# Patient Record
Sex: MALE | Race: WHITE | NOT HISPANIC OR LATINO | Employment: OTHER | URBAN - METROPOLITAN AREA
[De-identification: names, ages, dates, MRNs, and addresses within clinical notes are randomized per-mention and may not be internally consistent; named-entity substitution may affect disease eponyms.]

---

## 2024-08-01 ENCOUNTER — NURSE TRIAGE (OUTPATIENT)
Age: 79
End: 2024-08-01

## 2024-08-01 ENCOUNTER — OFFICE VISIT (OUTPATIENT)
Dept: UROLOGY | Facility: CLINIC | Age: 79
End: 2024-08-01
Payer: MEDICARE

## 2024-08-01 VITALS — SYSTOLIC BLOOD PRESSURE: 100 MMHG | DIASTOLIC BLOOD PRESSURE: 60 MMHG | OXYGEN SATURATION: 99 % | HEART RATE: 65 BPM

## 2024-08-01 DIAGNOSIS — N20.0 NEPHROLITHIASIS: Primary | ICD-10-CM

## 2024-08-01 PROCEDURE — 99204 OFFICE O/P NEW MOD 45 MIN: CPT

## 2024-08-01 RX ORDER — BACLOFEN 10 MG/1
5 TABLET ORAL
COMMUNITY

## 2024-08-01 RX ORDER — BISACODYL 5 MG/1
TABLET, DELAYED RELEASE ORAL
COMMUNITY

## 2024-08-01 RX ORDER — CEFDINIR 300 MG/1
CAPSULE ORAL
COMMUNITY
Start: 2024-06-01

## 2024-08-01 RX ORDER — AMLODIPINE BESYLATE 5 MG/1
TABLET ORAL
COMMUNITY
Start: 2024-07-07

## 2024-08-01 RX ORDER — PANTOPRAZOLE SODIUM 40 MG/1
TABLET, DELAYED RELEASE ORAL
COMMUNITY
Start: 2024-07-27

## 2024-08-01 RX ORDER — AMOXICILLIN 250 MG
CAPSULE ORAL
COMMUNITY

## 2024-08-01 RX ORDER — SENNOSIDES A AND B 8.6 MG/1
2 TABLET, FILM COATED ORAL
COMMUNITY

## 2024-08-01 RX ORDER — CEPHALEXIN 500 MG/1
CAPSULE ORAL
COMMUNITY
Start: 2024-04-30

## 2024-08-01 RX ORDER — LEVOTHYROXINE SODIUM 0.03 MG/1
TABLET ORAL
COMMUNITY
Start: 2024-07-26

## 2024-08-01 RX ORDER — ACETAMINOPHEN 325 MG/1
TABLET ORAL
COMMUNITY

## 2024-08-01 RX ORDER — SILVER SULFADIAZINE 1 %
CREAM (GRAM) TOPICAL
COMMUNITY
Start: 2024-07-29

## 2024-08-01 RX ORDER — FAMOTIDINE 20 MG/1
20 TABLET, FILM COATED ORAL DAILY
COMMUNITY

## 2024-08-01 NOTE — Clinical Note
Can we get urine culture results from PCP faxed over to us so I can review over the past year.  Also on recent BMP as well

## 2024-08-01 NOTE — TELEPHONE ENCOUNTER
"Answer Assessment - Initial Assessment Questions  1. REASON FOR CALL or QUESTION: \"What is your reason for calling today?\" or \"How can I best help you?\" or \"What question do you have that I can help answer?\"      [facility] returning a call to the office in regards to patient's appt for today. Call was disconnected    Protocols used: Information Only Call - No Triage-ADULT-[place]    "

## 2024-08-01 NOTE — PROGRESS NOTES
Office Visit- Urology  Chris Bojorquez 1945 MRN: [id number]      Assessment/Discussion/Plan    79 y.o. male managed by     Chronic catheter  -Per review of previous urologist records it appears patient has been having chronic catheter since 2022.  -Patient has a urethral catheter that is exchanged every 3 weeks due to obstruction.  Gave orders for flushing.  As well as next exchange to be a 18 Chinese all silicone catheter to prevent obstruction.  If catheter becomes less obstructed could consider exchange and on every 4-week basis  -Discussed suprapubic catheterization.  Patient refuses at this point in time  -Reassess in a few months.  Continue with routine catheter exchanges per nursing facility    2.  Urinary tract infections  -Family reports that patient has had multiple hospitalizations for sepsis  -Will obtain updated CT stone study to ensure no obstructing ureteral stones  -They will call office if any concern for urinary tract infection.  Will not treat with antibiotics for slowly cloudy urine or sediment.  Typical syndrome includes altered mental status with hallucinations.  Advised family to be on look out for symptoms such as increased bladder spasms, blood within the urine, suprapubic pain, fevers, chills  -Will obtain culture data over the past year.  Family would be interested in prophylactic antibiotics to keep patient out of hospital for urosepsis.  I think that this is reasonable given that he has prescribers that we would not bill will medicate with chronic catheter  -Would probably plan for Keflex 250 mg daily but will be dependent on urine culture results      Chief Complaint:   Chris is a 79 y.o. male presenting to the office for an initial evaluation regarding chronic catheter       Subjective    patient is a 79-year-old male with extensive urologic history including urinary retention Likely multifactorial given Parkinson's/BPH, nephrolithiasis, bladder stone, history of urosepsis.  He  presents today to the office with his 2 daughters.  He previously followed with [facility] but states that due to transportation issues patient was discharged from their practice.  Patient has a history of Rivera catheterization since 2022.  He has been maintained with a Rivera catheter has been exchanged on an every 3-week basis due to concern for obstruction/infection.  Nursing home staff performs exchanges every 3 weeks.  Patient does have a previous history of phimosis but with relatively easy access to urethral meatus.  Patient has previously refused consideration of suprapubic catheter.  He has had 2 ureteroscopy interventions for ureteral stones within the past year being in June 2023 used to in July 2023.  On patient has urinary tract infection family reports that he has hallucinations.  He stated that he has had to be hospitalized in the ICU due to sepsis    ROS:   Review of Systems   Constitutional: Negative.  Negative for chills, fatigue and fever.   HENT: Negative.     Respiratory:  Negative for shortness of breath.    Cardiovascular:  Negative for chest pain.   Gastrointestinal: Negative.  Negative for abdominal pain.   Endocrine: Negative.    Musculoskeletal: Negative.    Skin: Negative.    Neurological: Negative.  Negative for dizziness and light-headedness.   Hematological: Negative.    Psychiatric/Behavioral: Negative.           Past Medical History  History reviewed. No pertinent past medical history.    Past Surgical History  History reviewed. No pertinent surgical history.    Past Family History  History reviewed. No pertinent family history.    Past Social history  Social History     Socioeconomic History    Marital status:      Spouse name: Not on file    Number of children: Not on file    Years of education: Not on file    Highest education level: Not on file   Occupational History    Not on file   Tobacco Use    Smoking status: Not on file    Smokeless tobacco: Not on file    Substance and Sexual Activity    Alcohol use: Not on file    Drug use: Not on file    Sexual activity: Not on file   Other Topics Concern    Not on file   Social History Narrative    Not on file     Social Determinants of Health     Financial Resource Strain: Not on file   Food Insecurity: Not on file   Transportation Needs: Not on file   Physical Activity: Not on file   Stress: Not on file   Social Connections: Not on file   Intimate Partner Violence: Not on file   Housing Stability: Not on file       Current Medications  No current outpatient medications on file.     No current facility-administered medications for this visit.       Allergies  Not on File    OBJECTIVE    Vitals   Vitals:    08/01/24 1550   BP: 100/60   BP Location: Left arm   Patient Position: Sitting   Cuff Size: Standard   Pulse: 65   SpO2: 99%       PVR:    Physical Exam  Constitutional:       General: He is not in acute distress.     Appearance: Normal appearance. He is normal weight. He is not ill-appearing or toxic-appearing.   HENT:      Head: Normocephalic and atraumatic.   Eyes:      Conjunctiva/sclera: Conjunctivae normal.   Cardiovascular:      Rate and Rhythm: Normal rate.      Pulses: Normal pulses.   Pulmonary:      Effort: Pulmonary effort is normal. No respiratory distress.   Abdominal:      Tenderness: There is no right CVA tenderness or left CVA tenderness.   Genitourinary:     Comments: On exam today uncircumcised phallus.  Rivera catheter in place with mild exudate and urethra.  Not able to fully assess the ventral aspect of the urethra due to patient being in wheelchair not been able to ambulate but no significant erosion on my examination today  Musculoskeletal:         General: Normal range of motion.      Cervical back: Normal range of motion and neck supple.   Skin:     General: Skin is warm and dry.   Neurological:      General: No focal deficit present.      Mental Status: He is alert and oriented to person, place, and  "time.      Cranial Nerves: No cranial nerve deficit.   Psychiatric:         Mood and Affect: Mood normal.         Behavior: Behavior normal.         Thought Content: Thought content normal.          Labs:   LASTLAB(PROTEIN UA,TP,[id number],PROT,PROTEIN UA,PROTEINUA,PROTUR,LABPROTURI,PROTEIN,URPROTEIN)@   No results found for: \"PSA\"  No results found for: \"CREATININE\"   No results found for: \"HGBA1C\"  No results found for: \"GLUCOSE\", \"CALCIUM\", \"NA\", \"K\", \"CO2\", \"CL\", \"BUN\", \"CREATININE\"    I have personally reviewed all pertinent lab results and reviewed with patient    Imaging       David Sebastian PA-C  Date: 8/1/2024 Time: 3:56 PM  [facility]    This note was written using fluency dictation software. Please excuse any resulting minor grammatical errors.      "

## 2024-08-13 ENCOUNTER — HOSPITAL ENCOUNTER (OUTPATIENT)
Dept: RADIOLOGY | Facility: HOSPITAL | Age: 79
Discharge: HOME | End: 2024-08-13
Payer: MEDICARE

## 2024-08-13 DIAGNOSIS — N20.0 NEPHROLITHIASIS: ICD-10-CM

## 2024-08-13 PROCEDURE — 74176 CT ABD & PELVIS W/O CONTRAST: CPT

## 2024-08-14 ENCOUNTER — TELEPHONE (OUTPATIENT)
Age: 79
End: 2024-08-14

## 2024-08-14 NOTE — TELEPHONE ENCOUNTER
Radiology Test Results - Radiology Calling with report update    Pt under care of: David Sebastian- AP    Imaging Completed: 8/13/24    Significant Findings - Please review

## 2024-08-15 NOTE — TELEPHONE ENCOUNTER
Daughter called stating that brother sent message to her to call for the results of the patient's recent CT.     CB: [phone]

## 2024-08-15 NOTE — TELEPHONE ENCOUNTER
CT reviewed- large right renal pelvis stone burden (previous URS/laser lithotripsy x 2 in 2023 at [facility], with smaller burden then, so this appears new within the year)    i called patient with CT results. He is in facility and does not take his own calls. Nurse requested fax of the CT report to them as well.     I then called pt son listed as TOBY Reed he did not answer. left message on voicemail to please return our call to review dad's CT scan results.    stone surgery (high power URS/LL) will be recommended    [facility] DON fax #[phone]

## 2024-08-20 NOTE — TELEPHONE ENCOUNTER
Please call patient/power of  that due to the results of the CT scan he will likely need surgical intervention.  We should have this discussion in person so we can initiate this process.  Please schedule for urgent follow-up to initiate this.  I am okay with adding on during lunch.  Power of  should be present so that consent documentation can be signed

## 2024-08-20 NOTE — TELEPHONE ENCOUNTER
Spoke with Derek and relayed the message below: *Appt given for 8/26/24 at 12:40 pm with AP. Derek to call back and let us know who will be accompanying his dad to the appt.     My Note Signed 12:09 PM       Copy     LVM for pt's son - Derek - [phone]. *Instructed him to give the office a call back and ask for Terri JANSEN.      David Sebastian PA-C Physician Assistant Signed 10:22 AM      Copy      Please call patient/power of  that due to the results of the CT scan he will likely need surgical intervention.  We should have this discussion in person so we can initiate this process.  Please schedule for urgent follow-up to initiate this.  I am okay with adding on during lunch.  Power of  should be present so that consent documentation can be signed

## 2024-08-20 NOTE — TELEPHONE ENCOUNTER
[facility] for pt's son - Derek - [phone]. *Instructed him to give the office a call back and ask for Terri JANSEN.     David Sebastian PA-C Physician Assistant Signed 10:22 AM     Copy     Please call patient/power of  that due to the results of the CT scan he will likely need surgical intervention.  We should have this discussion in person so we can initiate this process.  Please schedule for urgent follow-up to initiate this.  I am okay with adding on during lunch.  Power of  should be present so that consent documentation can be signed

## 2024-08-21 NOTE — TELEPHONE ENCOUNTER
Pt son called and requested a VV for pt.  Stated that is is hard to get him out of the facility    Please chip son back at [phone]

## 2024-08-21 NOTE — TELEPHONE ENCOUNTER
Just spoke with patients son Derek, relayed Haines message that appointment needs to be done in person.  He would like to know what type of surgery his father might have, I aslo told him that would be decided when he meets with David.  Derek would still like a call back from Stephanie.

## 2024-08-26 ENCOUNTER — APPOINTMENT (EMERGENCY)
Dept: RADIOLOGY | Facility: HOSPITAL | Age: 79
DRG: 659 | End: 2024-08-26
Payer: MEDICARE

## 2024-08-26 ENCOUNTER — HOSPITAL ENCOUNTER (INPATIENT)
Facility: HOSPITAL | Age: 79
LOS: 15 days | Discharge: NURSING, REHAB OR LONG-TERM CARE | DRG: 659 | End: 2024-09-10
Attending: STUDENT IN AN ORGANIZED HEALTH CARE EDUCATION/TRAINING PROGRAM | Admitting: STUDENT IN AN ORGANIZED HEALTH CARE EDUCATION/TRAINING PROGRAM
Payer: MEDICARE

## 2024-08-26 DIAGNOSIS — L30.4 INTERTRIGO: ICD-10-CM

## 2024-08-26 DIAGNOSIS — R73.9 HYPERGLYCEMIA: ICD-10-CM

## 2024-08-26 DIAGNOSIS — N30.90 CYSTITIS: ICD-10-CM

## 2024-08-26 DIAGNOSIS — N20.0 RENAL STONES: ICD-10-CM

## 2024-08-26 DIAGNOSIS — R41.82 ALTERED MENTAL STATUS: Primary | ICD-10-CM

## 2024-08-26 DIAGNOSIS — A41.9 SEPSIS (HCC): ICD-10-CM

## 2024-08-26 DIAGNOSIS — I87.8 VENOUS STASIS: ICD-10-CM

## 2024-08-26 DIAGNOSIS — N17.9 AKI (ACUTE KIDNEY INJURY) (HCC): ICD-10-CM

## 2024-08-26 DIAGNOSIS — E87.70 VOLUME OVERLOAD: ICD-10-CM

## 2024-08-26 DIAGNOSIS — N12 EMPHYSEMATOUS PYELITIS: ICD-10-CM

## 2024-08-26 DIAGNOSIS — N50.89 SCROTAL SWELLING: ICD-10-CM

## 2024-08-26 DIAGNOSIS — N21.0 BLADDER STONES: ICD-10-CM

## 2024-08-26 DIAGNOSIS — E11.65 UNCONTROLLED TYPE 2 DIABETES MELLITUS WITH HYPERGLYCEMIA (HCC): ICD-10-CM

## 2024-08-26 DIAGNOSIS — N20.0 CALCULUS OF KIDNEY: ICD-10-CM

## 2024-08-26 PROBLEM — G93.41 ACUTE METABOLIC ENCEPHALOPATHY: Status: ACTIVE | Noted: 2024-08-26

## 2024-08-26 PROBLEM — E87.5 HYPERKALEMIA: Status: ACTIVE | Noted: 2024-08-26

## 2024-08-26 PROBLEM — I10 ESSENTIAL HYPERTENSION: Status: ACTIVE | Noted: 2024-08-26

## 2024-08-26 PROBLEM — G20.A1 PARKINSON DISEASE: Status: ACTIVE | Noted: 2024-08-26

## 2024-08-26 PROBLEM — E03.9 HYPOTHYROIDISM: Status: ACTIVE | Noted: 2024-08-26

## 2024-08-26 PROBLEM — E44.0 MODERATE PROTEIN-CALORIE MALNUTRITION (HCC): Status: ACTIVE | Noted: 2024-08-26

## 2024-08-26 PROBLEM — E87.1 HYPONATREMIA: Status: ACTIVE | Noted: 2024-08-26

## 2024-08-26 LAB
2HR DELTA HS TROPONIN: -1 NG/L
ALBUMIN SERPL BCG-MCNC: 2.9 G/DL (ref 3.5–5)
ALP SERPL-CCNC: 167 U/L (ref 34–104)
ALT SERPL W P-5'-P-CCNC: 8 U/L (ref 7–52)
AMMONIA PLAS-SCNC: 14 UMOL/L (ref 18–72)
ANION GAP SERPL CALCULATED.3IONS-SCNC: 8 MMOL/L (ref 4–13)
ANION GAP SERPL CALCULATED.3IONS-SCNC: 9 MMOL/L (ref 4–13)
APAP SERPL-MCNC: <2 UG/ML (ref 10–20)
AST SERPL W P-5'-P-CCNC: 25 U/L (ref 13–39)
B-OH-BUTYR SERPL-MCNC: 0.93 MMOL/L (ref 0.02–0.27)
BACTERIA UR QL AUTO: ABNORMAL /HPF
BASE EX.OXY STD BLDV CALC-SCNC: 61.8 % (ref 60–80)
BASE EXCESS BLDV CALC-SCNC: 0.4 MMOL/L
BASOPHILS # BLD AUTO: 0.02 THOUSANDS/ÂΜL (ref 0–0.1)
BASOPHILS NFR BLD AUTO: 0 % (ref 0–1)
BILIRUB SERPL-MCNC: 0.91 MG/DL (ref 0.2–1)
BILIRUB UR QL STRIP: NEGATIVE
BUN SERPL-MCNC: 39 MG/DL (ref 5–25)
BUN SERPL-MCNC: 41 MG/DL (ref 5–25)
CALCIUM ALBUM COR SERPL-MCNC: 8.9 MG/DL (ref 8.3–10.1)
CALCIUM SERPL-MCNC: 8 MG/DL (ref 8.4–10.2)
CALCIUM SERPL-MCNC: 8.1 MG/DL (ref 8.4–10.2)
CARDIAC TROPONIN I PNL SERPL HS: 10 NG/L
CARDIAC TROPONIN I PNL SERPL HS: 9 NG/L
CHLORIDE SERPL-SCNC: 104 MMOL/L (ref 96–108)
CHLORIDE SERPL-SCNC: 98 MMOL/L (ref 96–108)
CLARITY UR: ABNORMAL
CO2 SERPL-SCNC: 22 MMOL/L (ref 21–32)
CO2 SERPL-SCNC: 27 MMOL/L (ref 21–32)
COLOR UR: YELLOW
CREAT SERPL-MCNC: 1.94 MG/DL (ref 0.6–1.3)
CREAT SERPL-MCNC: 1.95 MG/DL (ref 0.6–1.3)
EOSINOPHIL # BLD AUTO: 0.01 THOUSAND/ÂΜL (ref 0–0.61)
EOSINOPHIL NFR BLD AUTO: 0 % (ref 0–6)
ERYTHROCYTE [DISTWIDTH] IN BLOOD BY AUTOMATED COUNT: 15.9 % (ref 11.6–15.1)
ETHANOL SERPL-MCNC: <10 MG/DL
FLUAV RNA RESP QL NAA+PROBE: NEGATIVE
FLUBV RNA RESP QL NAA+PROBE: NEGATIVE
GFR SERPL CREATININE-BSD FRML MDRD: 32 ML/MIN/1.73SQ M
GFR SERPL CREATININE-BSD FRML MDRD: 32 ML/MIN/1.73SQ M
GLUCOSE SERPL-MCNC: 171 MG/DL (ref 65–140)
GLUCOSE SERPL-MCNC: 348 MG/DL (ref 65–140)
GLUCOSE SERPL-MCNC: 437 MG/DL (ref 65–140)
GLUCOSE SERPL-MCNC: 492 MG/DL (ref 65–140)
GLUCOSE SERPL-MCNC: 554 MG/DL (ref 65–140)
GLUCOSE SERPL-MCNC: 638 MG/DL (ref 65–140)
GLUCOSE UR STRIP-MCNC: ABNORMAL MG/DL
HCO3 BLDV-SCNC: 25.6 MMOL/L (ref 24–30)
HCT VFR BLD AUTO: 33.7 % (ref 36.5–49.3)
HGB BLD-MCNC: 10.3 G/DL (ref 12–17)
HGB UR QL STRIP.AUTO: ABNORMAL
IMM GRANULOCYTES # BLD AUTO: 0.03 THOUSAND/UL (ref 0–0.2)
IMM GRANULOCYTES NFR BLD AUTO: 0 % (ref 0–2)
KETONES UR STRIP-MCNC: ABNORMAL MG/DL
LACTATE SERPL-SCNC: 1.1 MMOL/L (ref 0.5–2)
LEUKOCYTE ESTERASE UR QL STRIP: ABNORMAL
LYMPHOCYTES # BLD AUTO: 0.55 THOUSANDS/ÂΜL (ref 0.6–4.47)
LYMPHOCYTES NFR BLD AUTO: 6 % (ref 14–44)
MCH RBC QN AUTO: 25.5 PG (ref 26.8–34.3)
MCHC RBC AUTO-ENTMCNC: 30.6 G/DL (ref 31.4–37.4)
MCV RBC AUTO: 83 FL (ref 82–98)
MONOCYTES # BLD AUTO: 0.73 THOUSAND/ÂΜL (ref 0.17–1.22)
MONOCYTES NFR BLD AUTO: 8 % (ref 4–12)
NEUTROPHILS # BLD AUTO: 7.88 THOUSANDS/ÂΜL (ref 1.85–7.62)
NEUTS SEG NFR BLD AUTO: 86 % (ref 43–75)
NITRITE UR QL STRIP: NEGATIVE
NON-SQ EPI CELLS URNS QL MICRO: ABNORMAL /HPF
NRBC BLD AUTO-RTO: 0 /100 WBCS
O2 CT BLDV-SCNC: 9.4 ML/DL
PCO2 BLDV: 43.7 MM HG (ref 42–50)
PH BLDV: 7.39 [PH] (ref 7.3–7.4)
PH UR STRIP.AUTO: 7 [PH]
PLATELET # BLD AUTO: 149 THOUSANDS/UL (ref 149–390)
PLATELET # BLD AUTO: 163 THOUSANDS/UL (ref 149–390)
PMV BLD AUTO: 10.3 FL (ref 8.9–12.7)
PMV BLD AUTO: 9.7 FL (ref 8.9–12.7)
PO2 BLDV: 32.4 MM HG (ref 35–45)
POTASSIUM SERPL-SCNC: 5.4 MMOL/L (ref 3.5–5.3)
POTASSIUM SERPL-SCNC: 5.4 MMOL/L (ref 3.5–5.3)
PROT SERPL-MCNC: 6.8 G/DL (ref 6.4–8.4)
PROT UR STRIP-MCNC: ABNORMAL MG/DL
RBC # BLD AUTO: 4.04 MILLION/UL (ref 3.88–5.62)
RBC #/AREA URNS AUTO: ABNORMAL /HPF
RSV RNA RESP QL NAA+PROBE: NEGATIVE
SALICYLATES SERPL-MCNC: <5 MG/DL (ref 3–20)
SARS-COV-2 RNA RESP QL NAA+PROBE: NEGATIVE
SODIUM SERPL-SCNC: 133 MMOL/L (ref 135–147)
SODIUM SERPL-SCNC: 135 MMOL/L (ref 135–147)
SP GR UR STRIP.AUTO: 1.01 (ref 1–1.03)
TSH SERPL DL<=0.05 MIU/L-ACNC: 2.57 UIU/ML (ref 0.45–4.5)
UROBILINOGEN UR STRIP-ACNC: <2 MG/DL
WBC # BLD AUTO: 9.22 THOUSAND/UL (ref 4.31–10.16)
WBC #/AREA URNS AUTO: ABNORMAL /HPF
WBC CLUMPS # UR AUTO: PRESENT /UL

## 2024-08-26 PROCEDURE — 83605 ASSAY OF LACTIC ACID: CPT | Performed by: STUDENT IN AN ORGANIZED HEALTH CARE EDUCATION/TRAINING PROGRAM

## 2024-08-26 PROCEDURE — 96365 THER/PROPH/DIAG IV INF INIT: CPT

## 2024-08-26 PROCEDURE — 82140 ASSAY OF AMMONIA: CPT | Performed by: STUDENT IN AN ORGANIZED HEALTH CARE EDUCATION/TRAINING PROGRAM

## 2024-08-26 PROCEDURE — 83036 HEMOGLOBIN GLYCOSYLATED A1C: CPT

## 2024-08-26 PROCEDURE — 84443 ASSAY THYROID STIM HORMONE: CPT

## 2024-08-26 PROCEDURE — 82805 BLOOD GASES W/O2 SATURATION: CPT | Performed by: STUDENT IN AN ORGANIZED HEALTH CARE EDUCATION/TRAINING PROGRAM

## 2024-08-26 PROCEDURE — 99285 EMERGENCY DEPT VISIT HI MDM: CPT | Performed by: STUDENT IN AN ORGANIZED HEALTH CARE EDUCATION/TRAINING PROGRAM

## 2024-08-26 PROCEDURE — 87186 SC STD MICRODIL/AGAR DIL: CPT | Performed by: STUDENT IN AN ORGANIZED HEALTH CARE EDUCATION/TRAINING PROGRAM

## 2024-08-26 PROCEDURE — 82010 KETONE BODYS QUAN: CPT | Performed by: STUDENT IN AN ORGANIZED HEALTH CARE EDUCATION/TRAINING PROGRAM

## 2024-08-26 PROCEDURE — 82077 ASSAY SPEC XCP UR&BREATH IA: CPT | Performed by: STUDENT IN AN ORGANIZED HEALTH CARE EDUCATION/TRAINING PROGRAM

## 2024-08-26 PROCEDURE — 85025 COMPLETE CBC W/AUTO DIFF WBC: CPT | Performed by: STUDENT IN AN ORGANIZED HEALTH CARE EDUCATION/TRAINING PROGRAM

## 2024-08-26 PROCEDURE — 71045 X-RAY EXAM CHEST 1 VIEW: CPT

## 2024-08-26 PROCEDURE — 0241U HB NFCT DS VIR RESP RNA 4 TRGT: CPT | Performed by: STUDENT IN AN ORGANIZED HEALTH CARE EDUCATION/TRAINING PROGRAM

## 2024-08-26 PROCEDURE — 99222 1ST HOSP IP/OBS MODERATE 55: CPT

## 2024-08-26 PROCEDURE — 36415 COLL VENOUS BLD VENIPUNCTURE: CPT | Performed by: STUDENT IN AN ORGANIZED HEALTH CARE EDUCATION/TRAINING PROGRAM

## 2024-08-26 PROCEDURE — 87040 BLOOD CULTURE FOR BACTERIA: CPT | Performed by: STUDENT IN AN ORGANIZED HEALTH CARE EDUCATION/TRAINING PROGRAM

## 2024-08-26 PROCEDURE — 87154 CUL TYP ID BLD PTHGN 6+ TRGT: CPT | Performed by: STUDENT IN AN ORGANIZED HEALTH CARE EDUCATION/TRAINING PROGRAM

## 2024-08-26 PROCEDURE — 96375 TX/PRO/DX INJ NEW DRUG ADDON: CPT

## 2024-08-26 PROCEDURE — 81001 URINALYSIS AUTO W/SCOPE: CPT | Performed by: STUDENT IN AN ORGANIZED HEALTH CARE EDUCATION/TRAINING PROGRAM

## 2024-08-26 PROCEDURE — 99285 EMERGENCY DEPT VISIT HI MDM: CPT

## 2024-08-26 PROCEDURE — 80053 COMPREHEN METABOLIC PANEL: CPT | Performed by: STUDENT IN AN ORGANIZED HEALTH CARE EDUCATION/TRAINING PROGRAM

## 2024-08-26 PROCEDURE — 87077 CULTURE AEROBIC IDENTIFY: CPT | Performed by: STUDENT IN AN ORGANIZED HEALTH CARE EDUCATION/TRAINING PROGRAM

## 2024-08-26 PROCEDURE — 70450 CT HEAD/BRAIN W/O DYE: CPT

## 2024-08-26 PROCEDURE — 82948 REAGENT STRIP/BLOOD GLUCOSE: CPT

## 2024-08-26 PROCEDURE — 74177 CT ABD & PELVIS W/CONTRAST: CPT

## 2024-08-26 PROCEDURE — 80179 DRUG ASSAY SALICYLATE: CPT | Performed by: STUDENT IN AN ORGANIZED HEALTH CARE EDUCATION/TRAINING PROGRAM

## 2024-08-26 PROCEDURE — 87086 URINE CULTURE/COLONY COUNT: CPT | Performed by: STUDENT IN AN ORGANIZED HEALTH CARE EDUCATION/TRAINING PROGRAM

## 2024-08-26 PROCEDURE — 80048 BASIC METABOLIC PNL TOTAL CA: CPT

## 2024-08-26 PROCEDURE — 93005 ELECTROCARDIOGRAM TRACING: CPT

## 2024-08-26 PROCEDURE — 84484 ASSAY OF TROPONIN QUANT: CPT | Performed by: STUDENT IN AN ORGANIZED HEALTH CARE EDUCATION/TRAINING PROGRAM

## 2024-08-26 PROCEDURE — 80143 DRUG ASSAY ACETAMINOPHEN: CPT | Performed by: STUDENT IN AN ORGANIZED HEALTH CARE EDUCATION/TRAINING PROGRAM

## 2024-08-26 PROCEDURE — 85049 AUTOMATED PLATELET COUNT: CPT

## 2024-08-26 RX ORDER — CARBIDOPA AND LEVODOPA 25; 100 MG/1; MG/1
1 TABLET ORAL 2 TIMES DAILY
Status: DISCONTINUED | OUTPATIENT
Start: 2024-08-26 | End: 2024-09-10 | Stop reason: HOSPADM

## 2024-08-26 RX ORDER — ENOXAPARIN SODIUM 100 MG/ML
40 INJECTION SUBCUTANEOUS DAILY
Status: DISCONTINUED | OUTPATIENT
Start: 2024-08-27 | End: 2024-08-26

## 2024-08-26 RX ORDER — SODIUM CHLORIDE 9 MG/ML
75 INJECTION, SOLUTION INTRAVENOUS CONTINUOUS
Status: DISCONTINUED | OUTPATIENT
Start: 2024-08-26 | End: 2024-08-28

## 2024-08-26 RX ORDER — PANTOPRAZOLE SODIUM 40 MG/1
40 TABLET, DELAYED RELEASE ORAL
Status: DISCONTINUED | OUTPATIENT
Start: 2024-08-27 | End: 2024-09-10 | Stop reason: HOSPADM

## 2024-08-26 RX ORDER — CEFEPIME HYDROCHLORIDE 2 G/50ML
2000 INJECTION, SOLUTION INTRAVENOUS ONCE
Status: COMPLETED | OUTPATIENT
Start: 2024-08-26 | End: 2024-08-26

## 2024-08-26 RX ORDER — CEFEPIME HYDROCHLORIDE 1 G/50ML
1000 INJECTION, SOLUTION INTRAVENOUS EVERY 24 HOURS
Status: DISCONTINUED | OUTPATIENT
Start: 2024-08-27 | End: 2024-08-27

## 2024-08-26 RX ORDER — LEVOTHYROXINE SODIUM 25 UG/1
25 TABLET ORAL
Status: DISCONTINUED | OUTPATIENT
Start: 2024-08-27 | End: 2024-09-10 | Stop reason: HOSPADM

## 2024-08-26 RX ORDER — ENOXAPARIN SODIUM 100 MG/ML
30 INJECTION SUBCUTANEOUS DAILY
Status: DISCONTINUED | OUTPATIENT
Start: 2024-08-27 | End: 2024-08-29

## 2024-08-26 RX ORDER — CEFEPIME HYDROCHLORIDE 2 G/50ML
2000 INJECTION, SOLUTION INTRAVENOUS EVERY 12 HOURS
Status: DISCONTINUED | OUTPATIENT
Start: 2024-08-26 | End: 2024-08-26

## 2024-08-26 RX ORDER — SODIUM CHLORIDE, SODIUM GLUCONATE, SODIUM ACETATE, POTASSIUM CHLORIDE, MAGNESIUM CHLORIDE, SODIUM PHOSPHATE, DIBASIC, AND POTASSIUM PHOSPHATE .53; .5; .37; .037; .03; .012; .00082 G/100ML; G/100ML; G/100ML; G/100ML; G/100ML; G/100ML; G/100ML
75 INJECTION, SOLUTION INTRAVENOUS CONTINUOUS
Status: DISCONTINUED | OUTPATIENT
Start: 2024-08-26 | End: 2024-08-26

## 2024-08-26 RX ADMIN — SODIUM CHLORIDE 75 ML/HR: 0.9 INJECTION, SOLUTION INTRAVENOUS at 21:58

## 2024-08-26 RX ADMIN — CARBIDOPA AND LEVODOPA 1 TABLET: 25; 100 TABLET ORAL at 19:21

## 2024-08-26 RX ADMIN — SODIUM CHLORIDE 10 UNITS/HR: 9 INJECTION, SOLUTION INTRAVENOUS at 19:47

## 2024-08-26 RX ADMIN — CEFEPIME HYDROCHLORIDE 2000 MG: 2 INJECTION, SOLUTION INTRAVENOUS at 13:36

## 2024-08-26 RX ADMIN — SODIUM CHLORIDE 1000 ML: 0.9 INJECTION, SOLUTION INTRAVENOUS at 18:12

## 2024-08-26 RX ADMIN — SODIUM CHLORIDE 1000 ML: 0.9 INJECTION, SOLUTION INTRAVENOUS at 13:35

## 2024-08-26 RX ADMIN — VANCOMYCIN HYDROCHLORIDE 1500 MG: 10 INJECTION, POWDER, LYOPHILIZED, FOR SOLUTION INTRAVENOUS at 13:42

## 2024-08-26 RX ADMIN — IOHEXOL 100 ML: 350 INJECTION, SOLUTION INTRAVENOUS at 17:14

## 2024-08-26 NOTE — H&P
[facility]  H&P  Name: Chris Bojorquez 79 y.o. male I MRN: [id number]  Unit/Bed#: ED 09 I Date of Admission: 8/26/2024   Date of Service: 8/26/2024 I Hospital Day: 0      Assessment & Plan   Emphysematous pyelitis  Assessment & Plan  CT ABD: Worsening findings of urinary tract infection on the right, suspicious for emphysematous pyelitis. There is urothelial thickening throughout the right renal collecting system as well as gas within the renal collecting system and ureter. There is no   renal parenchymal gas to suggest emphysematous pyelonephritis.        Plan: case discussed with urology recommended admit and will evaluate per ED physician . Dr. Hughes aware     -continue cefipime   -de escalate Abx accordingly   -am labs   -continue IVF   -monitor I/Os   -trend vitals   -consult urology        * Acute metabolic encephalopathy  Assessment & Plan  On arrival from nursing home  Family reports poor compliance with medications including Diabetes medications   For the past several days havent been himself with lack of appetite       -complicated UTI   -mphysematous pyelitis.   -dehydration     CT head: no acute findings     CT ABD: Worsening findings of urinary tract infection on the right, suspicious for emphysematous pyelitis. There is urothelial thickening throughout the right renal collecting system as well as gas within the renal collecting system and ureter. There is no   renal parenchymal gas to suggest emphysematous pyelonephritis.    Plan:   Start cefepime   Continue IVF at adjusted level   Clears diet   Insulin infusion given severely increased and patient is drowsy and confused   Am labs   Interval follow up     Parkinson disease  Assessment & Plan  Continue carbidopa - levodopa       Hypothyroidism  Assessment & Plan  Continue medication  Recheck TSH     PABLO (acute kidney injury) (HCC)  Assessment & Plan  PABLO state on CKD 3   Per chart review basline Cr 0.7-0.9     Trend BMP   Am  "labs   Avoid nephrotoxins   PVR   Bladder scan   Retention protocol       Uncontrolled type 2 diabetes mellitus with hyperglycemia (HCC)  Assessment & Plan  No results found for: \"HGBA1C\"    Recent Labs     08/26/24  1219   POCGLU 554*       Blood Sugar Average: Last 72 hrs:  (P) 554  Poor complaince to medications per family   Takes metformin at home      Check HBA1C   Start insulin infusion   Interval follow up   Am labs    Accu checks Q 4     Moderate protein-calorie malnutrition (HCC)  Assessment & Plan  Malnutrition Findings:         Consult nutrition services                         BMI Findings:           There is no height or weight on file to calculate BMI.       Hyperkalemia  Assessment & Plan  Mild   No EKG changes    Recheck BMP in 4 hours     Hyponatremia  Assessment & Plan  Hypovolemic hyponatremia   Patient is dehydrated      S/p IV NS x 2 boluses total 2 L       Recheck BMP in 4 hrs     Essential hypertension  Assessment & Plan  Hold norvasc.   Restart when possible     -monitor vitals            VTE Pharmacologic Prophylaxis: VTE Score: 5 High Risk (Score >/= 5) - Pharmacological DVT Prophylaxis Ordered: enoxaparin (Lovenox). Sequential Compression Devices Ordered.  Code Status: Level 1 - Full Code   Discussion with family: Updated  (son) at bedside.    Anticipated Length of Stay: Patient will be admitted on an inpatient basis with an anticipated length of stay of greater than 2 midnights secondary to complicated UTI .    Total Time Spent on Date of Encounter in care of patient: 45 mins. This time was spent on one or more of the following: performing physical exam; counseling and coordination of care; obtaining or reviewing history; documenting in the medical record; reviewing/ordering tests, medications or procedures; communicating with other healthcare professionals and discussing with patient's family/caregivers.    Chief Complaint: altered mental state presents with his son and DIL "     History of Present Illness:  Chris Bojorquez is a 79 y.o. male with a PMH of htn , renal calculi, hypothyroidism , T2DM  who presents with his family from nursing home after was found confused and in altered mental state.family reports that this problem have been progressing for the past several days. Also with lack of appetite.   He have not been compliant with his medications per nursing at nursing home.     During my evaluation patient would open his eyes when called.   Answers breifly with one word.   Family reports that at baseline he talks and is sharp with limitations  He doesn't walk due to parkinsons.       Review of Systems:  Review of Systems   Constitutional:  Positive for activity change, appetite change and fatigue.   Gastrointestinal:  Positive for abdominal pain.       Past Medical and Surgical History:   History reviewed. No pertinent past medical history.    History reviewed. No pertinent surgical history.    Meds/Allergies:  Prior to Admission medications    Medication Sig Start Date End Date Taking? Authorizing Provider   acetaminophen (TYLENOL) 325 mg tablet every 4 (four) hours.    Historical Provider, MD   Acetaminophen 325 MG CAPS Take 2 tablets by mouth    Historical Provider, MD   amLODIPine (NORVASC) 5 mg tablet  7/7/24   Historical Provider, MD   baclofen 10 mg tablet Take 5 mg by mouth    Historical Provider, MD   bisacodyl (DULCOLAX) 5 mg EC tablet 1 (one) time each day at the same time.    Historical Provider, MD   carbidopa-levodopa (SINEMET)  mg per tablet  7/27/24   Historical Provider, MD   cefdinir (OMNICEF) 300 mg capsule  6/1/24   Historical Provider, MD   cephalexin (KEFLEX) 500 mg capsule  4/30/24   Historical Provider, MD   famotidine (PEPCID) 20 mg tablet Take 20 mg by mouth daily    Historical Provider, MD   levothyroxine 25 mcg tablet  7/26/24   Historical Provider, MD   magnesium hydroxide (MILK OF MAGNESIA) 400 mg/5 mL oral suspension Take by mouth     Historical Provider, MD   pantoprazole (PROTONIX) 40 mg tablet  7/27/24   Historical Provider, MD   senna (Senokot) 8.6 MG tablet Take 2 tablets by mouth    Historical Provider, MD   senna-docusate sodium (SENOKOT S) 8.6-50 mg per tablet 1 (one) time each day at the same time.    Historical Provider, MD   SSD 1 % cream  7/29/24   Historical Provider, MD GARCIA have reviewed home medications with patient personally.    Allergies:   Allergies   Allergen Reactions    Lactose - Food Allergy Other (See Comments)    Sulfa Antibiotics Other (See Comments)       Social History:  Marital Status:    Occupation: retired   Patient Pre-hospital Living Situation: Skilled Nursing Facility:    Patient Pre-hospital Level of Mobility: manual wheelchair  Patient Pre-hospital Diet Restrictions: regular   Substance Use History:   Social History     Substance and Sexual Activity   Alcohol Use None     Social History     Tobacco Use   Smoking Status Not on file   Smokeless Tobacco Not on file     Social History     Substance and Sexual Activity   Drug Use Not on file       Family History:  History reviewed. No pertinent family history.    Physical Exam:     Vitals:   Blood Pressure: 109/55 (08/26/24 1433)  Pulse: 70 (08/26/24 1433)  Temperature: 97.8 °F (36.6 °C) (08/26/24 1222)  Temp Source: Tympanic (08/26/24 1222)  Respirations: 16 (08/26/24 1433)  Weight - Scale: 62.4 kg (137 lb 9.1 oz) (08/26/24 1222)  SpO2: 97 % (08/26/24 1433)    Physical Exam  Constitutional:       Appearance: He is ill-appearing.   Cardiovascular:      Rate and Rhythm: Normal rate and regular rhythm.      Pulses: Normal pulses.      Heart sounds: Normal heart sounds. No murmur heard.  Pulmonary:      Effort: Pulmonary effort is normal.      Breath sounds: Normal breath sounds.   Abdominal:      General: Abdomen is flat. Bowel sounds are normal. There is no distension.      Palpations: Abdomen is soft.   Skin:     General: Skin is warm.      Capillary  "Refill: Capillary refill takes more than 3 seconds.   Neurological:      Mental Status: He is disoriented.   Psychiatric:         Behavior: Behavior normal.          Additional Data:     Lab Results:  Results from last 7 days   Lab Units 08/26/24  1256   WBC Thousand/uL 9.22   HEMOGLOBIN g/dL 10.3*   HEMATOCRIT % 33.7*   PLATELETS Thousands/uL 163   SEGS PCT % 86*   LYMPHO PCT % 6*   MONO PCT % 8   EOS PCT % 0     Results from last 7 days   Lab Units 08/26/24  1256   SODIUM mmol/L 133*   POTASSIUM mmol/L 5.4*   CHLORIDE mmol/L 98   CO2 mmol/L 27   BUN mg/dL 41*   CREATININE mg/dL 1.94*   ANION GAP mmol/L 8   CALCIUM mg/dL 8.0*   ALBUMIN g/dL 2.9*   TOTAL BILIRUBIN mg/dL 0.91   ALK PHOS U/L 167*   ALT U/L 8   AST U/L 25   GLUCOSE RANDOM mg/dL 638*         Results from last 7 days   Lab Units 08/26/24  1219   POC GLUCOSE mg/dl 554*     No results found for: \"HGBA1C\"  Results from last 7 days   Lab Units 08/26/24  1812   LACTIC ACID mmol/L 1.1       Lines/Drains:  Invasive Devices       Peripheral Intravenous Line  Duration             Peripheral IV 08/26/24 Distal;Left;Ventral (anterior) Forearm <1 day                        Imaging: Reviewed radiology reports from this admission including: abdominal/pelvic CT  CT head without contrast   Final Result by Isac Mcdonough MD (08/26 3007)      No acute intracranial abnormality.                  Workstation performed: [id number]         CT abdomen pelvis w contrast   Final Result by Isac Mcdonough MD (08/26 1777)      1. Worsening findings of urinary tract infection on the right, suspicious for emphysematous pyelitis. There is urothelial thickening throughout the right renal collecting system as well as gas within the renal collecting system and ureter. There is no    renal parenchymal gas to suggest emphysematous pyelonephritis.   2. Delayed nephrogram on the right, likely due to obstructive uropathy.   3. Decreased calculus volume in the right kidney, " interval passage of calculi suspected.   4. Bladder calculus with diffuse bladder wall thickening compatible with cystitis.      The study was marked in EPIC for immediate notification.      Workstation performed: [id number]         XR chest 1 view portable   ED Interpretation by Awais Haines DO (08/26 1824)   No acute cardiopulmonary disease          EKG and Other Studies Reviewed on Admission:   EKG: Personally Reviewed.    ** Please Note: This note has been constructed using a voice recognition system. **

## 2024-08-26 NOTE — ED NOTES
Update of pt's disposition given to [facility] over the phone     Cadence Islas RN  08/26/24 2305

## 2024-08-26 NOTE — ASSESSMENT & PLAN NOTE
Malnutrition Findings:         Consult nutrition services                         BMI Findings:           There is no height or weight on file to calculate BMI.

## 2024-08-26 NOTE — ASSESSMENT & PLAN NOTE
On arrival from nursing home  Family reports poor compliance with medications including Diabetes medications   For the past several days havent been himself with lack of appetite       -complicated UTI   -mphysematous pyelitis.   -dehydration     CT head: no acute findings     CT ABD: Worsening findings of urinary tract infection on the right, suspicious for emphysematous pyelitis. There is urothelial thickening throughout the right renal collecting system as well as gas within the renal collecting system and ureter. There is no   renal parenchymal gas to suggest emphysematous pyelonephritis.    Plan:   Start cefepime   Continue IVF at adjusted level   Clears diet   Insulin infusion given severely increased and patient is drowsy and confused   Am labs   Interval follow up

## 2024-08-26 NOTE — ASSESSMENT & PLAN NOTE
CT ABD: Worsening findings of urinary tract infection on the right, suspicious for emphysematous pyelitis. There is urothelial thickening throughout the right renal collecting system as well as gas within the renal collecting system and ureter. There is no   renal parenchymal gas to suggest emphysematous pyelonephritis.        Plan: case discussed with urology recommended admit and will evaluate per ED physician . Dr. Hughes aware     -continue cefipime   -de escalate Abx accordingly   -am labs   -continue IVF   -monitor I/Os   -trend vitals   -consult urology

## 2024-08-26 NOTE — ASSESSMENT & PLAN NOTE
Hypovolemic hyponatremia   Patient is dehydrated      S/p IV NS x 2 boluses total 2 L       Recheck BMP in 4 hrs

## 2024-08-26 NOTE — ED PROVIDER NOTES
History  Chief Complaint   Patient presents with    Altered Mental Status     Arrives via BLS from [facility]. EMS states he is altered beyond baseline, hx parkinsons. .      Patient is a 79-year-old male, past medical history including diabetes, Parkinson's, chronic Rivera catheter secondary to enlarged prostate, who presents the emergency department for altered mental status.  Per family, was at bedside, patient has recurrent episodes of altered mental status secondary to sepsis from UTIs.  This happens approximately every 6 months.  They state that patient was fine yesterday.  Today he has been not doing anything besides laying.  Family concerned there may be another UTI.    Further history and physical limited secondary to mental status change.      Altered Mental Status      Prior to Admission Medications   Prescriptions Last Dose Informant Patient Reported? Taking?   Acetaminophen 325 MG CAPS  Outside Facility (Specify) Yes No   Sig: Take 2 tablets by mouth   SSD 1 % cream  Outside Facility (Specify) Yes No   acetaminophen (TYLENOL) 325 mg tablet  Outside Facility (Specify) Yes No   Sig: every 4 (four) hours.   amLODIPine (NORVASC) 5 mg tablet  Outside Facility (Specify) Yes No   baclofen 10 mg tablet  Outside Facility (Specify) Yes No   Sig: Take 5 mg by mouth   bisacodyl (DULCOLAX) 5 mg EC tablet  Outside Facility (Specify) Yes No   Si (one) time each day at the same time.   carbidopa-levodopa (SINEMET)  mg per tablet  Outside Facility (Specify) Yes No   cefdinir (OMNICEF) 300 mg capsule  Outside Facility (Specify) Yes No   cephalexin (KEFLEX) 500 mg capsule  Outside Facility (Specify) Yes No   famotidine (PEPCID) 20 mg tablet  Outside Facility (Specify) Yes No   Sig: Take 20 mg by mouth daily   levothyroxine 25 mcg tablet  Outside Facility (Specify) Yes No   magnesium hydroxide (MILK OF MAGNESIA) 400 mg/5 mL oral suspension  Outside Facility (Specify) Yes No   Sig: Take by mouth    pantoprazole (PROTONIX) 40 mg tablet  Outside Facility (Specify) Yes No   senna (Senokot) 8.6 MG tablet  Outside Facility (Specify) Yes No   Sig: Take 2 tablets by mouth   senna-docusate sodium (SENOKOT S) 8.6-50 mg per tablet  Outside Facility (Specify) Yes No   Si (one) time each day at the same time.      Facility-Administered Medications: None       History reviewed. No pertinent past medical history.    History reviewed. No pertinent surgical history.    History reviewed. No pertinent family history.  I have reviewed and agree with the history as documented.    E-Cigarette/Vaping     E-Cigarette/Vaping Substances          Review of Systems   Unable to perform ROS: Mental status change       Physical Exam  Physical Exam  Vitals and nursing note reviewed.   Constitutional:       General: He is not in acute distress.     Appearance: He is well-developed. He is ill-appearing. He is not toxic-appearing or diaphoretic.   HENT:      Head: Normocephalic and atraumatic.      Right Ear: External ear normal.      Left Ear: External ear normal.      Nose: Nose normal.   Eyes:      General: Lids are normal. No scleral icterus.  Cardiovascular:      Rate and Rhythm: Normal rate and regular rhythm.      Heart sounds: Normal heart sounds. No murmur heard.     No friction rub. No gallop.   Pulmonary:      Effort: Pulmonary effort is normal. No respiratory distress.      Breath sounds: Normal breath sounds. No wheezing or rales.   Abdominal:      Palpations: Abdomen is soft.      Tenderness: There is no abdominal tenderness. There is no guarding or rebound.   Musculoskeletal:         General: No deformity. Normal range of motion.      Cervical back: Normal range of motion and neck supple.   Skin:     General: Skin is warm and dry.   Neurological:      General: No focal deficit present.         Vital Signs  ED Triage Vitals [24 1222]   Temperature Pulse Respirations Blood Pressure SpO2   97.8 °F (36.6 °C) 80 16 115/66  98 %      Temp Source Heart Rate Source Patient Position - Orthostatic VS BP Location FiO2 (%)   Tympanic Monitor Lying Right arm --      Pain Score       --           Vitals:    08/26/24 1222 08/26/24 1433   BP: 115/66 109/55   Pulse: 80 70   Patient Position - Orthostatic VS: Lying Lying         Visual Acuity      ED Medications  Medications   sodium chloride 0.9 % bolus 1,000 mL (1,000 mL Intravenous New Bag 8/26/24 1812)   sodium chloride 0.9 % bolus 1,000 mL (0 mL Intravenous Stopped 8/26/24 1535)   vancomycin (VANCOCIN) 1500 mg in sodium chloride 0.9% 250 mL IVPB (0 mg Intravenous Stopped 8/26/24 1512)   cefepime (MAXIPIME) IVPB (premix in dextrose) 2,000 mg 50 mL (0 mg Intravenous Stopped 8/26/24 1342)   iohexol (OMNIPAQUE) 350 MG/ML injection (MULTI-DOSE) 100 mL (100 mL Intravenous Given 8/26/24 1714)       Diagnostic Studies  Results Reviewed       Procedure Component Value Units Date/Time    Blood culture #2 [[id number]] Collected: 08/26/24 1812    Lab Status: In process Specimen: Blood from Arm, Right Updated: 08/26/24 1817    Blood culture #1 [[id number]] Collected: 08/26/24 1812    Lab Status: In process Specimen: Blood from Arm, Left Updated: 08/26/24 1817    Lactic acid, plasma (w/reflex if result > 2.0) [[id number]] Collected: 08/26/24 1812    Lab Status: In process Specimen: Blood from Arm, Left Updated: 08/26/24 1817    Hemoglobin A1C [[id number]] Collected: 08/26/24 1256    Lab Status: In process Specimen: Blood from Arm, Left Updated: 08/26/24 1812    HS Troponin I 2hr [[id number]]  (Normal) Collected: 08/26/24 1525    Lab Status: Final result Specimen: Blood from Arm, Left Updated: 08/26/24 1554     hs TnI 2hr 9 ng/L      Delta 2hr hsTnI -1 ng/L     Urine Microscopic [[id number]]  (Abnormal) Collected: 08/26/24 1333    Lab Status: Final result Specimen: Urine, Indwelling Rivera Catheter Updated: 08/26/24 1427     RBC, UA 0-5 /hpf      WBC, UA 30-50 /hpf      Epithelial Cells None Seen /hpf       Bacteria, UA Moderate /hpf      WBC Clumps present    Urine culture [[id number]] Collected: 08/26/24 1333    Lab Status: In process Specimen: Urine, Indwelling Rivera Catheter Updated: 08/26/24 1427    FLU/RSV/COVID - if FLU/RSV clinically relevant [[id number]]  (Normal) Collected: 08/26/24 1256    Lab Status: Final result Specimen: Nares from Nose Updated: 08/26/24 1406     SARS-CoV-2 Negative     INFLUENZA A PCR Negative     INFLUENZA B PCR Negative     RSV PCR Negative    Narrative:      This test has been performed using the CoV-2/Flu/RSV plus assay on the [employer] platform. This test has been validated by the  and verified by the performing laboratory.     This test is designed to amplify and detect the following: nucleocapsid (N), envelope (E), and RNA-dependent RNA polymerase (RdRP) genes of the SARS-CoV-2 genome; matrix (M), basic polymerase (PB2), and acidic protein (PA) segments of the influenza A genome; matrix (M) and non-structural protein (NS) segments of the influenza B genome, and the nucleocapsid genes of RSV A and RSV B.     Positive results are indicative of the presence of Flu A, Flu B, RSV, and/or SARS-CoV-2 RNA. Positive results for SARS-CoV-2 or suspected novel influenza should be reported to state, local, or federal health departments according to local reporting requirements.      All results should be assessed in conjunction with clinical presentation and other laboratory markers for clinical management.     FOR PEDIATRIC PATIENTS - copy/paste COVID Guidelines URL to browser: [email or web address]       UA w Reflex to Microscopic w Reflex to Culture [[id number]]  (Abnormal) Collected: 08/26/24 1333    Lab Status: Final result Specimen: Urine, Indwelling Rivera Catheter Updated: 08/26/24 1352     Color, UA Yellow     Clarity, UA Slightly Cloudy     Specific Gravity, UA 1.015     pH, UA 7.0     Leukocytes, UA Large      Nitrite, UA Negative     Protein, UA 30 (1+) mg/dl      Glucose, UA 1000 (1%) mg/dl      Ketones, UA 10 (1+) mg/dl      Urobilinogen, UA <2.0 mg/dl      Bilirubin, UA Negative     Occult Blood, UA Large    Comprehensive metabolic panel [[id number]]  (Abnormal) Collected: 08/26/24 1256    Lab Status: Final result Specimen: Blood from Arm, Left Updated: 08/26/24 1345     Sodium 133 mmol/L      Potassium 5.4 mmol/L      Chloride 98 mmol/L      CO2 27 mmol/L      ANION GAP 8 mmol/L      BUN 41 mg/dL      Creatinine 1.94 mg/dL      Glucose 638 mg/dL      Calcium 8.0 mg/dL      Corrected Calcium 8.9 mg/dL      AST 25 U/L      ALT 8 U/L      Alkaline Phosphatase 167 U/L      Total Protein 6.8 g/dL      Albumin 2.9 g/dL      Total Bilirubin 0.91 mg/dL      eGFR 32 ml/min/1.73sq m     Narrative:      National Kidney Disease Foundation guidelines for Chronic Kidney Disease (CKD):     Stage 1 with normal or high GFR (GFR > 90 mL/min/1.73 square meters)    Stage 2 Mild CKD (GFR = 60-89 mL/min/1.73 square meters)    Stage 3A Moderate CKD (GFR = 45-59 mL/min/1.73 square meters)    Stage 3B Moderate CKD (GFR = 30-44 mL/min/1.73 square meters)    Stage 4 Severe CKD (GFR = 15-29 mL/min/1.73 square meters)    Stage 5 End Stage CKD (GFR <15 mL/min/1.73 square meters)  Note: GFR calculation is accurate only with a steady state creatinine    Salicylate level [[id number]]  (Normal) Collected: 08/26/24 1256    Lab Status: Final result Specimen: Blood from Arm, Left Updated: 08/26/24 1343     Salicylate Lvl <5 mg/dL     HS Troponin 0hr (reflex protocol) [[id number]]  (Normal) Collected: 08/26/24 1256    Lab Status: Final result Specimen: Blood from Arm, Left Updated: 08/26/24 1342     hs TnI 0hr 10 ng/L     Acetaminophen level-If concentration is detectable, please discuss with medical  on call. [[id number]]  (Abnormal) Collected: 08/26/24 1256    Lab Status: Final result Specimen: Blood from Arm, Left Updated: 08/26/24 6807      Acetaminophen Level <2 ug/mL     Beta Hydroxybutyrate [[id number]]  (Abnormal) Collected: 08/26/24 1256    Lab Status: Final result Specimen: Blood from Arm, Left Updated: 08/26/24 1331     Beta- Hydroxybutyrate 0.93 mmol/L     Ammonia [[id number]]  (Abnormal) Collected: 08/26/24 1256    Lab Status: Final result Specimen: Blood from Arm, Left Updated: 08/26/24 1329     Ammonia 14 umol/L     Ethanol [[id number]]  (Normal) Collected: 08/26/24 1256    Lab Status: Final result Specimen: Blood from Arm, Left Updated: 08/26/24 1329     Ethanol Lvl <10 mg/dL     Blood gas, venous [[id number]]  (Abnormal) Collected: 08/26/24 1256    Lab Status: Final result Specimen: Blood from Arm, Left Updated: 08/26/24 1308     pH, Reese 7.386     pCO2, Reese 43.7 mm Hg      pO2, Reese 32.4 mm Hg      HCO3, Reese 25.6 mmol/L      Base Excess, Reese 0.4 mmol/L      O2 Content, Reese 9.4 ml/dL      O2 HGB, VENOUS 61.8 %     CBC and differential [[id number]]  (Abnormal) Collected: 08/26/24 1256    Lab Status: Final result Specimen: Blood from Arm, Left Updated: 08/26/24 1305     WBC 9.22 Thousand/uL      RBC 4.04 Million/uL      Hemoglobin 10.3 g/dL      Hematocrit 33.7 %      MCV 83 fL      MCH 25.5 pg      MCHC 30.6 g/dL      RDW 15.9 %      MPV 10.3 fL      Platelets 163 Thousands/uL      nRBC 0 /100 WBCs      Segmented % 86 %      Immature Grans % 0 %      Lymphocytes % 6 %      Monocytes % 8 %      Eosinophils Relative 0 %      Basophils Relative 0 %      Absolute Neutrophils 7.88 Thousands/µL      Absolute Immature Grans 0.03 Thousand/uL      Absolute Lymphocytes 0.55 Thousands/µL      Absolute Monocytes 0.73 Thousand/µL      Eosinophils Absolute 0.01 Thousand/µL      Basophils Absolute 0.02 Thousands/µL     Fingerstick Glucose (POCT) [[id number]]  (Abnormal) Collected: 08/26/24 1219    Lab Status: Final result Specimen: Blood Updated: 08/26/24 1222     POC Glucose 554 mg/dl                    CT head without contrast   Final Result by Isac Sandoval  MD Ceasar (08/26 1726)      No acute intracranial abnormality.                  Workstation performed: [id number]         CT abdomen pelvis w contrast   Final Result by Isac Mcdonough MD (08/26 1744)      1. Worsening findings of urinary tract infection on the right, suspicious for emphysematous pyelitis. There is urothelial thickening throughout the right renal collecting system as well as gas within the renal collecting system and ureter. There is no    renal parenchymal gas to suggest emphysematous pyelonephritis.   2. Delayed nephrogram on the right, likely due to obstructive uropathy.   3. Decreased calculus volume in the right kidney, interval passage of calculi suspected.   4. Bladder calculus with diffuse bladder wall thickening compatible with cystitis.      The study was marked in EPIC for immediate notification.      Workstation performed: [id number]         XR chest 1 view portable   ED Interpretation by Awais Haines DO (08/26 1824)   No acute cardiopulmonary disease                 Procedures  ECG 12 Lead Documentation Only    Date/Time: 8/26/2024 6:15 PM    Performed by: Awais Haines DO  Authorized by: Awais Haines DO    ECG reviewed by me, the ED Provider: yes    Patient location:  ED  Interpretation:     Interpretation: normal    Rate:     ECG rate:  79    ECG rate assessment: normal    Rhythm:     Rhythm: sinus rhythm    Ectopy:     Ectopy: none    QRS:     QRS axis:  Normal  Conduction:     Conduction: normal    ST segments:     ST segments:  Non-specific  T waves:     T waves: normal             ED Course  ED Course as of 08/26/24 1824   Mon Aug 26, 2024   1821 Discussed with urology. Aware. Discussed with SLIM. Accepts admission                                               Medical Decision Making  Patient is a 79 y.o. male who presents to the ED for altered mental status.  Patient is nontoxic, but ill-appearing.  Vitals are stable.  On exam patient is laying there with  "his mouth open.  Does respond intermittently to questions but otherwise not doing anything.    Presentation concerning for encephalopathy secondary to UTI. The differential includes toxic metabolic etiologies such as electrolyte disturbances (Na/Ca), hypoglycemia, uremia,, toxidromes of intoxication or withdrawal, hypoxemia or hypercarbia, liver disease or failure causing hepatic encephalopathy, endocrine emergencies (hyper/hypothyroidism, adrenal insufficiency), seizure, trauma, intracranial bleeds or ischemic stroke.     Plan: Labs, CT head, ct abdomen pelvis, abx, admit                 Portions of the record may have been created with voice recognition software. Occasional wrong word or \"sound a like\" substitutions may have occurred due to the inherent limitations of voice recognition software. Read the chart carefully and recognize, using context, where substitutions have occurred.      Amount and/or Complexity of Data Reviewed  Labs: ordered.  Radiology: ordered. Decision-making details documented in ED Course.    Risk  Prescription drug management.  Decision regarding hospitalization.                 Disposition  Final diagnoses:   Altered mental status   PABLO (acute kidney injury) (HCC)   Emphysematous pyelitis   Cystitis   Hyperglycemia     Time reflects when diagnosis was documented in both MDM as applicable and the Disposition within this note       Time User Action Codes Description Comment    8/26/2024  6:18 PM Awais Haines Add [R41.82] Altered mental status     8/26/2024  6:22 PM Awais Haines Add [N17.9] PABLO (acute kidney injury) (HCC)     8/26/2024  6:22 PM Awais Haines Add [N12] Emphysematous pyelitis     8/26/2024  6:23 PM Awais Haines [N30.90] Cystitis     8/26/2024  6:23 PM Awais Haines [R73.9] Hyperglycemia           ED Disposition       ED Disposition   Admit    Condition   Stable    Date/Time   Mon Aug 26, 2024  6:22 PM    Comment   Case was discussed with Dr Stacy the " patient's admission status was agreed to be Admission Status: observation status to the service of Dr. Cross .               Follow-up Information    None         Patient's Medications   Discharge Prescriptions    No medications on file       No discharge procedures on file.    PDMP Review       None            ED Provider  Electronically Signed by             Awais Haines DO  08/26/24 9670

## 2024-08-26 NOTE — CONSULTS
"H&P Exam - Urology       Patient: Chris Bojorquez   : 1945 Sex: male   MRN: [id number]     CSN: [id number]      History of Present Illness   HPI:  Chris Bojorquez is a 79 y.o. male who presents with family members from local nursing home found to be confused undergoing ER CAT scan confirming emphysematous pyelitis nonobstructing kidney stones bladder stones noted patient is vital signs stable white count 10        Review of Systems:   Constitutional:  Negative for activity change, fever, chills and diaphoresis.   HENT: Negative for hearing loss and trouble swallowing.   Eyes: Negative for itching and visual disturbance.   Respiratory: Negative for chest tightness and shortness of breath.   Cardiovascular: Negative for chest pain, edema.   Gastrointestinal: Negative for abdominal distention, na abdominal pain, constipation, diarrhea, Nausea and vomiting.   Genitourinary: Negative for decreased urine volume, difficulty urinating, dysuria, enuresis, frequency, hematuria and urgency.   Musculoskeletal: Negative for gait problem and myalgias.   Neurological: Negative for dizziness and headaches.   Hematological: Does not bruise/bleed easily.       Historical Information   History reviewed. No pertinent past medical history.  History reviewed. No pertinent surgical history.  Social History   Social History     Substance and Sexual Activity   Alcohol Use None     Social History     Substance and Sexual Activity   Drug Use Not on file     Social History     Tobacco Use   Smoking Status Not on file   Smokeless Tobacco Not on file     Family History: History reviewed. No pertinent family history.    Meds/Allergies   Not in a hospital admission.  Allergies   Allergen Reactions    Lactose - Food Allergy Other (See Comments)    Sulfa Antibiotics Other (See Comments)       Objective   Vitals: /55 (BP Location: Right arm)   Pulse 70   Temp 97.8 °F (36.6 °C) (Tympanic)   Resp 16   Ht 5' 9\" (1.753 m)   Wt 62.4 " "kg (137 lb 9.1 oz)   SpO2 97%   BMI 20.32 kg/m²     Physical Exam:  General lethargic  Normocephalic atraumatic PERRLA  Lungs clear bilaterally  Cardiac normal S1 normal S2  Abdomen soft, flank pain  Extremities no edema    I/O last 24 hours:  In: 1260 [IV Piggyback:1260]  Out: -     Invasive Devices       Peripheral Intravenous Line  Duration             Peripheral IV 08/26/24 Distal;Left;Ventral (anterior) Forearm <1 day                        Lab Results: CBC:   Lab Results   Component Value Date    WBC 9.22 08/26/2024    HGB 10.3 (L) 08/26/2024    HCT 33.7 (L) 08/26/2024    MCV 83 08/26/2024     08/26/2024    RBC 4.04 08/26/2024    MCH 25.5 (L) 08/26/2024    MCHC 30.6 (L) 08/26/2024    RDW 15.9 (H) 08/26/2024    MPV 10.3 08/26/2024    NRBC 0 08/26/2024     CMP:   Lab Results   Component Value Date    CL 98 08/26/2024    CO2 27 08/26/2024    BUN 41 (H) 08/26/2024    CREATININE 1.94 (H) 08/26/2024    CALCIUM 8.0 (L) 08/26/2024    AST 25 08/26/2024    ALT 8 08/26/2024    ALKPHOS 167 (H) 08/26/2024    EGFR 32 08/26/2024     Urinalysis:   Lab Results   Component Value Date    COLORU Yellow 08/26/2024    CLARITYU Slightly Cloudy 08/26/2024    SPECGRAV 1.015 08/26/2024    PHUR 7.0 08/26/2024    LEUKOCYTESUR Large (A) 08/26/2024    NITRITE Negative 08/26/2024    GLUCOSEU 1000 (1%) (A) 08/26/2024    KETONESU 10 (1+) (A) 08/26/2024    BILIRUBINUR Negative 08/26/2024    BLOODU Large (A) 08/26/2024     Urine Culture: No results found for: \"URINECX\"  PSA: No results found for: \"PSA\"        Assessment/ Plan:  Emphysematous pyelitis  Complicated UTI  Urothelial thickening in the right renal collecting system with gas  Delayed nephrogram questionable recently passed stone possible obstruction  Bladder stone?  Passed stone?  Would watch overnight  N.p.o. after midnight  If white count rises we will schedule cystoscopy stent tomorrow    Ciro Hughes MD    "

## 2024-08-26 NOTE — ASSESSMENT & PLAN NOTE
"No results found for: \"HGBA1C\"    Recent Labs     08/26/24  1219   POCGLU 554*       Blood Sugar Average: Last 72 hrs:  (P) 554  Poor complaince to medications per family   Takes metformin at home      Check HBA1C   Start insulin infusion   Interval follow up   Am labs    Accu checks Q 4   "

## 2024-08-26 NOTE — Clinical Note
Case was discussed with Dr Stacy the patient's admission status was agreed to be Admission Status: observation status to the service of   .

## 2024-08-26 NOTE — ASSESSMENT & PLAN NOTE
PABLO state on CKD 3   Per chart review basline Cr 0.7-0.9     Trend BMP   Am labs   Avoid nephrotoxins   Insert foleys

## 2024-08-27 PROBLEM — A41.9 SEVERE SEPSIS (HCC): Status: ACTIVE | Noted: 2024-08-27

## 2024-08-27 PROBLEM — R65.20 SEVERE SEPSIS (HCC): Status: ACTIVE | Noted: 2024-08-27

## 2024-08-27 PROBLEM — R78.81 BACTEREMIA: Status: ACTIVE | Noted: 2024-08-27

## 2024-08-27 LAB
ANION GAP SERPL CALCULATED.3IONS-SCNC: 6 MMOL/L (ref 4–13)
ATRIAL RATE: 79 BPM
BASOPHILS # BLD MANUAL: 0 THOUSAND/UL (ref 0–0.1)
BASOPHILS NFR MAR MANUAL: 0 % (ref 0–1)
BUN SERPL-MCNC: 41 MG/DL (ref 5–25)
CALCIUM SERPL-MCNC: 8 MG/DL (ref 8.4–10.2)
CHLORIDE SERPL-SCNC: 111 MMOL/L (ref 96–108)
CO2 SERPL-SCNC: 24 MMOL/L (ref 21–32)
CREAT SERPL-MCNC: 1.96 MG/DL (ref 0.6–1.3)
EOSINOPHIL # BLD MANUAL: 0 THOUSAND/UL (ref 0–0.4)
EOSINOPHIL NFR BLD MANUAL: 0 % (ref 0–6)
ERYTHROCYTE [DISTWIDTH] IN BLOOD BY AUTOMATED COUNT: 16 % (ref 11.6–15.1)
EST. AVERAGE GLUCOSE BLD GHB EST-MCNC: 372 MG/DL
GFR SERPL CREATININE-BSD FRML MDRD: 31 ML/MIN/1.73SQ M
GLUCOSE SERPL-MCNC: 115 MG/DL (ref 65–140)
GLUCOSE SERPL-MCNC: 128 MG/DL (ref 65–140)
GLUCOSE SERPL-MCNC: 136 MG/DL (ref 65–140)
GLUCOSE SERPL-MCNC: 150 MG/DL (ref 65–140)
GLUCOSE SERPL-MCNC: 151 MG/DL (ref 65–140)
GLUCOSE SERPL-MCNC: 153 MG/DL (ref 65–140)
GLUCOSE SERPL-MCNC: 158 MG/DL (ref 65–140)
GLUCOSE SERPL-MCNC: 158 MG/DL (ref 65–140)
GLUCOSE SERPL-MCNC: 177 MG/DL (ref 65–140)
GLUCOSE SERPL-MCNC: 181 MG/DL (ref 65–140)
GLUCOSE SERPL-MCNC: 203 MG/DL (ref 65–140)
GLUCOSE SERPL-MCNC: 311 MG/DL (ref 65–140)
GLUCOSE SERPL-MCNC: 355 MG/DL (ref 65–140)
HBA1C MFR BLD: 14.6 %
HCT VFR BLD AUTO: 28.7 % (ref 36.5–49.3)
HGB BLD-MCNC: 8.8 G/DL (ref 12–17)
LACTATE SERPL-SCNC: 0.8 MMOL/L (ref 0.5–2)
LYMPHOCYTES # BLD AUTO: 0.25 THOUSAND/UL (ref 0.6–4.47)
LYMPHOCYTES # BLD AUTO: 4 % (ref 14–44)
MAGNESIUM SERPL-MCNC: 2.2 MG/DL (ref 1.9–2.7)
MCH RBC QN AUTO: 25.7 PG (ref 26.8–34.3)
MCHC RBC AUTO-ENTMCNC: 30.7 G/DL (ref 31.4–37.4)
MCV RBC AUTO: 84 FL (ref 82–98)
MONOCYTES # BLD AUTO: 0.55 THOUSAND/UL (ref 0–1.22)
MONOCYTES NFR BLD: 9 % (ref 4–12)
NEUTROPHILS # BLD MANUAL: 5.33 THOUSAND/UL (ref 1.85–7.62)
NEUTS BAND NFR BLD MANUAL: 14 % (ref 0–8)
NEUTS SEG NFR BLD AUTO: 73 % (ref 43–75)
P AXIS: 60 DEGREES
PHOSPHATE SERPL-MCNC: 1.5 MG/DL (ref 2.3–4.1)
PLATELET # BLD AUTO: 132 THOUSANDS/UL (ref 149–390)
PLATELET BLD QL SMEAR: ABNORMAL
PMV BLD AUTO: 9.5 FL (ref 8.9–12.7)
POTASSIUM SERPL-SCNC: 4.3 MMOL/L (ref 3.5–5.3)
PR INTERVAL: 154 MS
PROCALCITONIN SERPL-MCNC: 6.84 NG/ML
QRS AXIS: 20 DEGREES
QRSD INTERVAL: 90 MS
QT INTERVAL: 368 MS
QTC INTERVAL: 421 MS
RBC # BLD AUTO: 3.43 MILLION/UL (ref 3.88–5.62)
RBC MORPH BLD: NORMAL
SODIUM SERPL-SCNC: 141 MMOL/L (ref 135–147)
T WAVE AXIS: 86 DEGREES
VENTRICULAR RATE: 79 BPM
WBC # BLD AUTO: 6.13 THOUSAND/UL (ref 4.31–10.16)

## 2024-08-27 PROCEDURE — 84145 PROCALCITONIN (PCT): CPT

## 2024-08-27 PROCEDURE — 87147 CULTURE TYPE IMMUNOLOGIC: CPT

## 2024-08-27 PROCEDURE — 93010 ELECTROCARDIOGRAM REPORT: CPT | Performed by: INTERNAL MEDICINE

## 2024-08-27 PROCEDURE — 99223 1ST HOSP IP/OBS HIGH 75: CPT | Performed by: INTERNAL MEDICINE

## 2024-08-27 PROCEDURE — 85007 BL SMEAR W/DIFF WBC COUNT: CPT

## 2024-08-27 PROCEDURE — 87081 CULTURE SCREEN ONLY: CPT

## 2024-08-27 PROCEDURE — 85027 COMPLETE CBC AUTOMATED: CPT

## 2024-08-27 PROCEDURE — 99232 SBSQ HOSP IP/OBS MODERATE 35: CPT

## 2024-08-27 PROCEDURE — 80048 BASIC METABOLIC PNL TOTAL CA: CPT

## 2024-08-27 PROCEDURE — 83605 ASSAY OF LACTIC ACID: CPT

## 2024-08-27 PROCEDURE — 82948 REAGENT STRIP/BLOOD GLUCOSE: CPT

## 2024-08-27 PROCEDURE — 83735 ASSAY OF MAGNESIUM: CPT

## 2024-08-27 PROCEDURE — 84100 ASSAY OF PHOSPHORUS: CPT

## 2024-08-27 RX ORDER — ACETAMINOPHEN 10 MG/ML
1000 INJECTION, SOLUTION INTRAVENOUS EVERY 6 HOURS PRN
Status: DISCONTINUED | OUTPATIENT
Start: 2024-08-27 | End: 2024-08-28

## 2024-08-27 RX ORDER — MULTIVIT-MIN/IRON FUM/FOLIC AC 7.5 MG-4
1 TABLET ORAL DAILY
COMMUNITY

## 2024-08-27 RX ORDER — CEFTRIAXONE 2 G/50ML
2000 INJECTION, SOLUTION INTRAVENOUS EVERY 12 HOURS
Status: DISCONTINUED | OUTPATIENT
Start: 2024-08-27 | End: 2024-08-27

## 2024-08-27 RX ORDER — DOCUSATE SODIUM 100 MG/1
100 CAPSULE, LIQUID FILLED ORAL DAILY
COMMUNITY

## 2024-08-27 RX ORDER — ACETAMINOPHEN 10 MG/ML
1000 INJECTION, SOLUTION INTRAVENOUS ONCE
Status: COMPLETED | OUTPATIENT
Start: 2024-08-27 | End: 2024-08-27

## 2024-08-27 RX ORDER — CEFTRIAXONE 2 G/50ML
2000 INJECTION, SOLUTION INTRAVENOUS EVERY 24 HOURS
Status: DISCONTINUED | OUTPATIENT
Start: 2024-08-27 | End: 2024-09-04

## 2024-08-27 RX ORDER — VANCOMYCIN HYDROCHLORIDE 1 G/200ML
15 INJECTION, SOLUTION INTRAVENOUS DAILY PRN
Status: DISCONTINUED | OUTPATIENT
Start: 2024-08-28 | End: 2024-08-28

## 2024-08-27 RX ADMIN — SODIUM CHLORIDE 1000 ML: 0.9 INJECTION, SOLUTION INTRAVENOUS at 08:39

## 2024-08-27 RX ADMIN — CEFTRIAXONE 2000 MG: 2 INJECTION, SOLUTION INTRAVENOUS at 17:30

## 2024-08-27 RX ADMIN — CARBIDOPA AND LEVODOPA 1 TABLET: 25; 100 TABLET ORAL at 17:23

## 2024-08-27 RX ADMIN — CEFEPIME HYDROCHLORIDE 1000 MG: 1 INJECTION, SOLUTION INTRAVENOUS at 13:01

## 2024-08-27 RX ADMIN — SODIUM CHLORIDE 75 ML/HR: 0.9 INJECTION, SOLUTION INTRAVENOUS at 13:06

## 2024-08-27 RX ADMIN — SODIUM PHOSPHATE, MONOBASIC, MONOHYDRATE AND SODIUM PHOSPHATE, DIBASIC, ANHYDROUS 12 MMOL: 142; 276 INJECTION, SOLUTION INTRAVENOUS at 17:27

## 2024-08-27 RX ADMIN — ACETAMINOPHEN 1000 MG: 10 INJECTION INTRAVENOUS at 04:15

## 2024-08-27 RX ADMIN — SODIUM CHLORIDE 1000 ML: 0.9 INJECTION, SOLUTION INTRAVENOUS at 10:09

## 2024-08-27 RX ADMIN — ENOXAPARIN SODIUM 30 MG: 30 INJECTION SUBCUTANEOUS at 10:11

## 2024-08-27 NOTE — ASSESSMENT & PLAN NOTE
Cr around 0.8-0.9 in December 2023  Cr 1.94 on admission  Likely in setting of sepsis, possible obstructive uropathy as seen on CT  Monitor Is/Os  Continue IVF  Daily bmp

## 2024-08-27 NOTE — ASSESSMENT & PLAN NOTE
History of polymicrobial bacteremia with e coli, bacteroides, klebsiella, enterococcus faecalis and proteus secondary to urinary source as well as MRSA bacteremia and PICC line infection  One of two admission blood cultures growing GNR  Infectious disease following  Continue cefepime and vancomycin  Follow up final cultures

## 2024-08-27 NOTE — PLAN OF CARE
Problem: Prexisting or High Potential for Compromised Skin Integrity  Goal: Skin integrity is maintained or improved  Description: INTERVENTIONS:  - Identify patients at risk for skin breakdown  - Assess and monitor skin integrity  - Assess and monitor nutrition and hydration status  - Monitor labs   - Assess for incontinence   - Turn and reposition patient  - Assist with mobility/ambulation  - Relieve pressure over bony prominences  - Avoid friction and shearing  - Provide appropriate hygiene as needed including keeping skin clean and dry  - Evaluate need for skin moisturizer/barrier cream  - Collaborate with interdisciplinary team   - Patient/family teaching  - Consider wound care consult   Outcome: Progressing     Problem: GENITOURINARY - ADULT  Goal: Maintains or returns to baseline urinary function  Description: INTERVENTIONS:  - Assess urinary function  - Encourage oral fluids to ensure adequate hydration if ordered  - Administer IV fluids as ordered to ensure adequate hydration  - Administer ordered medications as needed  - Offer frequent toileting  - Follow urinary retention protocol if ordered  Outcome: Progressing  Goal: Urinary catheter remains patent  Description: INTERVENTIONS:  - Assess patency of urinary catheter  - If patient has a chronic yang, consider changing catheter if non-functioning  - Follow guidelines for intermittent irrigation of non-functioning urinary catheter  Outcome: Progressing     Problem: METABOLIC, FLUID AND ELECTROLYTES - ADULT  Goal: Electrolytes maintained within normal limits  Description: INTERVENTIONS:  - Monitor labs and assess patient for signs and symptoms of electrolyte imbalances  - Administer electrolyte replacement as ordered  - Monitor response to electrolyte replacements, including repeat lab results as appropriate  - Instruct patient on fluid and nutrition as appropriate  Outcome: Progressing  Goal: Fluid balance maintained  Description: INTERVENTIONS:  -  Monitor labs   - Monitor I/O and WT  - Instruct patient on fluid and nutrition as appropriate  - Assess for signs & symptoms of volume excess or deficit  Outcome: Progressing  Goal: Glucose maintained within target range  Description: INTERVENTIONS:  - Monitor Blood Glucose as ordered  - Assess for signs and symptoms of hyperglycemia and hypoglycemia  - Administer ordered medications to maintain glucose within target range  - Assess nutritional intake and initiate nutrition service referral as needed  Outcome: Progressing     Problem: SKIN/TISSUE INTEGRITY - ADULT  Goal: Skin Integrity remains intact(Skin Breakdown Prevention)  Description: Assess:  -Perform Sushil assessment every shift  -Clean and moisturize skin every shift  -Inspect skin when repositioning, toileting, and assisting with ADLS  -Assess under medical devices such as wires every 2-4  -Assess extremities for adequate circulation and sensation     Bed Management:  -Have minimal linens on bed & keep smooth, unwrinkled  -Change linens as needed when moist or perspiring  -Avoid sitting or lying in one position for more than 2 hours while in bed  -Keep HOB at 45 degrees     Toileting:  -Offer bedside commode  -Assess for incontinence every shift  -Use incontinent care products after each incontinent episode such as foam cleanser    Activity:  -Mobilize patient  -Encourage activity and walks on unit  -Encourage or provide ROM exercises   -Turn and reposition patient every 2 Hours  -Use appropriate equipment to lift or move patient in bed  -Instruct/ Assist with weight shifting every 60 min when out of bed in chair  -Consider limitation of chair time 2 hour intervals    Skin Care:  -Avoid use of baby powder, tape, friction and shearing, hot water or constrictive clothing  -Relieve pressure over bony prominences using allyven  -Do not massage red bony areas    Next Steps:  -Teach patient strategies to minimize risks such as weight    -Consider consults to   interdisciplinary teams  Outcome: Progressing     Problem: HEMATOLOGIC - ADULT  Goal: Maintains hematologic stability  Description: INTERVENTIONS  - Assess for signs and symptoms of bleeding or hemorrhage  - Monitor labs  - Administer supportive blood products/factors as ordered and appropriate  Outcome: Progressing     Problem: MUSCULOSKELETAL - ADULT  Goal: Maintain or return mobility to safest level of function  Description: INTERVENTIONS:  - Assess patient's ability to carry out ADLs; assess patient's baseline for ADL function and identify physical deficits which impact ability to perform ADLs (bathing, care of mouth/teeth, toileting, grooming, dressing, etc.)  - Assess/evaluate cause of self-care deficits   - Assess range of motion  - Assess patient's mobility  - Assess patient's need for assistive devices and provide as appropriate  - Encourage maximum independence but intervene and supervise when necessary  - Involve family in performance of ADLs  - Assess for home care needs following discharge   - Consider OT consult to assist with ADL evaluation and planning for discharge  - Provide patient education as appropriate  Outcome: Progressing     Problem: PAIN - ADULT  Goal: Verbalizes/displays adequate comfort level or baseline comfort level  Description: Interventions:  - Encourage patient to monitor pain and request assistance  - Assess pain using appropriate pain scale  - Administer analgesics based on type and severity of pain and evaluate response  - Implement non-pharmacological measures as appropriate and evaluate response  - Consider cultural and social influences on pain and pain management  - Notify physician/advanced practitioner if interventions unsuccessful or patient reports new pain  Outcome: Progressing     Problem: INFECTION - ADULT  Goal: Absence or prevention of progression during hospitalization  Description: INTERVENTIONS:  - Assess and monitor for signs and symptoms of infection  - Monitor  lab/diagnostic results  - Monitor all insertion sites, i.e. indwelling lines, tubes, and drains  - Monitor endotracheal if appropriate and nasal secretions for changes in amount and color  - [place] appropriate cooling/warming therapies per order  - Administer medications as ordered  - Instruct and encourage patient and family to use good hand hygiene technique  - Identify and instruct in appropriate isolation precautions for identified infection/condition  Outcome: Progressing  Goal: Absence of fever/infection during neutropenic period  Description: INTERVENTIONS:  - Monitor WBC    Outcome: Progressing     Problem: SAFETY ADULT  Goal: Patient will remain free of falls  Description: INTERVENTIONS:  - Educate patient/family on patient safety including physical limitations  - Instruct patient to call for assistance with activity   - Consult OT/PT to assist with strengthening/mobility   - Keep Call bell within reach  - Keep bed low and locked with side rails adjusted as appropriate  - Keep care items and personal belongings within reach  - Initiate and maintain comfort rounds  - Make Fall Risk Sign visible to staff  - Offer Toileting every 2 Hours, in advance of need  - Initiate/Maintain bed alarm  - Obtain necessary fall risk management equipment: slipper socks  - Apply yellow socks and bracelet for high fall risk patients  - Consider moving patient to room near nurses station  Outcome: Progressing  Goal: Maintain or return to baseline ADL function  Description: INTERVENTIONS:  -  Assess patient's ability to carry out ADLs; assess patient's baseline for ADL function and identify physical deficits which impact ability to perform ADLs (bathing, care of mouth/teeth, toileting, grooming, dressing, etc.)  - Assess/evaluate cause of self-care deficits   - Assess range of motion  - Assess patient's mobility; develop plan if impaired  - Assess patient's need for assistive devices and provide as appropriate  - Encourage  maximum independence but intervene and supervise when necessary  - Involve family in performance of ADLs  - Assess for home care needs following discharge   - Consider OT consult to assist with ADL evaluation and planning for discharge  - Provide patient education as appropriate  Outcome: Progressing  Goal: Maintains/Returns to pre admission functional level  Description: INTERVENTIONS:  - Perform AM-PAC 6 Click Basic Mobility/ Daily Activity assessment daily.  - Set and communicate daily mobility goal to care team and patient/family/caregiver.   - Collaborate with rehabilitation services on mobility goals if consulted  - Perform Range of Motion 3 times a day.  - Reposition patient every 2 hours.  - Dangle patient 3 times a day  - Stand patient 3 times a day  - Ambulate patient 3 times a day  - Out of bed to chair 3 times a day   - Out of bed for meals 3 times a day  - Out of bed for toileting  - Record patient progress and toleration of activity level   Outcome: Progressing     Problem: DISCHARGE PLANNING  Goal: Discharge to home or other facility with appropriate resources  Description: INTERVENTIONS:  - Identify barriers to discharge w/patient and caregiver  - Arrange for needed discharge resources and transportation as appropriate  - Identify discharge learning needs (meds, wound care, etc.)  - Arrange for interpretive services to assist at discharge as needed  - Refer to Case Management Department for coordinating discharge planning if the patient needs post-hospital services based on physician/advanced practitioner order or complex needs related to functional status, cognitive ability, or social support system  Outcome: Progressing

## 2024-08-27 NOTE — CONSULTS
Consultation - Infectious Disease   Chris Bojorquez 79 y.o. male MRN: [id number]  Unit/Bed#: [address] [place] [address] Encounter: [id number]      IMPRESSION & RECOMMENDATIONS:   1. Severe sepsis, evidenced by fever spike, tachycardia, bandemia and hypotension responsive to IVF thus far. Likely in setting of #2/3 Blood and urine cultures finals pending.  -antibiotics as below  -follow-up cultures and adjust antibiotics as needed  -monitor temperature and hemodynamics  -serial exam  -recheck CBC and BMP in a.m. - monitoring for treatment response and any developing toxicities  -additional interventions pending clinical course     2. GNR bacteremia. Admission blood cultures 1 of 2 sets now growing GNR.   -continues on Cefepime 1 g IV q 24 hours at present, renal dose adjusted for #3  -follow up blood cultures  -management for #3 as below    3. Emphysematous pyelitis. 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 U/A with pyuria and bacteruria  -s/p Vancomycin 1.5 g load; pharmacy on consult for Vancomycin dosing  -continues on Cefepime 1 g IV q 24 hours at present, renal dose adjusted for #3  -monitor urine output and symptoms  -follow up urine culture  -serial exam  -close urological follow-up  -patient NPO for possible cystoscopy to follow     3. PABLO. Admission creatinine 1.94; CrCl 28% 12/2023 Cr 0.71  -renal dose adjust antibiotic as needed  -volume management   -recheck BMP     4. Type 2 Diabetes Mellitus with hyperglycemia;  on admission  8/26/24 HgbA1c 14.6%. Risk factor for infection  -tighten glycemic management per primary care team    5. Prior polymicrobic bacteremia with e coli, bacteroides, klebsiella, enterococcus faecalis, and proteus in 12/2023 due to urinary source. Patient completed 2 week total antibiotic course with Augmentin upon discharge from [facility]  system. Patient also had MRSA bacteremia/PICC infection 7/2023 managed by [facility]    Antibiotics:  Vancomycin/Cefepime D2    I have discussed the above management plan in detail with patient, RN, and Mai of the primary service. They concur with ID treatment plan and ongoing close followup.    Extensive review of the medical records in epic including review of the notes, radiographs, and laboratory results     HISTORY OF PRESENT ILLNESS:  Reason for Consult: 1. emphysematous pyelitis and 2. sepsis in background of prior polymicrobic bacteremia with e coli, bacteroides, klebsiella, enterococcus faecalis, and proteus in 12/2023 due to urinary source.    HPI: Chris Bojorquez is a 79 y.o. year old male with HTN, renal calculi, hypothyroidism, T2DM, and nonambulatory due to parkinsons  who presented to [facility] ER [facility]/26/24 with his family from nursing home after was found confused and in altered mental state progressing for the past several days with lack of appetite. CT A/P showed bladder calculi, urothelial thickening throughout the right renal collecting system as well as gas within the system and ureter, suspicious of emphysematous pyelitis. Creatinine 1.94 Blood and urine cultures were obtained. Patient was admitted on Vancomycin and Cefepime. Patient spiked fever to 102.9 this am with tachycardia and hypotensions responsive to IVF so far. Infectious Disease is now being consulted regarding evaluation and management of emphysematous pyelitis and sepsis in setting of prior polymicrobic bacteremia with e coli, bacteroides, klebsiella, enterococcus faecalis, and proteus in 12/2023 due to urinary source.  Patient also had MRSA bacteremia/PICC infection 7/2023 managed by [facility].  Patient is sluggish in bed. No reported chills, sweats, CP, cough, SOB, N/V/D, dysuria or abdominal/flank pain.    REVIEW OF SYSTEMS:  A complete review of systems is negative other than that noted in the HPI.    PAST  MEDICAL HISTORY:  History reviewed. No pertinent past medical history.  History reviewed. No pertinent surgical history.    FAMILY HISTORY:  Non-contributory    SOCIAL HISTORY:  Social History   Social History     Substance and Sexual Activity   Alcohol Use None     Social History     Substance and Sexual Activity   Drug Use Not on file     Social History     Tobacco Use   Smoking Status Unknown   Smokeless Tobacco Not on file       ALLERGIES:  Allergies   Allergen Reactions    Lactose - Food Allergy Other (See Comments)    Sulfa Antibiotics Other (See Comments)       MEDICATIONS:  All current active medications have been reviewed.    PHYSICAL EXAM:  Temp:  [97.1 °F (36.2 °C)-102.9 °F (39.4 °C)] 97.1 °F (36.2 °C)  HR:  [70-98] 72  Resp:  [16-20] 20  BP: ()/(44-67) 100/49  SpO2:  [92 %-99 %] 97 %  Temp (24hrs), Av.1 °F (37.3 °C), Min:97.1 °F (36.2 °C), Max:102.9 °F (39.4 °C)  Current: Temperature: (!) 97.1 °F (36.2 °C)    Intake/Output Summary (Last 24 hours) at 2024 1137  Last data filed at 2024 2228  Gross per 24 hour   Intake 1260 ml   Output 700 ml   Net 560 ml       General Appearance:  79 year old male, appearing lethargic, propped in bed, in no acute distress   Head:  Normocephalic, without obvious abnormality, atraumatic   Eyes:  Conjunctiva pink and sclera anicteric, both eyes. Keeps eyes closed   Nose: Nares normal, mucosa normal, no drainage   Throat: Oropharynx moist without lesions   Neck: Supple, symmetrical, no adenopathy, no tenderness/mass/nodules   Back:   Symmetric, no curvature, ROM normal, no CVA tenderness   Lungs:   Clear to auscultation bilaterally, respirations unlabored   Chest Wall:  No tenderness or deformity   Heart:  RRR; no murmur, rub or gallop   Abdomen:   Soft, non-tender, non-distended, positive bowel sounds    Extremities: No cyanosis, clubbing or edema   : Rivera with clear, pascale urine in bag, no SPT, no flank tenderness   Skin: No rashes or lesions. No  draining wounds noted. IV site nontender   Lymph nodes: Cervical, supraclavicular nodes normal   Neurologic: Arousable to name, answers few yes/no questions, lethargic, extremities propped in bed.       LABS, IMAGING, & OTHER STUDIES:  Lab Results:  I have personally reviewed pertinent labs.  Results from last 7 days   Lab Units 08/27/24 0443 08/26/24 1937 08/26/24  1256   WBC Thousand/uL 6.13  --  9.22   HEMOGLOBIN g/dL 8.8*  --  10.3*   PLATELETS Thousands/uL 132* 149 163     Results from last 7 days   Lab Units 08/27/24  0443 08/26/24  1937 08/26/24  1256   SODIUM mmol/L 141 135 133*   POTASSIUM mmol/L 4.3 5.4* 5.4*   CHLORIDE mmol/L 111* 104 98   CO2 mmol/L 24 22 27   BUN mg/dL 41* 39* 41*   CREATININE mg/dL 1.96* 1.95* 1.94*   EGFR ml/min/1.73sq m 31 32 32   CALCIUM mg/dL 8.0* 8.1* 8.0*   AST U/L  --   --  25   ALT U/L  --   --  8   ALK PHOS U/L  --   --  167*     Results from last 7 days   Lab Units 08/26/24  1812   BLOOD CULTURE  Received in Microbiology Lab. Culture in Progress.  Received in Microbiology Lab. Culture in Progress.     Results from last 7 days   Lab Units 08/27/24  0443   PROCALCITONIN ng/ml 6.84*                   Imaging Studies:   Imaging study reports and images in PACS in reviewed personally.  8/26/24 CT A/P showed bladder calculi, urothelial thickening throughout the right renal collecting system as well as gas within the system and ureter, suspicious of emphysematous pyelitis  8/26/24 CT head: no acute intracranial abnormality    Other Studies:   I have personally reviewed pertinent reports.

## 2024-08-27 NOTE — ASSESSMENT & PLAN NOTE
Family reports patient gets confused due to recurrent UTIs and sepsis  CT head negative  VBG, ammonia, TSH, coma panel all unremarkable  Likely metabolic encephalopathy in setting of sepsis due to bacteremia and UTI  Treatment as above  Continue to monitor

## 2024-08-27 NOTE — ASSESSMENT & PLAN NOTE
Evidenced by fever, tachycardia, bandemia, and hypotension responsive to IVF  In setting of bacteremia likely due to urinary source  Hypotensive this morning, received 30 mg/kg with improvement of BP  Lactic acid 0.8  1/2 blood cultures growing GNR  Urine culture pending  ID following  Continue cefepime and vancomycin  Trend cbc and fever curve

## 2024-08-27 NOTE — PLAN OF CARE
Problem: Prexisting or High Potential for Compromised Skin Integrity  Goal: Skin integrity is maintained or improved  Description: INTERVENTIONS:  - Identify patients at risk for skin breakdown  - Assess and monitor skin integrity  - Assess and monitor nutrition and hydration status  - Monitor labs   - Assess for incontinence   - Turn and reposition patient  - Assist with mobility/ambulation  - Relieve pressure over bony prominences  - Avoid friction and shearing  - Provide appropriate hygiene as needed including keeping skin clean and dry  - Evaluate need for skin moisturizer/barrier cream  - Collaborate with interdisciplinary team   - Patient/family teaching  - Consider wound care consult   Outcome: Progressing     Problem: GENITOURINARY - ADULT  Problem: METABOLIC, FLUID AND ELECTROLYTES - ADULT  Goal: Electrolytes maintained within normal limits  Description: INTERVENTIONS:  - Monitor labs and assess patient for signs and symptoms of electrolyte imbalances  - Administer electrolyte replacement as ordered  - Monitor response to electrolyte replacements, including repeat lab results as appropriate  - Instruct patient on fluid and nutrition as appropriate  Outcome: Progressing  Goal: Fluid balance maintained  Description: INTERVENTIONS:  - Monitor labs   - Monitor I/O and WT  - Instruct patient on fluid and nutrition as appropriate  - Assess for signs & symptoms of volume excess or deficit  Outcome: Progressing  Goal: Glucose maintained within target range  Description: INTERVENTIONS:  - Monitor Blood Glucose as ordered  - Assess for signs and symptoms of hyperglycemia and hypoglycemia  - Administer ordered medications to maintain glucose within target range  - Assess nutritional intake and initiate nutrition service referral as needed  Outcome: Progressing     Problem: HEMATOLOGIC - ADULT  Goal: Maintains hematologic stability  Description: INTERVENTIONS  - Assess for signs and symptoms of bleeding or  hemorrhage  - Monitor labs  - Administer supportive blood products/factors as ordered and appropriate  Outcome: Progressing     Problem: MUSCULOSKELETAL - ADULT  Goal: Maintain or return mobility to safest level of function  Description: INTERVENTIONS:  - Assess patient's ability to carry out ADLs; assess patient's baseline for ADL function and identify physical deficits which impact ability to perform ADLs (bathing, care of mouth/teeth, toileting, grooming, dressing, etc.)  - Assess/evaluate cause of self-care deficits   - Assess range of motion  - Assess patient's mobility  - Assess patient's need for assistive devices and provide as appropriate  - Encourage maximum independence but intervene and supervise when necessary  - Involve family in performance of ADLs  - Assess for home care needs following discharge   - Consider OT consult to assist with ADL evaluation and planning for discharge  - Provide patient education as appropriate  Outcome: Progressing     Problem: PAIN - ADULT  Goal: Verbalizes/displays adequate comfort level or baseline comfort level  Description: Interventions:  - Encourage patient to monitor pain and request assistance  - Assess pain using appropriate pain scale  - Administer analgesics based on type and severity of pain and evaluate response  - Implement non-pharmacological measures as appropriate and evaluate response  - Consider cultural and social influences on pain and pain management  - Notify physician/advanced practitioner if interventions unsuccessful or patient reports new pain  Outcome: Progressing     Problem: INFECTION - ADULT  Goal: Absence or prevention of progression during hospitalization  Description: INTERVENTIONS:  - Assess and monitor for signs and symptoms of infection  - Monitor lab/diagnostic results  - Monitor all insertion sites, i.e. indwelling lines, tubes, and drains  - Monitor endotracheal if appropriate and nasal secretions for changes in amount and color  -  [place] appropriate cooling/warming therapies per order  - Administer medications as ordered  - Instruct and encourage patient and family to use good hand hygiene technique  - Identify and instruct in appropriate isolation precautions for identified infection/condition  Outcome: Progressing  Goal: Absence of fever/infection during neutropenic period  Description: INTERVENTIONS:  - Monitor WBC    Outcome: Progressing     Problem: SAFETY ADULT  Goal: Patient will remain free of falls  Description: INTERVENTIONS:  - Educate patient/family on patient safety including physical limitations  - Instruct patient to call for assistance with activity   - Consult OT/PT to assist with strengthening/mobility   - Keep Call bell within reach  - Keep bed low and locked with side rails adjusted as appropriate  - Keep care items and personal belongings within reach  - Initiate and maintain comfort rounds  - Make Fall Risk Sign visible to staff  - Offer Toileting every 2 Hours, in advance of need  - Initiate/Maintain bed alarm  - Apply yellow socks and bracelet for high fall risk patients  - Consider moving patient to room near nurses station  Outcome: Progressing  Goal: Maintain or return to baseline ADL function  Description: INTERVENTIONS:  -  Assess patient's ability to carry out ADLs; assess patient's baseline for ADL function and identify physical deficits which impact ability to perform ADLs (bathing, care of mouth/teeth, toileting, grooming, dressing, etc.)  - Assess/evaluate cause of self-care deficits   - Assess range of motion  - Assess patient's mobility; develop plan if impaired  - Assess patient's need for assistive devices and provide as appropriate  - Encourage maximum independence but intervene and supervise when necessary  - Involve family in performance of ADLs  - Assess for home care needs following discharge   - Consider OT consult to assist with ADL evaluation and planning for discharge  - Provide patient  education as appropriate  Outcome: Progressing  Goal: Maintains/Returns to pre admission functional level  Description: INTERVENTIONS:  - Perform AM-PAC 6 Click Basic Mobility/ Daily Activity assessment daily.  - Set and communicate daily mobility goal to care team and patient/family/caregiver.   - Collaborate with rehabilitation services on mobility goals if consulted  - Perform Range of Motion 4 times a day.  - Reposition patient every 2 hours.  - Out of bed for toileting  - Record patient progress and toleration of activity level   Outcome: Progressing     Problem: DISCHARGE PLANNING  Goal: Discharge to home or other facility with appropriate resources  Description: INTERVENTIONS:  - Identify barriers to discharge w/patient and caregiver  - Arrange for needed discharge resources and transportation as appropriate  - Identify discharge learning needs (meds, wound care, etc.)  - Arrange for interpretive services to assist at discharge as needed  - Refer to Case Management Department for coordinating discharge planning if the patient needs post-hospital services based on physician/advanced practitioner order or complex needs related to functional status, cognitive ability, or social support system  Outcome: Progressing     Problem: Nutrition/Hydration-ADULT  Goal: Nutrient/Hydration intake appropriate for improving, restoring or maintaining nutritional needs  Description: Monitor and assess patient's nutrition/hydration status for malnutrition. Collaborate with interdisciplinary team and initiate plan and interventions as ordered.  Monitor patient's weight and dietary intake as ordered or per policy. Utilize nutrition screening tool and intervene as necessary. Determine patient's food preferences and provide high-protein, high-caloric foods as appropriate.     INTERVENTIONS:  - Monitor oral intake, urinary output, labs, and treatment plans  - Assess nutrition and hydration status and recommend course of action  -  Evaluate amount of meals eaten  - Assist patient with eating if necessary   - Allow adequate time for meals  - Recommend/ encourage appropriate diets, oral nutritional supplements, and vitamin/mineral supplements  - Order, calculate, and assess calorie counts as needed  - Assess need for intravenous fluids  - Provide specific nutrition/hydration education as appropriate  - Include patient/family/caregiver in decisions related to nutrition  Outcome: Progressing        Goal: Maintains or returns to baseline urinary function  Description: INTERVENTIONS:  - Assess urinary function  - Encourage oral fluids to ensure adequate hydration if ordered  - Administer IV fluids as ordered to ensure adequate hydration  - Administer ordered medications as needed  - Offer frequent toileting  - Follow urinary retention protocol if ordered  Outcome: Progressing  Goal: Urinary catheter remains patent  Description: INTERVENTIONS:  - Assess patency of urinary catheter  - If patient has a chronic yang, consider changing catheter if non-functioning  - Follow guidelines for intermittent irrigation of non-functioning urinary catheter  Outcome: Progressing     Problem: METABOLIC, FLUID AND ELECTROLYTES - ADULT  Goal: Electrolytes maintained within normal limits  Description: INTERVENTIONS:  - Monitor labs and assess patient for signs and symptoms of electrolyte imbalances  - Administer electrolyte replacement as ordered  - Monitor response to electrolyte replacements, including repeat lab results as appropriate  - Instruct patient on fluid and nutrition as appropriate  Outcome: Progressing  Goal: Fluid balance maintained  Description: INTERVENTIONS:  - Monitor labs   - Monitor I/O and WT  - Instruct patient on fluid and nutrition as appropriate  - Assess for signs & symptoms of volume excess or deficit  Outcome: Progressing  Goal: Glucose maintained within target range  Description: INTERVENTIONS:  - Monitor Blood Glucose as ordered  -  Assess for signs and symptoms of hyperglycemia and hypoglycemia  - Administer ordered medications to maintain glucose within target range  - Assess nutritional intake and initiate nutrition service referral as needed  Outcome: Progressing     Problem: SKIN/TISSUE INTEGRITY - ADULT  Goal: Skin Integrity remains intact(Skin Breakdown Prevention)  Description: Assess:  -Perform Sushil assessment daily  -Clean and moisturize skin daily  -Inspect skin when repositioning, toileting, and assisting with ADLS  -Assess under medical devices such as yang catheter every day  -Assess extremities for adequate circulation and sensation     Bed Management:  -Have minimal linens on bed & keep smooth, unwrinkled  -Change linens as needed when moist or perspiring  -Avoid sitting or lying in one position for more than 2 hours while in bed    Toileting:  -Offer bedside commode  -Assess for incontinence daily and PRN  -Use incontinent care products after each incontinent episode such as foaming cleanser    Activity:  -Encourage or provide ROM exercises   -Turn and reposition patient every 2 Hours  -Use appropriate equipment to lift or move patient in bed  -Instruct/ Assist with weight shifting every 2 when out of bed in chair    Skin Care:  -Avoid use of baby powder, tape, friction and shearing, hot water or constrictive clothing  -Relieve pressure over bony prominences using cusions  -Do not massage red bony areas    Next Steps:  -Consider consults to  interdisciplinary teams such as wound care  Outcome: Progressing

## 2024-08-27 NOTE — PHYSICAL THERAPY NOTE
PT cancellation     08/27/24 1319   PT Last Visit   PT Visit Date 08/27/24   Note Type   Note type Cancelled Session   Cancel Reasons Medical status  (Low BP)     Susan Nash PT  [id number]

## 2024-08-27 NOTE — PROGRESS NOTES
Formerly [facility], later [facility]  Progress Note  Name: Chris Bojorquez I  MRN: [id number]  Unit/Bed#: 4 [facility] 422-01 I Date of Admission: 8/26/2024   Date of Service: 8/27/2024 I Hospital Day: 1    Assessment & Plan   * Severe sepsis (HCC)  Assessment & Plan  Evidenced by fever, tachycardia, bandemia, and hypotension responsive to IVF  In setting of bacteremia likely due to urinary source  Hypotensive this morning, received 30 mg/kg with improvement of BP  Lactic acid 0.8  1/2 blood cultures growing GNR  Urine culture pending  ID following  Continue cefepime and vancomycin  Trend cbc and fever curve    Bacteremia  Assessment & Plan  History of polymicrobial bacteremia with e coli, bacteroides, klebsiella, enterococcus faecalis and proteus secondary to urinary source as well as MRSA bacteremia and PICC line infection  One of two admission blood cultures growing GNR  Infectious disease following  Continue cefepime and vancomycin  Follow up final cultures    Emphysematous pyelitis  Assessment & Plan  CT a/p: Worsening findings of urinary tract infection on the right, suspicious for emphysematous pyelitis. There is urothelial thickening throughout the right renal collecting system as well as gas within the renal collecting system and ureter. There is no   renal parenchymal gas to suggest emphysematous pyelonephritis.  Patient has chronic urethral catheter since 2022 that is exchanged every 3 weeks due to obstruction  Has had multiple hospitalizations for sepsis secondary to urinary source  UA positive for WBC, leukocytes  Urology consulted  NPO for possible cystoscopy  ID following  Continue cefepime and vancomycin  Urine culture pending    Acute metabolic encephalopathy  Assessment & Plan  Family reports patient gets confused due to recurrent UTIs and sepsis  CT head negative  VBG, ammonia, TSH, coma panel all unremarkable  Likely metabolic encephalopathy in setting of sepsis due to bacteremia and UTI  Treatment  as above  Continue to monitor    PABLO (acute kidney injury) (HCC)  Assessment & Plan  Cr around 0.8-0.9 in December 2023  Cr 1.94 on admission  Likely in setting of sepsis, possible obstructive uropathy as seen on CT  Monitor Is/Os  Continue IVF  Daily bmp    Parkinson disease  Assessment & Plan  Continue Sinemet  Non ambulatory at baseline    Hypothyroidism  Assessment & Plan  TSH WNL  Continue levothyroxine    Uncontrolled type 2 diabetes mellitus with hyperglycemia (HCC)  Assessment & Plan  Lab Results   Component Value Date    HGBA1C 14.6 (H) 08/26/2024     Recent Labs     08/27/24  0659 08/27/24  0847 08/27/24  1053 08/27/24  1327   POCGLU 128 153* 158* 181*     Blood Sugar Average: Last 72 hrs:  (P) 230.2812137759556446  Appears was on sliding scale from last discharge in December 2023  Family reports patient is on metformin at home but has poor compliance  Updated HbA1c 14.6  Started on insulin gtt on admission as sugars were up to 600s    Moderate protein-calorie malnutrition (HCC)  Assessment & Plan  Malnutrition Findings:   Adult Malnutrition type: Chronic illness  Adult Degree of Malnutrition: Malnutrition of moderate degree  Consult nutrition services Malnutrition Characteristics: Muscle loss, Fat loss, Weight loss     360 Statement: related to inadequate energy intake as evidenced by 10 % weight loss last 7-8 months, depression of temples, sunken orbital, wasted interosseous. Treatment: Oral diet and nutrition supplements    BMI Findings:      Body mass index is 20.84 kg/m².       Essential hypertension  Assessment & Plan  Hold home amlodipine         VTE Pharmacologic Prophylaxis: VTE Score: 5 High Risk (Score >/= 5) - Pharmacological DVT Prophylaxis Ordered: enoxaparin (Lovenox). Sequential Compression Devices Ordered.    Mobility:   Basic Mobility Inpatient Raw Score: 6  JH-HLM Goal: 2: Bed activities/Dependent transfer  JH-HLM Achieved: 2: Bed activities/Dependent transfer  JH-HLM Goal achieved.  "Continue to encourage appropriate mobility.    Patient Centered Rounds: I performed bedside rounds with nursing staff today.   Discussions with Specialists or Other Care Team Provider: critical care, ID, rn, cm    Education and Discussions with Family / Patient: Updated  (son) via phone.    Total Time Spent on Date of Encounter in care of patient: 55 mins. This time was spent on one or more of the following: performing physical exam; counseling and coordination of care; obtaining or reviewing history; documenting in the medical record; reviewing/ordering tests, medications or procedures; communicating with other healthcare professionals and discussing with patient's family/caregivers.    Current Length of Stay: 1 day(s)  Current Patient Status: Inpatient   Certification Statement: The patient will continue to require additional inpatient hospital stay due to severe sepsis, cultures pending, IV abx  Discharge Plan: Anticipate discharge in >72 hrs to discharge location to be determined pending rehab evaluations.    Code Status: Level 1 - Full Code    Subjective:   Patient seen and examined early this morning, appeared lethargic. He was moaning and wincing while blood work being drawn. Responsive to painful stimuli. He would not open his eyes or respond to questions.     Seen later in afternoon around 15:30 and patient is alert and oriented x 2 (self and location) though stated year was . He stated \"get the hell away from me with those needles\" and asked for some water. He denies current pain. He states he hates being in the hospital and when told he has an infection stated \"who cares, what are you going to do about it?\" He is asking for blueberries. Able to tell me his son's name.    Objective:     Vitals:   Temp (24hrs), Av °F (37.2 °C), Min:97.1 °F (36.2 °C), Max:102.9 °F (39.4 °C)    Temp:  [97.1 °F (36.2 °C)-102.9 °F (39.4 °C)] 97.5 °F (36.4 °C)  HR:  [65-98] 72  Resp:  [16-20] 18  BP: " ()/(44-67) 100/45  SpO2:  [92 %-100 %] 100 %  Body mass index is 20.84 kg/m².     Input and Output Summary (last 24 hours):     Intake/Output Summary (Last 24 hours) at 8/27/2024 1556  Last data filed at 8/27/2024 1401  Gross per 24 hour   Intake --   Output 950 ml   Net -950 ml       Physical Exam:   Physical Exam  Vitals and nursing note reviewed.   Constitutional:       General: He is not in acute distress.     Appearance: He is well-developed. He is ill-appearing.   Cardiovascular:      Rate and Rhythm: Normal rate and regular rhythm.   Pulmonary:      Effort: Pulmonary effort is normal. No respiratory distress.      Breath sounds: Normal breath sounds.   Abdominal:      Palpations: Abdomen is soft.      Tenderness: There is no abdominal tenderness.   Genitourinary:     Comments: Urethral catheter with dark yellow urine and sediment noted  Musculoskeletal:         General: No swelling.   Skin:     General: Skin is warm and dry.   Neurological:      Mental Status: He is alert. He is disoriented.      Comments: Oriented to self and location, thought it was 2003   Psychiatric:         Mood and Affect: Mood normal.          Additional Data:     Labs:  Results from last 7 days   Lab Units 08/27/24  0443 08/26/24  1937 08/26/24  1256   WBC Thousand/uL 6.13  --  9.22   HEMOGLOBIN g/dL 8.8*  --  10.3*   HEMATOCRIT % 28.7*  --  33.7*   PLATELETS Thousands/uL 132*   < > 163   BANDS PCT % 14*  --   --    SEGS PCT %  --   --  86*   LYMPHO PCT % 4*  --  6*   MONO PCT % 9  --  8   EOS PCT % 0  --  0    < > = values in this interval not displayed.     Results from last 7 days   Lab Units 08/27/24  0443 08/26/24  1937 08/26/24  1256   SODIUM mmol/L 141   < > 133*   POTASSIUM mmol/L 4.3   < > 5.4*   CHLORIDE mmol/L 111*   < > 98   CO2 mmol/L 24   < > 27   BUN mg/dL 41*   < > 41*   CREATININE mg/dL 1.96*   < > 1.94*   ANION GAP mmol/L 6   < > 8   CALCIUM mg/dL 8.0*   < > 8.0*   ALBUMIN g/dL  --   --  2.9*   TOTAL BILIRUBIN  mg/dL  --   --  0.91   ALK PHOS U/L  --   --  167*   ALT U/L  --   --  8   AST U/L  --   --  25   GLUCOSE RANDOM mg/dL 151*   < > 638*    < > = values in this interval not displayed.         Results from last 7 days   Lab Units 08/27/24  1529 08/27/24  1327 08/27/24  1053 08/27/24  0847 08/27/24  0659 08/27/24  0602 08/27/24  0357 08/27/24  0150 08/26/24  2349 08/26/24  2148 08/26/24  1943 08/26/24  1219   POC GLUCOSE mg/dl 158* 181* 158* 153* 128 136 150* 115 171* 348* 437* 554*     Results from last 7 days   Lab Units 08/26/24  1256   HEMOGLOBIN A1C % 14.6*     Results from last 7 days   Lab Units 08/27/24  0902 08/27/24  0443 08/26/24  1812   LACTIC ACID mmol/L 0.8  --  1.1   PROCALCITONIN ng/ml  --  6.84*  --        Lines/Drains:  Invasive Devices       Peripheral Intravenous Line  Duration             Peripheral IV 08/26/24 Distal;Left;Ventral (anterior) Forearm 1 day    Peripheral IV 08/26/24 Distal;Left;Upper;Ventral (anterior) Arm <1 day              Drain  Duration             Urethral Catheter 3 days                  Urinary Catheter:  Goal for removal: N/A - Chronic Rivera         Imaging: Reviewed radiology reports from this admission including: chest xray, abdominal/pelvic CT, and CT head    Recent Cultures (last 7 days):   Results from last 7 days   Lab Units 08/26/24  1812   BLOOD CULTURE  Received in Microbiology Lab. Culture in Progress.   GRAM STAIN RESULT  Gram negative rods*       Last 24 Hours Medication List:   Current Facility-Administered Medications   Medication Dose Route Frequency Provider Last Rate    acetaminophen  1,000 mg Intravenous Q6H PRN Mai Babcock PA-C      carbidopa-levodopa  1 tablet Oral BID Ward Cross MD      cefepime  1,000 mg Intravenous Q24H Ward Cross MD 1,000 mg (08/27/24 1301)    enoxaparin  30 mg Subcutaneous Daily Ward Cross MD      insulin regular (HumuLIN R,NovoLIN R) 1 Units/mL in sodium chloride 0.9 % 100 mL infusion  0.3-21 Units/hr Intravenous  Titrated Ward Cross MD 1 Units/hr (08/27/24 0852)    levothyroxine  25 mcg Oral Early Morning Ward Cross MD      pantoprazole  40 mg Oral Early Morning Ward Cross MD      sodium chloride  75 mL/hr Intravenous Continuous Ward Cross MD 75 mL/hr (08/27/24 1306)    [START ON 8/28/2024] vancomycin  15 mg/kg Intravenous Daily PRN Mai Babcock PA-C          Today, Patient Was Seen By: Mai Babcock PA-C    **Please Note: This note may have been constructed using a voice recognition system.**

## 2024-08-27 NOTE — ASSESSMENT & PLAN NOTE
Lab Results   Component Value Date    HGBA1C 14.6 (H) 08/26/2024     Recent Labs     08/27/24  0659 08/27/24  0847 08/27/24  1053 08/27/24  1327   POCGLU 128 153* 158* 181*     Blood Sugar Average: Last 72 hrs:  (P) 230.4672098969029278  Appears was on sliding scale from last discharge in December 2023  Family reports patient is on metformin at home but has poor compliance  Updated HbA1c 14.6  Started on insulin gtt on admission as sugars were up to 600s

## 2024-08-27 NOTE — MALNUTRITION/BMI
This medical record reflects one or more clinical indicators suggestive of malnutrition.    Malnutrition Findings:   Adult Malnutrition type: Chronic illness  Adult Degree of Malnutrition: Malnutrition of moderate degree  Malnutrition Characteristics: Muscle loss, Fat loss, Weight loss                  360 Statement: related to inadequate energy intake as evidenced by 10 % weight loss last 7-8 months, depression of temples, sunken orbital, wasted interosseous. Treatment: Oral diet and nutrition supplements    BMI Findings:           Body mass index is 20.84 kg/m².     See Nutrition note dated 8/27/2024 for additional details.  Completed nutrition assessment is viewable in the nutrition documentation.

## 2024-08-27 NOTE — ASSESSMENT & PLAN NOTE
CT a/p: Worsening findings of urinary tract infection on the right, suspicious for emphysematous pyelitis. There is urothelial thickening throughout the right renal collecting system as well as gas within the renal collecting system and ureter. There is no   renal parenchymal gas to suggest emphysematous pyelonephritis.  Patient has chronic urethral catheter since 2022 that is exchanged every 3 weeks due to obstruction  Has had multiple hospitalizations for sepsis secondary to urinary source  UA positive for WBC, leukocytes  Urology consulted  NPO for possible cystoscopy  ID following  Continue cefepime and vancomycin  Urine culture pending

## 2024-08-27 NOTE — PROGRESS NOTES
Chris Bojorquez is a 79 y.o. male who is currently ordered Vancomycin IV with management by the Pharmacy Consult service.  Relevant clinical data and objective / subjective history reviewed.  Vancomycin Assessment:  Indication and Goal AUC/Trough: Urinary tract infection (goal -600, trough >10)  Clinical Status:  new  Micro:   pending  Renal Function:  SCr: 1.96 mg/dL  CrCl: 28.1 mL/min  Renal replacement: Not on dialysis  Days of Therapy: 1  Current Dose: 1500mg IV once (LD)  Vancomycin Plan:  New Dosinmg  IV daily prn trough <15  Estimated AUC: N/A  Estimated Trough: <15 mcg/mL  Next Level: 24 @ 0600  Renal Function Monitoring: Daily BMP and UOP  Pharmacy will continue to follow closely for s/sx of nephrotoxicity, infusion reactions and appropriateness of therapy.  BMP and CBC will be ordered per protocol. We will continue to follow the patient’s culture results and clinical progress daily.    Gretchen Bowens, Pharmacist

## 2024-08-27 NOTE — PROGRESS NOTES
"Progress Note - Urology      Patient: Chris Bojorquez   : 1945 Sex: male   MRN: [id number]     CSN: [id number]  Unit/Bed#: [address] [place] [address]     SUBJECTIVE:   White count trending down  Vital signs stable  Long discussion with family members possible palliative care  Could schedule cystoscopy right stent in light of stones we will watch at this time not urgency      Objective   Vitals: /51   Pulse 80   Temp (!) 97.1 °F (36.2 °C)   Resp 20   Ht 5' 9\" (1.753 m)   Wt 64 kg (141 lb 1.6 oz)   SpO2 99%   BMI 20.84 kg/m²     I/O last 24 hours:  In: 1260 [IV Piggyback:1260]  Out: 950 [Urine:950]      Physical Exam:   General Alert awake   Normocephalic atraumatic PERRLA  Lungs clear bilaterally  Cardiac normal S1 normal S2  Abdomen soft, flank pain  Extremities no edema      Lab Results: CBC:   Lab Results   Component Value Date    WBC 6.13 2024    HGB 8.8 (L) 2024    HCT 28.7 (L) 2024    MCV 84 2024     (L) 2024    RBC 3.43 (L) 2024    MCH 25.7 (L) 2024    MCHC 30.7 (L) 2024    RDW 16.0 (H) 2024    MPV 9.5 2024    NRBC 0 2024     CMP:   Lab Results   Component Value Date     (H) 2024    CO2 24 2024    BUN 41 (H) 2024    CREATININE 1.96 (H) 2024    CALCIUM 8.0 (L) 2024    AST 25 2024    ALT 8 2024    ALKPHOS 167 (H) 2024    EGFR 31 2024     Urinalysis:   Lab Results   Component Value Date    COLORU Yellow 2024    CLARITYU Slightly Cloudy 2024    SPECGRAV 1.015 2024    PHUR 7.0 2024    LEUKOCYTESUR Large (A) 2024    NITRITE Negative 2024    GLUCOSEU 1000 (1%) (A) 2024    KETONESU 10 (1+) (A) 2024    BILIRUBINUR Negative 2024    BLOODU Large (A) 2024     Urine Culture: No results found for: \"URINECX\"  PSA: No results found for: \"PSA\"      Assessment/ Plan:  Right emphysematous pyelitis  Chronic right hydro " nephrosis  Emphysematous cystitis  White count trending down  Can watch at this time regular diet awaiting cultures          Ciro Hughes MD

## 2024-08-27 NOTE — ASSESSMENT & PLAN NOTE
Malnutrition Findings:   Adult Malnutrition type: Chronic illness  Adult Degree of Malnutrition: Malnutrition of moderate degree  Consult nutrition services Malnutrition Characteristics: Muscle loss, Fat loss, Weight loss     360 Statement: related to inadequate energy intake as evidenced by 10 % weight loss last 7-8 months, depression of temples, sunken orbital, wasted interosseous. Treatment: Oral diet and nutrition supplements    BMI Findings:      Body mass index is 20.84 kg/m².

## 2024-08-27 NOTE — SEPSIS NOTE
"  Sepsis Note   Chris Bojorquez 79 y.o. male MRN: [id number]  Unit/Bed#: [address] [place] [address] Encounter: [id number]       Initial Sepsis Screening       Row Name 08/27/24 0857                Is the patient's history suggestive of a new or worsening infection? Yes (Proceed)  -BR        Suspected source of infection urinary tract infection  -BR        Indicate SIRS criteria Tachycardia > 90 bpm;Hyperthemia > 38.3C (100.9F) OR Hypothermia <36C (96.8F)  -BR        Are two or more of the above signs & symptoms of infection both present and new to the patient? Yes (Proceed)  -BR        Assess for evidence of organ dysfunction: Are any of the below criteria present within 6 hours of suspected infection and SIRS criteria that are NOT considered to be chronic conditions? MAP < 65  -BR        Date of presentation of septic shock 08/27/24  -BR        Time of presentation of septic shock 0822  -BR        Fluid Resuscitation: 30 ml/kg IV fluid bolus will be given based on actual body weight  -BR        Is the patient is persistently hypotensive in the hour after fluid bolus administration? If yes, patient meets criteria for vasopressor use. --        Sepsis Note: Click \"NEXT\" below (NOT \"close\") to generate sepsis note based on above information. --                  User Key  (r) = Recorded By, (t) = Taken By, (c) = Cosigned By      Initials Name Provider Type    VANESSA Babcock PA-C Physician Assistant                        Body mass index is 20.84 kg/m².  Wt Readings from Last 1 Encounters:   08/26/24 64 kg (141 lb 1.6 oz)     IBW (Ideal Body Weight): 70.7 kg    Ideal body weight: 70.7 kg (155 lb 13.8 oz)    "

## 2024-08-27 NOTE — CASE MANAGEMENT
Case Management Assessment & Discharge Planning Note    Patient name Chris Bojorquez  Location [address] [place] [address]-* MRN [id number]  : 1945 Date 2024       Current Admission Date: 2024  Current Admission Diagnosis:Severe sepsis (HCC)   Patient Active Problem List    Diagnosis Date Noted Date Diagnosed    Severe sepsis (HCC) 2024     Bacteremia 2024     Essential hypertension 2024     Emphysematous pyelitis 2024     Hyponatremia 2024     Hyperkalemia 2024     Acute metabolic encephalopathy 2024     Moderate protein-calorie malnutrition (HCC) 2024     Uncontrolled type 2 diabetes mellitus with hyperglycemia (HCC) 2024     PABLO (acute kidney injury) (HCC) 2024     Hypothyroidism 2024     Parkinson disease 2024       LOS (days): 1  Geometric Mean LOS (GMLOS) (days): 3.9  Days to GMLOS:3     OBJECTIVE:    Risk of Unplanned Readmission Score: 17.76         Current admission status: Inpatient       Preferred Pharmacy:   PATIENT/FAMILY REPORTS NO PREFERRED PHARMACY  No address on file      Primary Care Provider: No primary care provider on file.    Primary Insurance: MEDICARE  Secondary Insurance: [employer] [facility]    ASSESSMENT:  Active Health Care Proxies       Derek Bojorquez [facility] Representative - Son   Primary Phone: [phone] (Mobile)                 Readmission Root Cause  30 Day Readmission: No    Patient Information  Admitted from:: Facility  Mental Status: Alert  During Assessment patient was accompanied by: Not accompanied during assessment  Assessment information provided by:: Son  Primary Caregiver: Other (Comment)  Caregiver's Name:: [facility]  Caregiver's Relationship to Patient:: Other (Specify)  Caregiver's Telephone Number:: [facility]  Support Systems: Family members, Son  County of Residence: [place]  What city do you live in?: [facility] entry access  options. Select all that apply.: No steps to enter home  Type of Current Residence: Facility  Upon entering residence, is there a bedroom on the main floor (no further steps)?: Yes  Upon entering residence, is there a bathroom on the main floor (no further steps)?: Yes  Living Arrangements: Other (Comment) (facility)  Is patient a ?: No    Activities of Daily Living Prior to Admission  Functional Status: Assistance  Completes ADLs independently?: No  Level of ADL dependence: Assistance  Ambulates independently?: No  Level of ambulatory dependence: Total Dependent  Does patient use assisted devices?: Yes  Assisted Devices (DME) used: Wheelchair  Does patient have a history of Outpatient Therapy (PT/OT)?: No  Does the patient have a history of Short-Term Rehab?: Yes  Does patient have a history of HHC?: No  Does patient currently have HHC?: No    Patient Information Continued  Income Source: Pension/half-way  Does patient have prescription coverage?: Yes  Does patient receive dialysis treatments?: No  Does patient have a history of substance abuse?: No  Does patient have a history of Mental Health Diagnosis?: No    Means of Transportation  Means of Transport to Appts:: Other (Comment) (SNF provides transportation)      Social Determinants of Health (SDOH)      Flowsheet Row Most Recent Value   Housing Stability    In the last 12 months, was there a time when you were not able to pay the mortgage or rent on time? N   In the past 12 months, how many times have you moved where you were living? 0   At any time in the past 12 months, were you homeless or living in a shelter (including now)? N   Transportation Needs    In the past 12 months, has lack of transportation kept you from medical appointments or from getting medications? no   In the past 12 months, has lack of transportation kept you from meetings, work, or from getting things needed for daily living? No   Food Insecurity    Within the past 12 months, you  worried that your food would run out before you got the money to buy more. Never true   Within the past 12 months, the food you bought just didn't last and you didn't have money to get more. Never true   Utilities    In the past 12 months has the electric, gas, oil, or water company threatened to shut off services in your home? No            DISCHARGE DETAILS:    Discharge planning discussed with:: patient's son over the phone  Freedom of Choice: Yes  Comments - Freedom of Choice: CM maintained freedom of choice as it pertains to discharge planning. Patient's son reports that patient has been a resident of [facility] since about 2018 and plan for patient to return there at discharge.  Patient's son reports that patient has a PCP through the SNF: Dr. Martinez.     CM contacted family/caregiver?: Yes  Were Treatment Team discharge recommendations reviewed with patient/caregiver?: Yes  Did patient/caregiver verbalize understanding of patient care needs?: N/A- going to facility  Were patient/caregiver advised of the risks associated with not following Treatment Team discharge recommendations?: Yes    Contacts  Patient Contacts: Derek (Juan Diego)  [phone]  Relationship to Patient:: Family  Contact Method: Phone  Phone Number: Derek Méndez)  [phone]  Reason/Outcome: Continuity of Care, Emergency Contact, Discharge Planning    Requested Home Health Care         Is the patient interested in HHC at discharge?: No    DME Referral Provided  Referral made for DME?: No    Other Referral/Resources/Interventions Provided:  Interventions: SNF  Referral Comments: Spoke to Abilio from admissions at [facility] over the phone. She confirms that patient is a resident of their facility and they are able to accept patient back at discharge. CM advised anticipated discharge was at least 48 hours. DALE referral sent to [facility] for resumption of care.    Would you like to participate in our  [facility] service program?  : No - Declined    Treatment Team Recommendation: Facility Return  Discharge Destination Plan:: Facility Return  Transport at Discharge : Warren rawls    Accepting Facility Name, City & State : [facility] [address], [facility]  Receiving Facility/Agency Phone Number: Phone: [phone]

## 2024-08-27 NOTE — QUICK NOTE
Patient seen and evaluated bedside. Meets septic shock criteria now with SBP < 90.    Administer 30 ml/kg  Follow up LA  Follow up culture data   Consider broadening antibiotics pending past sensitives if able to get previous records   Recommend additional goals of care conversations

## 2024-08-27 NOTE — OCCUPATIONAL THERAPY NOTE
OT EVALUATION       08/27/24 1516   Note Type   Note type Cancelled Session   Cancel Reasons Medical status   Additional Comments pt lethargic, will follow as medically appropriate.   Licensure   [place] License Number  Mari Bailey MS OTR/L [id number]

## 2024-08-28 ENCOUNTER — ANESTHESIA EVENT (INPATIENT)
Dept: PERIOP | Facility: HOSPITAL | Age: 79
End: 2024-08-28
Payer: MEDICARE

## 2024-08-28 PROBLEM — K21.9 GASTROESOPHAGEAL REFLUX DISEASE: Status: ACTIVE | Noted: 2024-08-28

## 2024-08-28 LAB
ANION GAP SERPL CALCULATED.3IONS-SCNC: 8 MMOL/L (ref 4–13)
BACTERIA UR CULT: ABNORMAL
BACTERIA UR CULT: ABNORMAL
BUN SERPL-MCNC: 42 MG/DL (ref 5–25)
CALCIUM SERPL-MCNC: 7.3 MG/DL (ref 8.4–10.2)
CHLORIDE SERPL-SCNC: 113 MMOL/L (ref 96–108)
CO2 SERPL-SCNC: 18 MMOL/L (ref 21–32)
CREAT SERPL-MCNC: 2.06 MG/DL (ref 0.6–1.3)
ERYTHROCYTE [DISTWIDTH] IN BLOOD BY AUTOMATED COUNT: 16.7 % (ref 11.6–15.1)
GFR SERPL CREATININE-BSD FRML MDRD: 29 ML/MIN/1.73SQ M
GLUCOSE SERPL-MCNC: 113 MG/DL (ref 65–140)
GLUCOSE SERPL-MCNC: 113 MG/DL (ref 65–140)
GLUCOSE SERPL-MCNC: 133 MG/DL (ref 65–140)
GLUCOSE SERPL-MCNC: 133 MG/DL (ref 65–140)
GLUCOSE SERPL-MCNC: 189 MG/DL (ref 65–140)
GLUCOSE SERPL-MCNC: 206 MG/DL (ref 65–140)
GLUCOSE SERPL-MCNC: 218 MG/DL (ref 65–140)
GLUCOSE SERPL-MCNC: 224 MG/DL (ref 65–140)
GLUCOSE SERPL-MCNC: 226 MG/DL (ref 65–140)
GLUCOSE SERPL-MCNC: 228 MG/DL (ref 65–140)
GLUCOSE SERPL-MCNC: 260 MG/DL (ref 65–140)
GLUCOSE SERPL-MCNC: 84 MG/DL (ref 65–140)
GLUCOSE SERPL-MCNC: 89 MG/DL (ref 65–140)
GLUCOSE SERPL-MCNC: 96 MG/DL (ref 65–140)
HCT VFR BLD AUTO: 28.4 % (ref 36.5–49.3)
HGB BLD-MCNC: 8.4 G/DL (ref 12–17)
MAGNESIUM SERPL-MCNC: 2.2 MG/DL (ref 1.9–2.7)
MCH RBC QN AUTO: 25.3 PG (ref 26.8–34.3)
MCHC RBC AUTO-ENTMCNC: 29.6 G/DL (ref 31.4–37.4)
MCV RBC AUTO: 86 FL (ref 82–98)
MRSA NOSE QL CULT: ABNORMAL
MRSA NOSE QL CULT: ABNORMAL
PHOSPHATE SERPL-MCNC: 2.2 MG/DL (ref 2.3–4.1)
PLATELET # BLD AUTO: 146 THOUSANDS/UL (ref 149–390)
PMV BLD AUTO: 9.4 FL (ref 8.9–12.7)
POTASSIUM SERPL-SCNC: 4.1 MMOL/L (ref 3.5–5.3)
RBC # BLD AUTO: 3.32 MILLION/UL (ref 3.88–5.62)
SODIUM SERPL-SCNC: 139 MMOL/L (ref 135–147)
VANCOMYCIN TROUGH SERPL-MCNC: 8.5 UG/ML (ref 10–20)
WBC # BLD AUTO: 7.54 THOUSAND/UL (ref 4.31–10.16)

## 2024-08-28 PROCEDURE — 85027 COMPLETE CBC AUTOMATED: CPT

## 2024-08-28 PROCEDURE — 84100 ASSAY OF PHOSPHORUS: CPT

## 2024-08-28 PROCEDURE — 82948 REAGENT STRIP/BLOOD GLUCOSE: CPT

## 2024-08-28 PROCEDURE — 97163 PT EVAL HIGH COMPLEX 45 MIN: CPT

## 2024-08-28 PROCEDURE — 83735 ASSAY OF MAGNESIUM: CPT

## 2024-08-28 PROCEDURE — 97110 THERAPEUTIC EXERCISES: CPT

## 2024-08-28 PROCEDURE — 92610 EVALUATE SWALLOWING FUNCTION: CPT

## 2024-08-28 PROCEDURE — 99232 SBSQ HOSP IP/OBS MODERATE 35: CPT | Performed by: NURSE PRACTITIONER

## 2024-08-28 PROCEDURE — 99233 SBSQ HOSP IP/OBS HIGH 50: CPT | Performed by: INTERNAL MEDICINE

## 2024-08-28 PROCEDURE — 80048 BASIC METABOLIC PNL TOTAL CA: CPT

## 2024-08-28 PROCEDURE — 80202 ASSAY OF VANCOMYCIN: CPT

## 2024-08-28 PROCEDURE — 97167 OT EVAL HIGH COMPLEX 60 MIN: CPT

## 2024-08-28 RX ORDER — VANCOMYCIN HYDROCHLORIDE 1 G/200ML
15 INJECTION, SOLUTION INTRAVENOUS ONCE
Status: COMPLETED | OUTPATIENT
Start: 2024-08-28 | End: 2024-08-29

## 2024-08-28 RX ORDER — INSULIN LISPRO 100 [IU]/ML
1-5 INJECTION, SOLUTION INTRAVENOUS; SUBCUTANEOUS
Status: DISCONTINUED | OUTPATIENT
Start: 2024-08-28 | End: 2024-08-29

## 2024-08-28 RX ORDER — SODIUM CHLORIDE, SODIUM GLUCONATE, SODIUM ACETATE, POTASSIUM CHLORIDE, MAGNESIUM CHLORIDE, SODIUM PHOSPHATE, DIBASIC, AND POTASSIUM PHOSPHATE .53; .5; .37; .037; .03; .012; .00082 G/100ML; G/100ML; G/100ML; G/100ML; G/100ML; G/100ML; G/100ML
500 INJECTION, SOLUTION INTRAVENOUS ONCE
Status: COMPLETED | OUTPATIENT
Start: 2024-08-28 | End: 2024-08-29

## 2024-08-28 RX ORDER — ACETAMINOPHEN 10 MG/ML
1000 INJECTION, SOLUTION INTRAVENOUS EVERY 6 HOURS SCHEDULED
Status: DISCONTINUED | OUTPATIENT
Start: 2024-08-28 | End: 2024-08-29

## 2024-08-28 RX ORDER — SODIUM CHLORIDE, SODIUM GLUCONATE, SODIUM ACETATE, POTASSIUM CHLORIDE, MAGNESIUM CHLORIDE, SODIUM PHOSPHATE, DIBASIC, AND POTASSIUM PHOSPHATE .53; .5; .37; .037; .03; .012; .00082 G/100ML; G/100ML; G/100ML; G/100ML; G/100ML; G/100ML; G/100ML
100 INJECTION, SOLUTION INTRAVENOUS CONTINUOUS
Status: DISCONTINUED | OUTPATIENT
Start: 2024-08-28 | End: 2024-08-29

## 2024-08-28 RX ORDER — SODIUM CHLORIDE, SODIUM GLUCONATE, SODIUM ACETATE, POTASSIUM CHLORIDE, MAGNESIUM CHLORIDE, SODIUM PHOSPHATE, DIBASIC, AND POTASSIUM PHOSPHATE .53; .5; .37; .037; .03; .012; .00082 G/100ML; G/100ML; G/100ML; G/100ML; G/100ML; G/100ML; G/100ML
250 INJECTION, SOLUTION INTRAVENOUS ONCE
Status: COMPLETED | OUTPATIENT
Start: 2024-08-28 | End: 2024-08-29

## 2024-08-28 RX ORDER — INSULIN LISPRO 100 [IU]/ML
1-5 INJECTION, SOLUTION INTRAVENOUS; SUBCUTANEOUS
Status: DISCONTINUED | OUTPATIENT
Start: 2024-08-29 | End: 2024-08-29

## 2024-08-28 RX ADMIN — LEVOTHYROXINE SODIUM 25 MCG: 25 TABLET ORAL at 06:09

## 2024-08-28 RX ADMIN — CARBIDOPA AND LEVODOPA 1 TABLET: 25; 100 TABLET ORAL at 08:26

## 2024-08-28 RX ADMIN — CARBIDOPA AND LEVODOPA 1 TABLET: 25; 100 TABLET ORAL at 17:27

## 2024-08-28 RX ADMIN — VANCOMYCIN HYDROCHLORIDE 1000 MG: 1 INJECTION, SOLUTION INTRAVENOUS at 09:25

## 2024-08-28 RX ADMIN — CEFTRIAXONE 2000 MG: 2 INJECTION, SOLUTION INTRAVENOUS at 16:55

## 2024-08-28 RX ADMIN — SODIUM CHLORIDE 0.5 UNITS/HR: 9 INJECTION, SOLUTION INTRAVENOUS at 04:39

## 2024-08-28 RX ADMIN — SODIUM CHLORIDE, SODIUM GLUCONATE, SODIUM ACETATE, POTASSIUM CHLORIDE, MAGNESIUM CHLORIDE, SODIUM PHOSPHATE, DIBASIC, AND POTASSIUM PHOSPHATE 500 ML: .53; .5; .37; .037; .03; .012; .00082 INJECTION, SOLUTION INTRAVENOUS at 16:34

## 2024-08-28 RX ADMIN — PANTOPRAZOLE SODIUM 40 MG: 40 TABLET, DELAYED RELEASE ORAL at 06:09

## 2024-08-28 RX ADMIN — ACETAMINOPHEN 1000 MG: 10 INJECTION INTRAVENOUS at 15:14

## 2024-08-28 RX ADMIN — INSULIN LISPRO 1 UNITS: 100 INJECTION, SOLUTION INTRAVENOUS; SUBCUTANEOUS at 22:17

## 2024-08-28 RX ADMIN — SODIUM CHLORIDE, SODIUM GLUCONATE, SODIUM ACETATE, POTASSIUM CHLORIDE, MAGNESIUM CHLORIDE, SODIUM PHOSPHATE, DIBASIC, AND POTASSIUM PHOSPHATE 250 ML: .53; .5; .37; .037; .03; .012; .00082 INJECTION, SOLUTION INTRAVENOUS at 14:23

## 2024-08-28 RX ADMIN — SODIUM CHLORIDE, SODIUM GLUCONATE, SODIUM ACETATE, POTASSIUM CHLORIDE, MAGNESIUM CHLORIDE, SODIUM PHOSPHATE, DIBASIC, AND POTASSIUM PHOSPHATE 75 ML/HR: .53; .5; .37; .037; .03; .012; .00082 INJECTION, SOLUTION INTRAVENOUS at 17:34

## 2024-08-28 RX ADMIN — ACETAMINOPHEN 1000 MG: 10 INJECTION INTRAVENOUS at 08:27

## 2024-08-28 RX ADMIN — SODIUM CHLORIDE, SODIUM GLUCONATE, SODIUM ACETATE, POTASSIUM CHLORIDE, MAGNESIUM CHLORIDE, SODIUM PHOSPHATE, DIBASIC, AND POTASSIUM PHOSPHATE 75 ML/HR: .53; .5; .37; .037; .03; .012; .00082 INJECTION, SOLUTION INTRAVENOUS at 13:22

## 2024-08-28 RX ADMIN — SODIUM CHLORIDE 75 ML/HR: 0.9 INJECTION, SOLUTION INTRAVENOUS at 04:40

## 2024-08-28 RX ADMIN — ACETAMINOPHEN 1000 MG: 10 INJECTION INTRAVENOUS at 21:55

## 2024-08-28 NOTE — ASSESSMENT & PLAN NOTE
Lab Results   Component Value Date    HGBA1C 14.6 (H) 08/26/2024     Recent Labs     08/28/24  0813 08/28/24  0820 08/28/24  1001 08/28/24  1154   POCGLU 228* 224* 260* 218*     Blood Sugar Average: Last 72 hrs:  (P) 213.48  Appears was on sliding scale from last discharge in December 2023  Family reports patient is on metformin at home but has poor compliance  Updated HbA1c 14.6  Started on insulin gtt on admission as sugars were up to 600s  Continues with elevated blood sugars, continue insulin drip at this time, following algorithm

## 2024-08-28 NOTE — PLAN OF CARE
Problem: Prexisting or High Potential for Compromised Skin Integrity  Goal: Skin integrity is maintained or improved  Description: INTERVENTIONS:  - Identify patients at risk for skin breakdown  - Assess and monitor skin integrity  - Assess and monitor nutrition and hydration status  - Monitor labs   - Assess for incontinence   - Turn and reposition patient  - Assist with mobility/ambulation  - Relieve pressure over bony prominences  - Avoid friction and shearing  - Provide appropriate hygiene as needed including keeping skin clean and dry  - Evaluate need for skin moisturizer/barrier cream  - Collaborate with interdisciplinary team   - Patient/family teaching  - Consider wound care consult   Outcome: Progressing     Problem: GENITOURINARY - ADULT  Goal: Maintains or returns to baseline urinary function  Description: INTERVENTIONS:  - Assess urinary function  - Encourage oral fluids to ensure adequate hydration if ordered  - Administer IV fluids as ordered to ensure adequate hydration  - Administer ordered medications as needed  - Offer frequent toileting  - Follow urinary retention protocol if ordered  Outcome: Progressing  Goal: Urinary catheter remains patent  Description: INTERVENTIONS:  - Assess patency of urinary catheter  - If patient has a chronic yang, consider changing catheter if non-functioning  - Follow guidelines for intermittent irrigation of non-functioning urinary catheter  Outcome: Progressing     Problem: METABOLIC, FLUID AND ELECTROLYTES - ADULT  Goal: Electrolytes maintained within normal limits  Description: INTERVENTIONS:  - Monitor labs and assess patient for signs and symptoms of electrolyte imbalances  - Administer electrolyte replacement as ordered  - Monitor response to electrolyte replacements, including repeat lab results as appropriate  - Instruct patient on fluid and nutrition as appropriate  Outcome: Progressing  Goal: Fluid balance maintained  Description: INTERVENTIONS:  -  Monitor labs   - Monitor I/O and WT  - Instruct patient on fluid and nutrition as appropriate  - Assess for signs & symptoms of volume excess or deficit  Outcome: Progressing  Goal: Glucose maintained within target range  Description: INTERVENTIONS:  - Monitor Blood Glucose as ordered  - Assess for signs and symptoms of hyperglycemia and hypoglycemia  - Administer ordered medications to maintain glucose within target range  - Assess nutritional intake and initiate nutrition service referral as needed  Outcome: Progressing     Problem: HEMATOLOGIC - ADULT  Goal: Maintains hematologic stability  Description: INTERVENTIONS  - Assess for signs and symptoms of bleeding or hemorrhage  - Monitor labs  - Administer supportive blood products/factors as ordered and appropriate  Outcome: Progressing     Problem: MUSCULOSKELETAL - ADULT  Goal: Maintain or return mobility to safest level of function  Description: INTERVENTIONS:  - Assess patient's ability to carry out ADLs; assess patient's baseline for ADL function and identify physical deficits which impact ability to perform ADLs (bathing, care of mouth/teeth, toileting, grooming, dressing, etc.)  - Assess/evaluate cause of self-care deficits   - Assess range of motion  - Assess patient's mobility  - Assess patient's need for assistive devices and provide as appropriate  - Encourage maximum independence but intervene and supervise when necessary  - Involve family in performance of ADLs  - Assess for home care needs following discharge   - Consider OT consult to assist with ADL evaluation and planning for discharge  - Provide patient education as appropriate  Outcome: Progressing     Problem: PAIN - ADULT  Goal: Verbalizes/displays adequate comfort level or baseline comfort level  Description: Interventions:  - Encourage patient to monitor pain and request assistance  - Assess pain using appropriate pain scale  - Administer analgesics based on type and severity of pain and  evaluate response  - Implement non-pharmacological measures as appropriate and evaluate response  - Consider cultural and social influences on pain and pain management  - Notify physician/advanced practitioner if interventions unsuccessful or patient reports new pain  Outcome: Progressing     Problem: SKIN/TISSUE INTEGRITY - ADULT  Goal: Skin Integrity remains intact(Skin Breakdown Prevention)  Description: Assess:  -Perform Sushil assessment every shift   -Clean and moisturize skin every shift   -Inspect skin when repositioning, toileting, and assisting with ADLS  -Assess under medical devices such as masimo every shift  -Assess extremities for adequate circulation and sensation     Bed Management:  -Have minimal linens on bed & keep smooth, unwrinkled  -Change linens as needed when moist or perspiring  -Avoid sitting or lying in one position for more than 2 hours while in bed  -Keep HOB at 30 degrees     Toileting:  -Offer bedside commode  -Assess for incontinence every shift   -Use incontinent care products after each incontinent episode such as remedy    Activity:  -Mobilize patient 3 times a day  -Encourage activity and walks on unit  -Encourage or provide ROM exercises   -Turn and reposition patient every 2 Hours  -Use appropriate equipment to lift or move patient in bed  -Instruct/ Assist with weight shifting every shift  when out of bed in chair  -Consider limitation of chair time 2 hour intervals    Skin Care:  -Avoid use of baby powder, tape, friction and shearing, hot water or constrictive clothing  -Relieve pressure over bony prominences using wedges and pillows   -Do not massage red bony areas    Next Steps:  -Teach patient strategies to minimize risks such as weight shifting    -Consider consults to  interdisciplinary teams such as woundcare  Outcome: Progressing

## 2024-08-28 NOTE — PROGRESS NOTES
[facility]  Progress Note  Name: Chris Bojorquez I  MRN: [id number]  Unit/Bed#: 4 [address] I Date of Admission: 8/26/2024   Date of Service: 8/28/2024 I Hospital Day: 2    Assessment & Plan   * Severe sepsis (HCC)  Assessment & Plan  Evidenced by fever, tachycardia, bandemia, and hypotension responsive to IVF  In setting of bacteremia likely due to urinary source due to chronic indwelling Rivera present on admit, evidenced by pyelitis, UA results requiring IV cefepime and transition to ceftriaxone  Bp still on the soft side, Isolyte at 75 cc per hour; will give bolus 250 cc over 1 hour  Lactic acid 0.8  1/2 blood cultures growing proteus   Urine culture growing proteus  ID following  Changed cefepime to ceftriaxone 2 grams IV  and continue vancomycin; MRSA from nares positive for MRSA   Trend cbc and fever curve    Bacteremia  Assessment & Plan  History of polymicrobial bacteremia with e coli, bacteroides, klebsiella, enterococcus faecalis and proteus secondary to urinary source as well as MRSA bacteremia and PICC line infection  One of two admission blood cultures growing Proteus  Infectious disease following  Patient had been on cefepime, changed to ceftriaxone 2 g IV every 24 hours and continue with IV vancomycin as MRSA swab is positive  IV hydration, will give bolus as BP soft    Emphysematous pyelitis  Assessment & Plan  CT a/p: Worsening findings of urinary tract infection on the right, suspicious for emphysematous pyelitis. There is urothelial thickening throughout the right renal collecting system as well as gas within the renal collecting system and ureter. There is no   renal parenchymal gas to suggest emphysematous pyelonephritis.  Patient has chronic urethral catheter since 2022 that is exchanged every 3 weeks due to obstruction  Has had multiple hospitalizations for sepsis secondary to urinary source  UA positive for WBC, leukocytes  Urology consulted  ID  following  Cefepime changed to ceftriaxone 2 g every 24 hours and vancomycin IV  Urine culture grew Proteus    Acute metabolic encephalopathy  Assessment & Plan  Family reports patient gets confused due to recurrent UTIs and sepsis  CT head negative  VBG, ammonia, TSH, coma panel all unremarkable  Likely metabolic encephalopathy in setting of sepsis due to bacteremia and UTI  Treatment as above  Continue to monitor    PABLO (acute kidney injury) (HCC)  Assessment & Plan  Cr around 0.8-0.9 in December 2023  Cr 1.94 on admission, currently 2.06  Likely in setting of sepsis, possible obstructive uropathy as seen on CT  Monitor Is/Os  Continue IVF  Daily bmp  If continues to worsen would consider consultation with nephrology    Parkinson disease  Assessment & Plan  Continue Sinemet  Non ambulatory at baseline    Hypothyroidism  Assessment & Plan  TSH WNL  Continue levothyroxine    Uncontrolled type 2 diabetes mellitus with hyperglycemia (HCC)  Assessment & Plan  Lab Results   Component Value Date    HGBA1C 14.6 (H) 08/26/2024     Recent Labs     08/28/24  0813 08/28/24  0820 08/28/24  1001 08/28/24  1154   POCGLU 228* 224* 260* 218*     Blood Sugar Average: Last 72 hrs:  (P) 213.48  Appears was on sliding scale from last discharge in December 2023  Family reports patient is on metformin at home but has poor compliance  Updated HbA1c 14.6  Started on insulin gtt on admission as sugars were up to 600s  Continues with elevated blood sugars, continue insulin drip at this time, following algorithm    Essential hypertension  Assessment & Plan  Hold home amlodipine    Moderate protein-calorie malnutrition (HCC)  Assessment & Plan  Malnutrition Findings:   Adult Malnutrition type: Chronic illness  Adult Degree of Malnutrition: Malnutrition of moderate degree  Consult nutrition services Malnutrition Characteristics: Muscle loss, Fat loss, Weight loss     360 Statement: related to inadequate energy intake as evidenced by 10 %  "weight loss last 7-8 months, depression of temples, sunken orbital, wasted interosseous. Treatment: Oral diet and nutrition supplements    BMI Findings:      Body mass index is 20.84 kg/m².                  VTE Pharmacologic Prophylaxis: VTE Score: 5 High Risk (Score >/= 5) - Pharmacological DVT Prophylaxis Ordered: enoxaparin (Lovenox). Sequential Compression Devices Ordered.    Mobility:   Basic Mobility Inpatient Raw Score: 8  JH-HLM Goal: 3: Sit at edge of bed  JH-HLM Achieved: 3: Sit at edge of bed  JH-HLM Goal achieved. Continue to encourage appropriate mobility.    Patient Centered Rounds: I performed bedside rounds with nursing staff today.   Discussions with Specialists or Other Care Team Provider: multidisciplinary team     Education and Discussions with Family / Patient: Attempted to update  (son) via phone. Left voicemail.     Total Time Spent on Date of Encounter in care of patient: greater than 45 mins. This time was spent on one or more of the following: performing physical exam; counseling and coordination of care; obtaining or reviewing history; documenting in the medical record; reviewing/ordering tests, medications or procedures; communicating with other healthcare professionals and discussing with patient's family/caregivers.    Current Length of Stay: 2 day(s)  Current Patient Status: Inpatient   Certification Statement: The patient will continue to require additional inpatient hospital stay due to IV abx, IV fluids, repeat labs   Discharge Plan: Anticipate discharge in >72 hrs to discharge location to be determined pending rehab evaluations.    Code Status: Level 1 - Full Code    Subjective:   Patient seen this am, very lethargic; nodding yes and no to simple questions. More awake this afternoon, however reports \"feeling tired\".     Objective:     Vitals:   Temp (24hrs), Av.4 °F (37.4 °C), Min:97.5 °F (36.4 °C), Max:102.1 °F (38.9 °C)    Temp:  [97.5 °F (36.4 °C)-102.1 °F " "(38.9 °C)] 100.7 °F (38.2 °C)  HR:  [63-83] 69  Resp:  [18] 18  BP: ()/(40-58) 82/40  SpO2:  [94 %-100 %] 98 %  Body mass index is 20.84 kg/m².     Input and Output Summary (last 24 hours):     Intake/Output Summary (Last 24 hours) at 8/28/2024 1418  Last data filed at 8/28/2024 0607  Gross per 24 hour   Intake 240 ml   Output 350 ml   Net -110 ml       Physical Exam:   Physical Exam  Vitals and nursing note reviewed.   Constitutional:       Appearance: He is ill-appearing.   HENT:      Head: Normocephalic.      Nose: Nose normal.      Mouth/Throat:      Mouth: Mucous membranes are dry.   Eyes:      Extraocular Movements: Extraocular movements intact.      Conjunctiva/sclera: Conjunctivae normal.   Cardiovascular:      Rate and Rhythm: Normal rate and regular rhythm.      Pulses: Normal pulses.   Pulmonary:      Effort: Pulmonary effort is normal.      Comments: Diminished bilaterally   Abdominal:      General: Bowel sounds are normal. There is no distension.      Palpations: Abdomen is soft.      Tenderness: There is no abdominal tenderness.   Genitourinary:     Comments: Chronic yang cath present   Musculoskeletal:      Cervical back: Normal range of motion.      Comments: Generalized deconditioning    Skin:     General: Skin is warm and dry.      Capillary Refill: Capillary refill takes less than 2 seconds.      Coloration: Skin is pale.   Neurological:      General: No focal deficit present.   Psychiatric:      Comments: Patient very lethargic, nods head yes and no, reports he is \"tired\".             Additional Data:     Labs:  Results from last 7 days   Lab Units 08/28/24  0435 08/27/24  0443 08/26/24  1937 08/26/24  1256   WBC Thousand/uL 7.54 6.13  --  9.22   HEMOGLOBIN g/dL 8.4* 8.8*  --  10.3*   HEMATOCRIT % 28.4* 28.7*  --  33.7*   PLATELETS Thousands/uL 146* 132*   < > 163   BANDS PCT %  --  14*  --   --    SEGS PCT %  --   --   --  86*   LYMPHO PCT %  --  4*  --  6*   MONO PCT %  --  9  --  8 "   EOS PCT %  --  0  --  0    < > = values in this interval not displayed.     Results from last 7 days   Lab Units 08/28/24  0435 08/26/24  1937 08/26/24  1256   SODIUM mmol/L 139   < > 133*   POTASSIUM mmol/L 4.1   < > 5.4*   CHLORIDE mmol/L 113*   < > 98   CO2 mmol/L 18*   < > 27   BUN mg/dL 42*   < > 41*   CREATININE mg/dL 2.06*   < > 1.94*   ANION GAP mmol/L 8   < > 8   CALCIUM mg/dL 7.3*   < > 8.0*   ALBUMIN g/dL  --   --  2.9*   TOTAL BILIRUBIN mg/dL  --   --  0.91   ALK PHOS U/L  --   --  167*   ALT U/L  --   --  8   AST U/L  --   --  25   GLUCOSE RANDOM mg/dL 113   < > 638*    < > = values in this interval not displayed.         Results from last 7 days   Lab Units 08/28/24  1154 08/28/24  1001 08/28/24  0820 08/28/24  0813 08/28/24  0658 08/28/24  0605 08/28/24  0406 08/28/24  0239 08/28/24  0138 08/27/24  2303 08/27/24  2113 08/27/24  1903   POC GLUCOSE mg/dl 218* 260* 224* 228* 226* 133 133 96 84 177* 311* 355*     Results from last 7 days   Lab Units 08/26/24  1256   HEMOGLOBIN A1C % 14.6*     Results from last 7 days   Lab Units 08/27/24  0902 08/27/24  0443 08/26/24  1812   LACTIC ACID mmol/L 0.8  --  1.1   PROCALCITONIN ng/ml  --  6.84*  --        Lines/Drains:  Invasive Devices       Peripheral Intravenous Line  Duration             Peripheral IV 08/27/24 Dorsal (posterior);Left Forearm <1 day    Peripheral IV 08/27/24 Right;Ventral (anterior) Forearm <1 day              Drain  Duration             Urethral Catheter 4 days                  Urinary Catheter:  Goal for removal: N/A - Chronic Rivera               Imaging: Reviewed radiology reports from this admission including: abdominal/pelvic CT and CT head    Recent Cultures (last 7 days):   Results from last 7 days   Lab Units 08/26/24  1812 08/26/24  1333   BLOOD CULTURE  Proteus mirabilis*  No Growth at 24 hrs.  --    GRAM STAIN RESULT  Gram negative rods*  --    URINE CULTURE   --  10,000-19,000 cfu/ml Proteus mirabilis*       Last 24 Hours  Medication List:   Current Facility-Administered Medications   Medication Dose Route Frequency Provider Last Rate    acetaminophen  1,000 mg Intravenous Q6H PRN Mai Babcock PA-C 1,000 mg (08/28/24 0827)    carbidopa-levodopa  1 tablet Oral BID Ward Cross MD      cefTRIAXone  2,000 mg Intravenous Q24H Destiney Aldea, DO 2,000 mg (08/27/24 1730)    enoxaparin  30 mg Subcutaneous Daily Ward Cross MD      insulin regular (HumuLIN R,NovoLIN R) 1 Units/mL in sodium chloride 0.9 % 100 mL infusion  0.3-21 Units/hr Intravenous Titrated Ward Cross MD 5 Units/hr (08/28/24 1156)    levothyroxine  25 mcg Oral Early Morning Ward Cross MD      multi-electrolyte  250 mL Intravenous Once FRANCES Schmidt      multi-electrolyte  75 mL/hr Intravenous Continuous FRANCES Schmidt      pantoprazole  40 mg Oral Early Morning Ward Cross MD      vancomycin  15 mg/kg Intravenous Daily PRN Mai Babcock PA-C          Today, Patient Was Seen By: FRANCES Schmidt    **Please Note: This note may have been constructed using a voice recognition system.**

## 2024-08-28 NOTE — ASSESSMENT & PLAN NOTE
Evidenced by fever, tachycardia, bandemia, and hypotension responsive to IVF  In setting of bacteremia likely due to urinary source due to chronic indwelling Rivera present on admit, evidenced by pyelitis, UA results requiring IV cefepime and transition to ceftriaxone  Bp still on the soft side, Isolyte at 75 cc per hour; will give bolus 250 cc over 1 hour  Lactic acid 0.8  1/2 blood cultures growing proteus   Urine culture growing proteus  ID following  Changed cefepime to ceftriaxone 2 grams IV  and continue vancomycin; MRSA from nares positive for MRSA   Trend cbc and fever curve

## 2024-08-28 NOTE — PROGRESS NOTES
Progress Note - Infectious Disease   Chris Bojorquez 79 y.o. male MRN: [id number]  Unit/Bed#: [address] [place] [address] Encounter: [id number]      Impression/Plan:  1. Severe sepsis, evidenced by fever spike, tachycardia, bandemia and hypotension responsive to IVF thus far. Likely in setting of #2/3 Blood and urine cultures finals pending.  -antibiotics as below  -follow-up cultures and adjust antibiotics as needed  -monitor temperature and hemodynamics  -serial exam  -recheck CBC and BMP in a.m. - monitoring for treatment response and any developing toxicities  -additional interventions pending clinical course     2. Proteus mirabilis bacteremia. Admission blood cultures 1 of 2 sets now growing Proteus mirabilis in setting of #3  -continue Ceftriaxone 2 g IV q 24 hours   -follow up final blood cultures  -management for #3 as below     3. Emphysematous pyelitis. 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 U/A with pyuria and bacteruria and urine cx with proteus mirabilis growth  -discontinue Vancomycin.  -continue Ceftriaxone 2 g IV q 24 hours   -monitor urine output and symptoms  -follow up urine culture  -serial exam  -close urological follow-up  -cystoscopy to follow     3. PABLO. Admission creatinine 1.94 < 2.06; CrCl <30% 12/2023 Cr 0.71  -renal dose adjust antibiotic as needed  -volume management   -recheck BMP     4. Type 2 Diabetes Mellitus with hyperglycemia;  on admission  8/26/24 HgbA1c 14.6%. Risk factor for infection  -tighten glycemic management per primary care team     5. Prior polymicrobic bacteremia with e coli, bacteroides, klebsiella, enterococcus faecalis, and proteus in 12/2023 due to urinary source. Patient completed 2 week total antibiotic course with Augmentin upon discharge from [facility]. Patient also had MRSA bacteremia/PICC infection  2023 managed by [facility] [employer] [facility]     6.Parkinson's Disease    Antibiotics:  Vancomycin/Ceftriaxone - abx D3     I have discussed the above management plan in detail with patient, RN, and Mare of the primary service. They concur with ID treatment plan and ongoing close followup.    Subjective:  Patient with fever spike to 102 F this am, chills, sweats; no reported nausea, vomiting, diarrhea; no cough, shortness of breath; no dysuria, flank/abd pain complaints. No new symptoms.    Objective:  Vitals:  Temp:  [97.5 °F (36.4 °C)-102.1 °F (38.9 °C)] 100.7 °F (38.2 °C)  HR:  [63-83] 79  Resp:  [18] 18  BP: ()/(45-58) 99/49  SpO2:  [94 %-100 %] 97 %  Temp (24hrs), Av.4 °F (37.4 °C), Min:97.5 °F (36.4 °C), Max:102.1 °F (38.9 °C)  Current: Temperature: (!) 100.7 °F (38.2 °C)    Physical Exam:   General Appearance:  79 year old male, chronically debilitated, lethargic, propped in bed, no acute resp distress.   HEENT: Atraumatic normocephalic   Throat: Oropharynx moist.   Pulmonary:   Normal respiratory excursion without accessory muscle use   Cardiac:  RRR   Abdomen:   Soft, non-tender, non-distended   Extremities: No edema   : Rivera with clear, yellow urine in bag, no SPT   Psychiatric: Weak arousable to name, cooperative   Skin: No new rashes. IV site nontender.        Labs, Imaging, & Other studies:   All pertinent labs and imaging studies were personally reviewed  Results from last 7 days   Lab Units 24  04324  1256   WBC Thousand/uL 7.54 6.13  --  9.22   HEMOGLOBIN g/dL 8.4* 8.8*  --  10.3*   PLATELETS Thousands/uL 146* 132* 149 163     Results from last 7 days   Lab Units 24  0435 243 24  1256   SODIUM mmol/L 139 141 135 133*   POTASSIUM mmol/L 4.1 4.3 5.4* 5.4*   CHLORIDE mmol/L 113* 111* 104 98   CO2 mmol/L 18* 24 22 27   BUN mg/dL 42* 41* 39* 41*   CREATININE mg/dL 2.06* 1.96* 1.95* 1.94*   EGFR ml/min/1.73sq m 29 31  32 32   CALCIUM mg/dL 7.3* 8.0* 8.1* 8.0*   AST U/L  --   --   --  25   ALT U/L  --   --   --  8   ALK PHOS U/L  --   --   --  167*     Results from last 7 days   Lab Units 08/27/24  0447 08/26/24  1812 08/26/24  1333   BLOOD CULTURE   --  Proteus species*  No Growth at 24 hrs.  --    GRAM STAIN RESULT   --  Gram negative rods*  --    URINE CULTURE   --   --  10,000-19,000 cfu/ml Proteus mirabilis*   MRSA CULTURE ONLY  Methicillin Resistant Staphylococcus aureus isolated*  This patient requires contact isolation precautions per [place] law. Contact precautions are not required in [place] for nasal surveillance cultures.  --   --      Results from last 7 days   Lab Units 08/27/24  0443   PROCALCITONIN ng/ml 6.84*

## 2024-08-28 NOTE — ASSESSMENT & PLAN NOTE
CT a/p: Worsening findings of urinary tract infection on the right, suspicious for emphysematous pyelitis. There is urothelial thickening throughout the right renal collecting system as well as gas within the renal collecting system and ureter. There is no   renal parenchymal gas to suggest emphysematous pyelonephritis.  Patient has chronic urethral catheter since 2022 that is exchanged every 3 weeks due to obstruction  Has had multiple hospitalizations for sepsis secondary to urinary source  UA positive for WBC, leukocytes  Urology consulted  ID following  Cefepime changed to ceftriaxone 2 g every 24 hours and vancomycin IV  Urine culture grew Proteus

## 2024-08-28 NOTE — PROGRESS NOTES
"Progress Note - Urology      Patient: Chris Bojorquez   : 1945 Sex: male   MRN: [id number]     CSN: [id number]  Unit/Bed#: [address] [place] [address]     SUBJECTIVE:   Patient seen on afternoon rounds  Spiking temperature to 102 today despite antibiotics  Will reach out to family for possible cystoscopy stent tomorrow in light of multiple admissions to hospital for stones and complicated UTIs/fever spikes with bladder stones      Objective   Vitals: BP (!) 89/43   Pulse 64   Temp 99.7 °F (37.6 °C)   Resp 18   Ht 5' 9\" (1.753 m)   Wt 64 kg (141 lb 1.6 oz)   SpO2 97%   BMI 20.84 kg/m²     I/O last 24 hours:  In: 510 [P.O.:510]  Out: 950 [Urine:950]      Physical Exam:   General Alert awake   Normocephalic atraumatic PERRLA  Lungs clear bilaterally  Cardiac normal S1 normal S2  Abdomen soft, flank pain  Extremities no edema      Lab Results: CBC:   Lab Results   Component Value Date    WBC 7.54 2024    HGB 8.4 (L) 2024    HCT 28.4 (L) 2024    MCV 86 2024     (L) 2024    RBC 3.32 (L) 2024    MCH 25.3 (L) 2024    MCHC 29.6 (L) 2024    RDW 16.7 (H) 2024    MPV 9.4 2024    NRBC 0 2024     CMP:   Lab Results   Component Value Date     (H) 2024    CO2 18 (L) 2024    BUN 42 (H) 2024    CREATININE 2.06 (H) 2024    CALCIUM 7.3 (L) 2024    AST 25 2024    ALT 8 2024    ALKPHOS 167 (H) 2024    EGFR 29 2024     Urinalysis:   Lab Results   Component Value Date    COLORU Yellow 2024    CLARITYU Slightly Cloudy 2024    SPECGRAV 1.015 2024    PHUR 7.0 2024    LEUKOCYTESUR Large (A) 2024    NITRITE Negative 2024    GLUCOSEU 1000 (1%) (A) 2024    KETONESU 10 (1+) (A) 2024    BILIRUBINUR Negative 2024    BLOODU Large (A) 2024     Urine Culture:   Lab Results   Component Value Date    URINECX 10,000-19,000 cfu/ml Proteus mirabilis (A) 2024 " "   URINECX <10,000 cfu/ml 08/26/2024     PSA: No results found for: \"PSA\"      Assessment/ Plan:  Right flank pain  Multiple stones  Will make n.p.o. after midnight  Discussed with family tomorrow cystoscopy possible stent removal bladder stones          Ciro Hughes MD  "

## 2024-08-28 NOTE — OCCUPATIONAL THERAPY NOTE
OT EVALUATION       08/28/24 0918   OT Last Visit   OT Visit Date 08/28/24   Note Type   Note type Evaluation   Pain Assessment   Pain Assessment Tool FLACC   Pain Rating: FLACC (Rest) - Face 0   Pain Rating: FLACC (Rest) - Legs 0   Pain Rating: FLACC (Rest) - Activity 0   Pain Rating: FLACC (Rest) - Cry 0   Pain Rating: FLACC (Rest) - Consolability 0   Score: FLACC (Rest) 0   Pain Rating: FLACC (Activity) - Face 0   Pain Rating: FLACC (Activity) - Legs 0   Pain Rating: FLACC (Activity) - Activity 0   Pain Rating: FLACC (Activity) - Cry 0   Pain Rating: FLACC (Activity) - Consolability 0   Score: FLACC (Activity) 0   Restrictions/Precautions   Other Precautions Chair Alarm;Bed Alarm;Fall Risk;Cognitive   Home Living   Type of Home SNF  ([facility])   Additional Comments pt reports mechanical lift OOB   Prior Function   Level of [place] Needs assistance with IADLS;Needs assistance with ADLs;Needs assistance with functional mobility   Lives With Facility staff   Receives Help From Personal care attendant   IADLs Family/Friend/Other provides transportation;Family/Friend/Other provides meals;Family/Friend/Other provides medication management   ADL   Eating Assistance 2  Maximal Assistance   Grooming Assistance 2  Maximal Assistance   UB Bathing Assistance 2  Maximal Assistance   LB Bathing Assistance 1  Total Assistance   UB Dressing Assistance 2  Maximal Assistance   LB Dressing Assistance 1  Total Assistance   Toileting Assistance  1  Total Assistance   Bed Mobility   Rolling R 2  Maximal assistance   Rolling L 2  Maximal assistance   Supine to Sit 2  Maximal assistance   Additional items Assist x 2   Sit to Supine 2  Maximal assistance   Additional items Assist x 2   Additional Comments poor- sitting balance on edge of bed, unable to assess standing   Transfers   Sit to Stand Unable to assess   Balance   Static Sitting Poor -   Activity Tolerance   Activity Tolerance Patient limited by  fatigue;Treatment limited secondary to medical complications (Comment)  (cognition, weakness)   Nurse Made Aware yes   RUE Assessment   RUE Assessment   (AAROM WFL, pt reports shoulder discomfort with RW, elbow AROM WFL, some flexion contracture 4th and 5th digits, pt did not allow OT to fully assess due to discomfort)   LUE Assessment   LUE Assessment   (AAROM WFL, pt reports shoulder discomfort with RW, elbow AROM WFL, flexion contracture 3,4,5 digits)   Cognition   Overall Cognitive Status Impaired   Arousal/Participation Arousable   Attention Difficulty attending to directions   Orientation Level Oriented to person;Oriented to place   Following Commands Follows one step commands with increased time or repetition   Comments pt with eyes closed throughout session, able to answer simple questions and follow most commands   Assessment   Limitation Decreased ADL status;Decreased UE strength;Decreased Safe judgement during ADL;Decreased endurance;Decreased self-care trans;Decreased high-level ADLs;Decreased cognition;Decreased UE ROM  (decreased balance)   Prognosis Good   Assessment Patient evaluated by Occupational Therapy.  Patient admitted with Severe sepsis (HCC).  The patients occupational profile, medical and therapy history includes a extensive additional review of physical, cognitive, or psychosocial history related to current functional performance.  Comorbidities affecting functional mobility and ADLS include: diabetes and Parkinsons.  Prior to admission, patient was requiring assist for functional mobility without assistive device, requiring assist for ADLS, and a resident of long term care.  The evaluation identifies the following performance deficits: weakness, decreased ROM, impaired balance, decreased endurance, increased fall risk, new onset of impairment of functional mobility, decreased ADLS, decreased IADLS, decreased activity tolerance, decreased safety awareness, impaired judgement, decreased  cognition, and decreased strength, that result in activity limitations and/or participation restrictions. This evaluation requires clinical decision making of high complexity, because the patient presents with comorbidites that affect occupational performance and required significant modification of tasks or assistance with consideration of multiple treatment options.  The Barthel Index was used as a functional outcome tool presenting with a score of Barthel Index Score: 5, indicating marked limitations of functional mobility and ADLS.  The patient's raw score on the -PAC Daily Activity Inpatient Short Form is 6. A raw score of less than 19 suggests the patient may benefit from discharge to post-acute rehabilitation services. Please refer to the recommendation of the Occupational Therapist for safe discharge planning.  Patient will benefit from skilled Occupational Therapy services to address above deficits and facilitate a safe return to prior level of function.   Goals   Patient Goals unable to state due to cognitive assessment   STG Time Frame   (1-7 days)   Short Term Goal  Goals established to promote Patient Goals: unable to state due to cognitive assessment:  Eating: mod assist; Grooming: mod assist seated; Bathing: mod assist; Upper Body Dressing max assist; Lower Body Dressing: max assist; Toileting: max assist; Patient will increase standing tolerance to 1 minutes during ADL task to decrease assistance level and decrease fall risk; Patient will increase bed mobility to mod assist of 2 in preparation for ADLS and transfers;  Patient will tolerate 5 minutes of UE ROM/strengthening to increase general activity tolerance and performance in ADLS/IADLS; Patient will improve functional activity tolerance to 10 minutes of sustained functional tasks to increase participation in basic self-care and decrease assistance level;  Patient will increase static sitting balance to poor to improve the ability to sit at  edge of bed or on a chair for ADLS;  Patient will increase static standing balance to poor to improve postural stability and decrease fall risk during standing ADLS and transfers.   LTG Time Frame   (8-14 days)   Long Term Goal Eating: min assist; Grooming: min assist seated; Bathing: min assist; Upper Body Dressing mod assist; Lower Body Dressing: mod assist; Toileting: mod assist; Patient will increase standing tolerance to 3 minutes during ADL task to decrease assistance level and decrease fall risk; Patient will increase bed mobility to mod assist in preparation for ADLS and transfers;  Patient will tolerate 10 minutes of UE ROM/strengthening to increase general activity tolerance and performance in ADLS/IADLS; Patient will improve functional activity tolerance to 20 minutes of sustained functional tasks to increase participation in basic self-care and decrease assistance level;  Patient will increase static sitting balance to poor+ to improve the ability to sit at edge of bed or on a chair for ADLS;  Patient will increase static standing balance to poor+ to improve postural stability and decrease fall risk during standing ADLS and transfers.   Pt will score >/= 11/24 on AM-[facility] Daily Activity Inpatient scale to promote safe independence with ADLs and functional mobility; Pt will score >/= 35/100 on Barthel Index in order to decrease caregiver assistance needed and increase ability to perform ADLs and functional mobility.   Plan   Treatment Interventions ADL retraining;Functional transfer training;UE strengthening/ROM;Endurance training;Cognitive reorientation;Patient/family training;Equipment evaluation/education;Activityengagement;Compensatory technique education   Goal Expiration Date 09/11/24   OT Frequency 1-2x/wk   Discharge Recommendation   Rehab Resource Intensity Level, OT III (Minimum Resource Intensity)   AM-PAC Daily Activity Inpatient   Lower Body Dressing 1   Bathing 1   Toileting 1   Upper Body  Dressing 1   Grooming 1   Eating 1   Daily Activity Raw Score 6   Turning Head Towards Sound 3   Follow Simple Instructions 3   Low Function Daily Activity Raw Score 12   Low Function Daily Activity Standardized Score  21.38   AM-PAC Applied Cognition Inpatient   Following a Speech/Presentation 1   Understanding Ordinary Conversation 3   Taking Medications 1   Remembering Where Things Are Placed or Put Away 1   Remembering List of 4-5 Errands 1   Taking Care of Complicated Tasks 1   Applied Cognition Raw Score 8   Applied Cognition Standardized Score 19.32   Barthel Index   Feeding 0   Bathing 0   Grooming Score 0   Dressing Score 0   Bladder Score 0   Bowels Score 0   Toilet Use Score 0   Transfers (Bed/Chair) Score 5   Mobility (Level Surface) Score 0   Stairs Score 0   Barthel Index Score 5   Licensure   [place] License Number  Mari Bailey MS OTR/L [id number]

## 2024-08-28 NOTE — PLAN OF CARE
Summary of Swallowing Evaluation:   Pt presented as lethargic with functional appearing pharyngeal stage swallowing skills but /s suggestive of mild-moderate oral dysphagia at this time.      Recommended Diet: mechanically altered/level 2 diet and thin liquids   Recommended Form of Meds: as best tolerated (crushed and in puree today)   Aspiration precautions and swallowing strategies: upright posture, only feed when fully alert, and slow rate of feeding  Other Recommendations: Continue frequent oral care   Plan: Halog topical solution: Apply to scalp and foot once a day or when irritated Detail Level: Zone

## 2024-08-28 NOTE — ANESTHESIA PREPROCEDURE EVALUATION
Procedure:  CYSTOSCOPY RETROGRADE PYELOGRAM WITH INSERTION STENT URETERAL removal bladder stone laser gypsy pexy (Right: Bladder)    Relevant Problems   CARDIO   (+) Essential hypertension      ENDO   (+) Hypothyroidism      GI/HEPATIC   (+) Gastroesophageal reflux disease      /RENAL   (+) PABLO (acute kidney injury) (HCC)   (+) Emphysematous pyelitis      Neurology/Sleep   (+) Parkinson disease        Physical Exam    Airway    Mallampati score: II  TM Distance: >3 FB  Neck ROM: full     Dental       Cardiovascular  Rhythm: irregular, Rate: abnormal    Pulmonary      Other Findings        Anesthesia Plan  ASA Score- 4 Emergent    Anesthesia Type- IV sedation with anesthesia with ASA Monitors.         Additional Monitors:     Airway Plan:     Comment: Patient brought to ICU around 5 am today.  Lethargic and confused admitted to ICU at that time.  Around 7 am he developed hypotension which progressed to bradycardia in the 40s.  Around 10 am he was started on epinephrine infusion which corrected the BP and HR.  Anesthesia plan was originally for GA with LMA, but given deterioration of the patient's condition it would be better to provide minimal sedation and avoid general anesthesia.  Will plan to send directly back to ICU after procedure.       Plan Factors-    Chart reviewed.        Patient is not a current smoker.              Induction- intravenous.    Postoperative Plan- Plan for postoperative opioid use.     Perioperative Resuscitation Plan - Level 1 - Full Code.       Informed Consent- Anesthetic plan and risks discussed with son.  I personally reviewed this patient with the CRNA. Discussed and agreed on the Anesthesia Plan with the CRNA..

## 2024-08-28 NOTE — SPEECH THERAPY NOTE
Speech-Language Pathology Bedside Swallow Evaluation      Patient Name: Chris Bojorquez    Today's Date: 8/28/2024     Problem List  Principal Problem:    Severe sepsis (HCC)  Active Problems:    Essential hypertension    Emphysematous pyelitis    Acute metabolic encephalopathy    Moderate protein-calorie malnutrition (HCC)    Uncontrolled type 2 diabetes mellitus with hyperglycemia (HCC)    PABLO (acute kidney injury) (HCC)    Hypothyroidism    Parkinson disease    Bacteremia      Past Medical History  History reviewed. No pertinent past medical history.    Past Surgical History  History reviewed. No pertinent surgical history.    Summary   Pt presented as lethargic with functional appearing pharyngeal stage swallowing skills but /s suggestive of mild-moderate oral dysphagia at this time.      Recommended Diet: mechanically altered/level 2 diet and thin liquids   Recommended Form of Meds:  as best tolerated (crushed and in puree today)    Aspiration precautions and swallowing strategies: upright posture, only feed when fully alert, and slow rate of feeding  Other Recommendations: Continue frequent oral care        Current Medical Status  Chris Bojorquez is a 79 y.o. M SNF resident with a PMH of htn , renal calculi, hypothyroidism , T2DM admitted 8/26 with AMS (progressed over a few days).   DX: Emphysematous pyelitis  CT ABD: Worsening findings of urinary tract infection on the right, suspicious for emphysematous pyelitis. There is urothelial thickening throughout the right renal collecting system as well as gas within the renal collecting system and ureter.    * Acute metabolic encephalopathy  Parkinson disease  Hypothyroidism  PABLO (acute kidney injury) (HCC)  Uncontrolled type 2 diabetes mellitus with hyperglycemia (HCC) Average BS: Last 72 hrs 554:  Moderate protein-calorie malnutrition (HCC), Hyperkalemia, Hyponatremia.     Pt was NPO 8/27 for procedure.  SLP Swallowing Evaluation ordered at this  "time    Allergies:  Lactose    Past medical history:  Please see H&P for details    Special Studies:  See above for CT of abd/pelvis.   CXR: NAD.  CT of head: No acute intracranial abnormality.     Social/Education/Vocational Hx:  Pt lives in SNF[facility]    Swallow Information   Current Risks for Dysphagia & Aspiration: AMS  Current Diet:CCD regular diet and thin liquids   Baseline Diet: CCD regular diet and thin liquids      Baseline Assessment   Behavior/Cognition: lethargic, O to self and \"hospital\"  When asked where he lives, \"right here at the hospital\"  could recognize name of SNF given choice of 2.   Speech/Language Status: able to follow commands inconsistently and limited verbal output  Patient Positioning: upright in bed  Pain Status/Interventions/Response to Interventions:  No report of or nonverbal indications of pain.       Swallow Mechanism Exam  Facial: masked facies  Labial: WFL for straw use  Lingual: decreased ROM and decreased coordination  Velum: unable to visualize  Mandible: adequate ROM  Dentition: adequate  Vocal quality:weak   Respiratory Status: on 2L O2     Consistencies Assessed and Performance   Consistencies Administered: thin liquids, puree, and soft solids  Materials administered included water/juice (pt v thirsty), cherie pudding, scrambled eggs, small bites of ripe melon and pineapple.    Oral Stage: mild-moderately impaired.  Adequate retrieval by straw/tsp/fork.   Mastication, bolus formation and transfer were significantly prolonged with fruit, mod prolonged with eggs.  Mild oral residue with each (puree required to facilitate clearance mild food residue). No oral residue noted with puree.  No overt s/s reduced oral control.    Pharyngeal Stage: WFL suspected  Swallow Mechanics:  Swallowing initiation appeared prompt.  Laryngeal rise was palpated and judged to be within functional limits.  No coughing, throat clearing, change in vocal quality or respiratory status noted " today.     Esophageal Concerns: none reported    Strategies and Efficacy: positioned upright and head supported (pt initially resistant to repositioning), fed slowly and puree used to clear mild food residue.    Summary and Recommendations (see above)    Results Reviewed with: RN, JULIA and JIM (Wendi who downgraded food texture as requested)    Treatment Recommended: yes    Frequency of treatment: 2-3x weekly    Patient Stated Goal: did not state today    Dysphagia LTG  -Patient will demonstrate safe and effective oral intake (without overt s/s significant oral/pharyngeal dysphagia including s/s penetration or aspiration) for the highest appropriate diet level.     Short Term Goals:  -Pt will tolerate Dysphagia 2/mechanical soft diet and thin liquid with no significant s/s oral or pharyngeal dysphagia across 1-3 diagnostic session/s.    -Patient will tolerate trials of upgraded food texture and pills with no significant s/s of oral or pharyngeal dysphagia including aspiration across 1-3 diagnostic sessions     Thank you for this referral.  Please do not hesitate to contact me with any questions or concerns.    Sofi Cat MS CCC-SLP  [place] License [id number]  [place] License [id number]

## 2024-08-28 NOTE — PLAN OF CARE
Problem: Prexisting or High Potential for Compromised Skin Integrity  Goal: Skin integrity is maintained or improved  Description: INTERVENTIONS:  - Identify patients at risk for skin breakdown  - Assess and monitor skin integrity  - Assess and monitor nutrition and hydration status  - Monitor labs   - Assess for incontinence   - Turn and reposition patient  - Assist with mobility/ambulation  - Relieve pressure over bony prominences  - Avoid friction and shearing  - Provide appropriate hygiene as needed including keeping skin clean and dry  - Evaluate need for skin moisturizer/barrier cream  - Collaborate with interdisciplinary team   - Patient/family teaching  - Consider wound care consult   Outcome: Progressing     Problem: GENITOURINARY - ADULT  Goal: Maintains or returns to baseline urinary function  Description: INTERVENTIONS:  - Assess urinary function  - Encourage oral fluids to ensure adequate hydration if ordered  - Administer IV fluids as ordered to ensure adequate hydration  - Administer ordered medications as needed  - Offer frequent toileting  - Follow urinary retention protocol if ordered  Outcome: Progressing  Goal: Urinary catheter remains patent  Description: INTERVENTIONS:  - Assess patency of urinary catheter  - If patient has a chronic yang, consider changing catheter if non-functioning  - Follow guidelines for intermittent irrigation of non-functioning urinary catheter  Outcome: Progressing     Problem: METABOLIC, FLUID AND ELECTROLYTES - ADULT  Goal: Electrolytes maintained within normal limits  Description: INTERVENTIONS:  - Monitor labs and assess patient for signs and symptoms of electrolyte imbalances  - Administer electrolyte replacement as ordered  - Monitor response to electrolyte replacements, including repeat lab results as appropriate  - Instruct patient on fluid and nutrition as appropriate  Outcome: Progressing  Goal: Fluid balance maintained  Description: INTERVENTIONS:  -  Last oral intake 0800 this morning. Pt reports \"a few sips of water with meds\". He denies any food today.    Monitor labs   - Monitor I/O and WT  - Instruct patient on fluid and nutrition as appropriate  - Assess for signs & symptoms of volume excess or deficit  Outcome: Progressing  Goal: Glucose maintained within target range  Description: INTERVENTIONS:  - Monitor Blood Glucose as ordered  - Assess for signs and symptoms of hyperglycemia and hypoglycemia  - Administer ordered medications to maintain glucose within target range  - Assess nutritional intake and initiate nutrition service referral as needed  Outcome: Progressing     Problem: SKIN/TISSUE INTEGRITY - ADULT  Goal: Skin Integrity remains intact(Skin Breakdown Prevention)  Description: Assess:  -Perform Sushil assessment every shift   -Clean and moisturize skin every shift   -Inspect skin when repositioning, toileting, and assisting with ADLS  -Assess under medical devices such as masimo every shift   -Assess extremities for adequate circulation and sensation     Bed Management:  -Have minimal linens on bed & keep smooth, unwrinkled  -Change linens as needed when moist or perspiring  -Avoid sitting or lying in one position for more than 2 hours while in bed  -Keep HOB at 30 degrees     Toileting:  -Offer bedside commode  -Assess for incontinence every shift   -Use incontinent care products after each incontinent episode such as remedy    Activity:  -Mobilize patient 3 times a day  -Encourage activity and walks on unit  -Encourage or provide ROM exercises   -Turn and reposition patient every 2 Hours  -Use appropriate equipment to lift or move patient in bed  -Instruct/ Assist with weight shifting every 30 minutes  when out of bed in chair  -Consider limitation of chair time 2 hour intervals    Skin Care:  -Avoid use of baby powder, tape, friction and shearing, hot water or constrictive clothing  -Relieve pressure over bony prominences using pillows and wedges   -Do not massage red bony areas    Next Steps:  -Teach patient strategies to minimize risks such as  weight shifting    -Consider consults to  interdisciplinary teams   Outcome: Progressing     Problem: HEMATOLOGIC - ADULT  Goal: Maintains hematologic stability  Description: INTERVENTIONS  - Assess for signs and symptoms of bleeding or hemorrhage  - Monitor labs  - Administer supportive blood products/factors as ordered and appropriate  Outcome: Progressing     Problem: MUSCULOSKELETAL - ADULT  Goal: Maintain or return mobility to safest level of function  Description: INTERVENTIONS:  - Assess patient's ability to carry out ADLs; assess patient's baseline for ADL function and identify physical deficits which impact ability to perform ADLs (bathing, care of mouth/teeth, toileting, grooming, dressing, etc.)  - Assess/evaluate cause of self-care deficits   - Assess range of motion  - Assess patient's mobility  - Assess patient's need for assistive devices and provide as appropriate  - Encourage maximum independence but intervene and supervise when necessary  - Involve family in performance of ADLs  - Assess for home care needs following discharge   - Consider OT consult to assist with ADL evaluation and planning for discharge  - Provide patient education as appropriate  Outcome: Progressing     Problem: PAIN - ADULT  Goal: Verbalizes/displays adequate comfort level or baseline comfort level  Description: Interventions:  - Encourage patient to monitor pain and request assistance  - Assess pain using appropriate pain scale  - Administer analgesics based on type and severity of pain and evaluate response  - Implement non-pharmacological measures as appropriate and evaluate response  - Consider cultural and social influences on pain and pain management  - Notify physician/advanced practitioner if interventions unsuccessful or patient reports new pain  Outcome: Progressing     Problem: INFECTION - ADULT  Goal: Absence or prevention of progression during hospitalization  Description: INTERVENTIONS:  - Assess and monitor for  signs and symptoms of infection  - Monitor lab/diagnostic results  - Monitor all insertion sites, i.e. indwelling lines, tubes, and drains  - Monitor endotracheal if appropriate and nasal secretions for changes in amount and color  - [place] appropriate cooling/warming therapies per order  - Administer medications as ordered  - Instruct and encourage patient and family to use good hand hygiene technique  - Identify and instruct in appropriate isolation precautions for identified infection/condition  Outcome: Progressing  Goal: Absence of fever/infection during neutropenic period  Description: INTERVENTIONS:  - Monitor WBC    Outcome: Progressing     Problem: SAFETY ADULT  Goal: Patient will remain free of falls  Description: INTERVENTIONS:  - Educate patient/family on patient safety including physical limitations  - Instruct patient to call for assistance with activity   - Consult OT/PT to assist with strengthening/mobility   - Keep Call bell within reach  - Keep bed low and locked with side rails adjusted as appropriate  - Keep care items and personal belongings within reach  - Initiate and maintain comfort rounds  - Make Fall Risk Sign visible to staff  - Offer Toileting every 2  Hours, in advance of need  - Initiate/Maintain bed alarm  - Obtain necessary fall risk management equipment: yes   - Apply yellow socks and bracelet for high fall risk patients  - Consider moving patient to room near nurses station  Outcome: Progressing  Goal: Maintain or return to baseline ADL function  Description: INTERVENTIONS:  -  Assess patient's ability to carry out ADLs; assess patient's baseline for ADL function and identify physical deficits which impact ability to perform ADLs (bathing, care of mouth/teeth, toileting, grooming, dressing, etc.)  - Assess/evaluate cause of self-care deficits   - Assess range of motion  - Assess patient's mobility; develop plan if impaired  - Assess patient's need for assistive devices and  provide as appropriate  - Encourage maximum independence but intervene and supervise when necessary  - Involve family in performance of ADLs  - Assess for home care needs following discharge   - Consider OT consult to assist with ADL evaluation and planning for discharge  - Provide patient education as appropriate  Outcome: Progressing  Goal: Maintains/Returns to pre admission functional level  Description: INTERVENTIONS:  - Perform AM-PAC 6 Click Basic Mobility/ Daily Activity assessment daily.  - Set and communicate daily mobility goal to care team and patient/family/caregiver.   - Collaborate with rehabilitation services on mobility goals if consulted  - Perform Range of Motion 3 times a day.  - Reposition patient every 2 hours.  - Dangle patient 3 times a day  - Stand patient 3 times a day  - Ambulate patient 3 times a day  - Out of bed to chair 3 times a day   - Out of bed for meals 3 times a day  - Out of bed for toileting  - Record patient progress and toleration of activity level   Outcome: Progressing     Problem: Nutrition/Hydration-ADULT  Goal: Nutrient/Hydration intake appropriate for improving, restoring or maintaining nutritional needs  Description: Monitor and assess patient's nutrition/hydration status for malnutrition. Collaborate with interdisciplinary team and initiate plan and interventions as ordered.  Monitor patient's weight and dietary intake as ordered or per policy. Utilize nutrition screening tool and intervene as necessary. Determine patient's food preferences and provide high-protein, high-caloric foods as appropriate.     INTERVENTIONS:  - Monitor oral intake, urinary output, labs, and treatment plans  - Assess nutrition and hydration status and recommend course of action  - Evaluate amount of meals eaten  - Assist patient with eating if necessary   - Allow adequate time for meals  - Recommend/ encourage appropriate diets, oral nutritional supplements, and vitamin/mineral supplements  -  Order, calculate, and assess calorie counts as needed  - Recommend, monitor, and adjust tube feedings and TPN/PPN based on assessed needs  - Assess need for intravenous fluids  - Provide specific nutrition/hydration education as appropriate  - Include patient/family/caregiver in decisions related to nutrition  Outcome: Progressing

## 2024-08-28 NOTE — ASSESSMENT & PLAN NOTE
History of polymicrobial bacteremia with e coli, bacteroides, klebsiella, enterococcus faecalis and proteus secondary to urinary source as well as MRSA bacteremia and PICC line infection  One of two admission blood cultures growing Proteus  Infectious disease following  Patient had been on cefepime, changed to ceftriaxone 2 g IV every 24 hours and continue with IV vancomycin as MRSA swab is positive  IV hydration, will give bolus as BP soft

## 2024-08-28 NOTE — QUICK NOTE
Patient noted to have softer BP in a.m. along with temperature 102.1.  Asked nursing to recheck BP 1400, noted to be 88/40, temp 100.7.  Changed patient's IV Tylenol to every 6 hours around-the-clock as patient has been having intermittent fevers.  Patient given isolyte bolus 250 cc, repeat blood pressure continued on low side 89/43, additional bolus of 500 cc provided.  Discussed with critical care, recommended increasing IV fluid to 100 cc/h and continue to monitor. Patient rounded on once again in evening; heart rate decreased, patient more alert and interactive; O2 sats upper 90s on 2 liters NC. Requested nursing staff to check vitals q 4 hours overnight and monitor.

## 2024-08-28 NOTE — PROGRESS NOTES
Chris Bojorquez is a 79 y.o. male who is currently ordered Vancomycin IV with management by the Pharmacy Consult service.  Relevant clinical data and objective / subjective history reviewed.  Vancomycin Assessment:  Indication and Goal AUC/Trough: Urinary tract infection (goal -600, trough >10)  Micro:   pending  Renal Function:  SCr: 2.06 mg/dL  CrCl: 26.8 mL/min  Renal replacement: Not on dialysis  Days of Therapy: 2  Current Dose: 1000mg  IV daily prn trough <15  Vancomycin Plan:  Continue the pulse dosing. 1000mg to be given once today.   Next Level: 8/29/24 @0600  Renal Function Monitoring: Daily BMP and UOP  Pharmacy will continue to follow closely for s/sx of nephrotoxicity, infusion reactions and appropriateness of therapy.  BMP and CBC will be ordered per protocol. We will continue to follow the patient’s culture results and clinical progress daily.    Do Shaniqua Cesar, Pharmacist

## 2024-08-28 NOTE — PLAN OF CARE
Problem: Prexisting or High Potential for Compromised Skin Integrity  Goal: Skin integrity is maintained or improved  Description: INTERVENTIONS:  - Identify patients at risk for skin breakdown  - Assess and monitor skin integrity  - Assess and monitor nutrition and hydration status  - Monitor labs   - Assess for incontinence   - Turn and reposition patient  - Assist with mobility/ambulation  - Relieve pressure over bony prominences  - Avoid friction and shearing  - Provide appropriate hygiene as needed including keeping skin clean and dry  - Evaluate need for skin moisturizer/barrier cream  - Collaborate with interdisciplinary team   - Patient/family teaching  - Consider wound care consult   Outcome: Progressing     Problem: GENITOURINARY - ADULT  Goal: Maintains or returns to baseline urinary function  Description: INTERVENTIONS:  - Assess urinary function  - Encourage oral fluids to ensure adequate hydration if ordered  - Administer IV fluids as ordered to ensure adequate hydration  - Administer ordered medications as needed  - Offer frequent toileting  - Follow urinary retention protocol if ordered  Outcome: Progressing  Goal: Urinary catheter remains patent  Description: INTERVENTIONS:  - Assess patency of urinary catheter  - If patient has a chronic yang, consider changing catheter if non-functioning  - Follow guidelines for intermittent irrigation of non-functioning urinary catheter  Outcome: Progressing     Problem: METABOLIC, FLUID AND ELECTROLYTES - ADULT  Goal: Electrolytes maintained within normal limits  Description: INTERVENTIONS:  - Monitor labs and assess patient for signs and symptoms of electrolyte imbalances  - Administer electrolyte replacement as ordered  - Monitor response to electrolyte replacements, including repeat lab results as appropriate  - Instruct patient on fluid and nutrition as appropriate  Outcome: Progressing  Goal: Fluid balance maintained  Description: INTERVENTIONS:  -  Monitor labs   - Monitor I/O and WT  - Instruct patient on fluid and nutrition as appropriate  - Assess for signs & symptoms of volume excess or deficit  Outcome: Progressing  Goal: Glucose maintained within target range  Description: INTERVENTIONS:  - Monitor Blood Glucose as ordered  - Assess for signs and symptoms of hyperglycemia and hypoglycemia  - Administer ordered medications to maintain glucose within target range  - Assess nutritional intake and initiate nutrition service referral as needed  Outcome: Progressing     Problem: SKIN/TISSUE INTEGRITY - ADULT  Goal: Skin Integrity remains intact(Skin Breakdown Prevention)  Description: Assess:  -Perform Sushil assessment every 4hr  -Clean and moisturize skin every shift  -Inspect skin when repositioning, toileting, and assisting with ADLS  -Assess under medical devices such as IV every shift  -Assess extremities for adequate circulation and sensation     Bed Management:  -Have minimal linens on bed & keep smooth, unwrinkled  -Change linens as needed when moist or perspiring  -Avoid sitting or lying in one position for more than 2 hours while in bed  -Keep HOB at 30degrees     Toileting:  -Offer bedside commode  -Assess for incontinence every 2-4 hr  -Use incontinent care products after each incontinent episode such as calazime    Activity:  -Mobilize patient 2 times a day  -Encourage activity and walks on unit  -Encourage or provide ROM exercises   -Turn and reposition patient every 2 Hours  -Use appropriate equipment to lift or move patient in bed  -Instruct/ Assist with weight shifting every 30 when out of bed in chair  -Consider limitation of chair time 2 hour intervals    Skin Care:  -Avoid use of baby powder, tape, friction and shearing, hot water or constrictive clothing  -Relieve pressure over bony prominences using allevyn, wedges  -Do not massage red bony areas    Next Steps:  -Teach patient strategies to minimize risks such as    -Consider consults to   interdisciplinary teams such as   Outcome: Progressing     Problem: HEMATOLOGIC - ADULT  Goal: Maintains hematologic stability  Description: INTERVENTIONS  - Assess for signs and symptoms of bleeding or hemorrhage  - Monitor labs  - Administer supportive blood products/factors as ordered and appropriate  Outcome: Progressing     Problem: MUSCULOSKELETAL - ADULT  Goal: Maintain or return mobility to safest level of function  Description: INTERVENTIONS:  - Assess patient's ability to carry out ADLs; assess patient's baseline for ADL function and identify physical deficits which impact ability to perform ADLs (bathing, care of mouth/teeth, toileting, grooming, dressing, etc.)  - Assess/evaluate cause of self-care deficits   - Assess range of motion  - Assess patient's mobility  - Assess patient's need for assistive devices and provide as appropriate  - Encourage maximum independence but intervene and supervise when necessary  - Involve family in performance of ADLs  - Assess for home care needs following discharge   - Consider OT consult to assist with ADL evaluation and planning for discharge  - Provide patient education as appropriate  Outcome: Progressing     Problem: PAIN - ADULT  Goal: Verbalizes/displays adequate comfort level or baseline comfort level  Description: Interventions:  - Encourage patient to monitor pain and request assistance  - Assess pain using appropriate pain scale  - Administer analgesics based on type and severity of pain and evaluate response  - Implement non-pharmacological measures as appropriate and evaluate response  - Consider cultural and social influences on pain and pain management  - Notify physician/advanced practitioner if interventions unsuccessful or patient reports new pain  Outcome: Progressing     Problem: INFECTION - ADULT  Goal: Absence or prevention of progression during hospitalization  Description: INTERVENTIONS:  - Assess and monitor for signs and symptoms of  infection  - Monitor lab/diagnostic results  - Monitor all insertion sites, i.e. indwelling lines, tubes, and drains  - Monitor endotracheal if appropriate and nasal secretions for changes in amount and color  - [place] appropriate cooling/warming therapies per order  - Administer medications as ordered  - Instruct and encourage patient and family to use good hand hygiene technique  - Identify and instruct in appropriate isolation precautions for identified infection/condition  Outcome: Progressing  Goal: Absence of fever/infection during neutropenic period  Description: INTERVENTIONS:  - Monitor WBC    Outcome: Progressing     Problem: SAFETY ADULT  Goal: Patient will remain free of falls  Description: INTERVENTIONS:  - Educate patient/family on patient safety including physical limitations  - Instruct patient to call for assistance with activity   - Consult OT/PT to assist with strengthening/mobility   - Keep Call bell within reach  - Keep bed low and locked with side rails adjusted as appropriate  - Keep care items and personal belongings within reach  - Initiate and maintain comfort rounds  - Make Fall Risk Sign visible to staff  - Offer Toileting every 2 Hours, in advance of need  - Initiate/Maintain bed alarm  - Obtain necessary fall risk management equipment:   - Apply yellow socks and bracelet for high fall risk patients  - Consider moving patient to room near nurses station  Outcome: Progressing  Goal: Maintain or return to baseline ADL function  Description: INTERVENTIONS:  -  Assess patient's ability to carry out ADLs; assess patient's baseline for ADL function and identify physical deficits which impact ability to perform ADLs (bathing, care of mouth/teeth, toileting, grooming, dressing, etc.)  - Assess/evaluate cause of self-care deficits   - Assess range of motion  - Assess patient's mobility; develop plan if impaired  - Assess patient's need for assistive devices and provide as appropriate  -  Encourage maximum independence but intervene and supervise when necessary  - Involve family in performance of ADLs  - Assess for home care needs following discharge   - Consider OT consult to assist with ADL evaluation and planning for discharge  - Provide patient education as appropriate  Outcome: Progressing  Goal: Maintains/Returns to pre admission functional level  Description: INTERVENTIONS:  - Perform AM-PAC 6 Click Basic Mobility/ Daily Activity assessment daily.  - Set and communicate daily mobility goal to care team and patient/family/caregiver.   - Collaborate with rehabilitation services on mobility goals if consulted  - Perform Range of Motion 2 times a day.  - Reposition patient every 2 hours.  - Dangle patient 2 times a day  - Stand patient 2 times a day  - Ambulate patient 1 times a day  - Out of bed to chair 2 times a day   - Out of bed for meals 2 times a day  - Out of bed for toileting  - Record patient progress and toleration of activity level   Outcome: Progressing     Problem: DISCHARGE PLANNING  Goal: Discharge to home or other facility with appropriate resources  Description: INTERVENTIONS:  - Identify barriers to discharge w/patient and caregiver  - Arrange for needed discharge resources and transportation as appropriate  - Identify discharge learning needs (meds, wound care, etc.)  - Arrange for interpretive services to assist at discharge as needed  - Refer to Case Management Department for coordinating discharge planning if the patient needs post-hospital services based on physician/advanced practitioner order or complex needs related to functional status, cognitive ability, or social support system  Outcome: Progressing     Problem: Nutrition/Hydration-ADULT  Goal: Nutrient/Hydration intake appropriate for improving, restoring or maintaining nutritional needs  Description: Monitor and assess patient's nutrition/hydration status for malnutrition. Collaborate with interdisciplinary team and  initiate plan and interventions as ordered.  Monitor patient's weight and dietary intake as ordered or per policy. Utilize nutrition screening tool and intervene as necessary. Determine patient's food preferences and provide high-protein, high-caloric foods as appropriate.     INTERVENTIONS:  - Monitor oral intake, urinary output, labs, and treatment plans  - Assess nutrition and hydration status and recommend course of action  - Evaluate amount of meals eaten  - Assist patient with eating if necessary   - Allow adequate time for meals  - Recommend/ encourage appropriate diets, oral nutritional supplements, and vitamin/mineral supplements  - Order, calculate, and assess calorie counts as needed  - Recommend, monitor, and adjust tube feedings and TPN/PPN based on assessed needs  - Assess need for intravenous fluids  - Provide specific nutrition/hydration education as appropriate  - Include patient/family/caregiver in decisions related to nutrition  Outcome: Progressing

## 2024-08-28 NOTE — PLAN OF CARE
Problem: PHYSICAL THERAPY ADULT  Goal: Performs mobility at highest level of function for planned discharge setting.  See evaluation for individualized goals.  Description: Treatment/Interventions: Therapeutic exercise, LE strengthening/ROM, Functional transfer training, ADL retraining, Bed mobility, Equipment eval/education, Patient/family training, Spoke to nursing          See flowsheet documentation for full assessment, interventions and recommendations.  Note: Prognosis: Good  Problem List: Decreased strength, Decreased endurance, Impaired balance, Decreased mobility  Assessment: Pt is 79 y.o. male seen for PT evaluation s/p admit to [facility] (formerly [facility]] on 8/26/2024 w/ Severe sepsis (HCC). PT consulted to assess pt's functional mobility and d/c needs. Order placed for PT eval and tx, w/ up as tolerated order.  Co-morbidities affecting patient's physical performance include: DM, Parkinsons disease, encephalopathy.  Personal factors affecting patient at time of initial evaluation include: decreased cognition, decreased initiation and engagement, inability to perform physical activity, and inability to perform ADLS. Prior to admission, patient has been residing at a LTC x 6 years.  Upon evaluation: Pt was able to sit at the edge of the bed and participate in some supine LE exercises.   Please find objective findings from Physical Therapy assessment regarding body systems outlined above with impairments and limitations including weakness, decreased ROM, impaired balance, decreased activity tolerance, decreased functional mobility tolerance, fall risk, and decreased cognition.The Barthel Index was used as a functional outcome tool presenting with a score of Barthel Index Score: 0 today indicating marked limitations of functional mobility and ADLS.  Patient's clinical presentation is currently unstable/unpredictable as seen in patient's presentation of varying levels of cognitive performance, increased fall risk, new  onset of impairment of functional mobility, and new onset of weakness. Pt would benefit from continued Physical Therapy treatment to address deficits as defined above and maximize level of functional mobility.     As demonstrated by objective findings, the assigned level of complexity for this evaluation is high.The patient's -[facility] Basic Mobility Inpatient Short Form Raw Score is 8. A Raw score of less than or equal to 16 suggests the patient may benefit from discharge to post-acute rehabilitation services. Please also refer to the recommendation of the Physical Therapist for safe discharge planning.        Rehab Resource Intensity Level, PT: III (Minimum Resource Intensity)    See flowsheet documentation for full assessment.

## 2024-08-28 NOTE — PHYSICAL THERAPY NOTE
"       PHYSICAL THERAPY EVALUATION/TREATMENT     08/28/24 0920   PT Last Visit   PT Visit Date 08/28/24   Note Type   Note type Evaluation   Pain Assessment   Pain Assessment Tool FLACC   Pain Rating: FLACC (Rest) - Face 0   Pain Rating: FLACC (Rest) - Legs 0   Pain Rating: FLACC (Rest) - Activity 0   Pain Rating: FLACC (Rest) - Cry 0   Pain Rating: FLACC (Rest) - Consolability 0   Score: FLACC (Rest) 0   Pain Rating: FLACC (Activity) - Face 0   Pain Rating: FLACC (Activity) - Legs 0   Pain Rating: FLACC (Activity) - Activity 0   Pain Rating: FLACC (Activity) - Cry 0   Pain Rating: FLACC (Activity) - Consolability 0   Score: FLACC (Activity) 0   Restrictions/Precautions   Other Precautions Fall Risk;Bed Alarm;Chair Alarm;Cognitive   Home Living   Type of Home SNF  ([facility])   Prior Function   Level of [place] Needs assistance with ADLs;Needs assistance with functional mobility  (Pt reports he gets OOB with a kathleen lift and sits in a chair \"sometimes\")   Lives With Facility staff   Receives Help From Personal care attendant   General   Additional Pertinent History Pt admitted wt severe sepsis.  Pt was lethargic yesterday but awake today.   Family/Caregiver Present No   Cognition   Arousal/Participation Arousable   Orientation Level Oriented to person;Oriented to place   Following Commands Follows one step commands with increased time or repetition   Subjective   Subjective \"My name is Chris, what do you think I like to be called?\"   RLE Assessment   RLE Assessment   (PROM limited DF, knee and hip flexion. MMT hip 1-2/5, knee 3-/5, ankle 2/5)   LLE Assessment   LLE Assessment   (PROM limited DF, knee and hip flexion. MMT hip 1-2/5, knee 3-/5, ankle 2/5)   Bed Mobility   Supine to Sit 2  Maximal assistance   Additional items Assist x 2   Sit to Supine 2  Maximal assistance   Additional items Assist x 2   Transfers   Sit to Stand 2  Maximal assistance   Additional items Assist x 2  (UE support on a walker) "   Stand to Sit 2  Maximal assistance   Additional items Assist x 2   Balance   Static Sitting Poor   Activity Tolerance   Activity Tolerance Patient limited by fatigue   Assessment   Prognosis Good   Problem List Decreased strength;Decreased endurance;Impaired balance;Decreased mobility   Assessment Pt is 79 y.o. male seen for PT evaluation s/p admit to [facility] on 8/26/2024 w/ Severe sepsis (HCC). PT consulted to assess pt's functional mobility and d/c needs. Order placed for PT eval and tx, w/ up as tolerated order.  Co-morbidities affecting patient's physical performance include: DM, Parkinsons disease, encephalopathy.  Personal factors affecting patient at time of initial evaluation include: decreased cognition, decreased initiation and engagement, inability to perform physical activity, and inability to perform ADLS. Prior to admission, patient has been residing at a LTC x 6 years.  Upon evaluation: Pt was able to sit at the edge of the bed and participate in some supine LE exercises.   Please find objective findings from Physical Therapy assessment regarding body systems outlined above with impairments and limitations including weakness, decreased ROM, impaired balance, decreased activity tolerance, decreased functional mobility tolerance, fall risk, and decreased cognition.The Barthel Index was used as a functional outcome tool presenting with a score of Barthel Index Score: 0 today indicating marked limitations of functional mobility and ADLS.  Patient's clinical presentation is currently unstable/unpredictable as seen in patient's presentation of varying levels of cognitive performance, increased fall risk, new onset of impairment of functional mobility, and new onset of weakness. Pt would benefit from continued Physical Therapy treatment to address deficits as defined above and maximize level of functional mobility.     As demonstrated by objective findings, the assigned level of complexity for  this evaluation is high.The patient's AM-PAC Basic Mobility Inpatient Short Form Raw Score is 8. A Raw score of less than or equal to 16 suggests the patient may benefit from discharge to post-acute rehabilitation services, however pt already lives at a LTC facility. Please also refer to the recommendation of the Physical Therapist for safe discharge planning.   Goals   Patient Goals none stated when asked   STG Expiration Date 09/04/24   Short Term Goal #1 1. improve bed mobility to max assist x 1, 2. Pt will participate in 10 minutes of LE strengthening exercises   [facility] Expiration Date 09/11/24   Long Term Goal #1 1. pt will sit up OOB in a chair x 2 hours, 2. Pt will stand pivot to a chair with max assist x 2, 3. improve B LE ROM to at least   Plan   Treatment/Interventions Therapeutic exercise;LE strengthening/ROM;Functional transfer training;ADL retraining;Bed mobility;Equipment eval/education;Patient/family training;Spoke to nursing   PT Frequency 3-5x/wk   Discharge Recommendation   Rehab Resource Intensity Level, PT III (Minimum Resource Intensity)   AM-PAC Basic Mobility Inpatient   Turning in Flat Bed Without Bedrails 2   Lying on Back to Sitting on Edge of Flat Bed Without Bedrails 2   Moving Bed to Chair 1   Standing Up From Chair Using Arms 1   Walk in Room 1   Climb 3-5 Stairs With Railing 1   Basic Mobility Inpatient Raw Score 8   Turning Head Towards Sound 3   Follow Simple Instructions 3   Low Function Basic Mobility Raw Score  14   Low Function Basic Mobility Standardized Score  22.01   [facility] Highest Level Of Mobility   -HL Goal 3: Sit at edge of bed   -[facility] Achieved 3: Sit at edge of bed   Barthel Index   Feeding 0   Bathing 0   Grooming Score 0   Dressing Score 0   Bladder Score 0   Bowels Score 0   Toilet Use Score 0   Transfers (Bed/Chair) Score 0   Mobility (Level Surface) Score 0   Stairs Score 0   Barthel Index Score 0   Additional Treatment Session   Start Time 0900   End Time  0910   Treatment Assessment S:  Pt agreed to participate in some LE exercises. O:  x 10 each AAROM ankle pumps, hip IR/ER, hip abd/add, heel slides, SAQ. A:  Pt able to participate actively in some exercises. P: Continue PT on a trial basis as pt is from [facility] and unsure what pt's function was PTA.   End of Consult   Patient Position at End of Consult All needs within reach;Bed/Chair alarm activated;Supine   Licensure   [place] License Number  Susan Nash PT  [id number]

## 2024-08-28 NOTE — ASSESSMENT & PLAN NOTE
Cr around 0.8-0.9 in December 2023  Cr 1.94 on admission, currently 2.06  Likely in setting of sepsis, possible obstructive uropathy as seen on CT  Monitor Is/Os  Continue IVF  Daily bmp  If continues to worsen would consider consultation with nephrology

## 2024-08-29 ENCOUNTER — ANESTHESIA (INPATIENT)
Dept: PERIOP | Facility: HOSPITAL | Age: 79
End: 2024-08-29
Payer: MEDICARE

## 2024-08-29 ENCOUNTER — APPOINTMENT (INPATIENT)
Dept: RADIOLOGY | Facility: HOSPITAL | Age: 79
DRG: 659 | End: 2024-08-29
Payer: MEDICARE

## 2024-08-29 LAB
ALBUMIN SERPL BCG-MCNC: 2.1 G/DL (ref 3.5–5)
ALP SERPL-CCNC: 141 U/L (ref 34–104)
ALT SERPL W P-5'-P-CCNC: 3 U/L (ref 7–52)
ANION GAP SERPL CALCULATED.3IONS-SCNC: 10 MMOL/L (ref 4–13)
ANION GAP SERPL CALCULATED.3IONS-SCNC: 9 MMOL/L (ref 4–13)
ARTERIAL PATENCY WRIST A: NO
AST SERPL W P-5'-P-CCNC: 27 U/L (ref 13–39)
BACTERIA BLD CULT: ABNORMAL
BASE EX.OXY STD BLDV CALC-SCNC: 59.2 % (ref 60–80)
BASE EXCESS BLDV CALC-SCNC: -6.9 MMOL/L
BILIRUB SERPL-MCNC: 0.33 MG/DL (ref 0.2–1)
BODY TEMPERATURE: 96.8 DEGREES FEHRENHEIT
BUN SERPL-MCNC: 35 MG/DL (ref 5–25)
BUN SERPL-MCNC: 39 MG/DL (ref 5–25)
CA-I BLD-SCNC: 0.99 MMOL/L (ref 1.12–1.32)
CALCIUM ALBUM COR SERPL-MCNC: 8.3 MG/DL (ref 8.3–10.1)
CALCIUM SERPL-MCNC: 6.8 MG/DL (ref 8.4–10.2)
CALCIUM SERPL-MCNC: 6.9 MG/DL (ref 8.4–10.2)
CHLORIDE SERPL-SCNC: 104 MMOL/L (ref 96–108)
CHLORIDE SERPL-SCNC: 107 MMOL/L (ref 96–108)
CO2 SERPL-SCNC: 18 MMOL/L (ref 21–32)
CO2 SERPL-SCNC: 20 MMOL/L (ref 21–32)
CREAT SERPL-MCNC: 1.95 MG/DL (ref 0.6–1.3)
CREAT SERPL-MCNC: 2.15 MG/DL (ref 0.6–1.3)
CREAT UR-MCNC: 89.1 MG/DL
ERYTHROCYTE [DISTWIDTH] IN BLOOD BY AUTOMATED COUNT: 17.2 % (ref 11.6–15.1)
FERRITIN SERPL-MCNC: 163 NG/ML (ref 24–336)
GFR SERPL CREATININE-BSD FRML MDRD: 28 ML/MIN/1.73SQ M
GFR SERPL CREATININE-BSD FRML MDRD: 32 ML/MIN/1.73SQ M
GLUCOSE SERPL-MCNC: 271 MG/DL (ref 65–140)
GLUCOSE SERPL-MCNC: 281 MG/DL (ref 65–140)
GLUCOSE SERPL-MCNC: 306 MG/DL (ref 65–140)
GLUCOSE SERPL-MCNC: 377 MG/DL (ref 65–140)
GLUCOSE SERPL-MCNC: 402 MG/DL (ref 65–140)
GLUCOSE SERPL-MCNC: 423 MG/DL (ref 65–140)
GRAM STN SPEC: ABNORMAL
HCO3 BLDV-SCNC: 19.3 MMOL/L (ref 24–30)
HCT VFR BLD AUTO: 22.9 % (ref 36.5–49.3)
HGB BLD-MCNC: 7 G/DL (ref 12–17)
HGB BLD-MCNC: 7.7 G/DL (ref 12–17)
IRON SERPL-MCNC: <10 UG/DL (ref 50–212)
LACTATE SERPL-SCNC: 0.5 MMOL/L (ref 0.5–2)
MAGNESIUM SERPL-MCNC: 2.2 MG/DL (ref 1.9–2.7)
MCH RBC QN AUTO: 25.9 PG (ref 26.8–34.3)
MCHC RBC AUTO-ENTMCNC: 30.6 G/DL (ref 31.4–37.4)
MCV RBC AUTO: 85 FL (ref 82–98)
NON VENT ROOM AIR: ABNORMAL %
O2 CT BLDV-SCNC: 8.3 ML/DL
PCO2 BLDV: 41.3 MM HG (ref 42–50)
PH BLDV: 7.29 [PH] (ref 7.3–7.4)
PLATELET # BLD AUTO: 118 THOUSANDS/UL (ref 149–390)
PMV BLD AUTO: 9.8 FL (ref 8.9–12.7)
PO2 BLDV: 31.7 MM HG (ref 35–45)
POTASSIUM SERPL-SCNC: 3.7 MMOL/L (ref 3.5–5.3)
POTASSIUM SERPL-SCNC: 4 MMOL/L (ref 3.5–5.3)
PROCALCITONIN SERPL-MCNC: 13.21 NG/ML
PROT SERPL-MCNC: 5.2 G/DL (ref 6.4–8.4)
PROTEUS SP DNA BLD POS QL NAA+NON-PROBE: DETECTED
RBC # BLD AUTO: 2.7 MILLION/UL (ref 3.88–5.62)
SODIUM SERPL-SCNC: 133 MMOL/L (ref 135–147)
SODIUM SERPL-SCNC: 135 MMOL/L (ref 135–147)
UIBC SERPL-MCNC: 130 UG/DL (ref 155–355)
UUN 24H UR-MCNC: 633 MG/DL
WBC # BLD AUTO: 9.61 THOUSAND/UL (ref 4.31–10.16)

## 2024-08-29 PROCEDURE — 84540 ASSAY OF URINE/UREA-N: CPT | Performed by: NURSE PRACTITIONER

## 2024-08-29 PROCEDURE — 85027 COMPLETE CBC AUTOMATED: CPT | Performed by: NURSE PRACTITIONER

## 2024-08-29 PROCEDURE — 82728 ASSAY OF FERRITIN: CPT | Performed by: NURSE PRACTITIONER

## 2024-08-29 PROCEDURE — 0TCB8ZZ EXTIRPATION OF MATTER FROM BLADDER, VIA NATURAL OR ARTIFICIAL OPENING ENDOSCOPIC: ICD-10-PCS | Performed by: SPECIALIST

## 2024-08-29 PROCEDURE — 80053 COMPREHEN METABOLIC PANEL: CPT | Performed by: NURSE PRACTITIONER

## 2024-08-29 PROCEDURE — 87040 BLOOD CULTURE FOR BACTERIA: CPT | Performed by: PHYSICIAN ASSISTANT

## 2024-08-29 PROCEDURE — 83550 IRON BINDING TEST: CPT | Performed by: NURSE PRACTITIONER

## 2024-08-29 PROCEDURE — 82948 REAGENT STRIP/BLOOD GLUCOSE: CPT

## 2024-08-29 PROCEDURE — 99291 CRITICAL CARE FIRST HOUR: CPT | Performed by: STUDENT IN AN ORGANIZED HEALTH CARE EDUCATION/TRAINING PROGRAM

## 2024-08-29 PROCEDURE — 0T768DZ DILATION OF RIGHT URETER WITH INTRALUMINAL DEVICE, VIA NATURAL OR ARTIFICIAL OPENING ENDOSCOPIC: ICD-10-PCS | Performed by: SPECIALIST

## 2024-08-29 PROCEDURE — 84145 PROCALCITONIN (PCT): CPT | Performed by: NURSE PRACTITIONER

## 2024-08-29 PROCEDURE — 83540 ASSAY OF IRON: CPT | Performed by: NURSE PRACTITIONER

## 2024-08-29 PROCEDURE — 99233 SBSQ HOSP IP/OBS HIGH 50: CPT | Performed by: STUDENT IN AN ORGANIZED HEALTH CARE EDUCATION/TRAINING PROGRAM

## 2024-08-29 PROCEDURE — 83735 ASSAY OF MAGNESIUM: CPT | Performed by: NURSE PRACTITIONER

## 2024-08-29 PROCEDURE — 83605 ASSAY OF LACTIC ACID: CPT | Performed by: PHYSICIAN ASSISTANT

## 2024-08-29 PROCEDURE — 82330 ASSAY OF CALCIUM: CPT | Performed by: NURSE PRACTITIONER

## 2024-08-29 PROCEDURE — NC001 PR NO CHARGE: Performed by: STUDENT IN AN ORGANIZED HEALTH CARE EDUCATION/TRAINING PROGRAM

## 2024-08-29 PROCEDURE — 85018 HEMOGLOBIN: CPT | Performed by: NURSE PRACTITIONER

## 2024-08-29 PROCEDURE — 82570 ASSAY OF URINE CREATININE: CPT | Performed by: NURSE PRACTITIONER

## 2024-08-29 PROCEDURE — 92526 ORAL FUNCTION THERAPY: CPT

## 2024-08-29 PROCEDURE — 74018 RADEX ABDOMEN 1 VIEW: CPT

## 2024-08-29 PROCEDURE — 80048 BASIC METABOLIC PNL TOTAL CA: CPT | Performed by: NURSE PRACTITIONER

## 2024-08-29 PROCEDURE — C2617 STENT, NON-COR, TEM W/O DEL: HCPCS | Performed by: SPECIALIST

## 2024-08-29 PROCEDURE — 82805 BLOOD GASES W/O2 SATURATION: CPT | Performed by: NURSE PRACTITIONER

## 2024-08-29 DEVICE — INLAY URETERAL STENT W/O GUIDEWIRE
Type: IMPLANTABLE DEVICE | Site: URETER | Status: FUNCTIONAL
Brand: BARD® INLAY® URETERAL STENT

## 2024-08-29 RX ORDER — PROPOFOL 10 MG/ML
INJECTION, EMULSION INTRAVENOUS CONTINUOUS PRN
Status: DISCONTINUED | OUTPATIENT
Start: 2024-08-29 | End: 2024-08-29

## 2024-08-29 RX ORDER — CALCIUM GLUCONATE 20 MG/ML
2 INJECTION, SOLUTION INTRAVENOUS ONCE
Status: COMPLETED | OUTPATIENT
Start: 2024-08-29 | End: 2024-08-29

## 2024-08-29 RX ORDER — INSULIN LISPRO 100 [IU]/ML
2-12 INJECTION, SOLUTION INTRAVENOUS; SUBCUTANEOUS
Status: DISCONTINUED | OUTPATIENT
Start: 2024-08-29 | End: 2024-09-10 | Stop reason: HOSPADM

## 2024-08-29 RX ORDER — INSULIN LISPRO 100 [IU]/ML
1-5 INJECTION, SOLUTION INTRAVENOUS; SUBCUTANEOUS EVERY 6 HOURS
Status: DISCONTINUED | OUTPATIENT
Start: 2024-08-29 | End: 2024-08-29

## 2024-08-29 RX ORDER — ALBUMIN, HUMAN INJ 5% 5 %
25 SOLUTION INTRAVENOUS ONCE
Status: COMPLETED | OUTPATIENT
Start: 2024-08-29 | End: 2024-08-29

## 2024-08-29 RX ORDER — FENTANYL CITRATE 50 UG/ML
INJECTION, SOLUTION INTRAMUSCULAR; INTRAVENOUS AS NEEDED
Status: DISCONTINUED | OUTPATIENT
Start: 2024-08-29 | End: 2024-08-29

## 2024-08-29 RX ORDER — INSULIN LISPRO 100 [IU]/ML
1-6 INJECTION, SOLUTION INTRAVENOUS; SUBCUTANEOUS EVERY 6 HOURS SCHEDULED
Status: DISCONTINUED | OUTPATIENT
Start: 2024-08-29 | End: 2024-08-29

## 2024-08-29 RX ORDER — INSULIN LISPRO 100 [IU]/ML
1-6 INJECTION, SOLUTION INTRAVENOUS; SUBCUTANEOUS
Status: DISCONTINUED | OUTPATIENT
Start: 2024-08-29 | End: 2024-08-29

## 2024-08-29 RX ORDER — ONDANSETRON 2 MG/ML
4 INJECTION INTRAMUSCULAR; INTRAVENOUS ONCE AS NEEDED
Status: DISCONTINUED | OUTPATIENT
Start: 2024-08-29 | End: 2024-08-29 | Stop reason: HOSPADM

## 2024-08-29 RX ORDER — ACETAMINOPHEN 10 MG/ML
1000 INJECTION, SOLUTION INTRAVENOUS EVERY 6 HOURS PRN
Status: DISCONTINUED | OUTPATIENT
Start: 2024-08-29 | End: 2024-09-10 | Stop reason: HOSPADM

## 2024-08-29 RX ORDER — CEFAZOLIN SODIUM 1 G/3ML
INJECTION, POWDER, FOR SOLUTION INTRAMUSCULAR; INTRAVENOUS AS NEEDED
Status: DISCONTINUED | OUTPATIENT
Start: 2024-08-29 | End: 2024-08-29

## 2024-08-29 RX ORDER — PROPOFOL 10 MG/ML
INJECTION, EMULSION INTRAVENOUS AS NEEDED
Status: DISCONTINUED | OUTPATIENT
Start: 2024-08-29 | End: 2024-08-29

## 2024-08-29 RX ORDER — ALBUMIN, HUMAN INJ 5% 5 %
12.5 SOLUTION INTRAVENOUS ONCE
Status: COMPLETED | OUTPATIENT
Start: 2024-08-29 | End: 2024-08-29

## 2024-08-29 RX ORDER — HEPARIN SODIUM 5000 [USP'U]/ML
5000 INJECTION, SOLUTION INTRAVENOUS; SUBCUTANEOUS EVERY 8 HOURS SCHEDULED
Status: DISCONTINUED | OUTPATIENT
Start: 2024-08-29 | End: 2024-09-07

## 2024-08-29 RX ORDER — INSULIN LISPRO 100 [IU]/ML
2-12 INJECTION, SOLUTION INTRAVENOUS; SUBCUTANEOUS
Status: DISCONTINUED | OUTPATIENT
Start: 2024-08-30 | End: 2024-09-10 | Stop reason: HOSPADM

## 2024-08-29 RX ORDER — POTASSIUM CHLORIDE 1500 MG/1
20 TABLET, EXTENDED RELEASE ORAL ONCE
Status: COMPLETED | OUTPATIENT
Start: 2024-08-29 | End: 2024-08-29

## 2024-08-29 RX ORDER — SODIUM CHLORIDE 9 MG/ML
INJECTION, SOLUTION INTRAVENOUS CONTINUOUS PRN
Status: DISCONTINUED | OUTPATIENT
Start: 2024-08-29 | End: 2024-08-29

## 2024-08-29 RX ORDER — CALCIUM GLUCONATE 20 MG/ML
1 INJECTION, SOLUTION INTRAVENOUS ONCE
Status: COMPLETED | OUTPATIENT
Start: 2024-08-29 | End: 2024-08-29

## 2024-08-29 RX ORDER — MAGNESIUM HYDROXIDE 1200 MG/15ML
LIQUID ORAL AS NEEDED
Status: DISCONTINUED | OUTPATIENT
Start: 2024-08-29 | End: 2024-08-29 | Stop reason: HOSPADM

## 2024-08-29 RX ORDER — FENTANYL CITRATE/PF 50 MCG/ML
25 SYRINGE (ML) INJECTION
Status: DISCONTINUED | OUTPATIENT
Start: 2024-08-29 | End: 2024-08-29 | Stop reason: HOSPADM

## 2024-08-29 RX ORDER — INSULIN GLARGINE 100 [IU]/ML
10 INJECTION, SOLUTION SUBCUTANEOUS
Status: DISCONTINUED | OUTPATIENT
Start: 2024-08-29 | End: 2024-09-04

## 2024-08-29 RX ORDER — SODIUM CHLORIDE, SODIUM GLUCONATE, SODIUM ACETATE, POTASSIUM CHLORIDE, MAGNESIUM CHLORIDE, SODIUM PHOSPHATE, DIBASIC, AND POTASSIUM PHOSPHATE .53; .5; .37; .037; .03; .012; .00082 G/100ML; G/100ML; G/100ML; G/100ML; G/100ML; G/100ML; G/100ML
75 INJECTION, SOLUTION INTRAVENOUS CONTINUOUS
Status: DISCONTINUED | OUTPATIENT
Start: 2024-08-29 | End: 2024-09-03

## 2024-08-29 RX ADMIN — SODIUM CHLORIDE, SODIUM GLUCONATE, SODIUM ACETATE, POTASSIUM CHLORIDE, MAGNESIUM CHLORIDE, SODIUM PHOSPHATE, DIBASIC, AND POTASSIUM PHOSPHATE 75 ML/HR: .53; .5; .37; .037; .03; .012; .00082 INJECTION, SOLUTION INTRAVENOUS at 21:06

## 2024-08-29 RX ADMIN — CEFTRIAXONE 2000 MG: 2 INJECTION, SOLUTION INTRAVENOUS at 17:27

## 2024-08-29 RX ADMIN — CEFAZOLIN 1000 MG: 1 INJECTION, POWDER, FOR SOLUTION INTRAMUSCULAR; INTRAVENOUS at 13:28

## 2024-08-29 RX ADMIN — ACETAMINOPHEN 1000 MG: 10 INJECTION INTRAVENOUS at 08:32

## 2024-08-29 RX ADMIN — EPINEPHRINE 1 MCG/MIN: 1 INJECTION INTRAMUSCULAR; INTRAVENOUS; SUBCUTANEOUS at 10:06

## 2024-08-29 RX ADMIN — FENTANYL CITRATE 25 MCG: 50 INJECTION, SOLUTION INTRAMUSCULAR; INTRAVENOUS at 13:36

## 2024-08-29 RX ADMIN — INSULIN LISPRO 3 UNITS: 100 INJECTION, SOLUTION INTRAVENOUS; SUBCUTANEOUS at 12:18

## 2024-08-29 RX ADMIN — FENTANYL CITRATE 25 MCG: 50 INJECTION, SOLUTION INTRAMUSCULAR; INTRAVENOUS at 13:28

## 2024-08-29 RX ADMIN — FENTANYL CITRATE 25 MCG: 50 INJECTION, SOLUTION INTRAMUSCULAR; INTRAVENOUS at 13:32

## 2024-08-29 RX ADMIN — INSULIN LISPRO 6 UNITS: 100 INJECTION, SOLUTION INTRAVENOUS; SUBCUTANEOUS at 18:03

## 2024-08-29 RX ADMIN — INSULIN GLARGINE 10 UNITS: 100 INJECTION, SOLUTION SUBCUTANEOUS at 21:51

## 2024-08-29 RX ADMIN — CARBIDOPA AND LEVODOPA 1 TABLET: 25; 100 TABLET ORAL at 17:27

## 2024-08-29 RX ADMIN — PROPOFOL 20 MG: 10 INJECTION, EMULSION INTRAVENOUS at 13:28

## 2024-08-29 RX ADMIN — INSULIN LISPRO 6 UNITS: 100 INJECTION, SOLUTION INTRAVENOUS; SUBCUTANEOUS at 21:17

## 2024-08-29 RX ADMIN — CALCIUM GLUCONATE 2 G: 20 INJECTION, SOLUTION INTRAVENOUS at 21:06

## 2024-08-29 RX ADMIN — NOREPINEPHRINE BITARTRATE 2 MCG/MIN: 1 SOLUTION INTRAVENOUS at 09:34

## 2024-08-29 RX ADMIN — PROPOFOL 60 MCG/KG/MIN: 10 INJECTION, EMULSION INTRAVENOUS at 13:28

## 2024-08-29 RX ADMIN — ALBUMIN (HUMAN) 12.5 G: 12.5 INJECTION, SOLUTION INTRAVENOUS at 04:15

## 2024-08-29 RX ADMIN — ALBUMIN (HUMAN) 25 G: 12.5 INJECTION, SOLUTION INTRAVENOUS at 07:03

## 2024-08-29 RX ADMIN — PROPOFOL 20 MG: 10 INJECTION, EMULSION INTRAVENOUS at 13:40

## 2024-08-29 RX ADMIN — SODIUM CHLORIDE: 0.9 INJECTION, SOLUTION INTRAVENOUS at 13:22

## 2024-08-29 RX ADMIN — ACETAMINOPHEN 1000 MG: 10 INJECTION INTRAVENOUS at 15:19

## 2024-08-29 RX ADMIN — CEFAZOLIN 1000 MG: 1 INJECTION, POWDER, FOR SOLUTION INTRAMUSCULAR; INTRAVENOUS at 13:35

## 2024-08-29 RX ADMIN — ACETAMINOPHEN 1000 MG: 10 INJECTION INTRAVENOUS at 03:40

## 2024-08-29 RX ADMIN — POTASSIUM CHLORIDE 20 MEQ: 1500 TABLET, EXTENDED RELEASE ORAL at 21:06

## 2024-08-29 RX ADMIN — SODIUM CHLORIDE, SODIUM GLUCONATE, SODIUM ACETATE, POTASSIUM CHLORIDE, MAGNESIUM CHLORIDE, SODIUM PHOSPHATE, DIBASIC, AND POTASSIUM PHOSPHATE 100 ML/HR: .53; .5; .37; .037; .03; .012; .00082 INJECTION, SOLUTION INTRAVENOUS at 04:08

## 2024-08-29 RX ADMIN — CALCIUM GLUCONATE 1 G: 20 INJECTION, SOLUTION INTRAVENOUS at 22:17

## 2024-08-29 RX ADMIN — INSULIN LISPRO 3 UNITS: 100 INJECTION, SOLUTION INTRAVENOUS; SUBCUTANEOUS at 08:29

## 2024-08-29 RX ADMIN — HEPARIN SODIUM 5000 UNITS: 5000 INJECTION, SOLUTION INTRAVENOUS; SUBCUTANEOUS at 21:06

## 2024-08-29 NOTE — CONSULTS
[facility]  Consult  Name: Chris Bojorquez 79 y.o. male I MRN: [id number]  Unit/Bed#: [address] [place] [address] Date of Admission: 8/26/2024   Date of Service: 8/29/2024 I Hospital Day: 3    Consults    Assessment & Plan   * Severe sepsis (HCC)  Assessment & Plan  Secondary to emphysematous pyelitis  To OR today for cystoscopy  Continue antibiotics  Patient remaining hypotensive despite fluid resuscitation - transfer to ICU for likely pressors  Blood cultures + proteus  ID following    Emphysematous pyelitis  Assessment & Plan  CT: Worsening findings of urinary tract infection on the right, suspicious for emphysematous pyelitis. There is urothelial thickening throughout the right renal collecting system as well as gas within the renal collecting system and ureter. There is no renal parenchymal gas to suggest emphysematous pyelonephritis.2. Delayed nephrogram on the right, likely due to obstructive uropathy.3. Decreased calculus volume in the right kidney, interval passage of calculi suspected.4. Bladder calculus with diffuse bladder wall thickening compatible with cystitis.  Hx of chronic yang and multiple infections  Urology following  Plan for cystoscopy today    Bacteremia  Assessment & Plan  1/2 BC positive for proteus mirabalis  Continue Ceftriaxone  ID following    Parkinson disease  Assessment & Plan  Continue carbidopa-levadopa as tolerated    Hypothyroidism  Assessment & Plan  Continue synthroid    PABLO (acute kidney injury) (HCC)  Assessment & Plan  In the setting of infection,  hypotension  Given adequate fluid resuscitation. If no improvement in BP, will require vasopressors    Uncontrolled type 2 diabetes mellitus with hyperglycemia (HCC)  Assessment & Plan  Lab Results   Component Value Date    HGBA1C 14.6 (H) 08/26/2024       Recent Labs     08/28/24  1420 08/28/24  1620 08/28/24  1834 08/28/24 2017   POCGLU 113 89 189* 206*       Blood Sugar Average: Last 72 hrs:  (P)  [id number]    Continue SSI - previously on insulin infusion  Hba1c 14    Moderate protein-calorie malnutrition (HCC)  Assessment & Plan  Malnutrition Findings:   Adult Malnutrition type: Chronic illness  Adult Degree of Malnutrition: Malnutrition of moderate degree  Malnutrition Characteristics: Muscle loss, Fat loss, Weight loss                  360 Statement: related to inadequate energy intake as evidenced by 10 % weight loss last 7-8 months, depression of temples, sunken orbital, wasted interosseous. Treatment: Oral diet and nutrition supplements    BMI Findings:           Body mass index is 20.84 kg/m².       Acute metabolic encephalopathy  Assessment & Plan  Hx of baseline parkinsons, worsening mental status in the setting of infection  Monitor    Essential hypertension  Assessment & Plan  Hold anti-hypertensive medications           History of Present Illness     HPI: Chris Bojorquez is a 79 y.o. with pmhx of HTN, renal calculi and infections, uncontrolled DM2, HTN, hypothyroidism, parksinsons who presents with altered mental status. CT with emphysematous pyelitis. Patient admitted to med/surg however has had issues with hypotension. Has received fluid resuscitation and has remained intermittently hypotensive. Patient will be transferred to ICU for further monitoring and possible vasopressors.    History obtained from chart review.  Review of Systems: Review of Systems   Unable to perform ROS: Mental status change     Disposition: Critical care   Historical Information   No past medical history on file. No past surgical history on file.   Current Outpatient Medications   Medication Instructions    acetaminophen (TYLENOL) 325 mg tablet every 4 (four) hours.    Acetaminophen 325 MG CAPS 2 tablets, Oral    amLODIPine (NORVASC) 5 mg tablet     baclofen 5 mg    bisacodyl (DULCOLAX) 5 mg EC tablet 1 (one) time each day at the same time.    carbidopa-levodopa (SINEMET)  mg per tablet 1 tablet, Oral,  3 times daily    cefdinir (OMNICEF) 300 mg capsule     cephalexin (KEFLEX) 500 mg capsule     docusate sodium (COLACE) 100 mg, Oral, Daily    famotidine (PEPCID) 20 mg, Daily    levothyroxine 25 mcg tablet     magnesium hydroxide (MILK OF MAGNESIA) 400 mg/5 mL oral suspension Oral    Multiple Vitamins-Minerals (multivitamin with minerals) tablet 1 tablet, Oral, Daily    pantoprazole (PROTONIX) 40 mg tablet     senna (Senokot) 8.6 MG tablet 2 tablets, Oral    senna-docusate sodium (SENOKOT S) 8.6-50 mg per tablet 1 (one) time each day at the same time.    SSD 1 % cream     Allergies   Allergen Reactions    Lactose - Food Allergy Other (See Comments)    Sulfa Antibiotics Other (See Comments)      Social History     Tobacco Use    Smoking status: Unknown    History reviewed. No pertinent family history.     Objective                            Vitals I/O      Most Recent Min/Max in 24hrs   Temp 98.2 °F (36.8 °C) Temp  Min: 98.2 °F (36.8 °C)  Max: 102.1 °F (38.9 °C)   Pulse 67 Pulse  Min: 64  Max: 92   Resp 18 Resp  Min: 18  Max: 20   BP (!) 91/44 BP  Min: 82/38  Max: 99/49   O2 Sat 98 % SpO2  Min: 94 %  Max: 98 %      Intake/Output Summary (Last 24 hours) at 8/29/2024 0506  Last data filed at 8/29/2024 0408  Gross per 24 hour   Intake 1744.17 ml   Output 500 ml   Net 1244.17 ml       Diet NPO    Invasive Monitoring           Physical Exam   Physical Exam  Vitals and nursing note reviewed.   Eyes:      Pupils: Pupils are equal, round, and reactive to light.   Skin:     General: Skin is warm.   HENT:      Head: Normocephalic and atraumatic.      Mouth/Throat:      Mouth: Mucous membranes are moist.   Cardiovascular:      Rate and Rhythm: Normal rate.   Abdominal:      Palpations: Abdomen is soft.   Constitutional:       General: He is not in acute distress.     Appearance: He is well-developed.   Pulmonary:      Effort: Pulmonary effort is normal.   Neurological:      Comments: Lethargic. Arousable and answers questions  but lethargic.   Genitourinary/Anorectal:  Rivera present.          Diagnostic Studies      EKG:   Imaging:  I have personally reviewed pertinent reports.       Medications:  Scheduled PRN   acetaminophen, 1,000 mg, Q6H RAHUL  carbidopa-levodopa, 1 tablet, BID  cefTRIAXone, 2,000 mg, Q24H  enoxaparin, 30 mg, Daily  insulin lispro, 1-5 Units, TID AC  insulin lispro, 1-5 Units, HS  levothyroxine, 25 mcg, Early Morning  pantoprazole, 40 mg, Early Morning          Continuous    multi-electrolyte, 100 mL/hr, Last Rate: 100 mL/hr (08/29/24 0408)         Labs:    CBC    Recent Labs     08/28/24  0435 08/29/24  0350   WBC 7.54 9.61   HGB 8.4* 7.0*   HCT 28.4* 22.9*   * 118*     BMP    Recent Labs     08/28/24  0435 08/29/24  0350   SODIUM 139 135   K 4.1 4.0   * 107   CO2 18* 18*   AGAP 8 10   BUN 42* 39*   CREATININE 2.06* 2.15*   CALCIUM 7.3* 6.8*       Coags    No recent results     Additional Electrolytes  Recent Labs     08/28/24  0435 08/29/24  0350   MG 2.2 2.2   PHOS 2.2*  --           Blood Gas    No recent results  No recent results LFTs  Recent Labs     08/29/24  0350   ALT 3*   AST 27   ALKPHOS 141*   ALB 2.1*   TBILI 0.33       Infectious  No recent results  Glucose  Recent Labs     08/28/24  0435 08/29/24  0350   GLUC 113 281*               Dennise Dobbs PA-C

## 2024-08-29 NOTE — PROGRESS NOTES
Progress Note - Infectious Disease   Chris Bojorquez 79 y.o. male MRN: [id number]  Unit/Bed#: ICU 05 Encounter: [id number]      Impression/Plan:  1. Severe sepsis, evidenced by fever spike, tachycardia, bandemia and hypotension requiring vasopressor therapy. Likely in setting of #2/3   -antibiotics as below  -follow-up cultures and adjust antibiotics as needed  -monitor temperature and hemodynamics  -serial exam  -recheck CBC and BMP in a.m. - monitoring for treatment response and any developing toxicities  -additional interventions pending clinical course  -vasopressor management per critical care team     2. Proteus mirabilis bacteremia. Admission blood cultures 1 of 2 sets now growing Proteus mirabilis sensitive to Cefuroxime, resistant to cefazolin, quinolones, tetracycline in setting of #3  8/29/24 blood cultures pending  -continue Ceftriaxone 2 g IV q 24 hours   -follow up final blood cultures  -management for #3 as below     3. Emphysematous pyelitis. 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 U/A with pyuria and bacteruria and urine cx with proteus mirabilis growth  8/29/24 cystoscopy with bladders stone broken/debris removed and stent placed  -continues Ceftriaxone 2 g IV q 24 hours   -monitor urine output and symptoms  -serial exam  -close urological follow-up  -cystoscopy to follow     3. PABLO. Admission creatinine 1.94 < 2.15; CrCl <30% 12/2023 Cr 0.71  -renal dose adjust antibiotic as needed  -volume management   -recheck BMP     4. Type 2 Diabetes Mellitus with hyperglycemia;  on admission  8/26/24 HgbA1c 14.6%. Risk factor for infection  -tighten glycemic management per primary care team     5. Prior polymicrobic bacteremia with e coli, bacteroides, klebsiella, enterococcus faecalis, and proteus in 12/2023 due to urinary source. Patient completed 2  week total antibiotic course with Augmentin upon discharge from [facility]. Patient also had MRSA bacteremia/PICC infection 2023 managed by [facility]     6.Parkinson's Disease    Antibiotics:  Ceftriaxone - abx D4     I have discussed the above management plan in detail with patient, RN, and the critical care service. They concur with ID treatment plan and ongoing close followup.    Subjective:  Patient with fever spike to 102 F overnight, chills, sweats; no reported nausea, vomiting, diarrhea; no cough, shortness of breath; no dysuria, flank/abd pain complaints. Patient transferred to ICU for vasopressor support for hypotension non responding to IVFs early this am    Objective:  Vitals:  Temp:  [96.8 °F (36 °C)-102 °F (38.9 °C)] 96.8 °F (36 °C)  HR:  [40-92] 48  Resp:  [10-20] 18  BP: ()/(36-66) 153/66  SpO2:  [94 %-100 %] 96 %  Temp (24hrs), Av.4 °F (37.4 °C), Min:96.8 °F (36 °C), Max:102 °F (38.9 °C)  Current: Temperature: (!) 96.8 °F (36 °C)    Physical Exam:   General Appearance:  79 year old male, chronically debilitated, lethargic, propped in bed, no acute resp distress, back from OR   HEENT: Atraumatic normocephalic   Throat: Oropharynx moist.   Pulmonary:   Normal respiratory excursion without accessory muscle use   Cardiac:  RRR   Abdomen:   Soft, non-tender, non-distended   Extremities: No edema   : Rivera with blood tinged urine in bag, no SPT   Psychiatric: Not easily arousable to name post op, cooperative   Skin: No new rashes. IV site nontender.        Labs, Imaging, & Other studies:   All pertinent labs and imaging studies were personally reviewed  Results from last 7 days   Lab Units 24  0350 24  0435 24  0443   WBC Thousand/uL 9.61 7.54 6.13   HEMOGLOBIN g/dL 7.0* 8.4* 8.8*   PLATELETS Thousands/uL 118* 146* 132*     Results from last 7 days   Lab Units 24  0350 24  0435 24  0443 24  1937 24  1256   SODIUM mmol/L  135 139 141   < > 133*   POTASSIUM mmol/L 4.0 4.1 4.3   < > 5.4*   CHLORIDE mmol/L 107 113* 111*   < > 98   CO2 mmol/L 18* 18* 24   < > 27   BUN mg/dL 39* 42* 41*   < > 41*   CREATININE mg/dL 2.15* 2.06* 1.96*   < > 1.94*   EGFR ml/min/1.73sq m 28 29 31   < > 32   CALCIUM mg/dL 6.8* 7.3* 8.0*   < > 8.0*   AST U/L 27  --   --   --  25   ALT U/L 3*  --   --   --  8   ALK PHOS U/L 141*  --   --   --  167*    < > = values in this interval not displayed.     Results from last 7 days   Lab Units 08/29/24  0622 08/27/24  0447 08/26/24  1812 08/26/24  1333   BLOOD CULTURE  Received in Microbiology Lab. Culture in Progress.  Received in Microbiology Lab. Culture in Progress.  --  Proteus mirabilis*  No Growth at 48 hrs.  --    GRAM STAIN RESULT   --   --  Gram negative rods*  --    URINE CULTURE   --   --   --  10,000-19,000 cfu/ml Proteus mirabilis*  <10,000 cfu/ml   MRSA CULTURE ONLY   --  Methicillin Resistant Staphylococcus aureus isolated*  This patient requires contact isolation precautions per [place] law. Contact precautions are not required in [place] for nasal surveillance cultures.  --   --      Results from last 7 days   Lab Units 08/27/24  0443   PROCALCITONIN ng/ml 6.84*

## 2024-08-29 NOTE — ASSESSMENT & PLAN NOTE
Secondary to emphysematous pyelitis  To OR today for cystoscopy  Continue antibiotics  Patient remaining hypotensive despite fluid resuscitation - transfer to ICU for likely pressors  Blood cultures + proteus  ID following

## 2024-08-29 NOTE — ASSESSMENT & PLAN NOTE
Malnutrition Findings:   Adult Malnutrition type: Chronic illness  Adult Degree of Malnutrition: Malnutrition of moderate degree  Malnutrition Characteristics: Muscle loss, Fat loss, Weight loss                  360 Statement: related to inadequate energy intake as evidenced by 10 % weight loss last 7-8 months, depression of temples, sunken orbital, wasted interosseous. Treatment: Oral diet and nutrition supplements    BMI Findings:           Body mass index is 20.84 kg/m².

## 2024-08-29 NOTE — ANESTHESIA POSTPROCEDURE EVALUATION
Post-Op Assessment Note    CV Status:  Stable  Pain Score: 0    Pain management: adequate       Mental Status:  Sleepy   Hydration Status:  Stable   PONV Controlled:  None   Airway Patency:  Patent     Post Op Vitals Reviewed: Yes    No anethesia notable event occurred.    Staff: Anesthesiologist, CRNA               /50 (08/29/24 1357)    Temp (!) 97 °F (36.1 °C) (08/29/24 1357)    Pulse 83 (08/29/24 1357)   Resp      SpO2   96

## 2024-08-29 NOTE — SPEECH THERAPY NOTE
Pt transferred to ICU since last seen. He is currently lethargic, NPO and planned for cystoscopy later day.   Plan: will f/u as medically and clinically indicated.   Sofi Cat MS CCC-SLP  [place] License [id number]

## 2024-08-29 NOTE — CASE MANAGEMENT
Case Management Discharge Planning Note    Patient name Chris Bojorquez  Location ICU 05/ICU 05 MRN [id number]  : 1945 Date 2024       Current Admission Date: 2024  Current Admission Diagnosis:Severe sepsis (HCC)   Patient Active Problem List    Diagnosis Date Noted Date Diagnosed    Gastroesophageal reflux disease 2024     Severe sepsis (HCC) 2024     Bacteremia 2024     Essential hypertension 2024     Emphysematous pyelitis 2024     Hyponatremia 2024     Hyperkalemia 2024     Acute metabolic encephalopathy 2024     Moderate protein-calorie malnutrition (HCC) 2024     Uncontrolled type 2 diabetes mellitus with hyperglycemia (HCC) 2024     PABLO (acute kidney injury) (HCC) 2024     Hypothyroidism 2024     Parkinson disease 2024       LOS (days): 3  Geometric Mean LOS (GMLOS) (days): 4.9  Days to GMLOS:2     OBJECTIVE:  Risk of Unplanned Readmission Score: 17.95     Current admission status: Inpatient   Preferred Pharmacy:   PATIENT/FAMILY REPORTS NO PREFERRED PHARMACY  No address on file      Primary Care Provider: No primary care provider on file.    Primary Insurance: MEDICARE  Secondary Insurance: [employer] [place] [facility]    DISCHARGE DETAILS:    Other Referral/Resources/Interventions Provided:  Interventions: Facility Return, SNF  Referral Comments: MIKHAIL following to assist with planning.  Per son plan is for pt to return to [facility].  Referral was made to facility and facility has accepted pt for readmission.  Facility will be reserved as requested by son.    Treatment Team Recommendation: Facility Return, SNF  Discharge Destination Plan:: Facility Return, SNF  Transport at Discharge : [facility]

## 2024-08-29 NOTE — ASSESSMENT & PLAN NOTE
In the setting of infection,  hypotension  Given adequate fluid resuscitation. If no improvement in BP, will require vasopressors

## 2024-08-29 NOTE — PROGRESS NOTES
Critical Care Attending Note; Brett Melendez   Note Date: 24  Note Time: 10:09 AM    Patient: Chris Bojorquez  Age, : 79 y.o., 1945 MRN: [id number] Code Status: Level 1 - Full Code Patient Location: ICU 05/ICU 05   Hospital LOS:3 days  ]   Patient seen and examined, medical record reviewed, discussed with house staff and nursing staff.     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    Main ICU Plans:       #Neuro  AMS/Parkinsons - TME  - Delirium precautions  - Sinemet    #Card  Sinus Bradycardia - Bradycardia likely related to sepsis, not overtly hypotensive during episodes   - Atropine bedside  - Pads on    Septic shock - POCUS normal biventricular function, IV 2.1 cm non-collapsible  - Epi MAP> 65mmHg    #Renal  PABLO on CKD - Baseline 0.7 - Concerns for ATN   - ATN   - FeUrea  - Strict I/O     #GI  PPI    #ID   Septic Shock - Emphysematous Pyelitis/Bacteremia - Proteus - Recurrent fevers despite appropriate abx, plan for possible stenting  - ID consulted  -CTX   - Urology consulted Plan for Cystoscopy -    #Endo  DM  - ISS    #Heme  Anemia -   - Iron Study  - Hgb >7     #DVT/GI ppx  Lovenox    #Lines/Tubes/Drains:   Invasive Devices       Peripheral Intravenous Line  Duration             Peripheral IV 24 Dorsal (posterior);Left Forearm 1 day    Peripheral IV 24 Right;Ventral (anterior) Forearm 1 day              Drain  Duration             Urethral Catheter 5 days                    #Nutrition:   Diet NPO        #Code Status:   Level 1 - Full Code    #Dispo:   ICU        Brett Melendez MD  Pulmonary, Critical Care    Critical care time, excluding procedures, teaching, family meetings, and excludes any prior time recorded by the AP/resident, 35 minutes. Upon my evaluation, this patient has a high probability of imminent or life-threatening  deterioration due to above problems which required my direct attention, intervention, and personal management.   Impression/Active Problems:    Septic Shock  AMS   Parkinson  Urinary retention   Nephrolithiasis   Emphysematous pyelitis   PABLO   DM   Anemia      Physical Exam:     Vital Signs:   Weight: 70.8 kg (156 lb 1.4 oz)  IBW: Ideal body weight: 70.7 kg (155 lb 13.8 oz)  Adjusted ideal body weight: 70.7 kg (155 lb 15.3 oz)  Temp:  [96.8 °F (36 °C)-102 °F (38.9 °C)] 96.8 °F (36 °C)  HR:  [40-92] 48  Resp:  [10-20] 18  BP: ()/(36-66) 153/66  General: NAD  Neuro: Speaking, not answering questions, localizing  Heart: RRR  Lungs: CTAB  Abdomen: Soft NT  Extremities: No Edema                Ventilator Settings:         Results from last 7 days   Lab Units 08/26/24  1256   PO2 BELINDA mm Hg 32.4*     Radiologic Images Reviewed:    CT Head  No acute intracranial abnormality.     CT Abd  1. Worsening findings of urinary tract infection on the right, suspicious for emphysematous pyelitis. There is urothelial thickening throughout the right renal collecting system as well as gas within the renal collecting system and ureter. There is no   renal parenchymal gas to suggest emphysematous pyelonephritis.   2. Delayed nephrogram on the right, likely due to obstructive uropathy.   3. Decreased calculus volume in the right kidney, interval passage of calculi suspected.   4. Bladder calculus with diffuse bladder wall thickening compatible with cystitis.     Input / Output:     Intake/Output Summary (Last 24 hours) at 8/29/2024 1009  Last data filed at 8/29/2024 0604  Gross per 24 hour   Intake 1767.5 ml   Output 900 ml   Net 867.5 ml            Infusions:  epinephrine, 1-10 mcg/min, Last Rate: 1 mcg/min (08/29/24 1006)  multi-electrolyte, 100 mL/hr, Last Rate: 100 mL/hr (08/29/24 0604)  norepinephrine, 1-30 mcg/min, Last Rate: 3 mcg/min (08/29/24 0940)      Scheduled Medications:  Current Facility-Administered Medications  "  Medication Dose Route Frequency Provider Last Rate    acetaminophen  1,000 mg Intravenous Q6H RAHUL Dennise Dobbs PA-C 1,000 mg (08/29/24 0832)    carbidopa-levodopa  1 tablet Oral BID Dennise Dobbs PA-C      cefTRIAXone  2,000 mg Intravenous Q24H Dennise Dobbs PA-C Stopped (08/29/24 0625)    enoxaparin  30 mg Subcutaneous Daily Dennise Dobbs PA-C      epinephrine  1-10 mcg/min Intravenous Titrated FRANCES Collado 1 mcg/min (08/29/24 1006)    insulin lispro  1-5 Units Subcutaneous Q6H FRANCES Collado      levothyroxine  25 mcg Oral Early Morning Dennise Dobbs PA-C      multi-electrolyte  100 mL/hr Intravenous Continuous Dennise Dobbs PA-C 100 mL/hr (08/29/24 0604)    norepinephrine  1-30 mcg/min Intravenous Titrated FRANCES Collado 3 mcg/min (08/29/24 0940)    pantoprazole  40 mg Oral Early Morning Dennise Dobbs PA-C         PRN Medications:      Labs Reviewed:  Results from last 7 days   Lab Units 08/29/24  0350 08/28/24  0435 08/27/24  0443   WBC Thousand/uL 9.61 7.54 6.13   HEMOGLOBIN g/dL 7.0* 8.4* 8.8*   HEMATOCRIT % 22.9* 28.4* 28.7*   PLATELETS Thousands/uL 118* 146* 132*      Results from last 7 days   Lab Units 08/29/24  0350 08/28/24  0435 08/27/24  0443   SODIUM mmol/L 135 139 141   CO2 mmol/L 18* 18* 24   BUN mg/dL 39* 42* 41*   CALCIUM mg/dL 6.8* 7.3* 8.0*   MAGNESIUM mg/dL 2.2 2.2 2.2   PHOSPHORUS mg/dL  --  2.2* 1.5*         Invalid input(s): \"ASTSGOT\", \"ALTSGPT\"LABRCNTIP@ ,alkphos:3,tbilirubin:3,dbilirubin:3)@            Invalid input(s): \"TROPT\", \"PBNP\"             I have personally seen and examined the patient on (08/29/24 between [id number]). I discussed the patient with the AP/resident including, but not limited to, verifying findings; reviewing labs and x-rays; discussing with consultants; developing the plan of care with the bedside nurse; and discussing treatment plan with patient or surrogate.  I have reviewed the note and assessment " performed by the AP/resident and agree with the AP/resident’s documented findings and plan of care with the above additions/exceptions. Please see my comments for details and adjustments.

## 2024-08-29 NOTE — PROGRESS NOTES
Patient:    MRN:  [id number]    Bg Request ID:  [id number]    Level of care reserved:  Skilled Nursing Facility    Partner Reserved:  [place] [phone]    Clinical needs requested:    Geography searched:  10 miles around [id number]    Start of Service:    Request sent:  1:55pm EDT on 8/27/2024 by Noy Soliman    Partner reserved:  4:57pm EDT on 8/29/2024 by Elissa Mcgregor    Choice list shared:  1:25pm EDT on 8/29/2024 by Elissa Mcgregor

## 2024-08-29 NOTE — OCCUPATIONAL THERAPY NOTE
OT TREATMENT     08/29/24 1140   Note Type   Note Type Cancelled Session   Cancel Reasons Medical status  (cancelled by nurse due to bradycardia.  Will follow as medically appropriate)   Licensure   [place] License Number  Mari Bailey MS OTR/L [id number]

## 2024-08-29 NOTE — OP NOTE
OPERATIVE REPORT  PATIENT NAME: Chris Bojorquez    :  1945  MRN: [id number]  Pt Location: [facility] OR ROOM 03    SURGERY DATE: 2024    Surgeons and Role:     * Ciro Hughes MD - Primary    Preop Diagnosis:  Emphysematous pyelitis [N12]  Bladder stones [N21.0]  Renal stones [N20.0]    Post-Op Diagnosis Codes:     * Emphysematous pyelitis [N12]     * Bladder stones [N21.0]     * Renal stones [N20.0]    Procedure(s):  Right - CYSTOSCOPY WITH INSERTION RIGHT STENT URETERAL removal large 3cm bladder stone    Specimen(s):  * No specimens in log *    Estimated Blood Loss:   0 mL    Drains:  Urethral Catheter Other (Comment) 20 Fr. (Active)   Number of days: 0       Ureteral Internal Stent Right ureter 6 Fr. (Active)   Number of days: 0       Anesthesia Type:   General/LMA    Operative Indications:  Emphysematous pyelitis [N12]  Bladder stones [N21.0]  Renal stones [N20.0]      Operative Findings:  3.5 cm by lobar obstructing prostate TUR defect 3 cm bladder stone fragmented broken and removed bladder debris removed  26 cm 6 Citizen of [place] stent passed      Complications:   None    Procedure and Technique:  Patient identified in the holding area telephone consent obtained earlier in the morning from the son right side marked placed in the OR suite after anesthesia induced placed in lithotomy position modified in light of contractures draped and prepped in sterile fashion timeout performed.  A 22 Citizen of [place] cystoscope with 30 lens was passed with difficulty through a 5 myotic edematous foreskin with the meatus found anterior FISH analysis posterior to confirm 3.5 cm by lobar obstructing prostatic urethra with questionable resection in the past scope inserted bladder lumen there was large amounts of debris a 0.038 wire passed up the right spatulated orifice into the right renal pelvis with difficulty and right of tortuosity a 26 cm 6 Citizen of [place] stent passed the wire removed alligator was then placed and the 3 cm bladder stone  debris was broken and removed.  A 0.038 wire was then left in and the scope removed a 20 [place] catheter was then placed over the wire 10 cc in the balloon irrigated with 50 cc normal saline and let the bag drainage.   mild hematuria postop   I was present for the entire procedure.    Patient Disposition:  PACU         SIGNATURE: Ciro Hughes MD  DATE: August 29, 2024  TIME: 1:55 PM

## 2024-08-29 NOTE — ASSESSMENT & PLAN NOTE
CT: Worsening findings of urinary tract infection on the right, suspicious for emphysematous pyelitis. There is urothelial thickening throughout the right renal collecting system as well as gas within the renal collecting system and ureter. There is no renal parenchymal gas to suggest emphysematous pyelonephritis.2. Delayed nephrogram on the right, likely due to obstructive uropathy.3. Decreased calculus volume in the right kidney, interval passage of calculi suspected.4. Bladder calculus with diffuse bladder wall thickening compatible with cystitis.  Hx of chronic yang and multiple infections  Urology following  Plan for cystoscopy today

## 2024-08-29 NOTE — ASSESSMENT & PLAN NOTE
Lab Results   Component Value Date    HGBA1C 14.6 (H) 08/26/2024       Recent Labs     08/28/24  1420 08/28/24  1620 08/28/24  1834 08/28/24 2017   POCGLU 113 89 189* 206*       Blood Sugar Average: Last 72 hrs:  (P) 204.6953145541028390    Continue SSI - previously on insulin infusion  Hba1c 14

## 2024-08-29 NOTE — NURSING NOTE
Np Licha made aware that patient blood pressure was in the 80s.  See Mar for orders.  ICU provider came to floor to evaluate patient and inform staff nurse that patient will be move to ICU for high level of care.  Pt transported to ICU at 550am on a monitor and O2.

## 2024-08-30 ENCOUNTER — APPOINTMENT (INPATIENT)
Dept: NON INVASIVE DIAGNOSTICS | Facility: HOSPITAL | Age: 79
DRG: 659 | End: 2024-08-30
Payer: MEDICARE

## 2024-08-30 PROBLEM — R65.21 SEPTIC SHOCK (HCC): Status: ACTIVE | Noted: 2024-08-27

## 2024-08-30 PROBLEM — D64.9 ANEMIA: Status: ACTIVE | Noted: 2024-08-30

## 2024-08-30 PROBLEM — E87.1 HYPONATREMIA: Status: RESOLVED | Noted: 2024-08-26 | Resolved: 2024-08-30

## 2024-08-30 PROBLEM — E87.5 HYPERKALEMIA: Status: RESOLVED | Noted: 2024-08-26 | Resolved: 2024-08-30

## 2024-08-30 LAB
ALBUMIN SERPL BCG-MCNC: 2.4 G/DL (ref 3.5–5)
ALP SERPL-CCNC: 130 U/L (ref 34–104)
ALT SERPL W P-5'-P-CCNC: 4 U/L (ref 7–52)
ANION GAP SERPL CALCULATED.3IONS-SCNC: 10 MMOL/L (ref 4–13)
AORTIC ROOT: 3.2 CM
APICAL FOUR CHAMBER EJECTION FRACTION: 61 %
ARTERIAL PATENCY WRIST A: NO
ASCENDING AORTA: 3.6 CM
AST SERPL W P-5'-P-CCNC: 14 U/L (ref 13–39)
BASE EX.OXY STD BLDV CALC-SCNC: 90.1 % (ref 60–80)
BASE EXCESS BLDV CALC-SCNC: -4 MMOL/L
BASOPHILS # BLD AUTO: 0.01 THOUSANDS/ÂΜL (ref 0–0.1)
BASOPHILS NFR BLD AUTO: 0 % (ref 0–1)
BILIRUB SERPL-MCNC: 0.35 MG/DL (ref 0.2–1)
BODY TEMPERATURE: 97.2 DEGREES FEHRENHEIT
BSA FOR ECHO PROCEDURE: 1.88 M2
BUN SERPL-MCNC: 32 MG/DL (ref 5–25)
CA-I BLD-SCNC: 1.09 MMOL/L (ref 1.12–1.32)
CALCIUM ALBUM COR SERPL-MCNC: 8.9 MG/DL (ref 8.3–10.1)
CALCIUM SERPL-MCNC: 7.6 MG/DL (ref 8.4–10.2)
CHLORIDE SERPL-SCNC: 106 MMOL/L (ref 96–108)
CO2 SERPL-SCNC: 18 MMOL/L (ref 21–32)
CREAT SERPL-MCNC: 1.73 MG/DL (ref 0.6–1.3)
E WAVE DECELERATION TIME: 181 MS
E/A RATIO: 1.07
EOSINOPHIL # BLD AUTO: 0.08 THOUSAND/ÂΜL (ref 0–0.61)
EOSINOPHIL NFR BLD AUTO: 1 % (ref 0–6)
ERYTHROCYTE [DISTWIDTH] IN BLOOD BY AUTOMATED COUNT: 17.6 % (ref 11.6–15.1)
FRACTIONAL SHORTENING: 31 (ref 28–44)
GFR SERPL CREATININE-BSD FRML MDRD: 36 ML/MIN/1.73SQ M
GLUCOSE SERPL-MCNC: 189 MG/DL (ref 65–140)
GLUCOSE SERPL-MCNC: 211 MG/DL (ref 65–140)
GLUCOSE SERPL-MCNC: 249 MG/DL (ref 65–140)
GLUCOSE SERPL-MCNC: 334 MG/DL (ref 65–140)
HCO3 BLDV-SCNC: 19.3 MMOL/L (ref 24–30)
HCT VFR BLD AUTO: 25.9 % (ref 36.5–49.3)
HGB BLD-MCNC: 7.8 G/DL (ref 12–17)
IMM GRANULOCYTES # BLD AUTO: 0.06 THOUSAND/UL (ref 0–0.2)
IMM GRANULOCYTES NFR BLD AUTO: 1 % (ref 0–2)
INTERVENTRICULAR SEPTUM IN DIASTOLE (PARASTERNAL SHORT AXIS VIEW): 1 CM
INTERVENTRICULAR SEPTUM: 1 CM (ref 0.6–1.1)
LAAS-AP2: 18.8 CM2
LAAS-AP4: 22.3 CM2
LEFT ATRIUM AREA SYSTOLE SINGLE PLANE A4C: 22.1 CM2
LEFT ATRIUM SIZE: 4.6 CM
LEFT ATRIUM VOLUME (MOD BIPLANE): 59 ML
LEFT ATRIUM VOLUME INDEX (MOD BIPLANE): 31.4 ML/M2
LEFT INTERNAL DIMENSION IN SYSTOLE: 3.3 CM (ref 2.1–4)
LEFT VENTRICULAR INTERNAL DIMENSION IN DIASTOLE: 4.8 CM (ref 3.5–6)
LEFT VENTRICULAR POSTERIOR WALL IN END DIASTOLE: 0.8 CM
LEFT VENTRICULAR STROKE VOLUME: 63 ML
LVSV (TEICH): 63 ML
LYMPHOCYTES # BLD AUTO: 0.35 THOUSANDS/ÂΜL (ref 0.6–4.47)
LYMPHOCYTES NFR BLD AUTO: 4 % (ref 14–44)
MAGNESIUM SERPL-MCNC: 2.2 MG/DL (ref 1.9–2.7)
MCH RBC QN AUTO: 25.2 PG (ref 26.8–34.3)
MCHC RBC AUTO-ENTMCNC: 30.1 G/DL (ref 31.4–37.4)
MCV RBC AUTO: 84 FL (ref 82–98)
MONOCYTES # BLD AUTO: 0.52 THOUSAND/ÂΜL (ref 0.17–1.22)
MONOCYTES NFR BLD AUTO: 5 % (ref 4–12)
MV E'TISSUE VEL-SEP: 10 CM/S
MV PEAK A VEL: 0.82 M/S
MV PEAK E VEL: 88 CM/S
MV STENOSIS PRESSURE HALF TIME: 53 MS
MV VALVE AREA P 1/2 METHOD: 4.15
NEUTROPHILS # BLD AUTO: 8.85 THOUSANDS/ÂΜL (ref 1.85–7.62)
NEUTS SEG NFR BLD AUTO: 89 % (ref 43–75)
NON VENT ROOM AIR: ABNORMAL %
NRBC BLD AUTO-RTO: 0 /100 WBCS
O2 CT BLDV-SCNC: 10.6 ML/DL
PCO2 BLDV: 28.3 MM HG (ref 42–50)
PH BLDV: 7.45 [PH] (ref 7.3–7.4)
PHOSPHATE SERPL-MCNC: 2.4 MG/DL (ref 2.3–4.1)
PLATELET # BLD AUTO: 159 THOUSANDS/UL (ref 149–390)
PMV BLD AUTO: 10.7 FL (ref 8.9–12.7)
PO2 BLDV: 54.3 MM HG (ref 35–45)
POTASSIUM SERPL-SCNC: 4 MMOL/L (ref 3.5–5.3)
PROCALCITONIN SERPL-MCNC: 11.85 NG/ML
PROT SERPL-MCNC: 5.6 G/DL (ref 6.4–8.4)
RA PRESSURE ESTIMATED: 8 MMHG
RBC # BLD AUTO: 3.09 MILLION/UL (ref 3.88–5.62)
RIGHT ATRIAL 2D VOLUME: 65 ML
RIGHT ATRIUM AREA SYSTOLE A4C: 18.5 CM2
RIGHT VENTRICLE ID DIMENSION: 4.2 CM
RV PSP: 51 MMHG
SL CV LEFT ATRIUM LENGTH A2C: 5.5 CM
SL CV LV EF: 55
SL CV PED ECHO LEFT VENTRICLE DIASTOLIC VOLUME (MOD BIPLANE) 2D: 106 ML
SL CV PED ECHO LEFT VENTRICLE SYSTOLIC VOLUME (MOD BIPLANE) 2D: 43 ML
SODIUM SERPL-SCNC: 134 MMOL/L (ref 135–147)
TR MAX PG: 43 MMHG
TR PEAK VELOCITY: 3.3 M/S
TRICUSPID ANNULAR PLANE SYSTOLIC EXCURSION: 2.8 CM
TRICUSPID VALVE PEAK REGURGITATION VELOCITY: 3.27 M/S
WBC # BLD AUTO: 9.87 THOUSAND/UL (ref 4.31–10.16)

## 2024-08-30 PROCEDURE — 83735 ASSAY OF MAGNESIUM: CPT | Performed by: NURSE PRACTITIONER

## 2024-08-30 PROCEDURE — 84100 ASSAY OF PHOSPHORUS: CPT | Performed by: NURSE PRACTITIONER

## 2024-08-30 PROCEDURE — 82805 BLOOD GASES W/O2 SATURATION: CPT | Performed by: NURSE PRACTITIONER

## 2024-08-30 PROCEDURE — NC001 PR NO CHARGE: Performed by: STUDENT IN AN ORGANIZED HEALTH CARE EDUCATION/TRAINING PROGRAM

## 2024-08-30 PROCEDURE — 82948 REAGENT STRIP/BLOOD GLUCOSE: CPT

## 2024-08-30 PROCEDURE — 99232 SBSQ HOSP IP/OBS MODERATE 35: CPT | Performed by: STUDENT IN AN ORGANIZED HEALTH CARE EDUCATION/TRAINING PROGRAM

## 2024-08-30 PROCEDURE — 93306 TTE W/DOPPLER COMPLETE: CPT | Performed by: INTERNAL MEDICINE

## 2024-08-30 PROCEDURE — 80053 COMPREHEN METABOLIC PANEL: CPT | Performed by: NURSE PRACTITIONER

## 2024-08-30 PROCEDURE — 82330 ASSAY OF CALCIUM: CPT | Performed by: NURSE PRACTITIONER

## 2024-08-30 PROCEDURE — 93306 TTE W/DOPPLER COMPLETE: CPT

## 2024-08-30 PROCEDURE — 84145 PROCALCITONIN (PCT): CPT | Performed by: NURSE PRACTITIONER

## 2024-08-30 PROCEDURE — 85025 COMPLETE CBC W/AUTO DIFF WBC: CPT | Performed by: NURSE PRACTITIONER

## 2024-08-30 PROCEDURE — 99233 SBSQ HOSP IP/OBS HIGH 50: CPT | Performed by: INTERNAL MEDICINE

## 2024-08-30 PROCEDURE — NC001 PR NO CHARGE: Performed by: NURSE PRACTITIONER

## 2024-08-30 RX ORDER — FERROUS SULFATE 325(65) MG
325 TABLET ORAL
Status: DISCONTINUED | OUTPATIENT
Start: 2024-08-30 | End: 2024-09-07

## 2024-08-30 RX ORDER — CALCIUM GLUCONATE 20 MG/ML
1 INJECTION, SOLUTION INTRAVENOUS ONCE
Status: COMPLETED | OUTPATIENT
Start: 2024-08-30 | End: 2024-08-30

## 2024-08-30 RX ADMIN — SODIUM CHLORIDE, SODIUM GLUCONATE, SODIUM ACETATE, POTASSIUM CHLORIDE, MAGNESIUM CHLORIDE, SODIUM PHOSPHATE, DIBASIC, AND POTASSIUM PHOSPHATE 75 ML/HR: .53; .5; .37; .037; .03; .012; .00082 INJECTION, SOLUTION INTRAVENOUS at 11:02

## 2024-08-30 RX ADMIN — PANTOPRAZOLE SODIUM 40 MG: 40 TABLET, DELAYED RELEASE ORAL at 05:59

## 2024-08-30 RX ADMIN — HEPARIN SODIUM 5000 UNITS: 5000 INJECTION, SOLUTION INTRAVENOUS; SUBCUTANEOUS at 05:59

## 2024-08-30 RX ADMIN — INSULIN LISPRO 8 UNITS: 100 INJECTION, SOLUTION INTRAVENOUS; SUBCUTANEOUS at 07:36

## 2024-08-30 RX ADMIN — INSULIN GLARGINE 10 UNITS: 100 INJECTION, SOLUTION SUBCUTANEOUS at 21:37

## 2024-08-30 RX ADMIN — INSULIN LISPRO 4 UNITS: 100 INJECTION, SOLUTION INTRAVENOUS; SUBCUTANEOUS at 21:36

## 2024-08-30 RX ADMIN — CALCIUM GLUCONATE 1 G: 20 INJECTION, SOLUTION INTRAVENOUS at 08:25

## 2024-08-30 RX ADMIN — LEVOTHYROXINE SODIUM 25 MCG: 25 TABLET ORAL at 06:00

## 2024-08-30 RX ADMIN — INSULIN LISPRO 2 UNITS: 100 INJECTION, SOLUTION INTRAVENOUS; SUBCUTANEOUS at 16:22

## 2024-08-30 RX ADMIN — CARBIDOPA AND LEVODOPA 1 TABLET: 25; 100 TABLET ORAL at 18:44

## 2024-08-30 RX ADMIN — HEPARIN SODIUM 5000 UNITS: 5000 INJECTION, SOLUTION INTRAVENOUS; SUBCUTANEOUS at 21:36

## 2024-08-30 RX ADMIN — INSULIN LISPRO 4 UNITS: 100 INJECTION, SOLUTION INTRAVENOUS; SUBCUTANEOUS at 11:03

## 2024-08-30 RX ADMIN — CARBIDOPA AND LEVODOPA 1 TABLET: 25; 100 TABLET ORAL at 08:25

## 2024-08-30 RX ADMIN — CEFTRIAXONE 2000 MG: 2 INJECTION, SOLUTION INTRAVENOUS at 16:24

## 2024-08-30 RX ADMIN — HEPARIN SODIUM 5000 UNITS: 5000 INJECTION, SOLUTION INTRAVENOUS; SUBCUTANEOUS at 13:43

## 2024-08-30 RX ADMIN — FERROUS SULFATE TAB 325 MG (65 MG ELEMENTAL FE) 325 MG: 325 (65 FE) TAB at 08:25

## 2024-08-30 NOTE — PROGRESS NOTES
Critical Care Interval Transfer Note:    Brief Hospital Summary:   Patient presented from home with diabetes, hypertension, and altered mental status.  CT scan showed emphysematous pyelitis.  Patient went to the OR yesterday afternoon for cystoscopy with a right ureteral stent placement and a 3 cm bladder stone removal.  He had positive blood cultures and continues on Rocephin.  He is back to his normal mental status and off of pressors.      Barriers to discharge:   PT/OT     Consults: IP CONSULT TO UROLOGY  IP CONSULT TO NUTRITION SERVICES  IP CONSULT TO CASE MANAGEMENT  IP CONSULT TO INFECTIOUS DISEASES    Recommended to review admission imaging for incidental findings and document in discharge navigator: Chart reviewed, no known incidental findings noted at this time.      Discharge Plan: Urology following -placement pending  Rivera Plan: Rivera placed by urology. Removal plans per their team            Patient seen and evaluated by Critical Care today and deemed to be appropriate for transfer to Med Surg. Spoke to Dr. Gayle from [facility] to accept transfer. Critical care can be contacted via [employer] with any questions or concerns.

## 2024-08-30 NOTE — ASSESSMENT & PLAN NOTE
Admission hgb 10.3, current hgb 7.7   Iron 10, start iron supplementation   Anemia secondary to iron deficiency, ABLA from procedure, and dilutional from IVF  Trend hgb daily, transfuse for hgb less than 7

## 2024-08-30 NOTE — PROGRESS NOTES
[facility]  Progress Note  Name: Chris Bojorquez I  MRN: [id number]  Unit/Bed#: ICU 05 I Date of Admission: 8/26/2024   Date of Service: 8/30/2024 I Hospital Day: 4    Assessment & Plan   * Septic shock (HCC)  Assessment & Plan  As evidenced by fever and bandemia   Secondary to emphysematous pyelitis  Lactic 0.5   Procal 6.8-->13.21, trend   1/2 blood cultures + proteus  Urine culture 10-19,000 CFU proteus  Continue ceftriaxone D5  Hypotension refractory to IVF, started on Levo and then developed bradycardia yesterday and was switched to Epi  Epi 2mcg, titrate for MAP > 65, HR > 50  Isolyte 75mL/hr   Echo ordered  Trend temps and WBC  PRN Tylenol for fever control   ID following, appreciate recs     Emphysematous pyelitis  Assessment & Plan  C 8/26T: Worsening findings of urinary tract infection on the right, suspicious for emphysematous pyelitis. There is urothelial thickening throughout the right renal collecting system as well as gas within the renal collecting system and ureter. There is no renal parenchymal gas to suggest emphysematous pyelonephritis.2. Delayed nephrogram on the right, likely due to obstructive uropathy.3. Decreased calculus volume in the right kidney, interval passage of calculi suspected.4. Bladder calculus with diffuse bladder wall thickening compatible with cystitis.  Hx of chronic yang and multiple infections  8/29 OR with urology: cystoscopy with Rt ureteral stent placed. Large 3 cm bladder stone removed  Appreciate urology recs       PABLO (acute kidney injury) (HCC)  Assessment & Plan  Pre-renal secondary to septic shock   Baseline creat around 0.7   Admission creat 1.94  Current creat 1.95  S/p IVF resuscitation, continue Isolyte 75mL/hr  Maintain MAP > 65  Trend renal indices  Strict I&O  Avoid nephrotoxic agents     Bacteremia  Assessment & Plan  1/2 BC positive for proteus mirabalis  Urinary source  Continue Ceftriaxone  ID following    Acute metabolic  encephalopathy  Assessment & Plan  Hx of baseline parkinsons, worsening mental status in the setting of infection  CT head 8/26 negative  Neuro checks q4h  Delirium precautions; regulate sleep/wake cycle, environmental controls, daily CAM ICU     Uncontrolled type 2 diabetes mellitus with hyperglycemia (HCC)  Assessment & Plan  Lab Results   Component Value Date    HGBA1C 14.6 (H) 08/26/2024       Recent Labs     08/29/24  0709 08/29/24  1120 08/29/24  1801 08/29/24  2109   POCGLU 306* 271* 423* 377*       Blood Sugar Average: Last 72 hrs:  (P) [id number]    Continue SSI - previously on insulin infusion  Increased to Alg #4 and 10 units HS Lantus added for persistent hyperglycemia  Hyperglycemia exacerbated by Epi infusion   Hba1c 14    Anemia  Assessment & Plan  Admission hgb 10.3, current hgb 7.7   Iron 10, start iron supplementation   Anemia secondary to iron deficiency, ABLA from procedure, and dilutional from IVF  Trend hgb daily, transfuse for hgb less than 7     Moderate protein-calorie malnutrition (HCC)  Assessment & Plan  Malnutrition Findings:   Adult Malnutrition type: Chronic illness  Adult Degree of Malnutrition: Malnutrition of moderate degree  Malnutrition Characteristics: Muscle loss, Fat loss, Weight loss                  360 Statement: related to inadequate energy intake as evidenced by 10 % weight loss last 7-8 months, depression of temples, sunken orbital, wasted interosseous. Treatment: Oral diet and nutrition supplements    BMI Findings:           Body mass index is 23.05 kg/m².     Nutrition consult       Hypothyroidism  Assessment & Plan  Continue synthroid  TSH 2.56     Parkinson disease  Assessment & Plan  Continue carbidopa-levadopa     Gastroesophageal reflux disease  Assessment & Plan  Continue protonix     Essential hypertension  Assessment & Plan  Hold home norvasc in setting of septic shock              Disposition: Critical care    ICU Core Measures     A: Assess, Prevent,  and Manage Pain Has pain been assessed? Yes  Need for changes to pain regimen? No   B: Both SAT/SAT  N/A   C: Choice of Sedation RASS Goal: N/A patient not on sedation  Need for changes to sedation or analgesia regimen? NA   D: Delirium CAM-ICU: Unable to perform secondary to Acute cognitive dysfunction   E: Early Mobility  Plan for early mobility? Yes   F: Family Engagement Plan for family engagement today? Yes       Antibiotic Review: Patient on appropriate coverage based on culture data.  and Infectious disease consulted    Review of Invasive Devices:    Yang Plan: chronic yang        Prophylaxis:  VTE VTE covered by:  heparin (porcine), Subcutaneous, 5,000 Units at 08/29/24 2106       Stress Ulcer  covered byfamotidine (PEPCID) 20 mg tablet [[id number]] (Long-Term Med), pantoprazole (PROTONIX) 40 mg tablet [[id number]] (Long-Term Med), pantoprazole (PROTONIX) EC tablet 40 mg [[id number]]         Significant 24hr Events     24hr events: POD #1 cystoscopy with Rt ureteral stent placed. Large 3 cm bladder stone removed. Bradycardic and hypotensive post procedure requiring Epi, remains on 2mcg. HR and BP drop when weaning attempted. Started on Isolyte 75mL/hr overnight. Hyperglycemia with glucose as high as 423, SSI increased to Alg #4 and 10 units Lantus added @ HS.      Subjective   Review of Systems: Review of Systems   All other systems reviewed and are negative.       Objective                            Vitals I/O      Most Recent Min/Max in 24hrs   Temp 98.1 °F (36.7 °C) Temp  Min: 94.4 °F (34.7 °C)  Max: 98.1 °F (36.7 °C)   Pulse 63 Pulse  Min: 40  Max: 90   Resp 12 Resp  Min: 10  Max: 20   BP (!) 74/41 BP  Min: 74/41  Max: 167/68   O2 Sat 94 % SpO2  Min: 94 %  Max: 100 %      Intake/Output Summary (Last 24 hours) at 8/30/2024 0301  Last data filed at 8/29/2024 2317  Gross per 24 hour   Intake 1797.5 ml   Output 825 ml   Net 972.5 ml       Diet Dysphagia/Modified Consistency; Dysphagia 2-Mechanical Soft; Thin  Liquid    Invasive Monitoring   N/A        Physical Exam   Physical Exam  Eyes:      General: Lids are normal.      Extraocular Movements: Extraocular movements intact.      Conjunctiva/sclera: Conjunctivae normal.      Pupils: Pupils are equal, round, and reactive to light.   Skin:     General: Skin is warm and dry.      Capillary Refill: Capillary refill takes less than 2 seconds.      Coloration: Skin is pale.   HENT:      Head: Normocephalic and atraumatic.   Neck:      Trachea: Trachea normal.   Cardiovascular:      Rate and Rhythm: Normal rate and regular rhythm.      Pulses: Normal pulses.           Radial pulses are 2+ on the right side and 2+ on the left side.        Dorsalis pedis pulses are 2+ on the right side and 2+ on the left side.      Heart sounds: Normal heart sounds, S1 normal and S2 normal.   Musculoskeletal:      Cervical back: Full passive range of motion without pain, normal range of motion and neck supple.      Comments: Generalized weakness, upper extremity rigidity    Abdominal: General: Bowel sounds are normal.      Palpations: Abdomen is soft.   Constitutional:       Appearance: He is ill-appearing.   Pulmonary:      Effort: Pulmonary effort is normal.      Breath sounds: Normal breath sounds.   Psychiatric:         Speech: Speech is delayed.         Behavior: Behavior is withdrawn. Behavior is cooperative.         Cognition and Memory: Cognition is impaired. Memory is impaired.         Judgment: Judgment is inappropriate.   Neurological:      General: No focal deficit present.      Mental Status: He is easily aroused. He is lethargic and disoriented.      GCS: GCS eye subscore is 4. GCS verbal subscore is 4. GCS motor subscore is 6.            Diagnostic Studies      No new imaging      Medications:  Scheduled PRN   carbidopa-levodopa, 1 tablet, BID  cefTRIAXone, 2,000 mg, Q24H  ferrous sulfate, 325 mg, Daily With Breakfast  heparin (porcine), 5,000 Units, Q8H RAHUL  insulin glargine, 10  Units, HS  insulin lispro, 2-12 Units, TID AC  insulin lispro, 2-12 Units, HS  levothyroxine, 25 mcg, Early Morning  pantoprazole, 40 mg, Early Morning      acetaminophen, 1,000 mg, Q6H PRN       Continuous    epinephrine, 1-10 mcg/min, Last Rate: 2 mcg/min (08/30/24 0034)  multi-electrolyte, 75 mL/hr, Last Rate: 75 mL/hr (08/29/24 2106)         Labs:    CBC    Recent Labs     08/28/24 0435 08/29/24 0350 08/29/24 2001   WBC 7.54 9.61  --    HGB 8.4* 7.0* 7.7*   HCT 28.4* 22.9*  --    * 118*  --      BMP    Recent Labs     08/29/24 0350 08/29/24 2001   SODIUM 135 133*   K 4.0 3.7    104   CO2 18* 20*   AGAP 10 9   BUN 39* 35*   CREATININE 2.15* 1.95*   CALCIUM 6.8* 6.9*       Coags    No recent results     Additional Electrolytes  Recent Labs     08/28/24 0435 08/29/24  0350 08/29/24 2001   MG 2.2 2.2  --    PHOS 2.2*  --   --    CAIONIZED  --   --  0.99*          Blood Gas    No recent results  Recent Labs     08/29/24 2001   PHVEN 7.287*   [id number] 41.3*   PO2VEN 31.7*   [id number] 19.3*   BEVEN -6.9   [id number] 59.2*    LFTs  Recent Labs     08/29/24 0350   ALT 3*   AST 27   ALKPHOS 141*   ALB 2.1*   TBILI 0.33       Infectious  Recent Labs     08/29/24 2001   PROCALCITONI 13.21*     Glucose  Recent Labs     08/28/24 0435 08/29/24  0350 08/29/24 2001   GLUC 113 281* 402*               FRANCES Cevallos

## 2024-08-30 NOTE — QUICK NOTE
Called left message for nurys Reed via telephone.     1205 - updated nurys Reed via telephone  all questions answered

## 2024-08-30 NOTE — ASSESSMENT & PLAN NOTE
Pre-renal secondary to septic shock   Baseline creat around 0.7   Admission creat 1.94  Current creat 1.95  S/p IVF resuscitation, continue Isolyte 75mL/hr  Maintain MAP > 65  Trend renal indices  Strict I&O  Avoid nephrotoxic agents

## 2024-08-30 NOTE — PROGRESS NOTES
"Progress Note - Urology      Patient: Chris Bojorquez   : 1945 Sex: male   MRN: [id number]     CSN: [id number]  Unit/Bed#: [address] [place] [address]     SUBJECTIVE:   Patient seen on evening rounds  Family present  Will continue full course of Rocephin  Can schedule outpatient right ureteroscopy stone extraction in a few weeks      Objective   Vitals: /59   Pulse 76   Temp 98.2 °F (36.8 °C) (Temporal)   Resp 20   Ht 5' 9\" (1.753 m)   Wt 73 kg (161 lb)   SpO2 97%   BMI 23.78 kg/m²     I/O last 24 hours:  In: 1234.6 [P.O.:160; I.V.:874.6; IV Piggyback:200]  Out: 1675 [Urine:1675]      Physical Exam:   General Confused  Normocephalic atraumatic PERRLA  Lungs clear bilaterally  Cardiac normal S1 normal S2  Abdomen soft, flank pain  Extremities no edema      Lab Results: CBC:   Lab Results   Component Value Date    WBC 9.87 2024    HGB 7.8 (L) 2024    HCT 25.9 (L) 2024    MCV 84 2024     2024    RBC 3.09 (L) 2024    MCH 25.2 (L) 2024    MCHC 30.1 (L) 2024    RDW 17.6 (H) 2024    MPV 10.7 2024    NRBC 0 2024     CMP:   Lab Results   Component Value Date     2024    CO2 18 (L) 2024    BUN 32 (H) 2024    CREATININE 1.73 (H) 2024    CALCIUM 7.6 (L) 2024    AST 14 2024    ALT 4 (L) 2024    ALKPHOS 130 (H) 2024    EGFR 36 2024     Urinalysis:   Lab Results   Component Value Date    COLORU Yellow 2024    CLARITYU Slightly Cloudy 2024    SPECGRAV 1.015 2024    PHUR 7.0 2024    LEUKOCYTESUR Large (A) 2024    NITRITE Negative 2024    GLUCOSEU 1000 (1%) (A) 2024    KETONESU 10 (1+) (A) 2024    BILIRUBINUR Negative 2024    BLOODU Large (A) 2024     Urine Culture:   Lab Results   Component Value Date    URINECX 10,000-19,000 cfu/ml Proteus mirabilis (A) 2024    URINECX <10,000 cfu/ml 2024     PSA: No results found for: " "\"PSA\"      Assessment/ Plan:  Status post cystoscopy right stent removal bladder stone day # 3  No issues  Family present discussed returning to the hospital for right ureteroscopy stone extraction in a few weeks            Ciro Hughes MD  "

## 2024-08-30 NOTE — ASSESSMENT & PLAN NOTE
C 8/26T: Worsening findings of urinary tract infection on the right, suspicious for emphysematous pyelitis. There is urothelial thickening throughout the right renal collecting system as well as gas within the renal collecting system and ureter. There is no renal parenchymal gas to suggest emphysematous pyelonephritis.2. Delayed nephrogram on the right, likely due to obstructive uropathy.3. Decreased calculus volume in the right kidney, interval passage of calculi suspected.4. Bladder calculus with diffuse bladder wall thickening compatible with cystitis.  Hx of chronic yang and multiple infections  8/29 OR with urology: cystoscopy with Rt ureteral stent placed. Large 3 cm bladder stone removed  Appreciate urology recs

## 2024-08-30 NOTE — ASSESSMENT & PLAN NOTE
Lab Results   Component Value Date    HGBA1C 14.6 (H) 08/26/2024       Recent Labs     08/29/24  0709 08/29/24  1120 08/29/24  1801 08/29/24  2109   POCGLU 306* 271* 423* 377*       Blood Sugar Average: Last 72 hrs:  (P) [id number]    Continue SSI - previously on insulin infusion  Increased to Alg #4 and 10 units HS Lantus added for persistent hyperglycemia  Hyperglycemia exacerbated by Epi infusion   Hba1c 14

## 2024-08-30 NOTE — ASSESSMENT & PLAN NOTE
Hx of baseline parkinsons, worsening mental status in the setting of infection  CT head 8/26 negative  Neuro checks q4h  Delirium precautions; regulate sleep/wake cycle, environmental controls, daily CAM ICU

## 2024-08-30 NOTE — ASSESSMENT & PLAN NOTE
Malnutrition Findings:   Adult Malnutrition type: Chronic illness  Adult Degree of Malnutrition: Malnutrition of moderate degree  Malnutrition Characteristics: Muscle loss, Fat loss, Weight loss                  360 Statement: related to inadequate energy intake as evidenced by 10 % weight loss last 7-8 months, depression of temples, sunken orbital, wasted interosseous. Treatment: Oral diet and nutrition supplements    BMI Findings:           Body mass index is 23.05 kg/m².     Nutrition consult

## 2024-08-30 NOTE — PROGRESS NOTES
Critical Care Attending Note; Brett Melendez   Note Date: 24  Note Time: 9:47 AM    Patient: Chirs Bojorquez  Age, : 79 y.o., 1945 MRN: [id number] Code Status: Level 1 - Full Code Patient Location: ICU 05/ICU 05   Hospital LOS:4 days  ]   Patient seen and examined, medical record reviewed, discussed with house staff and nursing staff.     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    Main ICU Plans:       #Neuro  AMS/Parkinsons - TME - Improving  - Delirium precautions  - Sinemet    #Card  Sinus Bradycardia - Bradycardia likely related to sepsis -  responding to Epi  - Monitor    Septic shock - POCUS normal biventricular function, IV 2.1 cm non-collapsible, Epi due to bradycardia  - Epi MAP> 65mmHg    #Renal  PABLO on CKD - Baseline 0.7 - ATN - Improved   - ATN   - Strict I/O     #GI  PPI    #ID   Septic Shock - Emphysematous Pyelitis/Bacteremia - Proteus - Recurrent fevers despite appropriate abx, plan for possible stenting  - ID consulted  -CTX   - Urology consulted Plan for Cystoscopy -  - Rivera     #Endo  DM  - ISS  - Lantus 10    #Heme  Anemia - PAZ  - Hgb >7   - Replete once infectious process resolved    #DVT/GI ppx  Lovenox    #Lines/Tubes/Drains:   Invasive Devices       Peripheral Intravenous Line  Duration             Peripheral IV 24 Dorsal (posterior);Left Forearm 2 days    Peripheral IV 24 Right;Ventral (anterior) Forearm 2 days              Drain  Duration             Ureteral Internal Stent Right ureter 6 Fr. <1 day    Urethral Catheter Other (Comment) 20 Fr. <1 day                    #Nutrition:   Diet Dysphagia/Modified Consistency; Dysphagia 2-Mechanical Soft; Thin Liquid        #Code Status:   Level 1 - Full Code    #Dispo:   ICU        Brett Melendez MD  Pulmonary, Critical Care    Critical care time, excluding procedures, teaching,  family meetings, and excludes any prior time recorded by the AP/resident, 35 minutes. Upon my evaluation, this patient has a high probability of imminent or life-threatening deterioration due to above problems which required my direct attention, intervention, and personal management.   Impression/Active Problems:    Septic Shock  AMS   Parkinson  Urinary retention   Nephrolithiasis   Emphysematous pyelitis   PABLO   DM   Anemia      Physical Exam:     Vital Signs:   Weight: 73 kg (161 lb)  IBW: Ideal body weight: 70.7 kg (155 lb 13.8 oz)  Adjusted ideal body weight: 71.6 kg (157 lb 14.7 oz)  Temp:  [94.4 °F (34.7 °C)-98.1 °F (36.7 °C)] 97.2 °F (36.2 °C)  HR:  [60-90] 83  Resp:  [10-20] 19  BP: ()/(39-68) 109/55  General: NAD  Neuro: Speaking, Confused/answering appropriately  Heart: RRR  Lungs: CTAB  Abdomen: Soft NT  Extremities: No Edema                Ventilator Settings:         Results from last 7 days   Lab Units 08/30/24  0558 08/29/24  2001 08/26/24  1256   PO2 BELINDA mm Hg 54.3* 31.7* 32.4*     Radiologic Images Reviewed:    CT Head  No acute intracranial abnormality.     CT Abd  1. Worsening findings of urinary tract infection on the right, suspicious for emphysematous pyelitis. There is urothelial thickening throughout the right renal collecting system as well as gas within the renal collecting system and ureter. There is no   renal parenchymal gas to suggest emphysematous pyelonephritis.   2. Delayed nephrogram on the right, likely due to obstructive uropathy.   3. Decreased calculus volume in the right kidney, interval passage of calculi suspected.   4. Bladder calculus with diffuse bladder wall thickening compatible with cystitis.     Input / Output:     Intake/Output Summary (Last 24 hours) at 8/30/2024 0950  Last data filed at 8/30/2024 0910  Gross per 24 hour   Intake 1222.55 ml   Output 1175 ml   Net 47.55 ml            Infusions:  epinephrine, 1-10 mcg/min, Last Rate: 1 mcg/min (08/30/24  "0535)  multi-electrolyte, 75 mL/hr, Last Rate: 75 mL/hr (08/29/24 2106)      Scheduled Medications:  Current Facility-Administered Medications   Medication Dose Route Frequency Provider Last Rate    acetaminophen  1,000 mg Intravenous Q6H PRN Luli Spironello V, CRNP      carbidopa-levodopa  1 tablet Oral BID Dennise Dobbs PA-C      cefTRIAXone  2,000 mg Intravenous Q24H Dennise Dobbs PA-C 2,000 mg (08/29/24 1727)    epinephrine  1-10 mcg/min Intravenous Titrated FRANCES Collado 1 mcg/min (08/30/24 0535)    ferrous sulfate  325 mg Oral Daily With Breakfast Luli Spironello V, CRNP      heparin (porcine)  5,000 Units Subcutaneous Q8H RAHUL Luli Spironello V, CRNP      insulin glargine  10 Units Subcutaneous HS Luli Spironello V, CRNP      insulin lispro  2-12 Units Subcutaneous TID AC Luli Spironello V, CRNP      insulin lispro  2-12 Units Subcutaneous HS Luli Spironello V, CRNP      levothyroxine  25 mcg Oral Early Morning Dennise Dobbs PA-C      multi-electrolyte  75 mL/hr Intravenous Continuous Luli Spiromaliko V, CRNP 75 mL/hr (08/29/24 2106)    pantoprazole  40 mg Oral Early Morning Dennise Dobbs PA-C         PRN Medications:    acetaminophen    Labs Reviewed:  Results from last 7 days   Lab Units 08/30/24  0558 08/29/24 2001 08/29/24 0350 08/28/24  0435   WBC Thousand/uL 9.87  --  9.61 7.54   HEMOGLOBIN g/dL 7.8* 7.7* 7.0* 8.4*   HEMATOCRIT % 25.9*  --  22.9* 28.4*   PLATELETS Thousands/uL 159  --  118* 146*      Results from last 7 days   Lab Units 08/30/24  0558 08/29/24 2001 08/29/24  0350 08/28/24  0435 08/27/24  0443   SODIUM mmol/L 134* 133* 135 139 141   CO2 mmol/L 18* 20* 18* 18* 24   BUN mg/dL 32* 35* 39* 42* 41*   CALCIUM mg/dL 7.6* 6.9* 6.8* 7.3* 8.0*   MAGNESIUM mg/dL 2.2  --  2.2 2.2 2.2   PHOSPHORUS mg/dL 2.4  --   --  2.2* 1.5*         Invalid input(s): \"ASTSGOT\", \"ALTSGPT\"LABRCNTIP@ ,alkphos:3,tbilirubin:3,dbilirubin:3)@            Invalid " "input(s): \"TROPT\", \"PBNP\"             I have personally seen and examined the patient on (08/30/24 between 6530-0719). I discussed the patient with the AP/resident including, but not limited to, verifying findings; reviewing labs and x-rays; discussing with consultants; developing the plan of care with the bedside nurse; and discussing treatment plan with patient or surrogate.  I have reviewed the note and assessment performed by the AP/resident and agree with the AP/resident’s documented findings and plan of care with the above additions/exceptions. Please see my comments for details and adjustments.                 "

## 2024-08-30 NOTE — ASSESSMENT & PLAN NOTE
As evidenced by fever and bandemia   Secondary to emphysematous pyelitis  Lactic 0.5   Procal 6.8-->13.21, trend   1/2 blood cultures + proteus  Urine culture 10-19,000 CFU proteus  Continue ceftriaxone D5  Hypotension refractory to IVF, started on Levo and then developed bradycardia yesterday and was switched to Epi  Epi 2mcg, titrate for MAP > 65, HR > 50  Isolyte 75mL/hr   Echo ordered  Trend temps and WBC  PRN Tylenol for fever control   ID following, appreciate recs

## 2024-08-30 NOTE — PROGRESS NOTES
Progress Note - Infectious Disease   Chris Bojorquez 79 y.o. male MRN: [id number]  Unit/Bed#: ICU 05 Encounter: [id number]      Impression/Plan:  1. Severe sepsis, evidenced by fever spike, tachycardia, bandemia and hypotension requiring vasopressor therapy. Likely in setting of #2/3   -antibiotics as below  -monitor temperature and hemodynamics  -serial exam  -monitoring serial CBC and BMP - monitoring for treatment response and any developing toxicities  -additional interventions pending clinical course  -vasopressor management discontinued per critical care team     2. Proteus mirabilis bacteremia. Admission blood cultures 1 of 2 sets now growing Proteus mirabilis sensitive to Cefuroxime, resistant to cefazolin, quinolones, tetracycline in setting of #3  8/29/24 blood cultures negative to date  -continues Ceftriaxone 2 g IV q 24 hours   -follow up final blood cultures  -management for #3 as below     3. Emphysematous pyelitis. 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 U/A with pyuria and bacteruria and urine cx with proteus mirabilis growth  8/29/24 cystoscopy with bladders stone broken/debris removed and stent placed  -continues Ceftriaxone 2 g IV q 24 hours at present  -monitor urine output and symptoms  -serial exam  -urological follow-up  -can transition to PO Cefpodoxime 400 mg PO q 12 hours upon discharge to complete post cystoscopy 10 day course through 9/8/24.       3. PABLO. Peak admission creatinine 2.15 > 1.76; CrCl 35% 12/2023 Cr 0.71  -renal dose adjust antibiotic as needed  -volume management   -recheck BMP     4. Type 2 Diabetes Mellitus with hyperglycemia;  on admission  8/26/24 HgbA1c 14.6%. Risk factor for infection  -tighten glycemic management per primary care team     5. Prior polymicrobic bacteremia with e coli, bacteroides, klebsiella,  enterococcus faecalis, and proteus in 2023 due to urinary source. Patient completed 2 week total antibiotic course with Augmentin upon discharge from [facility]. Patient also had MRSA bacteremia/PICC infection 2023 managed by [facility]     6.Parkinson's Disease    Antibiotics:  Ceftriaxone - abx D5     I have discussed the above management plan in detail with patient, RN, and the critical care service. They concur with ID treatment plan and ongoing close followup.  We will see patient again 9/3/24 if here. Please call ID on call physician in meantime if questions.    Subjective:  Patient with no fever since cystoscopy, no reported chills, sweats; no reported nausea, vomiting, diarrhea; no cough, shortness of breath; no dysuria, flank/abd pain complaints. Patient transferred to ICU for vasopressor support for hypotension, now discontinued.    Objective:  Vitals:  Temp:  [94.4 °F (34.7 °C)-98.2 °F (36.8 °C)] 98.2 °F (36.8 °C)  HR:  [60-85] 72  Resp:  [10-21] 20  BP: ()/(39-62) 96/55  SpO2:  [94 %-99 %] 97 %  Temp (24hrs), Av.1 °F (36.2 °C), Min:94.4 °F (34.7 °C), Max:98.2 °F (36.8 °C)  Current: Temperature: 98.2 °F (36.8 °C)    Physical Exam:   General Appearance:  79 year old male, chronically debilitated, sluggish more awake, propped in bed, no acute resp distress.   HEENT: Atraumatic normocephalic   Throat: Oropharynx moist.   Pulmonary:   Normal respiratory excursion without accessory muscle use   Cardiac:  RRR   Abdomen:   Soft, non-tender, non-distended   Extremities: No edema   : Rivera with debris in tubing, and some cloudy, yellow urine in bag, no SPT   Psychiatric: awake, cooperative   Skin: No new rashes. IV sites nontender.        Labs, Imaging, & Other studies:   All pertinent labs and imaging studies were personally reviewed  Results from last 7 days   Lab Units 24  0558 24  0350 24  0435   WBC Thousand/uL 9.87  --  9.61 7.54    HEMOGLOBIN g/dL 7.8* 7.7* 7.0* 8.4*   PLATELETS Thousands/uL 159  --  118* 146*     Results from last 7 days   Lab Units 08/30/24  0558 08/29/24 2001 08/29/24  0350 08/26/24  1937 08/26/24  1256   SODIUM mmol/L 134* 133* 135   < > 133*   POTASSIUM mmol/L 4.0 3.7 4.0   < > 5.4*   CHLORIDE mmol/L 106 104 107   < > 98   CO2 mmol/L 18* 20* 18*   < > 27   BUN mg/dL 32* 35* 39*   < > 41*   CREATININE mg/dL 1.73* 1.95* 2.15*   < > 1.94*   EGFR ml/min/1.73sq m 36 32 28   < > 32   CALCIUM mg/dL 7.6* 6.9* 6.8*   < > 8.0*   AST U/L 14  --  27  --  25   ALT U/L 4*  --  3*  --  8   ALK PHOS U/L 130*  --  141*  --  167*    < > = values in this interval not displayed.     Results from last 7 days   Lab Units 08/29/24  0622 08/27/24 0447 08/26/24  1812 08/26/24  1333   BLOOD CULTURE  No Growth at 24 hrs.  No Growth at 24 hrs.  --  No Growth at 72 hrs.  Proteus mirabilis*  --    GRAM STAIN RESULT   --   --  Gram negative rods*  --    URINE CULTURE   --   --   --  10,000-19,000 cfu/ml Proteus mirabilis*  <10,000 cfu/ml   MRSA CULTURE ONLY   --  Methicillin Resistant Staphylococcus aureus isolated*  This patient requires contact isolation precautions per [place] law. Contact precautions are not required in [place] for nasal surveillance cultures.  --   --      Results from last 7 days   Lab Units 08/30/24 0558 08/29/24 2001 08/27/24 0443   PROCALCITONIN ng/ml 11.85* 13.21* 6.84*         Results from last 7 days   Lab Units 08/29/24 2001   FERRITIN ng/mL 163

## 2024-08-31 PROBLEM — A41.9 SEPTIC SHOCK (HCC): Status: RESOLVED | Noted: 2024-08-27 | Resolved: 2024-08-31

## 2024-08-31 PROBLEM — R65.21 SEPTIC SHOCK (HCC): Status: RESOLVED | Noted: 2024-08-27 | Resolved: 2024-08-31

## 2024-08-31 LAB
ANION GAP SERPL CALCULATED.3IONS-SCNC: 5 MMOL/L (ref 4–13)
BASOPHILS # BLD AUTO: 0.01 THOUSANDS/ÂΜL (ref 0–0.1)
BASOPHILS NFR BLD AUTO: 0 % (ref 0–1)
BUN SERPL-MCNC: 27 MG/DL (ref 5–25)
CALCIUM SERPL-MCNC: 7.6 MG/DL (ref 8.4–10.2)
CHLORIDE SERPL-SCNC: 110 MMOL/L (ref 96–108)
CO2 SERPL-SCNC: 22 MMOL/L (ref 21–32)
CREAT SERPL-MCNC: 1.59 MG/DL (ref 0.6–1.3)
EOSINOPHIL # BLD AUTO: 0.14 THOUSAND/ÂΜL (ref 0–0.61)
EOSINOPHIL NFR BLD AUTO: 2 % (ref 0–6)
ERYTHROCYTE [DISTWIDTH] IN BLOOD BY AUTOMATED COUNT: 17.6 % (ref 11.6–15.1)
GFR SERPL CREATININE-BSD FRML MDRD: 40 ML/MIN/1.73SQ M
GLUCOSE SERPL-MCNC: 100 MG/DL (ref 65–140)
GLUCOSE SERPL-MCNC: 171 MG/DL (ref 65–140)
GLUCOSE SERPL-MCNC: 232 MG/DL (ref 65–140)
GLUCOSE SERPL-MCNC: 253 MG/DL (ref 65–140)
GLUCOSE SERPL-MCNC: 96 MG/DL (ref 65–140)
HCT VFR BLD AUTO: 25.6 % (ref 36.5–49.3)
HGB BLD-MCNC: 7.9 G/DL (ref 12–17)
IMM GRANULOCYTES # BLD AUTO: 0.09 THOUSAND/UL (ref 0–0.2)
IMM GRANULOCYTES NFR BLD AUTO: 1 % (ref 0–2)
LYMPHOCYTES # BLD AUTO: 0.62 THOUSANDS/ÂΜL (ref 0.6–4.47)
LYMPHOCYTES NFR BLD AUTO: 8 % (ref 14–44)
MAGNESIUM SERPL-MCNC: 2.1 MG/DL (ref 1.9–2.7)
MCH RBC QN AUTO: 25.1 PG (ref 26.8–34.3)
MCHC RBC AUTO-ENTMCNC: 30.9 G/DL (ref 31.4–37.4)
MCV RBC AUTO: 81 FL (ref 82–98)
MONOCYTES # BLD AUTO: 0.5 THOUSAND/ÂΜL (ref 0.17–1.22)
MONOCYTES NFR BLD AUTO: 7 % (ref 4–12)
NEUTROPHILS # BLD AUTO: 6.27 THOUSANDS/ÂΜL (ref 1.85–7.62)
NEUTS SEG NFR BLD AUTO: 82 % (ref 43–75)
NRBC BLD AUTO-RTO: 0 /100 WBCS
PHOSPHATE SERPL-MCNC: 1.8 MG/DL (ref 2.3–4.1)
PLATELET # BLD AUTO: 189 THOUSANDS/UL (ref 149–390)
PMV BLD AUTO: 10.4 FL (ref 8.9–12.7)
POTASSIUM SERPL-SCNC: 3.6 MMOL/L (ref 3.5–5.3)
RBC # BLD AUTO: 3.15 MILLION/UL (ref 3.88–5.62)
SODIUM SERPL-SCNC: 137 MMOL/L (ref 135–147)
WBC # BLD AUTO: 7.63 THOUSAND/UL (ref 4.31–10.16)

## 2024-08-31 PROCEDURE — 99232 SBSQ HOSP IP/OBS MODERATE 35: CPT | Performed by: STUDENT IN AN ORGANIZED HEALTH CARE EDUCATION/TRAINING PROGRAM

## 2024-08-31 PROCEDURE — 80048 BASIC METABOLIC PNL TOTAL CA: CPT | Performed by: NURSE PRACTITIONER

## 2024-08-31 PROCEDURE — 85025 COMPLETE CBC W/AUTO DIFF WBC: CPT | Performed by: NURSE PRACTITIONER

## 2024-08-31 PROCEDURE — 82948 REAGENT STRIP/BLOOD GLUCOSE: CPT

## 2024-08-31 PROCEDURE — 84100 ASSAY OF PHOSPHORUS: CPT | Performed by: NURSE PRACTITIONER

## 2024-08-31 PROCEDURE — 83735 ASSAY OF MAGNESIUM: CPT | Performed by: NURSE PRACTITIONER

## 2024-08-31 RX ADMIN — FERROUS SULFATE TAB 325 MG (65 MG ELEMENTAL FE) 325 MG: 325 (65 FE) TAB at 07:51

## 2024-08-31 RX ADMIN — CARBIDOPA AND LEVODOPA 1 TABLET: 25; 100 TABLET ORAL at 07:52

## 2024-08-31 RX ADMIN — CEFTRIAXONE 2000 MG: 2 INJECTION, SOLUTION INTRAVENOUS at 16:55

## 2024-08-31 RX ADMIN — SODIUM CHLORIDE, SODIUM GLUCONATE, SODIUM ACETATE, POTASSIUM CHLORIDE, MAGNESIUM CHLORIDE, SODIUM PHOSPHATE, DIBASIC, AND POTASSIUM PHOSPHATE 75 ML/HR: .53; .5; .37; .037; .03; .012; .00082 INJECTION, SOLUTION INTRAVENOUS at 17:01

## 2024-08-31 RX ADMIN — ACETAMINOPHEN 1000 MG: 10 INJECTION INTRAVENOUS at 21:31

## 2024-08-31 RX ADMIN — CARBIDOPA AND LEVODOPA 1 TABLET: 25; 100 TABLET ORAL at 17:02

## 2024-08-31 RX ADMIN — INSULIN LISPRO 2 UNITS: 100 INJECTION, SOLUTION INTRAVENOUS; SUBCUTANEOUS at 11:42

## 2024-08-31 RX ADMIN — LEVOTHYROXINE SODIUM 25 MCG: 25 TABLET ORAL at 05:25

## 2024-08-31 RX ADMIN — PANTOPRAZOLE SODIUM 40 MG: 40 TABLET, DELAYED RELEASE ORAL at 05:25

## 2024-08-31 RX ADMIN — HEPARIN SODIUM 5000 UNITS: 5000 INJECTION, SOLUTION INTRAVENOUS; SUBCUTANEOUS at 14:21

## 2024-08-31 RX ADMIN — HEPARIN SODIUM 5000 UNITS: 5000 INJECTION, SOLUTION INTRAVENOUS; SUBCUTANEOUS at 05:25

## 2024-08-31 RX ADMIN — INSULIN GLARGINE 10 UNITS: 100 INJECTION, SOLUTION SUBCUTANEOUS at 21:20

## 2024-08-31 RX ADMIN — INSULIN LISPRO 4 UNITS: 100 INJECTION, SOLUTION INTRAVENOUS; SUBCUTANEOUS at 16:59

## 2024-08-31 RX ADMIN — SODIUM CHLORIDE, SODIUM GLUCONATE, SODIUM ACETATE, POTASSIUM CHLORIDE, MAGNESIUM CHLORIDE, SODIUM PHOSPHATE, DIBASIC, AND POTASSIUM PHOSPHATE 75 ML/HR: .53; .5; .37; .037; .03; .012; .00082 INJECTION, SOLUTION INTRAVENOUS at 07:12

## 2024-08-31 RX ADMIN — HEPARIN SODIUM 5000 UNITS: 5000 INJECTION, SOLUTION INTRAVENOUS; SUBCUTANEOUS at 21:16

## 2024-08-31 RX ADMIN — INSULIN LISPRO 6 UNITS: 100 INJECTION, SOLUTION INTRAVENOUS; SUBCUTANEOUS at 21:21

## 2024-08-31 NOTE — ASSESSMENT & PLAN NOTE
Pt underwent cytoscopy with right stone extraction and right ureteral stent placement.  Resume IV Ceftriaxone (Day 6/10).  Urology and ID following

## 2024-08-31 NOTE — ASSESSMENT & PLAN NOTE
Possibly pre-renal due to septic shock/hypotension.  Continue IV fluid hydration.  Will follow BMP

## 2024-08-31 NOTE — ASSESSMENT & PLAN NOTE
Possibly related to dilution vs blood loss from procedures/blood draws.  Slight lower today however no urgent need for blood transfusion.  Will follow CBC

## 2024-08-31 NOTE — PROGRESS NOTES
[facility]  Progress Note  Name: Chris Bojorquez I  MRN: [id number]  Unit/Bed#: [address] [place] [address] I Date of Admission: 8/26/2024   Date of Service: 8/31/2024 I Hospital Day: 5    Assessment & Plan   Anemia  Assessment & Plan  Possibly related to dilution vs blood loss from procedures/blood draws.  Slight lower today however no urgent need for blood transfusion.  Will follow CBC     Gastroesophageal reflux disease  Assessment & Plan  Continue PPI    Bacteremia  Assessment & Plan  Proteus Bacteremia noted.   Resume IV Ceftriaxone.  ID following     Parkinson disease  Assessment & Plan  Continue Sinemet.  PT/OT evaluation for discharge needs     Hypothyroidism  Assessment & Plan  Continue Levothyroxine    PABLO (acute kidney injury) (HCC)  Assessment & Plan  Possibly pre-renal due to septic shock/hypotension.  Continue IV fluid hydration.  Will follow BMP     Uncontrolled type 2 diabetes mellitus with hyperglycemia (HCC)  Assessment & Plan  Lab Results   Component Value Date    HGBA1C 14.6 (H) 08/26/2024     Recent Labs     08/30/24  1055 08/30/24  1618 08/30/24  2104 08/31/24  0735   POCGLU 249* 189* 211* 96     Blood Sugar Average: Last 72 hrs:  (P) [id number]    Resume Lantus with SSI regimen     Moderate protein-calorie malnutrition (HCC)  Assessment & Plan  Malnutrition Findings:   Adult Malnutrition type: Chronic illness  Adult Degree of Malnutrition: Malnutrition of moderate degree  Consult nutrition services Malnutrition Characteristics: Muscle loss, Fat loss, Weight loss     360 Statement: related to inadequate energy intake as evidenced by 10 % weight loss last 7-8 months, depression of temples, sunken orbital, wasted interosseous. Treatment: Oral diet and nutrition supplements    BMI Findings:      Body mass index is 23.78 kg/m².       Acute metabolic encephalopathy  Assessment & Plan  Likely secondary to sepsis/UTI vs hospital delirium.  Mental status seems to have improved  today.  CT head is negative for acute process.  Continue neuro checks     Emphysematous pyelitis  Assessment & Plan  Pt underwent cytoscopy with right stone extraction and right ureteral stent placement.  Resume IV Ceftriaxone (Day 6/10).  Urology and ID following    Essential hypertension  Assessment & Plan  Norvasc was on hold due to septic shock.  Will restart once appropriate              VTE Pharmacologic Prophylaxis: VTE Score: 5 High Risk (Score >/= 5) - Pharmacological DVT Prophylaxis Ordered: heparin. Sequential Compression Devices Ordered.    Mobility:   Basic Mobility Inpatient Raw Score: 8  -HLM Goal: 3: Sit at edge of bed  JH-HLM Achieved: 1: Laying in bed  JH-HLM Goal NOT achieved. Continue with multidisciplinary rounding and encourage appropriate mobility to improve upon JH-HLM goals.    Patient Centered Rounds: I performed bedside rounds with nursing staff today.   Discussions with Specialists or Other Care Team Provider: Case Management     Education and Discussions with Family / Patient: Attempted to update  (son) via phone. Left voicemail.     Total Time Spent on Date of Encounter in care of patient: 35 mins. This time was spent on one or more of the following: performing physical exam; counseling and coordination of care; obtaining or reviewing history; documenting in the medical record; reviewing/ordering tests, medications or procedures; communicating with other healthcare professionals and discussing with patient's family/caregivers.    Current Length of Stay: 5 day(s)  Current Patient Status: Inpatient   Certification Statement: The patient will continue to require additional inpatient hospital stay due to Complicated UTI management; PABLO; placement   Discharge Plan: Anticipate discharge in 48-72 hrs to discharge location to be determined pending rehab evaluations.    Code Status: Level 1 - Full Code    Subjective:   Pt was seen and examined at bedside.  Denies any chest pain,  "SOB or abdominal pain.  Very agitated today because he feels he's being asked \"too many questions\".       Objective:     Vitals:   Temp (24hrs), Av.7 °F (37.1 °C), Min:98.1 °F (36.7 °C), Max:99.8 °F (37.7 °C)    Temp:  [98.1 °F (36.7 °C)-99.8 °F (37.7 °C)] 98.1 °F (36.7 °C)  HR:  [71-83] 79  Resp:  [17-21] 18  BP: ()/(51-59) 108/56  SpO2:  [96 %-98 %] 98 %  Body mass index is 23.78 kg/m².     Input and Output Summary (last 24 hours):     Intake/Output Summary (Last 24 hours) at 2024 0819  Last data filed at 2024 0500  Gross per 24 hour   Intake 62 ml   Output 800 ml   Net -738 ml       Physical Exam:   General: in no acute distress  Skin: no jaundice  CVS: RRR, no murmurs appreciated  Lungs: CTAL, no wheezing or rales appreciated  Abdomen: soft, nondistended, bowel sounds normal, nontender upon palpation, no guarding or rebound tenderness  Extremities: venodynes in place in both legs   Neuro: alert and oriented x3, no tremors noted   Psych: agitated       Additional Data:     Labs:  Results from last 7 days   Lab Units 24  0513 24  0435 24  0443   WBC Thousand/uL 7.63   < > 6.13   HEMOGLOBIN g/dL 7.9*   < > 8.8*   HEMATOCRIT % 25.6*   < > 28.7*   PLATELETS Thousands/uL 189   < > 132*   BANDS PCT %  --   --  14*   SEGS PCT % 82*   < >  --    LYMPHO PCT % 8*   < > 4*   MONO PCT % 7   < > 9   EOS PCT % 2   < > 0    < > = values in this interval not displayed.     Results from last 7 days   Lab Units 24  0513 24  0558   SODIUM mmol/L 137 134*   POTASSIUM mmol/L 3.6 4.0   CHLORIDE mmol/L 110* 106   CO2 mmol/L 22 18*   BUN mg/dL 27* 32*   CREATININE mg/dL 1.59* 1.73*   ANION GAP mmol/L 5 10   CALCIUM mg/dL 7.6* 7.6*   ALBUMIN g/dL  --  2.4*   TOTAL BILIRUBIN mg/dL  --  0.35   ALK PHOS U/L  --  130*   ALT U/L  --  4*   AST U/L  --  14   GLUCOSE RANDOM mg/dL 100 334*         Results from last 7 days   Lab Units 24  0735 24  2104 24  1618 24  1055 " 08/29/24  2109 08/29/24  1801 08/29/24  1120 08/29/24  0709 08/28/24  2017 08/28/24  1834 08/28/24  1620 08/28/24  1420   POC GLUCOSE mg/dl 96 211* 189* 249* 377* 423* 271* 306* 206* 189* 89 113     Results from last 7 days   Lab Units 08/26/24  1256   HEMOGLOBIN A1C % 14.6*     Results from last 7 days   Lab Units 08/30/24  0558 08/29/24 2001 08/29/24  0622 08/27/24  0902 08/27/24  0443 08/26/24  1812   LACTIC ACID mmol/L  --   --  0.5 0.8  --  1.1   PROCALCITONIN ng/ml 11.85* 13.21*  --   --  6.84*  --        Lines/Drains:  Invasive Devices       Peripheral Intravenous Line  Duration             Peripheral IV 08/27/24 Dorsal (posterior);Left Forearm 3 days    Peripheral IV 08/27/24 Right;Ventral (anterior) Forearm 3 days              Drain  Duration             Ureteral Internal Stent Right ureter 6 Fr. 1 day    Urethral Catheter Other (Comment) 20 Fr. 1 day                  Urinary Catheter:  Goal for removal: Voiding trial when ambulation improves               Imaging: Personally reviewed the following imaging: abdominal/pelvic CT and CT head    Recent Cultures (last 7 days):   Results from last 7 days   Lab Units 08/29/24  0622 08/26/24  1812 08/26/24  1333   BLOOD CULTURE  No Growth at 24 hrs.  No Growth at 24 hrs. No Growth After 4 Days.  Proteus mirabilis*  --    GRAM STAIN RESULT   --  Gram negative rods*  --    URINE CULTURE   --   --  10,000-19,000 cfu/ml Proteus mirabilis*  <10,000 cfu/ml       Last 24 Hours Medication List:   Current Facility-Administered Medications   Medication Dose Route Frequency Provider Last Rate    acetaminophen  1,000 mg Intravenous Q6H PRN FRANCES Hubbard      carbidopa-levodopa  1 tablet Oral BID FRANCES Hubbard      cefTRIAXone  2,000 mg Intravenous Q24H JanaFRANCES Peralta 2,000 mg (08/30/24 1624)    ferrous sulfate  325 mg Oral Daily With Breakfast FRANCES Hubbard      heparin (porcine)  5,000 Units Subcutaneous Q8H [facility] FRANCES Hubbard       insulin glargine  10 Units Subcutaneous HS Jana Kathy Jurado, CRNP      insulin lispro  2-12 Units Subcutaneous TID AC Jana Kathy Jurado, CRNP      insulin lispro  2-12 Units Subcutaneous HS Jana Kathy Jurado, CRNP      levothyroxine  25 mcg Oral Early Morning Jana Kathy Jurado, CRNP      multi-electrolyte  75 mL/hr Intravenous Continuous Jana Kathy Jurado, CRNP 75 mL/hr (08/31/24 0712)    pantoprazole  40 mg Oral Early Morning Jana Kathy Jurado, FRANCES          Today, Patient Was Seen By: Shante Palacio MD    **Please Note: This note may have been constructed using a voice recognition system.**

## 2024-08-31 NOTE — ASSESSMENT & PLAN NOTE
Likely secondary to sepsis/UTI vs hospital delirium.  Mental status seems to have improved today.  CT head is negative for acute process.  Continue neuro checks

## 2024-08-31 NOTE — ASSESSMENT & PLAN NOTE
Malnutrition Findings:   Adult Malnutrition type: Chronic illness  Adult Degree of Malnutrition: Malnutrition of moderate degree  Consult nutrition services Malnutrition Characteristics: Muscle loss, Fat loss, Weight loss     360 Statement: related to inadequate energy intake as evidenced by 10 % weight loss last 7-8 months, depression of temples, sunken orbital, wasted interosseous. Treatment: Oral diet and nutrition supplements    BMI Findings:      Body mass index is 23.78 kg/m².

## 2024-08-31 NOTE — PLAN OF CARE
Problem: Prexisting or High Potential for Compromised Skin Integrity  Goal: Skin integrity is maintained or improved  Description: INTERVENTIONS:  - Identify patients at risk for skin breakdown  - Assess and monitor skin integrity  - Assess and monitor nutrition and hydration status  - Monitor labs   - Assess for incontinence   - Turn and reposition patient  - Assist with mobility/ambulation  - Relieve pressure over bony prominences  - Avoid friction and shearing  - Provide appropriate hygiene as needed including keeping skin clean and dry  - Evaluate need for skin moisturizer/barrier cream  - Collaborate with interdisciplinary team   - Patient/family teaching  - Consider wound care consult   Outcome: Progressing     Problem: GENITOURINARY - ADULT  Goal: Maintains or returns to baseline urinary function  Description: INTERVENTIONS:  - Assess urinary function  - Encourage oral fluids to ensure adequate hydration if ordered  - Administer IV fluids as ordered to ensure adequate hydration  - Administer ordered medications as needed  - Offer frequent toileting  - Follow urinary retention protocol if ordered  Outcome: Progressing  Goal: Urinary catheter remains patent  Description: INTERVENTIONS:  - Assess patency of urinary catheter  - If patient has a chronic yang, consider changing catheter if non-functioning  - Follow guidelines for intermittent irrigation of non-functioning urinary catheter  Outcome: Progressing     Problem: METABOLIC, FLUID AND ELECTROLYTES - ADULT  Goal: Electrolytes maintained within normal limits  Description: INTERVENTIONS:  - Monitor labs and assess patient for signs and symptoms of electrolyte imbalances  - Administer electrolyte replacement as ordered  - Monitor response to electrolyte replacements, including repeat lab results as appropriate  - Instruct patient on fluid and nutrition as appropriate  Outcome: Progressing  Goal: Fluid balance maintained  Description: INTERVENTIONS:  -  Monitor labs   - Monitor I/O and WT  - Instruct patient on fluid and nutrition as appropriate  - Assess for signs & symptoms of volume excess or deficit  Outcome: Progressing  Goal: Glucose maintained within target range  Description: INTERVENTIONS:  - Monitor Blood Glucose as ordered  - Assess for signs and symptoms of hyperglycemia and hypoglycemia  - Administer ordered medications to maintain glucose within target range  - Assess nutritional intake and initiate nutrition service referral as needed  Outcome: Progressing     Problem: SKIN/TISSUE INTEGRITY - ADULT  Goal: Skin Integrity remains intact(Skin Breakdown Prevention)  Description: Assess:  -Perform Sushil assessment every 12  -Clean and moisturize skin every 12  -Inspect skin when repositioning, toileting, and assisting with ADLS    -Assess extremities for adequate circulation and sensation     Bed Management:  -Have minimal linens on bed & keep smooth, unwrinkled  -Change linens as needed when moist or perspiring  -Avoid sitting or lying in one position for more than 2 hours while in bed  -Keep HOB at 30degrees     Toileting:  -Offer bedside commode  -Assess for incontinence every 2  -Use incontinent care products after each incontinent episode such as foam    Activity:  -Mobilize patient 3 times a day  -Encourage activity   -Encourage or provide ROM exercises   -Turn and reposition patient every 2 Hours  -Use appropriate equipment to lift or move patient in bed  -Instruct/ Assist with weight shifting every 45 when out of bed in chair  -Consider limitation of chair time 3 hour intervals    Skin Care:  -Avoid use of baby powder, tape, friction and shearing, hot water or constrictive clothing  -Relieve pressure over bony prominences using foam  -Do not massage red bony areas    Next Steps:  -Teach patient strategies to minimize risks such as repositioning   -Consider consults to  interdisciplinary teams such as PT  Outcome: Progressing     Problem:  HEMATOLOGIC - ADULT  Goal: Maintains hematologic stability  Description: INTERVENTIONS  - Assess for signs and symptoms of bleeding or hemorrhage  - Monitor labs  - Administer supportive blood products/factors as ordered and appropriate  Outcome: Progressing     Problem: MUSCULOSKELETAL - ADULT  Goal: Maintain or return mobility to safest level of function  Description: INTERVENTIONS:  - Assess patient's ability to carry out ADLs; assess patient's baseline for ADL function and identify physical deficits which impact ability to perform ADLs (bathing, care of mouth/teeth, toileting, grooming, dressing, etc.)  - Assess/evaluate cause of self-care deficits   - Assess range of motion  - Assess patient's mobility  - Assess patient's need for assistive devices and provide as appropriate  - Encourage maximum independence but intervene and supervise when necessary  - Involve family in performance of ADLs  - Assess for home care needs following discharge   - Consider OT consult to assist with ADL evaluation and planning for discharge  - Provide patient education as appropriate  Outcome: Progressing     Problem: PAIN - ADULT  Goal: Verbalizes/displays adequate comfort level or baseline comfort level  Description: Interventions:  - Encourage patient to monitor pain and request assistance  - Assess pain using appropriate pain scale  - Administer analgesics based on type and severity of pain and evaluate response  - Implement non-pharmacological measures as appropriate and evaluate response  - Consider cultural and social influences on pain and pain management  - Notify physician/advanced practitioner if interventions unsuccessful or patient reports new pain  Outcome: Progressing     Problem: INFECTION - ADULT  Goal: Absence or prevention of progression during hospitalization  Description: INTERVENTIONS:  - Assess and monitor for signs and symptoms of infection  - Monitor lab/diagnostic results  - Monitor all insertion sites,  i.e. indwelling lines, tubes, and drains  - Monitor endotracheal if appropriate and nasal secretions for changes in amount and color  - [place] appropriate cooling/warming therapies per order  - Administer medications as ordered  - Instruct and encourage patient and family to use good hand hygiene technique  - Identify and instruct in appropriate isolation precautions for identified infection/condition  Outcome: Progressing  Goal: Absence of fever/infection during neutropenic period  Description: INTERVENTIONS:  - Monitor WBC    Outcome: Progressing     Problem: DISCHARGE PLANNING  Goal: Discharge to home or other facility with appropriate resources  Description: INTERVENTIONS:  - Identify barriers to discharge w/patient and caregiver  - Arrange for needed discharge resources and transportation as appropriate  - Identify discharge learning needs (meds, wound care, etc.)  - Arrange for interpretive services to assist at discharge as needed  - Refer to Case Management Department for coordinating discharge planning if the patient needs post-hospital services based on physician/advanced practitioner order or complex needs related to functional status, cognitive ability, or social support system  Outcome: Progressing     Problem: SAFETY ADULT  Goal: Patient will remain free of falls  Description: INTERVENTIONS:  - Educate patient/family on patient safety including physical limitations  - Instruct patient to call for assistance with activity   - Consult OT/PT to assist with strengthening/mobility   - Keep Call bell within reach  - Keep bed low and locked with side rails adjusted as appropriate  - Keep care items and personal belongings within reach  - Initiate and maintain comfort rounds  - Make Fall Risk Sign visible to staff  - Offer Toileting every 2 Hours, in advance of need  - Initiate/Maintain bed alarm  - Obtain necessary fall risk management equipment: alarm  - Apply yellow socks and bracelet for high fall risk  patients  - Consider moving patient to room near nurses station  Outcome: Progressing

## 2024-09-01 LAB
ANION GAP SERPL CALCULATED.3IONS-SCNC: 5 MMOL/L (ref 4–13)
BACTERIA BLD CULT: NORMAL
BASOPHILS # BLD AUTO: 0.01 THOUSANDS/ÂΜL (ref 0–0.1)
BASOPHILS NFR BLD AUTO: 0 % (ref 0–1)
BUN SERPL-MCNC: 25 MG/DL (ref 5–25)
CALCIUM SERPL-MCNC: 7.2 MG/DL (ref 8.4–10.2)
CHLORIDE SERPL-SCNC: 109 MMOL/L (ref 96–108)
CO2 SERPL-SCNC: 23 MMOL/L (ref 21–32)
CREAT SERPL-MCNC: 1.56 MG/DL (ref 0.6–1.3)
EOSINOPHIL # BLD AUTO: 0.08 THOUSAND/ÂΜL (ref 0–0.61)
EOSINOPHIL NFR BLD AUTO: 2 % (ref 0–6)
ERYTHROCYTE [DISTWIDTH] IN BLOOD BY AUTOMATED COUNT: 17.9 % (ref 11.6–15.1)
GFR SERPL CREATININE-BSD FRML MDRD: 41 ML/MIN/1.73SQ M
GLUCOSE SERPL-MCNC: 165 MG/DL (ref 65–140)
GLUCOSE SERPL-MCNC: 198 MG/DL (ref 65–140)
GLUCOSE SERPL-MCNC: 239 MG/DL (ref 65–140)
GLUCOSE SERPL-MCNC: 341 MG/DL (ref 65–140)
GLUCOSE SERPL-MCNC: 394 MG/DL (ref 65–140)
HCT VFR BLD AUTO: 25.2 % (ref 36.5–49.3)
HGB BLD-MCNC: 7.7 G/DL (ref 12–17)
IMM GRANULOCYTES # BLD AUTO: 0.06 THOUSAND/UL (ref 0–0.2)
IMM GRANULOCYTES NFR BLD AUTO: 1 % (ref 0–2)
LYMPHOCYTES # BLD AUTO: 0.55 THOUSANDS/ÂΜL (ref 0.6–4.47)
LYMPHOCYTES NFR BLD AUTO: 12 % (ref 14–44)
MAGNESIUM SERPL-MCNC: 2.1 MG/DL (ref 1.9–2.7)
MCH RBC QN AUTO: 25.1 PG (ref 26.8–34.3)
MCHC RBC AUTO-ENTMCNC: 30.6 G/DL (ref 31.4–37.4)
MCV RBC AUTO: 82 FL (ref 82–98)
MONOCYTES # BLD AUTO: 0.48 THOUSAND/ÂΜL (ref 0.17–1.22)
MONOCYTES NFR BLD AUTO: 10 % (ref 4–12)
NEUTROPHILS # BLD AUTO: 3.48 THOUSANDS/ÂΜL (ref 1.85–7.62)
NEUTS SEG NFR BLD AUTO: 75 % (ref 43–75)
NRBC BLD AUTO-RTO: 0 /100 WBCS
PLATELET # BLD AUTO: 214 THOUSANDS/UL (ref 149–390)
PMV BLD AUTO: 10.2 FL (ref 8.9–12.7)
POTASSIUM SERPL-SCNC: 3.8 MMOL/L (ref 3.5–5.3)
RBC # BLD AUTO: 3.07 MILLION/UL (ref 3.88–5.62)
SODIUM SERPL-SCNC: 137 MMOL/L (ref 135–147)
WBC # BLD AUTO: 4.66 THOUSAND/UL (ref 4.31–10.16)

## 2024-09-01 PROCEDURE — 82948 REAGENT STRIP/BLOOD GLUCOSE: CPT

## 2024-09-01 PROCEDURE — 99232 SBSQ HOSP IP/OBS MODERATE 35: CPT | Performed by: STUDENT IN AN ORGANIZED HEALTH CARE EDUCATION/TRAINING PROGRAM

## 2024-09-01 PROCEDURE — 85025 COMPLETE CBC W/AUTO DIFF WBC: CPT | Performed by: STUDENT IN AN ORGANIZED HEALTH CARE EDUCATION/TRAINING PROGRAM

## 2024-09-01 PROCEDURE — 80048 BASIC METABOLIC PNL TOTAL CA: CPT | Performed by: STUDENT IN AN ORGANIZED HEALTH CARE EDUCATION/TRAINING PROGRAM

## 2024-09-01 PROCEDURE — 83735 ASSAY OF MAGNESIUM: CPT | Performed by: STUDENT IN AN ORGANIZED HEALTH CARE EDUCATION/TRAINING PROGRAM

## 2024-09-01 RX ADMIN — INSULIN LISPRO 2 UNITS: 100 INJECTION, SOLUTION INTRAVENOUS; SUBCUTANEOUS at 08:04

## 2024-09-01 RX ADMIN — HEPARIN SODIUM 5000 UNITS: 5000 INJECTION, SOLUTION INTRAVENOUS; SUBCUTANEOUS at 22:25

## 2024-09-01 RX ADMIN — SODIUM CHLORIDE, SODIUM GLUCONATE, SODIUM ACETATE, POTASSIUM CHLORIDE, MAGNESIUM CHLORIDE, SODIUM PHOSPHATE, DIBASIC, AND POTASSIUM PHOSPHATE 75 ML/HR: .53; .5; .37; .037; .03; .012; .00082 INJECTION, SOLUTION INTRAVENOUS at 06:37

## 2024-09-01 RX ADMIN — LEVOTHYROXINE SODIUM 25 MCG: 25 TABLET ORAL at 05:47

## 2024-09-01 RX ADMIN — HEPARIN SODIUM 5000 UNITS: 5000 INJECTION, SOLUTION INTRAVENOUS; SUBCUTANEOUS at 05:48

## 2024-09-01 RX ADMIN — INSULIN LISPRO 8 UNITS: 100 INJECTION, SOLUTION INTRAVENOUS; SUBCUTANEOUS at 22:24

## 2024-09-01 RX ADMIN — PANTOPRAZOLE SODIUM 40 MG: 40 TABLET, DELAYED RELEASE ORAL at 05:47

## 2024-09-01 RX ADMIN — INSULIN LISPRO 4 UNITS: 100 INJECTION, SOLUTION INTRAVENOUS; SUBCUTANEOUS at 11:51

## 2024-09-01 RX ADMIN — INSULIN LISPRO 10 UNITS: 100 INJECTION, SOLUTION INTRAVENOUS; SUBCUTANEOUS at 17:27

## 2024-09-01 RX ADMIN — CARBIDOPA AND LEVODOPA 1 TABLET: 25; 100 TABLET ORAL at 08:05

## 2024-09-01 RX ADMIN — CARBIDOPA AND LEVODOPA 1 TABLET: 25; 100 TABLET ORAL at 17:27

## 2024-09-01 RX ADMIN — HEPARIN SODIUM 5000 UNITS: 5000 INJECTION, SOLUTION INTRAVENOUS; SUBCUTANEOUS at 15:07

## 2024-09-01 RX ADMIN — CEFTRIAXONE 2000 MG: 2 INJECTION, SOLUTION INTRAVENOUS at 17:30

## 2024-09-01 RX ADMIN — FERROUS SULFATE TAB 325 MG (65 MG ELEMENTAL FE) 325 MG: 325 (65 FE) TAB at 08:05

## 2024-09-01 RX ADMIN — INSULIN GLARGINE 10 UNITS: 100 INJECTION, SOLUTION SUBCUTANEOUS at 22:22

## 2024-09-01 NOTE — ASSESSMENT & PLAN NOTE
Likely secondary to sepsis/UTI vs hospital delirium.   CT head is negative for acute process.  Continue neuro checks

## 2024-09-01 NOTE — ASSESSMENT & PLAN NOTE
Pt underwent cytoscopy with right stone extraction and right ureteral stent placement.  Resume IV Ceftriaxone (Day 7/10).  Urology and ID following

## 2024-09-01 NOTE — PLAN OF CARE
Problem: GENITOURINARY - ADULT  Goal: Maintains or returns to baseline urinary function  Description: INTERVENTIONS:  - Assess urinary function  - Encourage oral fluids to ensure adequate hydration if ordered  - Administer IV fluids as ordered to ensure adequate hydration  - Administer ordered medications as needed  - Offer frequent toileting  - Follow urinary retention protocol if ordered  Outcome: Progressing     Problem: GENITOURINARY - ADULT  Goal: Urinary catheter remains patent  Description: INTERVENTIONS:  - Assess patency of urinary catheter  - If patient has a chronic yang, consider changing catheter if non-functioning  - Follow guidelines for intermittent irrigation of non-functioning urinary catheter  Outcome: Progressing     Problem: METABOLIC, FLUID AND ELECTROLYTES - ADULT  Goal: Electrolytes maintained within normal limits  Description: INTERVENTIONS:  - Monitor labs and assess patient for signs and symptoms of electrolyte imbalances  - Administer electrolyte replacement as ordered  - Monitor response to electrolyte replacements, including repeat lab results as appropriate  - Instruct patient on fluid and nutrition as appropriate  Outcome: Progressing  Goal: Fluid balance maintained  Description: INTERVENTIONS:  - Monitor labs   - Monitor I/O and WT  - Instruct patient on fluid and nutrition as appropriate  - Assess for signs & symptoms of volume excess or deficit  Outcome: Progressing  Goal: Glucose maintained within target range  Description: INTERVENTIONS:  - Monitor Blood Glucose as ordered  - Assess for signs and symptoms of hyperglycemia and hypoglycemia  - Administer ordered medications to maintain glucose within target range  - Assess nutritional intake and initiate nutrition service referral as needed  Outcome: Progressing     Problem: SKIN/TISSUE INTEGRITY - ADULT  Goal: Skin Integrity remains intact(Skin Breakdown Prevention)  Description: Assess:  -Perform Sushil assessment every  12  -Clean and moisturize skin every 12  -Inspect skin when repositioning, toileting, and assisting with ADLS    -Assess extremities for adequate circulation and sensation     Bed Management:  -Have minimal linens on bed & keep smooth, unwrinkled  -Change linens as needed when moist or perspiring  -Avoid sitting or lying in one position for more than 2 hours while in bed  -Keep HOB at 30degrees     Toileting:  -Offer bedside commode  -Assess for incontinence every 2  -Use incontinent care products after each incontinent episode such as foam    Activity:  -Mobilize patient 3 times a day  -Encourage activity   -Encourage or provide ROM exercises   -Turn and reposition patient every 2 Hours  -Use appropriate equipment to lift or move patient in bed  -Instruct/ Assist with weight shifting every 45 when out of bed in chair  -Consider limitation of chair time 3 hour intervals    Skin Care:  -Avoid use of baby powder, tape, friction and shearing, hot water or constrictive clothing  -Relieve pressure over bony prominences using foam  -Do not massage red bony areas    Next Steps:  -Teach patient strategies to minimize risks such as repositioning   -Consider consults to  interdisciplinary teams such as PT  Outcome: Progressing     Problem: HEMATOLOGIC - ADULT  Goal: Maintains hematologic stability  Description: INTERVENTIONS  - Assess for signs and symptoms of bleeding or hemorrhage  - Monitor labs  - Administer supportive blood products/factors as ordered and appropriate  Outcome: Progressing     Problem: MUSCULOSKELETAL - ADULT  Goal: Maintain or return mobility to safest level of function  Description: INTERVENTIONS:  - Assess patient's ability to carry out ADLs; assess patient's baseline for ADL function and identify physical deficits which impact ability to perform ADLs (bathing, care of mouth/teeth, toileting, grooming, dressing, etc.)  - Assess/evaluate cause of self-care deficits   - Assess range of motion  - Assess  patient's mobility  - Assess patient's need for assistive devices and provide as appropriate  - Encourage maximum independence but intervene and supervise when necessary  - Involve family in performance of ADLs  - Assess for home care needs following discharge   - Consider OT consult to assist with ADL evaluation and planning for discharge  - Provide patient education as appropriate  Outcome: Progressing

## 2024-09-01 NOTE — PROGRESS NOTES
[facility]  Progress Note  Name: Chris Bojorquez I  MRN: [id number]  Unit/Bed#: 2 [address] Date of Admission: 8/26/2024   Date of Service: 9/1/2024 I Hospital Day: 6    Assessment & Plan   Anemia  Assessment & Plan  Possibly related to dilution vs blood loss from procedures/blood draws.  Will follow CBC     Gastroesophageal reflux disease  Assessment & Plan  Continue PPI    Bacteremia  Assessment & Plan  Proteus Bacteremia noted.   Resume IV Ceftriaxone.  ID following     Parkinson disease  Assessment & Plan  Continue Sinemet.  PT/OT evaluation for discharge needs     Hypothyroidism  Assessment & Plan  Continue Levothyroxine    PABLO (acute kidney injury) (HCC)  Assessment & Plan  Possibly pre-renal due to septic shock/hypotension.  Continue IV fluid hydration.  Will follow BMP     Uncontrolled type 2 diabetes mellitus with hyperglycemia (HCC)  Assessment & Plan  Lab Results   Component Value Date    HGBA1C 14.6 (H) 08/26/2024     Recent Labs     08/31/24  1124 08/31/24  1643 08/31/24  2051 09/01/24  0746   POCGLU 171* 232* 253* 165*     Blood Sugar Average: Last 72 hrs:  (P) 245.25    Resume Lantus with SSI regimen     Moderate protein-calorie malnutrition (HCC)  Assessment & Plan  Malnutrition Findings:   Adult Malnutrition type: Chronic illness  Adult Degree of Malnutrition: Malnutrition of moderate degree  Consult nutrition services Malnutrition Characteristics: Muscle loss, Fat loss, Weight loss     360 Statement: related to inadequate energy intake as evidenced by 10 % weight loss last 7-8 months, depression of temples, sunken orbital, wasted interosseous. Treatment: Oral diet and nutrition supplements    BMI Findings:      Body mass index is 23.78 kg/m².       Acute metabolic encephalopathy  Assessment & Plan  Likely secondary to sepsis/UTI vs hospital delirium.   CT head is negative for acute process.  Continue neuro checks     Emphysematous pyelitis  Assessment & Plan  Pt  underwent cytoscopy with right stone extraction and right ureteral stent placement.  Resume IV Ceftriaxone (Day 7/10).  Urology and ID following    Essential hypertension  Assessment & Plan  Norvasc was on hold due to septic shock. BP still remains soft therefore will not resume at this time.  Will restart once appropriate              VTE Pharmacologic Prophylaxis: VTE Score: 5 High Risk (Score >/= 5) - Pharmacological DVT Prophylaxis Ordered: heparin. Sequential Compression Devices Ordered.    Mobility:   Basic Mobility Inpatient Raw Score: 8  -HLM Goal: 3: Sit at edge of bed  JH-HLM Achieved: 1: Laying in bed  JH-HLM Goal NOT achieved. Continue with multidisciplinary rounding and encourage appropriate mobility to improve upon -HLM goals.    Patient Centered Rounds: I performed bedside rounds with nursing staff today.   Discussions with Specialists or Other Care Team Provider: Case Management     Education and Discussions with Family / Patient: Attempted to update  (son) via phone. Left voicemail.     Total Time Spent on Date of Encounter in care of patient: 35 mins. This time was spent on one or more of the following: performing physical exam; counseling and coordination of care; obtaining or reviewing history; documenting in the medical record; reviewing/ordering tests, medications or procedures; communicating with other healthcare professionals and discussing with patient's family/caregivers.    Current Length of Stay: 6 day(s)  Current Patient Status: Inpatient   Certification Statement: The patient will continue to require additional inpatient hospital stay due to renal failure; UTI/Bacteremia treatment   Discharge Plan: Anticipate discharge in 48 hrs to discharge location to be determined pending rehab evaluations.    Code Status: Level 1 - Full Code    Subjective:   Pt was seen and examined at bedside.   Had a fever last night.  Denies any chest pain, SOB or abdominal pain.  Less agitated  today.      Objective:     Vitals:   Temp (24hrs), Av.4 °F (37.4 °C), Min:97.7 °F (36.5 °C), Max:101.2 °F (38.4 °C)    Temp:  [97.7 °F (36.5 °C)-101.2 °F (38.4 °C)] 97.7 °F (36.5 °C)  HR:  [66-82] 68  Resp:  [18-20] 18  BP: (107-124)/(55-68) 107/55  SpO2:  [91 %-97 %] 93 %  Body mass index is 23.78 kg/m².     Input and Output Summary (last 24 hours):     Intake/Output Summary (Last 24 hours) at 2024 0830  Last data filed at 2024 1801  Gross per 24 hour   Intake 1050 ml   Output 375 ml   Net 675 ml       Physical Exam:   General: in no acute distress  HEENT: atraumatic, normocephalic  Skin: no jaundice  CVS: RRR, no murmurs appreciated  Lungs: CTAL, no wheezing or rales appreciated  Abdomen: soft, nondistended, bowel sounds normal, nontender upon palpation, no guarding or rebound tenderness  Extremities: no edema, no calf swelling or tenderness  Neuro: alert and oriented x3, no tremor noted   Psych: calm      Additional Data:     Labs:  Results from last 7 days   Lab Units 24  0526 24  0435 24  0443   WBC Thousand/uL 4.66   < > 6.13   HEMOGLOBIN g/dL 7.7*   < > 8.8*   HEMATOCRIT % 25.2*   < > 28.7*   PLATELETS Thousands/uL 214   < > 132*   BANDS PCT %  --   --  14*   SEGS PCT % 75   < >  --    LYMPHO PCT % 12*   < > 4*   MONO PCT % 10   < > 9   EOS PCT % 2   < > 0    < > = values in this interval not displayed.     Results from last 7 days   Lab Units 24  0526 24  0513 24  0558   SODIUM mmol/L 137   < > 134*   POTASSIUM mmol/L 3.8   < > 4.0   CHLORIDE mmol/L 109*   < > 106   CO2 mmol/L 23   < > 18*   BUN mg/dL 25   < > 32*   CREATININE mg/dL 1.56*   < > 1.73*   ANION GAP mmol/L 5   < > 10   CALCIUM mg/dL 7.2*   < > 7.6*   ALBUMIN g/dL  --   --  2.4*   TOTAL BILIRUBIN mg/dL  --   --  0.35   ALK PHOS U/L  --   --  130*   ALT U/L  --   --  4*   AST U/L  --   --  14   GLUCOSE RANDOM mg/dL 198*   < > 334*    < > = values in this interval not displayed.         Results from  last 7 days   Lab Units 09/01/24  0746 08/31/24  2051 08/31/24  1643 08/31/24  1124 08/31/24  0735 08/30/24  2104 08/30/24  1618 08/30/24  1055 08/29/24  2109 08/29/24  1801 08/29/24  1120 08/29/24  0709   POC GLUCOSE mg/dl 165* 253* 232* 171* 96 211* 189* 249* 377* 423* 271* 306*     Results from last 7 days   Lab Units 08/26/24  1256   HEMOGLOBIN A1C % 14.6*     Results from last 7 days   Lab Units 08/30/24  0558 08/29/24  2001 08/29/24  0622 08/27/24  0902 08/27/24  0443 08/26/24  1812   LACTIC ACID mmol/L  --   --  0.5 0.8  --  1.1   PROCALCITONIN ng/ml 11.85* 13.21*  --   --  6.84*  --        Lines/Drains:  Invasive Devices       Peripheral Intravenous Line  Duration             Peripheral IV 08/27/24 Dorsal (posterior);Left Forearm 4 days    Peripheral IV 08/27/24 Right;Ventral (anterior) Forearm 4 days              Drain  Duration             Ureteral Internal Stent Right ureter 6 Fr. 2 days    Urethral Catheter Other (Comment) 20 Fr. 2 days                  Urinary Catheter:  Goal for removal: Voiding trial when ambulation improves               Imaging: No pertinent imaging reviewed.    Recent Cultures (last 7 days):   Results from last 7 days   Lab Units 08/29/24  0622 08/26/24  1812 08/26/24  1333   BLOOD CULTURE  No Growth at 48 hrs.  No Growth at 48 hrs. No Growth After 5 Days.  Proteus mirabilis*  --    GRAM STAIN RESULT   --  Gram negative rods*  --    URINE CULTURE   --   --  10,000-19,000 cfu/ml Proteus mirabilis*  <10,000 cfu/ml       Last 24 Hours Medication List:   Current Facility-Administered Medications   Medication Dose Route Frequency Provider Last Rate    acetaminophen  1,000 mg Intravenous Q6H PRN FRANCES Hubbard 1,000 mg (08/31/24 2131)    carbidopa-levodopa  1 tablet Oral BID FRANCES Hubbard      cefTRIAXone  2,000 mg Intravenous Q24H FRANCES Hubbard Stopped (08/31/24 6930)    ferrous sulfate  325 mg Oral Daily With Breakfast FRANCES Hubbard      heparin  (porcine)  5,000 Units Subcutaneous Q8H RAHUL Jana Kathy Jurado, CRNP      insulin glargine  10 Units Subcutaneous HS Jana Kathy Jurado, CRNP      insulin lispro  2-12 Units Subcutaneous TID AC Jana Kathy Jurado, CRNP      insulin lispro  2-12 Units Subcutaneous HS Jana Kathy Jurado, CRNP      levothyroxine  25 mcg Oral Early Morning Jana Kathy Jurado, CRNP      multi-electrolyte  75 mL/hr Intravenous Continuous Jana Akthy Jurado, CRNP 75 mL/hr (09/01/24 0637)    pantoprazole  40 mg Oral Early Morning Jana Kathy Jurado, CRNP          Today, Patient Was Seen By: Shante Palacio MD    **Please Note: This note may have been constructed using a voice recognition system.**

## 2024-09-01 NOTE — ASSESSMENT & PLAN NOTE
Lab Results   Component Value Date    HGBA1C 14.6 (H) 08/26/2024     Recent Labs     08/31/24  1124 08/31/24  1643 08/31/24 2051 09/01/24  0746   POCGLU 171* 232* 253* 165*     Blood Sugar Average: Last 72 hrs:  (P) 245.25    Resume Lantus with SSI regimen

## 2024-09-01 NOTE — PLAN OF CARE
Problem: METABOLIC, FLUID AND ELECTROLYTES - ADULT  Goal: Electrolytes maintained within normal limits  Description: INTERVENTIONS:  - Monitor labs and assess patient for signs and symptoms of electrolyte imbalances  - Administer electrolyte replacement as ordered  - Monitor response to electrolyte replacements, including repeat lab results as appropriate  - Instruct patient on fluid and nutrition as appropriate  Outcome: Progressing     Problem: INFECTION - ADULT  Goal: Absence or prevention of progression during hospitalization  Description: INTERVENTIONS:  - Assess and monitor for signs and symptoms of infection  - Monitor lab/diagnostic results  - Monitor all insertion sites, i.e. indwelling lines, tubes, and drains  - Monitor endotracheal if appropriate and nasal secretions for changes in amount and color  - [place] appropriate cooling/warming therapies per order  - Administer medications as ordered  - Instruct and encourage patient and family to use good hand hygiene technique  - Identify and instruct in appropriate isolation precautions for identified infection/condition  Outcome: Progressing

## 2024-09-01 NOTE — ASSESSMENT & PLAN NOTE
Norvasc was on hold due to septic shock. BP still remains soft therefore will not resume at this time.  Will restart once appropriate

## 2024-09-02 LAB
ANION GAP SERPL CALCULATED.3IONS-SCNC: 4 MMOL/L (ref 4–13)
BASOPHILS # BLD AUTO: 0.02 THOUSANDS/ÂΜL (ref 0–0.1)
BASOPHILS NFR BLD AUTO: 0 % (ref 0–1)
BUN SERPL-MCNC: 22 MG/DL (ref 5–25)
CALCIUM SERPL-MCNC: 7.3 MG/DL (ref 8.4–10.2)
CHLORIDE SERPL-SCNC: 110 MMOL/L (ref 96–108)
CO2 SERPL-SCNC: 25 MMOL/L (ref 21–32)
CREAT SERPL-MCNC: 1.53 MG/DL (ref 0.6–1.3)
EOSINOPHIL # BLD AUTO: 0.07 THOUSAND/ÂΜL (ref 0–0.61)
EOSINOPHIL NFR BLD AUTO: 1 % (ref 0–6)
ERYTHROCYTE [DISTWIDTH] IN BLOOD BY AUTOMATED COUNT: 17.8 % (ref 11.6–15.1)
GFR SERPL CREATININE-BSD FRML MDRD: 42 ML/MIN/1.73SQ M
GLUCOSE SERPL-MCNC: 143 MG/DL (ref 65–140)
GLUCOSE SERPL-MCNC: 153 MG/DL (ref 65–140)
GLUCOSE SERPL-MCNC: 198 MG/DL (ref 65–140)
GLUCOSE SERPL-MCNC: 248 MG/DL (ref 65–140)
GLUCOSE SERPL-MCNC: 357 MG/DL (ref 65–140)
HCT VFR BLD AUTO: 25.1 % (ref 36.5–49.3)
HGB BLD-MCNC: 7.5 G/DL (ref 12–17)
IMM GRANULOCYTES # BLD AUTO: 0.09 THOUSAND/UL (ref 0–0.2)
IMM GRANULOCYTES NFR BLD AUTO: 2 % (ref 0–2)
LYMPHOCYTES # BLD AUTO: 0.63 THOUSANDS/ÂΜL (ref 0.6–4.47)
LYMPHOCYTES NFR BLD AUTO: 11 % (ref 14–44)
MAGNESIUM SERPL-MCNC: 2.1 MG/DL (ref 1.9–2.7)
MCH RBC QN AUTO: 24.8 PG (ref 26.8–34.3)
MCHC RBC AUTO-ENTMCNC: 29.9 G/DL (ref 31.4–37.4)
MCV RBC AUTO: 83 FL (ref 82–98)
MONOCYTES # BLD AUTO: 0.59 THOUSAND/ÂΜL (ref 0.17–1.22)
MONOCYTES NFR BLD AUTO: 11 % (ref 4–12)
NEUTROPHILS # BLD AUTO: 4.14 THOUSANDS/ÂΜL (ref 1.85–7.62)
NEUTS SEG NFR BLD AUTO: 75 % (ref 43–75)
NRBC BLD AUTO-RTO: 0 /100 WBCS
PLATELET # BLD AUTO: 256 THOUSANDS/UL (ref 149–390)
PMV BLD AUTO: 9.6 FL (ref 8.9–12.7)
POTASSIUM SERPL-SCNC: 3.9 MMOL/L (ref 3.5–5.3)
RBC # BLD AUTO: 3.03 MILLION/UL (ref 3.88–5.62)
SODIUM SERPL-SCNC: 139 MMOL/L (ref 135–147)
WBC # BLD AUTO: 5.54 THOUSAND/UL (ref 4.31–10.16)

## 2024-09-02 PROCEDURE — 99232 SBSQ HOSP IP/OBS MODERATE 35: CPT | Performed by: STUDENT IN AN ORGANIZED HEALTH CARE EDUCATION/TRAINING PROGRAM

## 2024-09-02 PROCEDURE — 83735 ASSAY OF MAGNESIUM: CPT | Performed by: STUDENT IN AN ORGANIZED HEALTH CARE EDUCATION/TRAINING PROGRAM

## 2024-09-02 PROCEDURE — 80048 BASIC METABOLIC PNL TOTAL CA: CPT | Performed by: STUDENT IN AN ORGANIZED HEALTH CARE EDUCATION/TRAINING PROGRAM

## 2024-09-02 PROCEDURE — 85025 COMPLETE CBC W/AUTO DIFF WBC: CPT | Performed by: STUDENT IN AN ORGANIZED HEALTH CARE EDUCATION/TRAINING PROGRAM

## 2024-09-02 PROCEDURE — 82948 REAGENT STRIP/BLOOD GLUCOSE: CPT

## 2024-09-02 RX ADMIN — INSULIN LISPRO 10 UNITS: 100 INJECTION, SOLUTION INTRAVENOUS; SUBCUTANEOUS at 21:16

## 2024-09-02 RX ADMIN — CARBIDOPA AND LEVODOPA 1 TABLET: 25; 100 TABLET ORAL at 17:46

## 2024-09-02 RX ADMIN — INSULIN LISPRO 2 UNITS: 100 INJECTION, SOLUTION INTRAVENOUS; SUBCUTANEOUS at 12:07

## 2024-09-02 RX ADMIN — FERROUS SULFATE TAB 325 MG (65 MG ELEMENTAL FE) 325 MG: 325 (65 FE) TAB at 10:00

## 2024-09-02 RX ADMIN — CEFTRIAXONE 2000 MG: 2 INJECTION, SOLUTION INTRAVENOUS at 17:47

## 2024-09-02 RX ADMIN — HEPARIN SODIUM 5000 UNITS: 5000 INJECTION, SOLUTION INTRAVENOUS; SUBCUTANEOUS at 13:58

## 2024-09-02 RX ADMIN — SODIUM CHLORIDE, SODIUM GLUCONATE, SODIUM ACETATE, POTASSIUM CHLORIDE, MAGNESIUM CHLORIDE, SODIUM PHOSPHATE, DIBASIC, AND POTASSIUM PHOSPHATE 75 ML/HR: .53; .5; .37; .037; .03; .012; .00082 INJECTION, SOLUTION INTRAVENOUS at 03:54

## 2024-09-02 RX ADMIN — CARBIDOPA AND LEVODOPA 1 TABLET: 25; 100 TABLET ORAL at 10:00

## 2024-09-02 RX ADMIN — LEVOTHYROXINE SODIUM 25 MCG: 25 TABLET ORAL at 06:20

## 2024-09-02 RX ADMIN — INSULIN LISPRO 4 UNITS: 100 INJECTION, SOLUTION INTRAVENOUS; SUBCUTANEOUS at 17:11

## 2024-09-02 RX ADMIN — HEPARIN SODIUM 5000 UNITS: 5000 INJECTION, SOLUTION INTRAVENOUS; SUBCUTANEOUS at 06:21

## 2024-09-02 RX ADMIN — SODIUM CHLORIDE, SODIUM GLUCONATE, SODIUM ACETATE, POTASSIUM CHLORIDE, MAGNESIUM CHLORIDE, SODIUM PHOSPHATE, DIBASIC, AND POTASSIUM PHOSPHATE 75 ML/HR: .53; .5; .37; .037; .03; .012; .00082 INJECTION, SOLUTION INTRAVENOUS at 17:52

## 2024-09-02 RX ADMIN — HEPARIN SODIUM 5000 UNITS: 5000 INJECTION, SOLUTION INTRAVENOUS; SUBCUTANEOUS at 21:15

## 2024-09-02 RX ADMIN — PANTOPRAZOLE SODIUM 40 MG: 40 TABLET, DELAYED RELEASE ORAL at 06:21

## 2024-09-02 RX ADMIN — INSULIN GLARGINE 10 UNITS: 100 INJECTION, SOLUTION SUBCUTANEOUS at 21:15

## 2024-09-02 NOTE — ASSESSMENT & PLAN NOTE
Possibly multifactorial septic shock/hypotension and obstructive uropathy.  Pt is s/p ureteral stent placement with stone extraction.  Continue IV fluid hydration.  Will follow BMP.  Urology following

## 2024-09-02 NOTE — NURSING NOTE
Attempted to insert new IV access but pt not cooperative at all. He was refusing to be stuck with a needle and pinched nurse's hand everytime his arm or hand is touched.

## 2024-09-02 NOTE — ASSESSMENT & PLAN NOTE
Pt underwent cytoscopy with right stone extraction and right ureteral stent placement.  Resume IV Ceftriaxone (Day 8/10).  Urology and ID following

## 2024-09-02 NOTE — PROGRESS NOTES
[facility]  Progress Note  Name: Chris Bojorquez I  MRN: [id number]  Unit/Bed#: 2 [address] Date of Admission: 8/26/2024   Date of Service: 9/2/2024 I Hospital Day: 7    Assessment & Plan   Anemia  Assessment & Plan  Possibly related to dilution vs blood loss from procedures/blood draws.  Will follow CBC     Gastroesophageal reflux disease  Assessment & Plan  Continue PPI    Bacteremia  Assessment & Plan  Proteus Bacteremia noted.   Resume IV Ceftriaxone.  ID following     Parkinson disease  Assessment & Plan  Continue Sinemet.  PT/OT evaluation for discharge needs     Hypothyroidism  Assessment & Plan  Continue Levothyroxine    PABLO (acute kidney injury) (HCC)  Assessment & Plan  Possibly multifactorial septic shock/hypotension and obstructive uropathy.  Pt is s/p ureteral stent placement with stone extraction.  Continue IV fluid hydration.  Will follow BMP.  Urology following     Uncontrolled type 2 diabetes mellitus with hyperglycemia (HCC)  Assessment & Plan  Lab Results   Component Value Date    HGBA1C 14.6 (H) 08/26/2024     Recent Labs     09/01/24  1144 09/01/24  1633 09/01/24  2043 09/02/24  0743   POCGLU 239* 394* 341* 143*     Blood Sugar Average: Last 72 hrs:  (P) [id number]    Resume Lantus with SSI regimen     Moderate protein-calorie malnutrition (HCC)  Assessment & Plan  Malnutrition Findings:   Adult Malnutrition type: Chronic illness  Adult Degree of Malnutrition: Malnutrition of moderate degree  Consult nutrition services Malnutrition Characteristics: Muscle loss, Fat loss, Weight loss     360 Statement: related to inadequate energy intake as evidenced by 10 % weight loss last 7-8 months, depression of temples, sunken orbital, wasted interosseous. Treatment: Oral diet and nutrition supplements    BMI Findings:      Body mass index is 23.78 kg/m².       Acute metabolic encephalopathy  Assessment & Plan  Likely secondary to sepsis/UTI vs hospital delirium.   CT  head is negative for acute process.  Mental status continues to wax and wane per family.  Continue neuro checks     Emphysematous pyelitis  Assessment & Plan  Pt underwent cytoscopy with right stone extraction and right ureteral stent placement.  Resume IV Ceftriaxone (Day 8/10).  Urology and ID following    Essential hypertension  Assessment & Plan  Norvasc initially held due to labile BP/sepsis.  Will restart once appropriate              VTE Pharmacologic Prophylaxis: VTE Score: 5 High Risk (Score >/= 5) - Pharmacological DVT Prophylaxis Ordered: heparin. Sequential Compression Devices Ordered.    Mobility:   Basic Mobility Inpatient Raw Score: 7  JH-HLM Goal: 2: Bed activities/Dependent transfer  JH-HLM Achieved: 1: Laying in bed  JH-HLM Goal NOT achieved. Continue with multidisciplinary rounding and encourage appropriate mobility to improve upon -HLM goals.    Patient Centered Rounds: I performed bedside rounds with nursing staff today.   Discussions with Specialists or Other Care Team Provider: Case Management     Education and Discussions with Family / Patient: Attempted to update  (son) via phone. Left voicemail.     Total Time Spent on Date of Encounter in care of patient: 35 mins. This time was spent on one or more of the following: performing physical exam; counseling and coordination of care; obtaining or reviewing history; documenting in the medical record; reviewing/ordering tests, medications or procedures; communicating with other healthcare professionals and discussing with patient's family/caregivers.    Current Length of Stay: 7 day(s)  Current Patient Status: Inpatient   Certification Statement: The patient will continue to require additional inpatient hospital stay due to renal failure, UTI/bacteremia treatment; placement   Discharge Plan: Anticipate discharge in 24-48 hrs to discharge location to be determined pending rehab evaluations.    Code Status: Level 1 - Full  Code    Subjective:   Pt was seen and examined at bedside.  Appears slightly confused today.  Does not participate in my questioning.  Afebrile overnight.        Objective:     Vitals:   Temp (24hrs), Av.4 °F (37.4 °C), Min:99 °F (37.2 °C), Max:99.7 °F (37.6 °C)    Temp:  [99 °F (37.2 °C)-99.7 °F (37.6 °C)] 99.5 °F (37.5 °C)  HR:  [71-74] 73  Resp:  [17-19] 17  BP: (112-122)/(57-65) 112/57  SpO2:  [91 %-94 %] 91 %  Body mass index is 23.78 kg/m².     Input and Output Summary (last 24 hours):     Intake/Output Summary (Last 24 hours) at 2024 0821  Last data filed at 2024 0639  Gross per 24 hour   Intake 1795 ml   Output 650 ml   Net 1145 ml       Physical Exam:   General: in no acute distress  Skin: no jaundice  CVS: RRR, no murmurs appreciated  Lungs: no wheezing or rales appreciated  Abdomen: soft, slightly distended, bowel sounds normal, nontender upon palpation, no guarding or rebound tenderness  Extremities: no edema, no calf swelling or tenderness  Neuro: alert, resting tremor noted in left hand   Psych: calm, cooperative      Additional Data:     Labs:  Results from last 7 days   Lab Units 24  04324  0435 24  0443   WBC Thousand/uL 5.54   < > 6.13   HEMOGLOBIN g/dL 7.5*   < > 8.8*   HEMATOCRIT % 25.1*   < > 28.7*   PLATELETS Thousands/uL 256   < > 132*   BANDS PCT %  --   --  14*   SEGS PCT % 75   < >  --    LYMPHO PCT % 11*   < > 4*   MONO PCT % 11   < > 9   EOS PCT % 1   < > 0    < > = values in this interval not displayed.     Results from last 7 days   Lab Units 24  04324  0513 24  0558   SODIUM mmol/L 139   < > 134*   POTASSIUM mmol/L 3.9   < > 4.0   CHLORIDE mmol/L 110*   < > 106   CO2 mmol/L 25   < > 18*   BUN mg/dL 22   < > 32*   CREATININE mg/dL 1.53*   < > 1.73*   ANION GAP mmol/L 4   < > 10   CALCIUM mg/dL 7.3*   < > 7.6*   ALBUMIN g/dL  --   --  2.4*   TOTAL BILIRUBIN mg/dL  --   --  0.35   ALK PHOS U/L  --   --  130*   ALT U/L  --   --  4*   AST  U/L  --   --  14   GLUCOSE RANDOM mg/dL 153*   < > 334*    < > = values in this interval not displayed.         Results from last 7 days   Lab Units 09/02/24  0743 09/01/24  2043 09/01/24  1633 09/01/24  1144 09/01/24  0746 08/31/24  2051 08/31/24  1643 08/31/24  1124 08/31/24  0735 08/30/24  2104 08/30/24  1618 08/30/24  1055   POC GLUCOSE mg/dl 143* 341* 394* 239* 165* 253* 232* 171* 96 211* 189* 249*     Results from last 7 days   Lab Units 08/26/24  1256   HEMOGLOBIN A1C % 14.6*     Results from last 7 days   Lab Units 08/30/24  0558 08/29/24 2001 08/29/24  0622 08/27/24  0902 08/27/24  0443 08/26/24  1812   LACTIC ACID mmol/L  --   --  0.5 0.8  --  1.1   PROCALCITONIN ng/ml 11.85* 13.21*  --   --  6.84*  --        Lines/Drains:  Invasive Devices       Peripheral Intravenous Line  Duration             Peripheral IV Dorsal (posterior);Right Hand -- days              Drain  Duration             Ureteral Internal Stent Right ureter 6 Fr. 3 days    Urethral Catheter Other (Comment) 20 Fr. 3 days                  Urinary Catheter:  Goal for removal:  voiding trial once deemed appropriate by Urology                Imaging: No pertinent imaging reviewed.    Recent Cultures (last 7 days):   Results from last 7 days   Lab Units 08/29/24  0622 08/26/24  1812 08/26/24  1333   BLOOD CULTURE  No Growth at 72 hrs.  No Growth at 72 hrs. No Growth After 5 Days.  Proteus mirabilis*  --    GRAM STAIN RESULT   --  Gram negative rods*  --    URINE CULTURE   --   --  10,000-19,000 cfu/ml Proteus mirabilis*  <10,000 cfu/ml       Last 24 Hours Medication List:   Current Facility-Administered Medications   Medication Dose Route Frequency Provider Last Rate    acetaminophen  1,000 mg Intravenous Q6H PRN FRANCES Hubbard 1,000 mg (08/31/24 2131)    carbidopa-levodopa  1 tablet Oral BID Jana Kathy Jurado, CRNP      cefTRIAXone  2,000 mg Intravenous Q24H FRANCES Hubbard 2,000 mg (09/01/24 3290)    ferrous sulfate  325 mg Oral  Daily With Breakfast Jana Kathy Jurado, CRNP      heparin (porcine)  5,000 Units Subcutaneous Q8H RAHUL Jana Kathy Jurado, CRNP      insulin glargine  10 Units Subcutaneous HS Jana Kathy Jurado, CRNP      insulin lispro  2-12 Units Subcutaneous TID AC Jana Kathy Jurado, CRNP      insulin lispro  2-12 Units Subcutaneous HS Jana Kathy Jurado, CRNP      levothyroxine  25 mcg Oral Early Morning Jana Kathy Jurado, CRNP      multi-electrolyte  75 mL/hr Intravenous Continuous Jana Kathy Jurado, CRNP 75 mL/hr (09/02/24 0639)    pantoprazole  40 mg Oral Early Morning Jana Kathy Jurado, CRNP          Today, Patient Was Seen By: Shante Palacio MD    **Please Note: This note may have been constructed using a voice recognition system.**

## 2024-09-02 NOTE — PROGRESS NOTES
"Progress Note - Urology      Patient: Chris Bojorquez   : 1945 Sex: male   MRN: [id number]     CSN: [id number]  Unit/Bed#: [address] [place] [address]     SUBJECTIVE:   Patient seen on afternoon rounds  Family not present today  On Rocephin    Objective   Vitals: /56   Pulse 72   Temp 99.3 °F (37.4 °C)   Resp 18   Ht 5' 9\" (1.753 m)   Wt 73 kg (161 lb)   SpO2 96%   BMI 23.78 kg/m²     I/O last 24 hours:  In: 1795 [P.O.:200; I.V.:1595]  Out: 650 [Urine:650]      Physical Exam:   General Alert awake   Normocephalic atraumatic PERRLA  Lungs clear bilaterally  Cardiac normal S1 normal S2  Abdomen soft, flank pain  Extremities no edema      Lab Results: CBC:   Lab Results   Component Value Date    WBC 5.54 2024    HGB 7.5 (L) 2024    HCT 25.1 (L) 2024    MCV 83 2024     2024    RBC 3.03 (L) 2024    MCH 24.8 (L) 2024    MCHC 29.9 (L) 2024    RDW 17.8 (H) 2024    MPV 9.6 2024    NRBC 0 2024     CMP:   Lab Results   Component Value Date     (H) 2024    CO2 25 2024    BUN 22 2024    CREATININE 1.53 (H) 2024    CALCIUM 7.3 (L) 2024    AST 14 2024    ALT 4 (L) 2024    ALKPHOS 130 (H) 2024    EGFR 42 2024     Urinalysis:   Lab Results   Component Value Date    COLORU Yellow 2024    CLARITYU Slightly Cloudy 2024    SPECGRAV 1.015 2024    PHUR 7.0 2024    LEUKOCYTESUR Large (A) 2024    NITRITE Negative 2024    GLUCOSEU 1000 (1%) (A) 2024    KETONESU 10 (1+) (A) 2024    BILIRUBINUR Negative 2024    BLOODU Large (A) 2024     Urine Culture:   Lab Results   Component Value Date    URINECX 10,000-19,000 cfu/ml Proteus mirabilis (A) 2024    URINECX <10,000 cfu/ml 2024     PSA: No results found for: \"PSA\"      Assessment/ Plan:  Emphysematous cystitis/right emphysematous pyelitis  Multiple right renal stones  Status post " cystoscopy right stent removal bladder stone day #4  Continue antibiotics as per medical team  Ureteroscopy in 2 to 3 weeks          Ciro Hughes MD

## 2024-09-02 NOTE — PLAN OF CARE
Problem: Prexisting or High Potential for Compromised Skin Integrity  Goal: Skin integrity is maintained or improved  Description: INTERVENTIONS:  - Identify patients at risk for skin breakdown  - Assess and monitor skin integrity  - Assess and monitor nutrition and hydration status  - Monitor labs   - Assess for incontinence   - Turn and reposition patient  - Assist with mobility/ambulation  - Relieve pressure over bony prominences  - Avoid friction and shearing  - Provide appropriate hygiene as needed including keeping skin clean and dry  - Evaluate need for skin moisturizer/barrier cream  - Collaborate with interdisciplinary team   - Patient/family teaching  - Consider wound care consult   Outcome: Progressing     Problem: GENITOURINARY - ADULT  Goal: Maintains or returns to baseline urinary function  Description: INTERVENTIONS:  - Assess urinary function  - Encourage oral fluids to ensure adequate hydration if ordered  - Administer IV fluids as ordered to ensure adequate hydration  - Administer ordered medications as needed  - Offer frequent toileting  - Follow urinary retention protocol if ordered  Outcome: Progressing  Goal: Urinary catheter remains patent  Description: INTERVENTIONS:  - Assess patency of urinary catheter  - If patient has a chronic yang, consider changing catheter if non-functioning  - Follow guidelines for intermittent irrigation of non-functioning urinary catheter  Outcome: Progressing     Problem: METABOLIC, FLUID AND ELECTROLYTES - ADULT  Goal: Electrolytes maintained within normal limits  Description: INTERVENTIONS:  - Monitor labs and assess patient for signs and symptoms of electrolyte imbalances  - Administer electrolyte replacement as ordered  - Monitor response to electrolyte replacements, including repeat lab results as appropriate  - Instruct patient on fluid and nutrition as appropriate  Outcome: Progressing  Goal: Fluid balance maintained  Description: INTERVENTIONS:  -  Monitor labs   - Monitor I/O and WT  - Instruct patient on fluid and nutrition as appropriate  - Assess for signs & symptoms of volume excess or deficit  Outcome: Progressing  Goal: Glucose maintained within target range  Description: INTERVENTIONS:  - Monitor Blood Glucose as ordered  - Assess for signs and symptoms of hyperglycemia and hypoglycemia  - Administer ordered medications to maintain glucose within target range  - Assess nutritional intake and initiate nutrition service referral as needed  Outcome: Progressing     Problem: SKIN/TISSUE INTEGRITY - ADULT  Goal: Skin Integrity remains intact(Skin Breakdown Prevention)  Description: Assess:  -Perform Sushil assessment every 12  -Clean and moisturize skin every 12  -Inspect skin when repositioning, toileting, and assisting with ADLS    -Assess extremities for adequate circulation and sensation     Bed Management:  -Have minimal linens on bed & keep smooth, unwrinkled  -Change linens as needed when moist or perspiring  -Avoid sitting or lying in one position for more than 2 hours while in bed  -Keep HOB at 30degrees     Toileting:  -Offer bedside commode  -Assess for incontinence every 2  -Use incontinent care products after each incontinent episode such as foam    Activity:  -Mobilize patient 3 times a day  -Encourage activity   -Encourage or provide ROM exercises   -Turn and reposition patient every 2 Hours  -Use appropriate equipment to lift or move patient in bed  -Instruct/ Assist with weight shifting every 45 when out of bed in chair  -Consider limitation of chair time 3 hour intervals    Skin Care:  -Avoid use of baby powder, tape, friction and shearing, hot water or constrictive clothing  -Relieve pressure over bony prominences using foam  -Do not massage red bony areas    Next Steps:  -Teach patient strategies to minimize risks such as repositioning   -Consider consults to  interdisciplinary teams such as PT  Outcome: Progressing     Problem:  HEMATOLOGIC - ADULT  Goal: Maintains hematologic stability  Description: INTERVENTIONS  - Assess for signs and symptoms of bleeding or hemorrhage  - Monitor labs  - Administer supportive blood products/factors as ordered and appropriate  Outcome: Progressing     Problem: MUSCULOSKELETAL - ADULT  Goal: Maintain or return mobility to safest level of function  Description: INTERVENTIONS:  - Assess patient's ability to carry out ADLs; assess patient's baseline for ADL function and identify physical deficits which impact ability to perform ADLs (bathing, care of mouth/teeth, toileting, grooming, dressing, etc.)  - Assess/evaluate cause of self-care deficits   - Assess range of motion  - Assess patient's mobility  - Assess patient's need for assistive devices and provide as appropriate  - Encourage maximum independence but intervene and supervise when necessary  - Involve family in performance of ADLs  - Assess for home care needs following discharge   - Consider OT consult to assist with ADL evaluation and planning for discharge  - Provide patient education as appropriate  Outcome: Progressing     Problem: PAIN - ADULT  Goal: Verbalizes/displays adequate comfort level or baseline comfort level  Description: Interventions:  - Encourage patient to monitor pain and request assistance  - Assess pain using appropriate pain scale  - Administer analgesics based on type and severity of pain and evaluate response  - Implement non-pharmacological measures as appropriate and evaluate response  - Consider cultural and social influences on pain and pain management  - Notify physician/advanced practitioner if interventions unsuccessful or patient reports new pain  Outcome: Progressing     Problem: INFECTION - ADULT  Goal: Absence or prevention of progression during hospitalization  Description: INTERVENTIONS:  - Assess and monitor for signs and symptoms of infection  - Monitor lab/diagnostic results  - Monitor all insertion sites,  i.e. indwelling lines, tubes, and drains  - Monitor endotracheal if appropriate and nasal secretions for changes in amount and color  - Mullens appropriate cooling/warming therapies per order  - Administer medications as ordered  - Instruct and encourage patient and family to use good hand hygiene technique  - Identify and instruct in appropriate isolation precautions for identified infection/condition  Outcome: Progressing  Goal: Absence of fever/infection during neutropenic period  Description: INTERVENTIONS:  - Monitor WBC    Outcome: Progressing     Problem: SAFETY ADULT  Goal: Patient will remain free of falls  Description: INTERVENTIONS:  - Educate patient/family on patient safety including physical limitations  - Instruct patient to call for assistance with activity   - Consult OT/PT to assist with strengthening/mobility   - Keep Call bell within reach  - Keep bed low and locked with side rails adjusted as appropriate  - Keep care items and personal belongings within reach  - Initiate and maintain comfort rounds  - Make Fall Risk Sign visible to staff  - Offer Toileting every 2 Hours, in advance of need  - Initiate/Maintain bed alarm  - Obtain necessary fall risk management equipment: alarm  - Apply yellow socks and bracelet for high fall risk patients  - Consider moving patient to room near nurses station  Outcome: Progressing     Problem: DISCHARGE PLANNING  Goal: Discharge to home or other facility with appropriate resources  Description: INTERVENTIONS:  - Identify barriers to discharge w/patient and caregiver  - Arrange for needed discharge resources and transportation as appropriate  - Identify discharge learning needs (meds, wound care, etc.)  - Arrange for interpretive services to assist at discharge as needed  - Refer to Case Management Department for coordinating discharge planning if the patient needs post-hospital services based on physician/advanced practitioner order or complex needs related to  functional status, cognitive ability, or social support system  Outcome: Progressing     Problem: Nutrition/Hydration-ADULT  Goal: Nutrient/Hydration intake appropriate for improving, restoring or maintaining nutritional needs  Description: Monitor and assess patient's nutrition/hydration status for malnutrition. Collaborate with interdisciplinary team and initiate plan and interventions as ordered.  Monitor patient's weight and dietary intake as ordered or per policy. Utilize nutrition screening tool and intervene as necessary. Determine patient's food preferences and provide high-protein, high-caloric foods as appropriate.     INTERVENTIONS:  - Monitor oral intake, urinary output, labs, and treatment plans  - Assess nutrition and hydration status and recommend course of action  - Evaluate amount of meals eaten  - Assist patient with eating if necessary   - Allow adequate time for meals  - Recommend/ encourage appropriate diets, oral nutritional supplements, and vitamin/mineral supplements  - Order, calculate, and assess calorie counts as needed  - Recommend, monitor, and adjust tube feedings and TPN/PPN based on assessed needs  - Assess need for intravenous fluids  - Provide specific nutrition/hydration education as appropriate  - Include patient/family/caregiver in decisions related to nutrition  Outcome: Progressing

## 2024-09-02 NOTE — ASSESSMENT & PLAN NOTE
Likely secondary to sepsis/UTI vs hospital delirium.   CT head is negative for acute process.  Mental status continues to wax and wane per family.  Continue neuro checks

## 2024-09-02 NOTE — ASSESSMENT & PLAN NOTE
Lab Results   Component Value Date    HGBA1C 14.6 (H) 08/26/2024     Recent Labs     09/01/24  1144 09/01/24  1633 09/01/24 2043 09/02/24  0743   POCGLU 239* 394* 341* 143*     Blood Sugar Average: Last 72 hrs:  (P) [id number]    Resume Lantus with SSI regimen

## 2024-09-03 LAB
ANION GAP SERPL CALCULATED.3IONS-SCNC: 5 MMOL/L (ref 4–13)
BACTERIA BLD CULT: NORMAL
BACTERIA BLD CULT: NORMAL
BASOPHILS # BLD AUTO: 0.01 THOUSANDS/ÂΜL (ref 0–0.1)
BASOPHILS NFR BLD AUTO: 0 % (ref 0–1)
BUN SERPL-MCNC: 22 MG/DL (ref 5–25)
CALCIUM SERPL-MCNC: 7.1 MG/DL (ref 8.4–10.2)
CHLORIDE SERPL-SCNC: 109 MMOL/L (ref 96–108)
CO2 SERPL-SCNC: 24 MMOL/L (ref 21–32)
CREAT SERPL-MCNC: 1.53 MG/DL (ref 0.6–1.3)
EOSINOPHIL # BLD AUTO: 0.06 THOUSAND/ÂΜL (ref 0–0.61)
EOSINOPHIL NFR BLD AUTO: 1 % (ref 0–6)
ERYTHROCYTE [DISTWIDTH] IN BLOOD BY AUTOMATED COUNT: 17.6 % (ref 11.6–15.1)
GFR SERPL CREATININE-BSD FRML MDRD: 42 ML/MIN/1.73SQ M
GLUCOSE SERPL-MCNC: 195 MG/DL (ref 65–140)
GLUCOSE SERPL-MCNC: 219 MG/DL (ref 65–140)
GLUCOSE SERPL-MCNC: 232 MG/DL (ref 65–140)
GLUCOSE SERPL-MCNC: 234 MG/DL (ref 65–140)
GLUCOSE SERPL-MCNC: 294 MG/DL (ref 65–140)
HCT VFR BLD AUTO: 23 % (ref 36.5–49.3)
HGB BLD-MCNC: 7 G/DL (ref 12–17)
IMM GRANULOCYTES # BLD AUTO: 0.08 THOUSAND/UL (ref 0–0.2)
IMM GRANULOCYTES NFR BLD AUTO: 1 % (ref 0–2)
LYMPHOCYTES # BLD AUTO: 0.72 THOUSANDS/ÂΜL (ref 0.6–4.47)
LYMPHOCYTES NFR BLD AUTO: 11 % (ref 14–44)
MAGNESIUM SERPL-MCNC: 2.2 MG/DL (ref 1.9–2.7)
MCH RBC QN AUTO: 25.3 PG (ref 26.8–34.3)
MCHC RBC AUTO-ENTMCNC: 30.4 G/DL (ref 31.4–37.4)
MCV RBC AUTO: 83 FL (ref 82–98)
MONOCYTES # BLD AUTO: 0.59 THOUSAND/ÂΜL (ref 0.17–1.22)
MONOCYTES NFR BLD AUTO: 9 % (ref 4–12)
NEUTROPHILS # BLD AUTO: 5.15 THOUSANDS/ÂΜL (ref 1.85–7.62)
NEUTS SEG NFR BLD AUTO: 78 % (ref 43–75)
NRBC BLD AUTO-RTO: 0 /100 WBCS
PLATELET # BLD AUTO: 280 THOUSANDS/UL (ref 149–390)
PMV BLD AUTO: 9.6 FL (ref 8.9–12.7)
POTASSIUM SERPL-SCNC: 4.3 MMOL/L (ref 3.5–5.3)
RBC # BLD AUTO: 2.77 MILLION/UL (ref 3.88–5.62)
SODIUM SERPL-SCNC: 138 MMOL/L (ref 135–147)
WBC # BLD AUTO: 6.61 THOUSAND/UL (ref 4.31–10.16)

## 2024-09-03 PROCEDURE — 82948 REAGENT STRIP/BLOOD GLUCOSE: CPT

## 2024-09-03 PROCEDURE — 99232 SBSQ HOSP IP/OBS MODERATE 35: CPT

## 2024-09-03 PROCEDURE — 85025 COMPLETE CBC W/AUTO DIFF WBC: CPT | Performed by: STUDENT IN AN ORGANIZED HEALTH CARE EDUCATION/TRAINING PROGRAM

## 2024-09-03 PROCEDURE — 99233 SBSQ HOSP IP/OBS HIGH 50: CPT | Performed by: STUDENT IN AN ORGANIZED HEALTH CARE EDUCATION/TRAINING PROGRAM

## 2024-09-03 PROCEDURE — 83735 ASSAY OF MAGNESIUM: CPT | Performed by: STUDENT IN AN ORGANIZED HEALTH CARE EDUCATION/TRAINING PROGRAM

## 2024-09-03 PROCEDURE — 80048 BASIC METABOLIC PNL TOTAL CA: CPT | Performed by: STUDENT IN AN ORGANIZED HEALTH CARE EDUCATION/TRAINING PROGRAM

## 2024-09-03 RX ORDER — FUROSEMIDE 10 MG/ML
20 INJECTION INTRAMUSCULAR; INTRAVENOUS ONCE
Status: COMPLETED | OUTPATIENT
Start: 2024-09-03 | End: 2024-09-03

## 2024-09-03 RX ADMIN — HEPARIN SODIUM 5000 UNITS: 5000 INJECTION, SOLUTION INTRAVENOUS; SUBCUTANEOUS at 05:18

## 2024-09-03 RX ADMIN — CARBIDOPA AND LEVODOPA 1 TABLET: 25; 100 TABLET ORAL at 17:12

## 2024-09-03 RX ADMIN — INSULIN LISPRO 4 UNITS: 100 INJECTION, SOLUTION INTRAVENOUS; SUBCUTANEOUS at 22:55

## 2024-09-03 RX ADMIN — INSULIN LISPRO 6 UNITS: 100 INJECTION, SOLUTION INTRAVENOUS; SUBCUTANEOUS at 11:37

## 2024-09-03 RX ADMIN — INSULIN GLARGINE 10 UNITS: 100 INJECTION, SOLUTION SUBCUTANEOUS at 22:55

## 2024-09-03 RX ADMIN — FERROUS SULFATE TAB 325 MG (65 MG ELEMENTAL FE) 325 MG: 325 (65 FE) TAB at 08:44

## 2024-09-03 RX ADMIN — INSULIN LISPRO 2 UNITS: 100 INJECTION, SOLUTION INTRAVENOUS; SUBCUTANEOUS at 16:39

## 2024-09-03 RX ADMIN — HEPARIN SODIUM 5000 UNITS: 5000 INJECTION, SOLUTION INTRAVENOUS; SUBCUTANEOUS at 14:03

## 2024-09-03 RX ADMIN — MUPIROCIN 1 APPLICATION: 20 OINTMENT TOPICAL at 09:00

## 2024-09-03 RX ADMIN — FUROSEMIDE 20 MG: 10 INJECTION, SOLUTION INTRAMUSCULAR; INTRAVENOUS at 10:12

## 2024-09-03 RX ADMIN — CEFTRIAXONE 2000 MG: 2 INJECTION, SOLUTION INTRAVENOUS at 16:39

## 2024-09-03 RX ADMIN — LEVOTHYROXINE SODIUM 25 MCG: 25 TABLET ORAL at 05:18

## 2024-09-03 RX ADMIN — CARBIDOPA AND LEVODOPA 1 TABLET: 25; 100 TABLET ORAL at 08:44

## 2024-09-03 RX ADMIN — PANTOPRAZOLE SODIUM 40 MG: 40 TABLET, DELAYED RELEASE ORAL at 05:18

## 2024-09-03 RX ADMIN — INSULIN LISPRO 4 UNITS: 100 INJECTION, SOLUTION INTRAVENOUS; SUBCUTANEOUS at 08:43

## 2024-09-03 NOTE — PROGRESS NOTES
"Intended introductory visit with pt \"Chris\" who is resting comfortably at this time. [place] offered outside of pt room. Available to follow upon request.     Thank you!        09/03/24 1300   Clinical Encounter Type   Visited With Patient not available   Routine Visit Introduction       "

## 2024-09-03 NOTE — PROGRESS NOTES
Progress Note - Infectious Disease   Chris Bojorquez 79 y.o. male MRN: [id number]  Unit/Bed#: [address] [place] [address] Encounter: [id number]      Impression/Plan:    1.  Severe sepsis.  Fever, tachycardia, bandemia, hypotension initially requiring vasopressor therapy.  Source is most likely UTI, complicated by bacteremia.  Patient is clinically improved and is now off vasopressors.  White blood cell count normalized.  Temperature overall improved.  -Continue antibiotics as below  -Monitor fever curve  -Monitor CBC     2.  Proteus mirabilis bacteremia.  Source is most likely UTI.  Patient is clinically improved.  -New IV ceftriaxone     3.  Emphysematous pyelitis.  Urine and blood cultures are growing Proteus mirabilis.  Status post cystoscopy, clearance of bladder stone debris, right ureteral stent placement .  Patient clinically improving  Continue IV ceftriaxone for now  -At discharge transition to p.o. cefpodoxime 400mg q12hr  -Treat x 10 days post cystoscopy through 2024     4.  PABLO.  Peak creatinine 2.15, improved and remains around 1.53.  Likely due to infection, obstruction.  Prior creatinine 0.71  -Continue to monitor creatinine     5.  DM, poorly controlled, with hyperglycemia and highly elevated hemoglobin A1c of 14.6.  This is risk factor for infections above.  -Management per primary service     6.  Parkinson's disease.    Above management plan to continue IV ceftriaxone discussed with Dr. Cross who is in agreement.  Discussed with the patient at bedside.  ID will follow.    Antibiotics:  Ceftriaxone  Antibiotics day 9    Subjective:  The patient appears lethargic but does answer some questions.  He denies any pain.  No abdominal pain.  Rivera catheter remains in place.  Tmax 100.2, no chills.    Objective:  Vitals:  Temp:  [100.2 °F (37.9 °C)] 100.2 °F (37.9 °C)  HR:  [77] 77  BP: (121-122)/(57-58) 122/58  SpO2:  [91 %-93 %] 93 %  Temp (24hrs), Av.2 °F (37.9 °C), Min:100.2 °F (37.9 °C), Max:100.2  °F (37.9 °C)  Current: Temperature: 100.2 °F (37.9 °C)    Physical Exam:   General Appearance:  Lethargic and keeps eyes closed but answers some questions   Throat: Oropharynx moist without lesions.    Lungs:   Clear to auscultation bilaterally; no wheezes, rhonchi or rales; respirations unlabored   Heart:  RRR; no murmur, rub or gallop   Abdomen/:   Soft, non-tender, non-distended, positive bowel sounds.  Rivera catheter in place with yellow urine   Extremities: No clubbing, cyanosis or edema   Skin: No new rashes or lesions.        Labs:   All pertinent labs and imaging studies were personally reviewed  Results from last 7 days   Lab Units 09/03/24 0530 09/02/24 0439 09/01/24 0526   WBC Thousand/uL 6.61 5.54 4.66   HEMOGLOBIN g/dL 7.0* 7.5* 7.7*   PLATELETS Thousands/uL 280 256 214     Results from last 7 days   Lab Units 09/03/24 0530 09/02/24 0439 09/01/24 0526 08/31/24 0513 08/30/24 0558 08/29/24 2001 08/29/24  0350   SODIUM mmol/L 138 139 137   < > 134*   < > 135   POTASSIUM mmol/L 4.3 3.9 3.8   < > 4.0   < > 4.0   CHLORIDE mmol/L 109* 110* 109*   < > 106   < > 107   CO2 mmol/L 24 25 23   < > 18*   < > 18*   BUN mg/dL 22 22 25   < > 32*   < > 39*   CREATININE mg/dL 1.53* 1.53* 1.56*   < > 1.73*   < > 2.15*   EGFR ml/min/1.73sq m 42 42 41   < > 36   < > 28   CALCIUM mg/dL 7.1* 7.3* 7.2*   < > 7.6*   < > 6.8*   AST U/L  --   --   --   --  14  --  27   ALT U/L  --   --   --   --  4*  --  3*   ALK PHOS U/L  --   --   --   --  130*  --  141*    < > = values in this interval not displayed.     Results from last 7 days   Lab Units 08/30/24 0558 08/29/24 2001   PROCALCITONIN ng/ml 11.85* 13.21*         Results from last 7 days   Lab Units 08/29/24 2001   FERRITIN ng/mL 163           Micro:  Results from last 7 days   Lab Units 08/29/24  0622   BLOOD CULTURE  No Growth After 5 Days.  No Growth After 5 Days.       Imaging:  CT A/P with contrast 8/26-emphysematous pyelitis, bladder calculus with diffuse  bladder wall thickening

## 2024-09-03 NOTE — PROGRESS NOTES
[facility]  Progress Note  Name: Chris Bojorquez I  MRN: [id number]  Unit/Bed#: 2 [address] Date of Admission: 8/26/2024   Date of Service: 9/3/2024 I Hospital Day: 8    Assessment & Plan   Acute metabolic encephalopathy  Assessment & Plan  Likely secondary to sepsis/UTI vs hospital delirium combined with dementia.   CT head is negative for acute process.  Mental status continues to improve since admission.  Continue neurochecks    Emphysematous pyelitis  Assessment & Plan  Pt underwent cytoscopy with right stone extraction and right ureteral stent placement.  Resume IV Ceftriaxone (Day 8/10).  Urology and ID following    Anemia  Assessment & Plan  Possibly related to dilution vs blood loss from procedures/blood draws.  Will follow CBC     Gastroesophageal reflux disease  Assessment & Plan  Continue PPI    Bacteremia  Assessment & Plan  Proteus Bacteremia noted.   Resume IV Ceftriaxone.  ID following     Parkinson disease  Assessment & Plan  Continue Sinemet.  PT/OT evaluation for discharge needs     Hypothyroidism  Assessment & Plan  Continue Levothyroxine    PABLO (acute kidney injury) (HCC)  Assessment & Plan  Possibly multifactorial septic shock/hypotension and obstructive uropathy.  Pt is s/p ureteral stent placement with stone extraction.  Continue IV fluid hydration.  Will follow BMP.  Urology following     Bun/Cr improving   Continue to monitor   Am labs       Uncontrolled type 2 diabetes mellitus with hyperglycemia (HCC)  Assessment & Plan  Lab Results   Component Value Date    HGBA1C 14.6 (H) 08/26/2024     Recent Labs     09/02/24  1601 09/02/24  2104 09/03/24  0745 09/03/24  1124   POCGLU 248* 357* 219* 294*     Blood Sugar Average: Last 72 hrs:  (P) 239.4800109976939033    Resume Lantus with SSI regimen     Moderate protein-calorie malnutrition (HCC)  Assessment & Plan  Malnutrition Findings:   Adult Malnutrition type: Chronic illness  Adult Degree of Malnutrition:  Malnutrition of moderate degree  Consult nutrition services Malnutrition Characteristics: Muscle loss, Fat loss, Weight loss     360 Statement: related to inadequate energy intake as evidenced by 10 % weight loss last 7-8 months, depression of temples, sunken orbital, wasted interosseous. Treatment: Oral diet and nutrition supplements    BMI Findings:      Body mass index is 23.78 kg/m².       Essential hypertension  Assessment & Plan  Norvasc initially held due to labile BP/sepsis.  Will restart once appropriate     * Septic shock (HCC)-resolved as of 8/31/2024  Assessment & Plan  Evidenced by fever, tachycardia, bandemia, and hypotension responsive to IVF  In setting of bacteremia likely due to urinary source due to chronic indwelling Rivera present on admit, evidenced by pyelitis, UA results requiring IV cefepime and transition to ceftriaxone  Bp still on the soft side, Isolyte at 75 cc per hour; will give bolus 250 cc over 1 hour  Lactic acid 0.8  1/2 blood cultures growing proteus   Urine culture growing proteus  ID following  Changed cefepime to ceftriaxone 2 grams IV  and continue vancomycin; MRSA from nares positive for MRSA   Trend cbc and fever curve                 VTE Pharmacologic Prophylaxis: VTE Score: 5 High Risk (Score >/= 5) - Pharmacological DVT Prophylaxis Ordered: heparin. Sequential Compression Devices Ordered.    Mobility:   Basic Mobility Inpatient Raw Score: 7  JH-HLM Goal: 2: Bed activities/Dependent transfer  JH-HLM Achieved: 2: Bed activities/Dependent transfer  JH-HLM Goal achieved. Continue to encourage appropriate mobility.    Patient Centered Rounds: I performed bedside rounds with nursing staff today.   Discussions with Specialists or Other Care Team Provider: infectious disease     Education and Discussions with Family / Patient: Updated  (son) via phone.    Total Time Spent on Date of Encounter in care of patient: 30 mins. This time was spent on one or more of the  following: performing physical exam; counseling and coordination of care; obtaining or reviewing history; documenting in the medical record; reviewing/ordering tests, medications or procedures; communicating with other healthcare professionals and discussing with patient's family/caregivers.    Current Length of Stay: 8 day(s)  Current Patient Status: Inpatient   Certification Statement: The patient will continue to require additional inpatient hospital stay due to PABLO, bacteremia   Discharge Plan: Anticipate discharge in 24-48 hrs to TBD     Code Status: Level 1 - Full Code    Subjective:   Patient was seen today at bedside.  He was sleeping comfortably in bed.  No concerns per nursing from overnight.    Nursing reports that he is tolerating oral diet.  Objective:     Vitals:   Temp (24hrs), Av.2 °F (37.9 °C), Min:100.2 °F (37.9 °C), Max:100.2 °F (37.9 °C)    Temp:  [100.2 °F (37.9 °C)] 100.2 °F (37.9 °C)  HR:  [77] 77  BP: (121-122)/(57-58) 122/58  SpO2:  [91 %-93 %] 93 %  Body mass index is 23.78 kg/m².     Input and Output Summary (last 24 hours):     Intake/Output Summary (Last 24 hours) at 9/3/2024 1201  Last data filed at 9/3/2024 0601  Gross per 24 hour   Intake 240 ml   Output 1275 ml   Net -1035 ml       Physical Exam:   Physical Exam  Vitals reviewed.   Constitutional:       General: He is not in acute distress.     Appearance: Normal appearance.   HENT:      Head: Normocephalic and atraumatic.      Mouth/Throat:      Mouth: Mucous membranes are moist.   Eyes:      Conjunctiva/sclera: Conjunctivae normal.      Pupils: Pupils are equal, round, and reactive to light.   Cardiovascular:      Rate and Rhythm: Normal rate and regular rhythm.      Pulses: Normal pulses.           Carotid pulses are 2+ on the right side and 2+ on the left side.       Radial pulses are 2+ on the right side and 2+ on the left side.        Dorsalis pedis pulses are 2+ on the right side and 2+ on the left side.      Heart  sounds: Normal heart sounds, S1 normal and S2 normal. No murmur heard.  Pulmonary:      Effort: No tachypnea, bradypnea or accessory muscle usage.      Breath sounds: Normal breath sounds and air entry. No decreased breath sounds, wheezing, rhonchi or rales.   Abdominal:      General: Abdomen is flat. Bowel sounds are normal.      Palpations: Abdomen is soft.   Musculoskeletal:      Right lower leg: Edema present.      Left lower leg: Edema present.   Skin:     Capillary Refill: Capillary refill takes less than 2 seconds.   Neurological:      General: No focal deficit present.      Mental Status: He is alert.      =    Additional Data:     Labs:  Results from last 7 days   Lab Units 09/03/24  0530   WBC Thousand/uL 6.61   HEMOGLOBIN g/dL 7.0*   HEMATOCRIT % 23.0*   PLATELETS Thousands/uL 280   SEGS PCT % 78*   LYMPHO PCT % 11*   MONO PCT % 9   EOS PCT % 1     Results from last 7 days   Lab Units 09/03/24  0530 08/31/24  0513 08/30/24  0558   SODIUM mmol/L 138   < > 134*   POTASSIUM mmol/L 4.3   < > 4.0   CHLORIDE mmol/L 109*   < > 106   CO2 mmol/L 24   < > 18*   BUN mg/dL 22   < > 32*   CREATININE mg/dL 1.53*   < > 1.73*   ANION GAP mmol/L 5   < > 10   CALCIUM mg/dL 7.1*   < > 7.6*   ALBUMIN g/dL  --   --  2.4*   TOTAL BILIRUBIN mg/dL  --   --  0.35   ALK PHOS U/L  --   --  130*   ALT U/L  --   --  4*   AST U/L  --   --  14   GLUCOSE RANDOM mg/dL 234*   < > 334*    < > = values in this interval not displayed.         Results from last 7 days   Lab Units 09/03/24  1124 09/03/24  0745 09/02/24  2104 09/02/24  1601 09/02/24  1154 09/02/24  0743 09/01/24  2043 09/01/24  1633 09/01/24  1144 09/01/24  0746 08/31/24  2051 08/31/24  1643   POC GLUCOSE mg/dl 294* 219* 357* 248* 198* 143* 341* 394* 239* 165* 253* 232*         Results from last 7 days   Lab Units 08/30/24  0558 08/29/24 2001 08/29/24  0622   LACTIC ACID mmol/L  --   --  0.5   PROCALCITONIN ng/ml 11.85* 13.21*  --        Lines/Drains:  Invasive Devices        Peripheral Intravenous Line  Duration             Peripheral IV Dorsal (posterior);Right Hand -- days              Drain  Duration             Ureteral Internal Stent Right ureter 6 Fr. 4 days    Urethral Catheter Other (Comment) 20 Fr. 4 days                  Urinary Catheter:  Goal for removal:  per urology                Imaging: No pertinent imaging reviewed.    Recent Cultures (last 7 days):   Results from last 7 days   Lab Units 08/29/24  0622   BLOOD CULTURE  No Growth After 5 Days.  No Growth After 5 Days.       Last 24 Hours Medication List:   Current Facility-Administered Medications   Medication Dose Route Frequency Provider Last Rate    acetaminophen  1,000 mg Intravenous Q6H PRN Jana Kathy Jurado, CRNP 1,000 mg (08/31/24 2131)    carbidopa-levodopa  1 tablet Oral BID Jana Kathy Adrien, CRNP      cefTRIAXone  2,000 mg Intravenous Q24H Jana Kathy Jurado, CRNP 2,000 mg (09/02/24 1747)    ferrous sulfate  325 mg Oral Daily With Breakfast Jana Kathylane Jurado, CRNP      heparin (porcine)  5,000 Units Subcutaneous Q8H [facility] Jana Kathylane Jurado, SHANTENP      insulin glargine  10 Units Subcutaneous HS Jana Kathy Jurado, CRNP      insulin lispro  2-12 Units Subcutaneous TID AC Jana Kathy Jurado, CRNP      insulin lispro  2-12 Units Subcutaneous HS Jana Kathy Jurado, CRNP      levothyroxine  25 mcg Oral Early Morning Jana Kathy Jurado, CRNP      mupirocin   Nasal Q12H [facility] Ward Cross MD      pantoprazole  40 mg Oral Early Morning Jana FRANCES Dupont          Today, Patient Was Seen By: Ward Cross MD    **Please Note: This note may have been constructed using a voice recognition system.**

## 2024-09-03 NOTE — PLAN OF CARE
Problem: PAIN - ADULT  Goal: Verbalizes/displays adequate comfort level or baseline comfort level  Description: Interventions:  - Encourage patient to monitor pain and request assistance  - Assess pain using appropriate pain scale  - Administer analgesics based on type and severity of pain and evaluate response  - Implement non-pharmacological measures as appropriate and evaluate response  - Consider cultural and social influences on pain and pain management  - Notify physician/advanced practitioner if interventions unsuccessful or patient reports new pain  Outcome: Progressing     Problem: MUSCULOSKELETAL - ADULT  Goal: Maintain or return mobility to safest level of function  Description: INTERVENTIONS:  - Assess patient's ability to carry out ADLs; assess patient's baseline for ADL function and identify physical deficits which impact ability to perform ADLs (bathing, care of mouth/teeth, toileting, grooming, dressing, etc.)  - Assess/evaluate cause of self-care deficits   - Assess range of motion  - Assess patient's mobility  - Assess patient's need for assistive devices and provide as appropriate  - Encourage maximum independence but intervene and supervise when necessary  - Involve family in performance of ADLs  - Assess for home care needs following discharge   - Consider OT consult to assist with ADL evaluation and planning for discharge  - Provide patient education as appropriate  Outcome: Progressing     Problem: INFECTION - ADULT  Goal: Absence or prevention of progression during hospitalization  Description: INTERVENTIONS:  - Assess and monitor for signs and symptoms of infection  - Monitor lab/diagnostic results  - Monitor all insertion sites, i.e. indwelling lines, tubes, and drains  - Monitor endotracheal if appropriate and nasal secretions for changes in amount and color  - [place] appropriate cooling/warming therapies per order  - Administer medications as ordered  - Instruct and encourage patient  and family to use good hand hygiene technique  - Identify and instruct in appropriate isolation precautions for identified infection/condition  Outcome: Progressing  Goal: Absence of fever/infection during neutropenic period  Description: INTERVENTIONS:  - Monitor WBC    Outcome: Progressing

## 2024-09-03 NOTE — ASSESSMENT & PLAN NOTE
Possibly multifactorial septic shock/hypotension and obstructive uropathy.  Pt is s/p ureteral stent placement with stone extraction.  Continue IV fluid hydration.  Will follow BMP.  Urology following     Bun/Cr improving   Continue to monitor   Am labs

## 2024-09-03 NOTE — ASSESSMENT & PLAN NOTE
Lab Results   Component Value Date    HGBA1C 14.6 (H) 08/26/2024     Recent Labs     09/02/24  1601 09/02/24  2104 09/03/24  0745 09/03/24  1124   POCGLU 248* 357* 219* 294*     Blood Sugar Average: Last 72 hrs:  (P) [id number]    Resume Lantus with SSI regimen

## 2024-09-03 NOTE — PLAN OF CARE
Problem: Prexisting or High Potential for Compromised Skin Integrity  Goal: Skin integrity is maintained or improved  Description: INTERVENTIONS:  - Identify patients at risk for skin breakdown  - Assess and monitor skin integrity  - Assess and monitor nutrition and hydration status  - Monitor labs   - Assess for incontinence   - Turn and reposition patient  - Assist with mobility/ambulation  - Relieve pressure over bony prominences  - Avoid friction and shearing  - Provide appropriate hygiene as needed including keeping skin clean and dry  - Evaluate need for skin moisturizer/barrier cream  - Collaborate with interdisciplinary team   - Patient/family teaching  - Consider wound care consult   Outcome: Progressing     Problem: GENITOURINARY - ADULT  Goal: Maintains or returns to baseline urinary function  Description: INTERVENTIONS:  - Assess urinary function  - Encourage oral fluids to ensure adequate hydration if ordered  - Administer IV fluids as ordered to ensure adequate hydration  - Administer ordered medications as needed  - Offer frequent toileting  - Follow urinary retention protocol if ordered  Outcome: Progressing  Goal: Urinary catheter remains patent  Description: INTERVENTIONS:  - Assess patency of urinary catheter  - If patient has a chronic yang, consider changing catheter if non-functioning  - Follow guidelines for intermittent irrigation of non-functioning urinary catheter  Outcome: Progressing     Problem: METABOLIC, FLUID AND ELECTROLYTES - ADULT  Goal: Electrolytes maintained within normal limits  Description: INTERVENTIONS:  - Monitor labs and assess patient for signs and symptoms of electrolyte imbalances  - Administer electrolyte replacement as ordered  - Monitor response to electrolyte replacements, including repeat lab results as appropriate  - Instruct patient on fluid and nutrition as appropriate  Outcome: Progressing  Goal: Fluid balance maintained  Description: INTERVENTIONS:  -  Monitor labs   - Monitor I/O and WT  - Instruct patient on fluid and nutrition as appropriate  - Assess for signs & symptoms of volume excess or deficit  Outcome: Progressing  Goal: Glucose maintained within target range  Description: INTERVENTIONS:  - Monitor Blood Glucose as ordered  - Assess for signs and symptoms of hyperglycemia and hypoglycemia  - Administer ordered medications to maintain glucose within target range  - Assess nutritional intake and initiate nutrition service referral as needed  Outcome: Progressing     Problem: SKIN/TISSUE INTEGRITY - ADULT  Goal: Skin Integrity remains intact(Skin Breakdown Prevention)  Description: Assess:  -Perform Sushil assessment every 12  -Clean and moisturize skin every 12  -Inspect skin when repositioning, toileting, and assisting with ADLS    -Assess extremities for adequate circulation and sensation     Bed Management:  -Have minimal linens on bed & keep smooth, unwrinkled  -Change linens as needed when moist or perspiring  -Avoid sitting or lying in one position for more than 2 hours while in bed  -Keep HOB at 30degrees     Toileting:  -Offer bedside commode  -Assess for incontinence every 2  -Use incontinent care products after each incontinent episode such as foam    Activity:  -Mobilize patient 3 times a day  -Encourage activity   -Encourage or provide ROM exercises   -Turn and reposition patient every 2 Hours  -Use appropriate equipment to lift or move patient in bed  -Instruct/ Assist with weight shifting every 45 when out of bed in chair  -Consider limitation of chair time 3 hour intervals    Skin Care:  -Avoid use of baby powder, tape, friction and shearing, hot water or constrictive clothing  -Relieve pressure over bony prominences using foam  -Do not massage red bony areas    Next Steps:  -Teach patient strategies to minimize risks such as repositioning   -Consider consults to  interdisciplinary teams such as PT  Outcome: Progressing     Problem:  HEMATOLOGIC - ADULT  Goal: Maintains hematologic stability  Description: INTERVENTIONS  - Assess for signs and symptoms of bleeding or hemorrhage  - Monitor labs  - Administer supportive blood products/factors as ordered and appropriate  Outcome: Progressing     Problem: MUSCULOSKELETAL - ADULT  Goal: Maintain or return mobility to safest level of function  Description: INTERVENTIONS:  - Assess patient's ability to carry out ADLs; assess patient's baseline for ADL function and identify physical deficits which impact ability to perform ADLs (bathing, care of mouth/teeth, toileting, grooming, dressing, etc.)  - Assess/evaluate cause of self-care deficits   - Assess range of motion  - Assess patient's mobility  - Assess patient's need for assistive devices and provide as appropriate  - Encourage maximum independence but intervene and supervise when necessary  - Involve family in performance of ADLs  - Assess for home care needs following discharge   - Consider OT consult to assist with ADL evaluation and planning for discharge  - Provide patient education as appropriate  Outcome: Progressing     Problem: PAIN - ADULT  Goal: Verbalizes/displays adequate comfort level or baseline comfort level  Description: Interventions:  - Encourage patient to monitor pain and request assistance  - Assess pain using appropriate pain scale  - Administer analgesics based on type and severity of pain and evaluate response  - Implement non-pharmacological measures as appropriate and evaluate response  - Consider cultural and social influences on pain and pain management  - Notify physician/advanced practitioner if interventions unsuccessful or patient reports new pain  Outcome: Progressing     Problem: INFECTION - ADULT  Goal: Absence or prevention of progression during hospitalization  Description: INTERVENTIONS:  - Assess and monitor for signs and symptoms of infection  - Monitor lab/diagnostic results  - Monitor all insertion sites,  i.e. indwelling lines, tubes, and drains  - Monitor endotracheal if appropriate and nasal secretions for changes in amount and color  - [place] appropriate cooling/warming therapies per order  - Administer medications as ordered  - Instruct and encourage patient and family to use good hand hygiene technique  - Identify and instruct in appropriate isolation precautions for identified infection/condition  Outcome: Progressing  Goal: Absence of fever/infection during neutropenic period  Description: INTERVENTIONS:  - Monitor WBC    Outcome: Progressing     Problem: SAFETY ADULT  Goal: Patient will remain free of falls  Description: INTERVENTIONS:  - Educate patient/family on patient safety including physical limitations  - Instruct patient to call for assistance with activity   - Consult OT/PT to assist with strengthening/mobility   - Keep Call bell within reach  - Keep bed low and locked with side rails adjusted as appropriate  - Keep care items and personal belongings within reach  - Initiate and maintain comfort rounds  - Make Fall Risk Sign visible to staff  - Offer Toileting every 2 Hours, in advance of need  - Initiate/Maintain bed alarm  - Obtain necessary fall risk management equipment: alarm  - Apply yellow socks and bracelet for high fall risk patients  - Consider moving patient to room near nurses station  Outcome: Progressing     Problem: DISCHARGE PLANNING  Goal: Discharge to home or other facility with appropriate resources  Description: INTERVENTIONS:  - Identify barriers to discharge w/patient and caregiver  - Arrange for needed discharge resources and transportation as appropriate  - Identify discharge learning needs (meds, wound care, etc.)  - Arrange for interpretive services to assist at discharge as needed  - Refer to Case Management Department for coordinating discharge planning if the patient needs post-hospital services based on physician/advanced practitioner order or complex needs related to  functional status, cognitive ability, or social support system  Outcome: Progressing     Problem: Nutrition/Hydration-ADULT  Goal: Nutrient/Hydration intake appropriate for improving, restoring or maintaining nutritional needs  Description: Monitor and assess patient's nutrition/hydration status for malnutrition. Collaborate with interdisciplinary team and initiate plan and interventions as ordered.  Monitor patient's weight and dietary intake as ordered or per policy. Utilize nutrition screening tool and intervene as necessary. Determine patient's food preferences and provide high-protein, high-caloric foods as appropriate.     INTERVENTIONS:  - Monitor oral intake, urinary output, labs, and treatment plans  - Assess nutrition and hydration status and recommend course of action  - Evaluate amount of meals eaten  - Assist patient with eating if necessary   - Allow adequate time for meals  - Recommend/ encourage appropriate diets, oral nutritional supplements, and vitamin/mineral supplements  - Order, calculate, and assess calorie counts as needed  - Recommend, monitor, and adjust tube feedings and TPN/PPN based on assessed needs  - Assess need for intravenous fluids  - Provide specific nutrition/hydration education as appropriate  - Include patient/family/caregiver in decisions related to nutrition  Outcome: Progressing

## 2024-09-03 NOTE — ASSESSMENT & PLAN NOTE
Likely secondary to sepsis/UTI vs hospital delirium combined with dementia.   CT head is negative for acute process.  Mental status continues to improve since admission.  Continue neurochecks

## 2024-09-03 NOTE — PROGRESS NOTES
"Progress Note - Urology      Patient: Chris Bojorquez   : 1945 Sex: male   MRN: [id number]     CSN: [id number]  Unit/Bed#: [address] [place] [address]     SUBJECTIVE:   Patient seen on morning rounds  No issues at this time      Objective   Vitals: /58   Pulse 77   Temp 100.2 °F (37.9 °C)   Resp 18   Ht 5' 9\" (1.753 m)   Wt 73 kg (161 lb)   SpO2 93%   BMI 23.78 kg/m²     I/O last 24 hours:  In: 440 [P.O.:440]  Out: 1275 [Urine:1275]      Physical Exam:   General Alert awake   Normocephalic atraumatic PERRLA  Lungs clear bilaterally  Cardiac normal S1 normal S2  Abdomen soft, flank pain  Extremities no edema      Lab Results: CBC:   Lab Results   Component Value Date    WBC 6.61 2024    HGB 7.0 (L) 2024    HCT 23.0 (L) 2024    MCV 83 2024     2024    RBC 2.77 (L) 2024    MCH 25.3 (L) 2024    MCHC 30.4 (L) 2024    RDW 17.6 (H) 2024    MPV 9.6 2024    NRBC 0 2024     CMP:   Lab Results   Component Value Date     (H) 2024    CO2 24 2024    BUN 22 2024    CREATININE 1.53 (H) 2024    CALCIUM 7.1 (L) 2024    AST 14 2024    ALT 4 (L) 2024    ALKPHOS 130 (H) 2024    EGFR 42 2024     Urinalysis:   Lab Results   Component Value Date    COLORU Yellow 2024    CLARITYU Slightly Cloudy 2024    SPECGRAV 1.015 2024    PHUR 7.0 2024    LEUKOCYTESUR Large (A) 2024    NITRITE Negative 2024    GLUCOSEU 1000 (1%) (A) 2024    KETONESU 10 (1+) (A) 2024    BILIRUBINUR Negative 2024    BLOODU Large (A) 2024     Urine Culture:   Lab Results   Component Value Date    URINECX 10,000-19,000 cfu/ml Proteus mirabilis (A) 2024    URINECX <10,000 cfu/ml 2024     PSA: No results found for: \"PSA\"      Assessment/ Plan:  Complicated UTI  Status post cystoscopy right stent removal bladder stones day #5  Finish 7-day course of antibiotics as per " infectious disease  Patient to be scheduled for cystoscopy right ureteroscopy stone obstruction few weeks          Ciro Hughes MD

## 2024-09-04 PROBLEM — G93.41 ACUTE METABOLIC ENCEPHALOPATHY: Status: RESOLVED | Noted: 2024-08-26 | Resolved: 2024-09-04

## 2024-09-04 PROBLEM — E87.70 VOLUME OVERLOAD: Status: ACTIVE | Noted: 2024-09-04

## 2024-09-04 PROBLEM — N50.89 SCROTAL SWELLING: Status: ACTIVE | Noted: 2024-09-04

## 2024-09-04 PROBLEM — I87.8 VENOUS STASIS: Status: ACTIVE | Noted: 2024-09-04

## 2024-09-04 LAB
GLUCOSE SERPL-MCNC: 177 MG/DL (ref 65–140)
GLUCOSE SERPL-MCNC: 273 MG/DL (ref 65–140)
GLUCOSE SERPL-MCNC: 277 MG/DL (ref 65–140)
GLUCOSE SERPL-MCNC: 349 MG/DL (ref 65–140)

## 2024-09-04 PROCEDURE — 99222 1ST HOSP IP/OBS MODERATE 55: CPT | Performed by: INTERNAL MEDICINE

## 2024-09-04 PROCEDURE — 99232 SBSQ HOSP IP/OBS MODERATE 35: CPT | Performed by: STUDENT IN AN ORGANIZED HEALTH CARE EDUCATION/TRAINING PROGRAM

## 2024-09-04 PROCEDURE — 99232 SBSQ HOSP IP/OBS MODERATE 35: CPT

## 2024-09-04 PROCEDURE — 82948 REAGENT STRIP/BLOOD GLUCOSE: CPT

## 2024-09-04 RX ORDER — FUROSEMIDE 10 MG/ML
20 INJECTION INTRAMUSCULAR; INTRAVENOUS ONCE
Status: COMPLETED | OUTPATIENT
Start: 2024-09-04 | End: 2024-09-04

## 2024-09-04 RX ORDER — ALBUMIN (HUMAN) 12.5 G/50ML
25 SOLUTION INTRAVENOUS
Status: DISCONTINUED | OUTPATIENT
Start: 2024-09-04 | End: 2024-09-07

## 2024-09-04 RX ORDER — ALBUMIN (HUMAN) 12.5 G/50ML
12.5 SOLUTION INTRAVENOUS ONCE
Status: CANCELLED | OUTPATIENT
Start: 2024-09-05

## 2024-09-04 RX ORDER — CEFPODOXIME PROXETIL 200 MG/1
400 TABLET, FILM COATED ORAL 2 TIMES DAILY WITH MEALS
Status: DISCONTINUED | OUTPATIENT
Start: 2024-09-04 | End: 2024-09-09

## 2024-09-04 RX ORDER — FUROSEMIDE 10 MG/ML
20 INJECTION INTRAMUSCULAR; INTRAVENOUS
Status: DISCONTINUED | OUTPATIENT
Start: 2024-09-04 | End: 2024-09-07

## 2024-09-04 RX ORDER — INSULIN GLARGINE 100 [IU]/ML
12 INJECTION, SOLUTION SUBCUTANEOUS
Status: DISCONTINUED | OUTPATIENT
Start: 2024-09-04 | End: 2024-09-04

## 2024-09-04 RX ORDER — FUROSEMIDE 10 MG/ML
40 INJECTION INTRAMUSCULAR; INTRAVENOUS ONCE
Status: CANCELLED | OUTPATIENT
Start: 2024-09-05

## 2024-09-04 RX ORDER — INSULIN GLARGINE 100 [IU]/ML
12 INJECTION, SOLUTION SUBCUTANEOUS
Status: DISCONTINUED | OUTPATIENT
Start: 2024-09-04 | End: 2024-09-06

## 2024-09-04 RX ADMIN — INSULIN LISPRO 8 UNITS: 100 INJECTION, SOLUTION INTRAVENOUS; SUBCUTANEOUS at 17:07

## 2024-09-04 RX ADMIN — HEPARIN SODIUM 5000 UNITS: 5000 INJECTION, SOLUTION INTRAVENOUS; SUBCUTANEOUS at 15:35

## 2024-09-04 RX ADMIN — FUROSEMIDE 20 MG: 10 INJECTION, SOLUTION INTRAMUSCULAR; INTRAVENOUS at 09:35

## 2024-09-04 RX ADMIN — INSULIN LISPRO 6 UNITS: 100 INJECTION, SOLUTION INTRAVENOUS; SUBCUTANEOUS at 21:06

## 2024-09-04 RX ADMIN — CEFPODOXIME PROXETIL 400 MG: 200 TABLET, FILM COATED ORAL at 17:13

## 2024-09-04 RX ADMIN — INSULIN LISPRO 2 UNITS: 100 INJECTION, SOLUTION INTRAVENOUS; SUBCUTANEOUS at 09:50

## 2024-09-04 RX ADMIN — HEPARIN SODIUM 5000 UNITS: 5000 INJECTION, SOLUTION INTRAVENOUS; SUBCUTANEOUS at 21:06

## 2024-09-04 RX ADMIN — MUPIROCIN 1 APPLICATION: 20 OINTMENT TOPICAL at 20:51

## 2024-09-04 RX ADMIN — CARBIDOPA AND LEVODOPA 1 TABLET: 25; 100 TABLET ORAL at 09:30

## 2024-09-04 RX ADMIN — PANTOPRAZOLE SODIUM 40 MG: 40 TABLET, DELAYED RELEASE ORAL at 05:20

## 2024-09-04 RX ADMIN — INSULIN GLARGINE 12 UNITS: 100 INJECTION, SOLUTION SUBCUTANEOUS at 20:51

## 2024-09-04 RX ADMIN — CARBIDOPA AND LEVODOPA 1 TABLET: 25; 100 TABLET ORAL at 18:32

## 2024-09-04 RX ADMIN — LEVOTHYROXINE SODIUM 25 MCG: 25 TABLET ORAL at 05:20

## 2024-09-04 RX ADMIN — FERROUS SULFATE TAB 325 MG (65 MG ELEMENTAL FE) 325 MG: 325 (65 FE) TAB at 09:34

## 2024-09-04 RX ADMIN — FUROSEMIDE 20 MG: 10 INJECTION, SOLUTION INTRAMUSCULAR; INTRAVENOUS at 17:10

## 2024-09-04 RX ADMIN — ALBUMIN (HUMAN) 25 G: 0.25 INJECTION, SOLUTION INTRAVENOUS at 15:45

## 2024-09-04 RX ADMIN — MUPIROCIN 1 APPLICATION: 20 OINTMENT TOPICAL at 10:54

## 2024-09-04 RX ADMIN — CEFPODOXIME PROXETIL 400 MG: 200 TABLET, FILM COATED ORAL at 09:30

## 2024-09-04 NOTE — ASSESSMENT & PLAN NOTE
Noted bilaterally   Mostly secondary to IVF since admission      -d/c IVF on 9/3   -patient is tolerating oral diet   -duiresis with caution   -monitor I/Os

## 2024-09-04 NOTE — ASSESSMENT & PLAN NOTE
Most probably secondary to fluid overload from IVFs that were received during the hospitalization.     -s/p lasix 20 mg x 1 9/3 with good net output    Trial lasix 20 mg x 1 today  Monitor I/Os   Case discussed with urology; dr kaiser will evaluate at bedside.

## 2024-09-04 NOTE — ASSESSMENT & PLAN NOTE
Malnutrition Findings:   Adult Malnutrition type: Chronic illness  Adult Degree of Malnutrition: Malnutrition of moderate degree  Consult nutrition services Malnutrition Characteristics: Muscle loss, Fat loss, Weight loss     360 Statement: related to inadequate energy intake as evidenced by 10 % weight loss last 7-8 months, depression of temples, sunken orbital, wasted interosseous. Treatment: Oral diet and nutrition supplements    BMI Findings:      Body mass index is 26.05 kg/m².

## 2024-09-04 NOTE — PROGRESS NOTES
"Progress Note - Urology      Patient: Chris Bojorquez   : 1945 Sex: male   MRN: [id number]     CSN: [id number]  Unit/Bed#: [address] [place] [address]     SUBJECTIVE:   Patient seen on afternoon rounds  Emphysematous pyelitis  Status post cystoscopy right stent placement bladder stone removal day #7  Appropriate antibiotics cefpodoxine      Objective   Vitals: /62   Pulse 69   Temp 97.7 °F (36.5 °C)   Resp 20   Ht 5' 9\" (1.753 m)   Wt 80 kg (176 lb 5.9 oz)   SpO2 96%   BMI 26.05 kg/m²     I/O last 24 hours:  In: -   Out:  [Urine:222]      Physical Exam:   General Alert awake   Normocephalic atraumatic PERRLA  Lungs clear bilaterally  Cardiac normal S1 normal S2  Abdomen soft, flank pain  Extremities no edema      Lab Results: CBC:   Lab Results   Component Value Date    WBC 6.61 2024    HGB 7.0 (L) 2024    HCT 23.0 (L) 2024    MCV 83 2024     2024    RBC 2.77 (L) 2024    MCH 25.3 (L) 2024    MCHC 30.4 (L) 2024    RDW 17.6 (H) 2024    MPV 9.6 2024    NRBC 0 2024     CMP:   Lab Results   Component Value Date     (H) 2024    CO2 24 2024    BUN 22 2024    CREATININE 1.53 (H) 2024    CALCIUM 7.1 (L) 2024    AST 14 2024    ALT 4 (L) 2024    ALKPHOS 130 (H) 2024    EGFR 42 2024     Urinalysis:   Lab Results   Component Value Date    COLORU Yellow 2024    CLARITYU Slightly Cloudy 2024    SPECGRAV 1.015 2024    PHUR 7.0 2024    LEUKOCYTESUR Large (A) 2024    NITRITE Negative 2024    GLUCOSEU 1000 (1%) (A) 2024    KETONESU 10 (1+) (A) 2024    BILIRUBINUR Negative 2024    BLOODU Large (A) 2024     Urine Culture:   Lab Results   Component Value Date    URINECX 10,000-19,000 cfu/ml Proteus mirabilis (A) 2024    URINECX <10,000 cfu/ml 2024     PSA: No results found for: \"PSA\"      Assessment/ Love  Emphysematous " cystitis  Emphysematous right pyelitis  Right obstructing renal stones  Status post cystoscopy right stent removal bladder stones day #7  Continue to biotics as per ID  We will schedule cystoscopy right ureteroscopy stone extraction in 3 weeks          Ciro Hughes MD

## 2024-09-04 NOTE — ASSESSMENT & PLAN NOTE
Lab Results   Component Value Date    HGBA1C 14.6 (H) 08/26/2024     Recent Labs     09/03/24  1555 09/03/24  2045 09/04/24  0746 09/04/24  1148   POCGLU 195* 232* 177* 273*     Blood Sugar Average: Last 72 hrs:  (P) [id number]    Resume Lantus 12 U Qhs   SSI regimen

## 2024-09-04 NOTE — PLAN OF CARE
Problem: Prexisting or High Potential for Compromised Skin Integrity  Goal: Skin integrity is maintained or improved  Description: INTERVENTIONS:  - Identify patients at risk for skin breakdown  - Assess and monitor skin integrity  - Assess and monitor nutrition and hydration status  - Monitor labs   - Assess for incontinence   - Turn and reposition patient  - Assist with mobility/ambulation  - Relieve pressure over bony prominences  - Avoid friction and shearing  - Provide appropriate hygiene as needed including keeping skin clean and dry  - Evaluate need for skin moisturizer/barrier cream  - Collaborate with interdisciplinary team   - Patient/family teaching  - Consider wound care consult   Outcome: Progressing     Problem: GENITOURINARY - ADULT  Goal: Maintains or returns to baseline urinary function  Description: INTERVENTIONS:  - Assess urinary function  - Encourage oral fluids to ensure adequate hydration if ordered  - Administer IV fluids as ordered to ensure adequate hydration  - Administer ordered medications as needed  - Offer frequent toileting  - Follow urinary retention protocol if ordered  Outcome: Progressing  Goal: Urinary catheter remains patent  Description: INTERVENTIONS:  - Assess patency of urinary catheter  - If patient has a chronic yang, consider changing catheter if non-functioning  - Follow guidelines for intermittent irrigation of non-functioning urinary catheter  Outcome: Progressing     Problem: METABOLIC, FLUID AND ELECTROLYTES - ADULT  Goal: Electrolytes maintained within normal limits  Description: INTERVENTIONS:  - Monitor labs and assess patient for signs and symptoms of electrolyte imbalances  - Administer electrolyte replacement as ordered  - Monitor response to electrolyte replacements, including repeat lab results as appropriate  - Instruct patient on fluid and nutrition as appropriate  Outcome: Progressing  Goal: Fluid balance maintained  Description: INTERVENTIONS:  -  Monitor labs   - Monitor I/O and WT  - Instruct patient on fluid and nutrition as appropriate  - Assess for signs & symptoms of volume excess or deficit  Outcome: Progressing  Goal: Glucose maintained within target range  Description: INTERVENTIONS:  - Monitor Blood Glucose as ordered  - Assess for signs and symptoms of hyperglycemia and hypoglycemia  - Administer ordered medications to maintain glucose within target range  - Assess nutritional intake and initiate nutrition service referral as needed  Outcome: Progressing     Problem: SKIN/TISSUE INTEGRITY - ADULT  Goal: Skin Integrity remains intact(Skin Breakdown Prevention)  Description: Assess:  -Perform Sushil assessment every 12  -Clean and moisturize skin every 12  -Inspect skin when repositioning, toileting, and assisting with ADLS    -Assess extremities for adequate circulation and sensation     Bed Management:  -Have minimal linens on bed & keep smooth, unwrinkled  -Change linens as needed when moist or perspiring  -Avoid sitting or lying in one position for more than 2 hours while in bed  -Keep HOB at 30degrees     Toileting:  -Offer bedside commode  -Assess for incontinence every 2  -Use incontinent care products after each incontinent episode such as foam    Activity:  -Mobilize patient 3 times a day  -Encourage activity   -Encourage or provide ROM exercises   -Turn and reposition patient every 2 Hours  -Use appropriate equipment to lift or move patient in bed  -Instruct/ Assist with weight shifting every 45 when out of bed in chair  -Consider limitation of chair time 3 hour intervals    Skin Care:  -Avoid use of baby powder, tape, friction and shearing, hot water or constrictive clothing  -Relieve pressure over bony prominences using foam  -Do not massage red bony areas    Next Steps:  -Teach patient strategies to minimize risks such as repositioning   -Consider consults to  interdisciplinary teams such as PT  Outcome: Progressing     Problem:  HEMATOLOGIC - ADULT  Goal: Maintains hematologic stability  Description: INTERVENTIONS  - Assess for signs and symptoms of bleeding or hemorrhage  - Monitor labs  - Administer supportive blood products/factors as ordered and appropriate  Outcome: Progressing     Problem: MUSCULOSKELETAL - ADULT  Goal: Maintain or return mobility to safest level of function  Description: INTERVENTIONS:  - Assess patient's ability to carry out ADLs; assess patient's baseline for ADL function and identify physical deficits which impact ability to perform ADLs (bathing, care of mouth/teeth, toileting, grooming, dressing, etc.)  - Assess/evaluate cause of self-care deficits   - Assess range of motion  - Assess patient's mobility  - Assess patient's need for assistive devices and provide as appropriate  - Encourage maximum independence but intervene and supervise when necessary  - Involve family in performance of ADLs  - Assess for home care needs following discharge   - Consider OT consult to assist with ADL evaluation and planning for discharge  - Provide patient education as appropriate  Outcome: Progressing     Problem: PAIN - ADULT  Goal: Verbalizes/displays adequate comfort level or baseline comfort level  Description: Interventions:  - Encourage patient to monitor pain and request assistance  - Assess pain using appropriate pain scale  - Administer analgesics based on type and severity of pain and evaluate response  - Implement non-pharmacological measures as appropriate and evaluate response  - Consider cultural and social influences on pain and pain management  - Notify physician/advanced practitioner if interventions unsuccessful or patient reports new pain  Outcome: Progressing     Problem: INFECTION - ADULT  Goal: Absence or prevention of progression during hospitalization  Description: INTERVENTIONS:  - Assess and monitor for signs and symptoms of infection  - Monitor lab/diagnostic results  - Monitor all insertion sites,  i.e. indwelling lines, tubes, and drains  - Monitor endotracheal if appropriate and nasal secretions for changes in amount and color  - [place] appropriate cooling/warming therapies per order  - Administer medications as ordered  - Instruct and encourage patient and family to use good hand hygiene technique  - Identify and instruct in appropriate isolation precautions for identified infection/condition  Outcome: Progressing  Goal: Absence of fever/infection during neutropenic period  Description: INTERVENTIONS:  - Monitor WBC    Outcome: Progressing     Problem: SAFETY ADULT  Goal: Patient will remain free of falls  Description: INTERVENTIONS:  - Educate patient/family on patient safety including physical limitations  - Instruct patient to call for assistance with activity   - Consult OT/PT to assist with strengthening/mobility   - Keep Call bell within reach  - Keep bed low and locked with side rails adjusted as appropriate  - Keep care items and personal belongings within reach  - Initiate and maintain comfort rounds  - Make Fall Risk Sign visible to staff  - Offer Toileting every 2 Hours, in advance of need  - Initiate/Maintain bed alarm  - Obtain necessary fall risk management equipment: alarm  - Apply yellow socks and bracelet for high fall risk patients  - Consider moving patient to room near nurses station  Outcome: Progressing     Problem: DISCHARGE PLANNING  Goal: Discharge to home or other facility with appropriate resources  Description: INTERVENTIONS:  - Identify barriers to discharge w/patient and caregiver  - Arrange for needed discharge resources and transportation as appropriate  - Identify discharge learning needs (meds, wound care, etc.)  - Arrange for interpretive services to assist at discharge as needed  - Refer to Case Management Department for coordinating discharge planning if the patient needs post-hospital services based on physician/advanced practitioner order or complex needs related to  functional status, cognitive ability, or social support system  Outcome: Progressing     Problem: Nutrition/Hydration-ADULT  Goal: Nutrient/Hydration intake appropriate for improving, restoring or maintaining nutritional needs  Description: Monitor and assess patient's nutrition/hydration status for malnutrition. Collaborate with interdisciplinary team and initiate plan and interventions as ordered.  Monitor patient's weight and dietary intake as ordered or per policy. Utilize nutrition screening tool and intervene as necessary. Determine patient's food preferences and provide high-protein, high-caloric foods as appropriate.     INTERVENTIONS:  - Monitor oral intake, urinary output, labs, and treatment plans  - Assess nutrition and hydration status and recommend course of action  - Evaluate amount of meals eaten  - Assist patient with eating if necessary   - Allow adequate time for meals  - Recommend/ encourage appropriate diets, oral nutritional supplements, and vitamin/mineral supplements  - Order, calculate, and assess calorie counts as needed  - Recommend, monitor, and adjust tube feedings and TPN/PPN based on assessed needs  - Assess need for intravenous fluids  - Provide specific nutrition/hydration education as appropriate  - Include patient/family/caregiver in decisions related to nutrition  Outcome: Progressing

## 2024-09-04 NOTE — ASSESSMENT & PLAN NOTE
Lab Results   Component Value Date    HGBA1C 14.6 (H) 08/26/2024     Recent Labs     09/03/24  1555 09/03/24  2045 09/04/24  0746 09/04/24  1148   POCGLU 195* 232* 177* 273*     Blood Sugar Average: Last 72 hrs:  (P) [id number]    Resume Lantus with SSI regimen

## 2024-09-04 NOTE — ASSESSMENT & PLAN NOTE
Pt underwent cytoscopy with right stone extraction and right ureteral stent placement.  Resume IV Ceftriaxone (Day 8/10).  Urology and ID following      Status post cystoscopy right stent removal bladder stones day #6 per urology   Foleys catheter in place . Pending further recommendations from urology at this time.   Patient completed 7 days course ceftriaxone   Cefpodoxime Day#2     Am labs

## 2024-09-04 NOTE — PLAN OF CARE
Problem: Prexisting or High Potential for Compromised Skin Integrity  Goal: Skin integrity is maintained or improved  Description: INTERVENTIONS:  - Identify patients at risk for skin breakdown  - Assess and monitor skin integrity  - Assess and monitor nutrition and hydration status  - Monitor labs   - Assess for incontinence   - Turn and reposition patient  - Assist with mobility/ambulation  - Relieve pressure over bony prominences  - Avoid friction and shearing  - Provide appropriate hygiene as needed including keeping skin clean and dry  - Evaluate need for skin moisturizer/barrier cream  - Collaborate with interdisciplinary team   - Patient/family teaching  - Consider wound care consult   Outcome: Progressing     Problem: GENITOURINARY - ADULT  Goal: Maintains or returns to baseline urinary function  Description: INTERVENTIONS:  - Assess urinary function  - Encourage oral fluids to ensure adequate hydration if ordered  - Administer IV fluids as ordered to ensure adequate hydration  - Administer ordered medications as needed  - Offer frequent toileting  - Follow urinary retention protocol if ordered  Outcome: Progressing  Goal: Urinary catheter remains patent  Description: INTERVENTIONS:  - Assess patency of urinary catheter  - If patient has a chronic yang, consider changing catheter if non-functioning  - Follow guidelines for intermittent irrigation of non-functioning urinary catheter  Outcome: Progressing     Problem: METABOLIC, FLUID AND ELECTROLYTES - ADULT  Goal: Electrolytes maintained within normal limits  Description: INTERVENTIONS:  - Monitor labs and assess patient for signs and symptoms of electrolyte imbalances  - Administer electrolyte replacement as ordered  - Monitor response to electrolyte replacements, including repeat lab results as appropriate  - Instruct patient on fluid and nutrition as appropriate  Outcome: Progressing  Goal: Fluid balance maintained  Description: INTERVENTIONS:  -  Monitor labs   - Monitor I/O and WT  - Instruct patient on fluid and nutrition as appropriate  - Assess for signs & symptoms of volume excess or deficit  Outcome: Progressing  Goal: Glucose maintained within target range  Description: INTERVENTIONS:  - Monitor Blood Glucose as ordered  - Assess for signs and symptoms of hyperglycemia and hypoglycemia  - Administer ordered medications to maintain glucose within target range  - Assess nutritional intake and initiate nutrition service referral as needed  Outcome: Progressing     Problem: SKIN/TISSUE INTEGRITY - ADULT  Goal: Skin Integrity remains intact(Skin Breakdown Prevention)  Description: Assess:  -Perform Sushil assessment every 12  -Clean and moisturize skin every 12  -Inspect skin when repositioning, toileting, and assisting with ADLS    -Assess extremities for adequate circulation and sensation     Bed Management:  -Have minimal linens on bed & keep smooth, unwrinkled  -Change linens as needed when moist or perspiring  -Avoid sitting or lying in one position for more than 2 hours while in bed  -Keep HOB at 30degrees     Toileting:  -Offer bedside commode  -Assess for incontinence every 2  -Use incontinent care products after each incontinent episode such as foam    Activity:  -Mobilize patient 3 times a day  -Encourage activity   -Encourage or provide ROM exercises   -Turn and reposition patient every 2 Hours  -Use appropriate equipment to lift or move patient in bed  -Instruct/ Assist with weight shifting every 45 when out of bed in chair  -Consider limitation of chair time 3 hour intervals    Skin Care:  -Avoid use of baby powder, tape, friction and shearing, hot water or constrictive clothing  -Relieve pressure over bony prominences using foam  -Do not massage red bony areas    Next Steps:  -Teach patient strategies to minimize risks such as repositioning   -Consider consults to  interdisciplinary teams such as PT  Outcome: Progressing     Problem:  HEMATOLOGIC - ADULT  Goal: Maintains hematologic stability  Description: INTERVENTIONS  - Assess for signs and symptoms of bleeding or hemorrhage  - Monitor labs  - Administer supportive blood products/factors as ordered and appropriate  Outcome: Progressing     Problem: MUSCULOSKELETAL - ADULT  Goal: Maintain or return mobility to safest level of function  Description: INTERVENTIONS:  - Assess patient's ability to carry out ADLs; assess patient's baseline for ADL function and identify physical deficits which impact ability to perform ADLs (bathing, care of mouth/teeth, toileting, grooming, dressing, etc.)  - Assess/evaluate cause of self-care deficits   - Assess range of motion  - Assess patient's mobility  - Assess patient's need for assistive devices and provide as appropriate  - Encourage maximum independence but intervene and supervise when necessary  - Involve family in performance of ADLs  - Assess for home care needs following discharge   - Consider OT consult to assist with ADL evaluation and planning for discharge  - Provide patient education as appropriate  Outcome: Progressing     Problem: PAIN - ADULT  Goal: Verbalizes/displays adequate comfort level or baseline comfort level  Description: Interventions:  - Encourage patient to monitor pain and request assistance  - Assess pain using appropriate pain scale  - Administer analgesics based on type and severity of pain and evaluate response  - Implement non-pharmacological measures as appropriate and evaluate response  - Consider cultural and social influences on pain and pain management  - Notify physician/advanced practitioner if interventions unsuccessful or patient reports new pain  Outcome: Progressing     Problem: INFECTION - ADULT  Goal: Absence or prevention of progression during hospitalization  Description: INTERVENTIONS:  - Assess and monitor for signs and symptoms of infection  - Monitor lab/diagnostic results  - Monitor all insertion sites,  i.e. indwelling lines, tubes, and drains  - Monitor endotracheal if appropriate and nasal secretions for changes in amount and color  - [place] appropriate cooling/warming therapies per order  - Administer medications as ordered  - Instruct and encourage patient and family to use good hand hygiene technique  - Identify and instruct in appropriate isolation precautions for identified infection/condition  Outcome: Progressing  Goal: Absence of fever/infection during neutropenic period  Description: INTERVENTIONS:  - Monitor WBC    Outcome: Progressing     Problem: SAFETY ADULT  Goal: Patient will remain free of falls  Description: INTERVENTIONS:  - Educate patient/family on patient safety including physical limitations  - Instruct patient to call for assistance with activity   - Consult OT/PT to assist with strengthening/mobility   - Keep Call bell within reach  - Keep bed low and locked with side rails adjusted as appropriate  - Keep care items and personal belongings within reach  - Initiate and maintain comfort rounds  - Make Fall Risk Sign visible to staff  - Offer Toileting every 2 Hours, in advance of need  - Initiate/Maintain bed alarm  - Obtain necessary fall risk management equipment: alarm  - Apply yellow socks and bracelet for high fall risk patients  - Consider moving patient to room near nurses station  Outcome: Progressing     Problem: DISCHARGE PLANNING  Goal: Discharge to home or other facility with appropriate resources  Description: INTERVENTIONS:  - Identify barriers to discharge w/patient and caregiver  - Arrange for needed discharge resources and transportation as appropriate  - Identify discharge learning needs (meds, wound care, etc.)  - Arrange for interpretive services to assist at discharge as needed  - Refer to Case Management Department for coordinating discharge planning if the patient needs post-hospital services based on physician/advanced practitioner order or complex needs related to  functional status, cognitive ability, or social support system  Outcome: Progressing     Problem: Nutrition/Hydration-ADULT  Goal: Nutrient/Hydration intake appropriate for improving, restoring or maintaining nutritional needs  Description: Monitor and assess patient's nutrition/hydration status for malnutrition. Collaborate with interdisciplinary team and initiate plan and interventions as ordered.  Monitor patient's weight and dietary intake as ordered or per policy. Utilize nutrition screening tool and intervene as necessary. Determine patient's food preferences and provide high-protein, high-caloric foods as appropriate.     INTERVENTIONS:  - Monitor oral intake, urinary output, labs, and treatment plans  - Assess nutrition and hydration status and recommend course of action  - Evaluate amount of meals eaten  - Assist patient with eating if necessary   - Allow adequate time for meals  - Recommend/ encourage appropriate diets, oral nutritional supplements, and vitamin/mineral supplements  - Order, calculate, and assess calorie counts as needed  - Recommend, monitor, and adjust tube feedings and TPN/PPN based on assessed needs  - Assess need for intravenous fluids  - Provide specific nutrition/hydration education as appropriate  - Include patient/family/caregiver in decisions related to nutrition  Outcome: Progressing

## 2024-09-04 NOTE — PROGRESS NOTES
Progress Note - Infectious Disease   Chris Bojorquez 79 y.o. male MRN: [id number]  Unit/Bed#: [address] [place] [address] Encounter: [id number]      Impression/Plan:    1.  Severe sepsis.  Fever, tachycardia, bandemia, hypotension initially requiring vasopressor therapy.  Source is most likely UTI, complicated by bacteremia.  Patient is clinically improved and is now off vasopressors.  White blood cell count normalized and patient is afebrile.  -Antibiotics as below  -Monitor fever curve  -Monitor CBC     2.  Proteus mirabilis bacteremia.  Source is most likely UTI.  Patient is clinically improved.  -Antibiotics as below     3.  Emphysematous pyelitis.  Urine and blood cultures are growing Proteus mirabilis.  Status post cystoscopy, clearance of bladder stone debris, right ureteral stent placement .  Patient clinically improving  -Stop ceftriaxone  -Transition to p.o. cefpodoxime 400mg q12hr  -Treat x 10 days post cystoscopy through 2024     4.  PABLO.  Peak creatinine 2.15, improved and remains around 1.53.  Likely due to infection, obstruction.  Prior creatinine 0.71  -Continue to monitor creatinine     5.  DM, poorly controlled, with hyperglycemia and highly elevated hemoglobin A1c of 14.6.  This is risk factor for infections above.  -Management per primary service     6.  Parkinson's disease.    Above management plan to transition to p.o. antibiotics discussed with Dr. Cross who is in agreement.  Discussed with the patient at bedside.  ID will sign off, please call with questions.    Antibiotics:  Ceftriaxone  Antibiotics day 9    Subjective:  The patient appears lethargic but does answer some questions.  He denies any pain.  No abdominal pain.  Rivera catheter remains in place.  Tmax 100.2, no chills.    Objective:  Vitals:  Temp:  [95.9 °F (35.5 °C)-98.4 °F (36.9 °C)] 95.9 °F (35.5 °C)  HR:  [64-74] 74  Resp:  [16-20] 16  BP: (108-119)/(57-59) 112/59  SpO2:  [85 %-98 %] 85 %  Temp (24hrs), Av.6 °F (36.4 °C),  Min:95.9 °F (35.5 °C), Max:98.4 °F (36.9 °C)  Current: Temperature: (!) 95.9 °F (35.5 °C)    Physical Exam:   General Appearance:  Lethargic and keeps eyes closed but answers some questions   Throat: Oropharynx moist without lesions.    Lungs:   Clear to auscultation bilaterally; no wheezes, rhonchi or rales; respirations unlabored   Heart:  RRR; no murmur, rub or gallop   Abdomen/:   Soft, non-tender, non-distended, positive bowel sounds.  Rivera catheter in place with yellow urine   Extremities: No clubbing, cyanosis or edema   Skin: No new rashes or lesions.        Labs:   All pertinent labs and imaging studies were personally reviewed  Results from last 7 days   Lab Units 09/03/24 0530 09/02/24 0439 09/01/24  0526   WBC Thousand/uL 6.61 5.54 4.66   HEMOGLOBIN g/dL 7.0* 7.5* 7.7*   PLATELETS Thousands/uL 280 256 214     Results from last 7 days   Lab Units 09/03/24 0530 09/02/24 0439 09/01/24  0526 08/31/24  0513 08/30/24  0558 08/29/24 2001 08/29/24  0350   SODIUM mmol/L 138 139 137   < > 134*   < > 135   POTASSIUM mmol/L 4.3 3.9 3.8   < > 4.0   < > 4.0   CHLORIDE mmol/L 109* 110* 109*   < > 106   < > 107   CO2 mmol/L 24 25 23   < > 18*   < > 18*   BUN mg/dL 22 22 25   < > 32*   < > 39*   CREATININE mg/dL 1.53* 1.53* 1.56*   < > 1.73*   < > 2.15*   EGFR ml/min/1.73sq m 42 42 41   < > 36   < > 28   CALCIUM mg/dL 7.1* 7.3* 7.2*   < > 7.6*   < > 6.8*   AST U/L  --   --   --   --  14  --  27   ALT U/L  --   --   --   --  4*  --  3*   ALK PHOS U/L  --   --   --   --  130*  --  141*    < > = values in this interval not displayed.     Results from last 7 days   Lab Units 08/30/24  0558 08/29/24 2001   PROCALCITONIN ng/ml 11.85* 13.21*         Results from last 7 days   Lab Units 08/29/24 2001   FERRITIN ng/mL 163           Micro:  Results from last 7 days   Lab Units 08/29/24  0622   BLOOD CULTURE  No Growth After 5 Days.  No Growth After 5 Days.       Imaging:  CT A/P with contrast 8/26-emphysematous  pyelitis, bladder calculus with diffuse bladder wall thickening

## 2024-09-04 NOTE — QUICK NOTE
I called the patient's son  Daily updates given     His son will visit in the evening. We will hold ACP discussion s

## 2024-09-04 NOTE — ASSESSMENT & PLAN NOTE
Pt underwent cytoscopy with right stone extraction and right ureteral stent placement.  Resume IV Ceftriaxone (Day 8/10).  Urology and ID following      Status post cystoscopy right stent removal bladder stones day #6 per urology   Foleys catheter in place . Pending further recommendations from urology at this time.   Patient completed 7 days course ceftriaxone   Transitioned to cefpodoxime today per ID team   Am labs

## 2024-09-04 NOTE — PROGRESS NOTES
Formerly [facility]  Progress Note  Name: Chris Bojorquez I  MRN: [id number]  Unit/Bed#: [address] [place] [address] Date of Admission: 8/26/2024   Date of Service: 9/4/2024 I Hospital Day: 9    Assessment & Plan   Emphysematous pyelitis  Assessment & Plan  Pt underwent cytoscopy with right stone extraction and right ureteral stent placement.  Resume IV Ceftriaxone (Day 8/10).  Urology and ID following      Status post cystoscopy right stent removal bladder stones day #6 per urology   Foleys catheter in place . Pending further recommendations from urology at this time.   Patient completed 7 days course ceftriaxone   Transitioned to cefpodoxime today per ID team   Am labs     Acute metabolic encephalopathy-resolved as of 9/4/2024  Assessment & Plan  Likely secondary to sepsis/UTI vs hospital delirium combined with dementia.   CT head is negative for acute process.  Mental status continues to improve since admission.  Continue neurochecks    Volume overload  Assessment & Plan  Most probably secondary to IVF adminsteration given patient with complicated pyelitis and dehydration on admission.   Also with shock state that was managed per ICU team .     Echo completed on 8/30 with normal EF, G1DD     Over the past 24-48 hrs it was brought to my attention scrotal edema .   Also with bilateral pitting edema      -IVF were stopped on 9/3  -s/p lasix 20 mg IV x 2 with net 1400 output      Still with pertinent scrotal edema today , peripheral edema , venous stasis  His BP is labile also   Consult to cardiology was placed for further care and management given challenging duiresis at this time.       Venous stasis  Assessment & Plan  Noted bilaterally   Mostly secondary to IVF since admission      -d/c IVF on 9/3   -patient is tolerating oral diet   -duiresis with caution   -monitor I/Os     Scrotal swelling  Assessment & Plan  Most probably secondary to fluid overload from IVFs that were received during the  hospitalization.     -s/p lasix 20 mg x 1 9/3 with good net output    Trial lasix 20 mg x 1 today  Monitor I/Os   Case discussed with urology; dr kaiser will evaluate at bedside.     Anemia  Assessment & Plan  Possibly related to dilution vs blood loss from procedures/blood draws.  Will follow CBC     Gastroesophageal reflux disease  Assessment & Plan  Continue PPI    Bacteremia  Assessment & Plan  Proteus Bacteremia noted.   Resume IV Ceftriaxone.  ID following     Parkinson disease  Assessment & Plan  Continue Sinemet.  PT/OT evaluation for discharge needs     Hypothyroidism  Assessment & Plan  Continue Levothyroxine    PABLO (acute kidney injury) (HCC)  Assessment & Plan  Possibly multifactorial septic shock/hypotension and obstructive uropathy.  Pt is s/p ureteral stent placement with stone extraction.  Continue IV fluid hydration.  Will follow BMP.  Urology following     Bun/Cr improving   Continue to monitor   Am labs       Uncontrolled type 2 diabetes mellitus with hyperglycemia (HCC)  Assessment & Plan  Lab Results   Component Value Date    HGBA1C 14.6 (H) 08/26/2024     Recent Labs     09/03/24  1555 09/03/24  2045 09/04/24  0746 09/04/24  1148   POCGLU 195* 232* 177* 273*     Blood Sugar Average: Last 72 hrs:  (P) [id number]    Resume Lantus with SSI regimen     Moderate protein-calorie malnutrition (HCC)  Assessment & Plan  Malnutrition Findings:   Adult Malnutrition type: Chronic illness  Adult Degree of Malnutrition: Malnutrition of moderate degree  Consult nutrition services Malnutrition Characteristics: Muscle loss, Fat loss, Weight loss     360 Statement: related to inadequate energy intake as evidenced by 10 % weight loss last 7-8 months, depression of temples, sunken orbital, wasted interosseous. Treatment: Oral diet and nutrition supplements    BMI Findings:      Body mass index is 26.05 kg/m².       Essential hypertension  Assessment & Plan  Norvasc initially held due to labile  BP/sepsis.  Will restart once appropriate     * Septic shock (HCC)-resolved as of 8/31/2024  Assessment & Plan  Evidenced by fever, tachycardia, bandemia, and hypotension responsive to IVF  In setting of bacteremia likely due to urinary source due to chronic indwelling Rivera present on admit, evidenced by pyelitis, UA results requiring IV cefepime and transition to ceftriaxone  Bp still on the soft side, Isolyte at 75 cc per hour; will give bolus 250 cc over 1 hour  Lactic acid 0.8  1/2 blood cultures growing proteus   Urine culture growing proteus  ID following  Changed cefepime to ceftriaxone 2 grams IV  and continue vancomycin; MRSA from nares positive for MRSA   Trend cbc and fever curve               VTE Pharmacologic Prophylaxis: VTE Score: 5 High Risk (Score >/= 5) - Pharmacological DVT Prophylaxis Ordered: heparin. Sequential Compression Devices Ordered.    Mobility:   Basic Mobility Inpatient Raw Score: 7  -[facility] Goal: 2: Bed activities/Dependent transfer  JH-HLM Achieved: 1: Laying in bed  JH-HLM Goal achieved. Continue to encourage appropriate mobility.    Patient Centered Rounds: I performed bedside rounds with nursing staff today.   Discussions with Specialists or Other Care Team Provider: urology     Education and Discussions with Family / Patient:  patient .     Total Time Spent on Date of Encounter in care of patient: 30 mins. This time was spent on one or more of the following: performing physical exam; counseling and coordination of care; obtaining or reviewing history; documenting in the medical record; reviewing/ordering tests, medications or procedures; communicating with other healthcare professionals and discussing with patient's family/caregivers.    Current Length of Stay: 9 day(s)  Current Patient Status: Inpatient   Certification Statement: The patient will continue to require additional inpatient hospital stay due to pyelitis, scrotal swelling   Discharge Plan: Anticipate discharge in  24-48 hrs to TBD    Code Status: Level 1 - Full Code    Subjective:   Patient was seen today at bedside. Sleeping comfortably   He woke up when called. He answered my questions appropriately   Reports no active complaitns.       Objective:     Vitals:   Temp (24hrs), Av.6 °F (36.4 °C), Min:95.9 °F (35.5 °C), Max:98.4 °F (36.9 °C)    Temp:  [95.9 °F (35.5 °C)-98.4 °F (36.9 °C)] 95.9 °F (35.5 °C)  HR:  [64-74] 74  Resp:  [16-20] 16  BP: (108-119)/(57-59) 112/59  SpO2:  [85 %-98 %] 85 %  Body mass index is 26.05 kg/m².     Input and Output Summary (last 24 hours):     Intake/Output Summary (Last 24 hours) at 2024 1347  Last data filed at 2024 0531  Gross per 24 hour   Intake --   Output 1200 ml   Net -1200 ml       Physical Exam:   Physical Exam  Vitals reviewed.   Constitutional:       General: He is not in acute distress.     Appearance: Normal appearance.   HENT:      Head: Normocephalic and atraumatic.      Mouth/Throat:      Mouth: Mucous membranes are moist.   Eyes:      Conjunctiva/sclera: Conjunctivae normal.      Pupils: Pupils are equal, round, and reactive to light.   Cardiovascular:      Rate and Rhythm: Normal rate and regular rhythm.      Pulses: Normal pulses.           Carotid pulses are 2+ on the right side and 2+ on the left side.       Radial pulses are 2+ on the right side and 2+ on the left side.        Dorsalis pedis pulses are 2+ on the right side and 2+ on the left side.      Heart sounds: Normal heart sounds, S1 normal and S2 normal. No murmur heard.     Comments: Venous stasis. Bilaterally.     Pulmonary:      Effort: No tachypnea, bradypnea or accessory muscle usage.      Breath sounds: Normal breath sounds and air entry. No decreased breath sounds, wheezing, rhonchi or rales.   Abdominal:      General: Abdomen is flat. Bowel sounds are normal.      Palpations: Abdomen is soft.   Genitourinary:     Comments: Diffuse scrotal swelling  Nontender no erythema  Musculoskeletal:       Right lower leg: Edema present.      Left lower leg: Edema present.   Neurological:      Mental Status: He is alert and oriented to person, place, and time. Mental status is at baseline.          Additional Data:     Labs:  Results from last 7 days   Lab Units 09/03/24  0530   WBC Thousand/uL 6.61   HEMOGLOBIN g/dL 7.0*   HEMATOCRIT % 23.0*   PLATELETS Thousands/uL 280   SEGS PCT % 78*   LYMPHO PCT % 11*   MONO PCT % 9   EOS PCT % 1     Results from last 7 days   Lab Units 09/03/24  0530 08/31/24  0513 08/30/24  0558   SODIUM mmol/L 138   < > 134*   POTASSIUM mmol/L 4.3   < > 4.0   CHLORIDE mmol/L 109*   < > 106   CO2 mmol/L 24   < > 18*   BUN mg/dL 22   < > 32*   CREATININE mg/dL 1.53*   < > 1.73*   ANION GAP mmol/L 5   < > 10   CALCIUM mg/dL 7.1*   < > 7.6*   ALBUMIN g/dL  --   --  2.4*   TOTAL BILIRUBIN mg/dL  --   --  0.35   ALK PHOS U/L  --   --  130*   ALT U/L  --   --  4*   AST U/L  --   --  14   GLUCOSE RANDOM mg/dL 234*   < > 334*    < > = values in this interval not displayed.         Results from last 7 days   Lab Units 09/04/24  1148 09/04/24  0746 09/03/24 2045 09/03/24  1555 09/03/24  1124 09/03/24  0745 09/02/24  2104 09/02/24  1601 09/02/24  1154 09/02/24  0743 09/01/24  2043 09/01/24  1633   POC GLUCOSE mg/dl 273* 177* 232* 195* 294* 219* 357* 248* 198* 143* 341* 394*         Results from last 7 days   Lab Units 08/30/24  0558 08/29/24 2001 08/29/24  0622   LACTIC ACID mmol/L  --   --  0.5   PROCALCITONIN ng/ml 11.85* 13.21*  --        Lines/Drains:  Invasive Devices       Peripheral Intravenous Line  Duration             Peripheral IV Dorsal (posterior);Right Hand -- days              Drain  Duration             Ureteral Internal Stent Right ureter 6 Fr. 6 days    Urethral Catheter Other (Comment) 20 Fr. 6 days                  Urinary Catheter:  Goal for removal:  pending per urology                Imaging: No pertinent imaging reviewed.    Recent Cultures (last 7 days):   Results from last 7  days   Lab Units 08/29/24  0622   BLOOD CULTURE  No Growth After 5 Days.  No Growth After 5 Days.       Last 24 Hours Medication List:   Current Facility-Administered Medications   Medication Dose Route Frequency Provider Last Rate    acetaminophen  1,000 mg Intravenous Q6H PRN Jana KathySHANTE WardNP 1,000 mg (08/31/24 2131)    carbidopa-levodopa  1 tablet Oral BID Jana FRANCES Dupont      cefpodoxime  400 mg Oral BID With Meals Lori Menjivar MD      ferrous sulfate  325 mg Oral Daily With Breakfast Jana FRANCES Dupont      heparin (porcine)  5,000 Units Subcutaneous Q8H [facility] Jana FRANCES Dupont      insulin glargine  10 Units Subcutaneous HS Jana Kathylane Jurado, FRANCES      insulin lispro  2-12 Units Subcutaneous TID AC Jana Kathy Jurado, FRANCES      insulin lispro  2-12 Units Subcutaneous HS Jana Kathy Jurado, FRANCES      levothyroxine  25 mcg Oral Early Morning Jana Kathy Jurado, FRANCES      mupirocin   Nasal Q12H [facility] Ward Cross MD      pantoprazole  40 mg Oral Early Morning Jana FRANCES Dupont          Today, Patient Was Seen By: Ward Cross MD    **Please Note: This note may have been constructed using a voice recognition system.**

## 2024-09-04 NOTE — ASSESSMENT & PLAN NOTE
Possibly multifactorial septic shock/hypotension and obstructive uropathy.  Pt is s/p ureteral stent placement with stone extraction.  Continue IV fluid hydration.  Will follow BMP.  Urology following     Bun/Cr improving   Continue to monitor   Labs pending

## 2024-09-04 NOTE — CONSULTS
Consultation - Cardiology   Chris Bojorquez 79 y.o. male MRN: [id number]  Unit/Bed#: [address] [place] [address] Encounter: [id number]    Assessment & Plan     Assessment:  1. Volume overload with scrotal and generalized edema secondary to protein malnourishment  2. Proteus Mirabilis bacteremia  3. Diabetes poorly controlled: hemoglobin A1c 14.6  4. Anemia of chronic disease  5. Parkinson's disease  6. Chronic Rivera with recurrent UTI  7. Protein malnourishment  8. Failure to thrive      Plan:  Patient is currently on the hospitalist service  1. Patient with albumin level of 2.2 and total protein of 5.6 on labs from 8/30/2024. Appetite is poor. Will trial diuresing patient with albumin 25%, 25 g followed by low-dose IV Lasix at 20 mg BID with close monitoring of I and O, labs and daily weights.    2. Antibiotic care per infectious disease and primary team    3. Patient needs better blood sugar control with goal hemoglobin less than 7    4. Patient verbalizes frustration regarding multiple chronic illnesses, consider consultation to palliative care    5. Patient's home Norvasc currently on hold secondary to hypotension. Will continue to monitor vital signs.    6. Further orders as patient condition and testing warrant      History of Present Illness   Physician Requesting Consult: Ward Cross MD  Reason for Consult / Principal Problem: volume overload      HPI: Chris Bojorquez is a 79 y.o. year old male who has a history of multiple chronic illnesses who was admitted on 8/26/2024 from [facility] with change in mental status. He has history of Parkinson's disease with chronic Rivera and recurrent UTI's, diabetes with hemoglobin A1c of 14.6, hypertension, protein malnourishment, hypothyroidism, MRSA bacteremia, chronic anemia, and venous stasis. Patient appears to be essentially bedbound.    Hospital course was complicated with acute metabolic encephalitis, AK I and hypotension requiring an ICU stay, he had  cystoscopy secondary to CAT scan which was positive for emphysematous pyelitis with removal of kidney and bladder stones. He also had positive blood cultures for Proteus bacteremia. Patient has poor appetite with albumin of 2.2 and now has generalized edema and scrotal edema. Attempts by primary team to diuresis patient have been unsuccessful as they have been causing hypotension.    8/30/2024 patient had 2D echocardiogram which demonstrated EF of 55% in grade 1 diastolic dysfunction, both left and right atrium were mildly dilated and custom valve had mild regurgitation with estimated peak PA pressure of 51 mmHg.        Inpatient consult to Cardiology  Consult performed by: FRANCES Mendez  Consult ordered by: Ward Cross MD          Review of Systems   Constitutional:  Positive for activity change and fatigue.   HENT: Negative.  Negative for congestion, facial swelling, sinus pain and tinnitus.    Eyes: Negative.  Negative for photophobia and visual disturbance.   Respiratory: Negative.  Negative for cough, chest tightness and shortness of breath.    Cardiovascular:  Positive for leg swelling. Negative for chest pain and palpitations.   Gastrointestinal:  Negative for abdominal distention, constipation, diarrhea, nausea and vomiting.   Endocrine: Negative.  Negative for polydipsia, polyphagia and polyuria.   Genitourinary:  Positive for difficulty urinating.        Chronic Rivera   Musculoskeletal:  Positive for gait problem.   Skin: Negative.    Neurological:  Negative for dizziness, syncope, weakness and light-headedness.   Hematological: Negative.    Psychiatric/Behavioral: Negative.         Historical Information   Past Medical History:   Diagnosis Date    Acute metabolic encephalopathy 08/26/2024     Past Surgical History:   Procedure Laterality Date    NE CYSTO BLADDER W/URETERAL CATHETERIZATION Right 8/29/2024    Procedure: CYSTOSCOPY WITH INSERTION RIGHT STENT URETERAL removal large 3cm bladder  mayela;  Surgeon: Ciro Hughes MD;  Location: [facility];  Service: Urology     Social History     Substance and Sexual Activity   Alcohol Use Not Currently     Social History     Substance and Sexual Activity   Drug Use Not on file     E-Cigarette/Vaping     E-Cigarette/Vaping Substances     Social History     Tobacco Use   Smoking Status Unknown   Smokeless Tobacco Not on file     Family History: History reviewed. No pertinent family history.    Meds/Allergies   all current active meds have been reviewed, current meds:   Current Facility-Administered Medications   Medication Dose Route Frequency    acetaminophen (Ofirmev) injection 1,000 mg  1,000 mg Intravenous Q6H PRN    albumin human (FLEXBUMIN) 25 % injection 25 g  25 g Intravenous BID (diuretic)    And    furosemide (LASIX) injection 20 mg  20 mg Intravenous BID (diuretic)    carbidopa-levodopa (SINEMET)  mg per tablet 1 tablet  1 tablet Oral BID    cefpodoxime (VANTIN) tablet 400 mg  400 mg Oral BID With Meals    ferrous sulfate tablet 325 mg  325 mg Oral Daily With Breakfast    heparin (porcine) subcutaneous injection 5,000 Units  5,000 Units Subcutaneous Q8H RAHUL    insulin glargine (LANTUS) subcutaneous injection 10 Units 0.1 mL  10 Units Subcutaneous HS    insulin lispro (HumALOG/ADMELOG) 100 units/mL subcutaneous injection 2-12 Units  2-12 Units Subcutaneous TID AC    insulin lispro (HumALOG/ADMELOG) 100 units/mL subcutaneous injection 2-12 Units  2-12 Units Subcutaneous HS    levothyroxine tablet 25 mcg  25 mcg Oral Early Morning    mupirocin (BACTROBAN) 2 % nasal ointment   Nasal Q12H RAHUL    pantoprazole (PROTONIX) EC tablet 40 mg  40 mg Oral Early Morning   , and PTA meds:   Prior to Admission Medications   Prescriptions Last Dose Informant Patient Reported? Taking?   Acetaminophen 325 MG CAPS  Outside Facility (Specify) Yes No   Sig: Take 2 tablets by mouth   Multiple Vitamins-Minerals (multivitamin with minerals) tablet 8/26/2024  Yes Yes    Sig: Take 1 tablet by mouth daily   SSD 1 % cream  Outside Facility (Specify) Yes No   acetaminophen (TYLENOL) 325 mg tablet  Outside Facility (Specify) Yes No   Sig: every 4 (four) hours.   amLODIPine (NORVASC) 5 mg tablet 2024 Outside Facility (Specify) Yes Yes   baclofen 10 mg tablet Not Taking Outside Facility (Specify) Yes No   Sig: Take 5 mg by mouth   Patient not taking: Reported on 2024   bisacodyl (DULCOLAX) 5 mg EC tablet  Outside Facility (Specify) Yes No   Si (one) time each day at the same time.   carbidopa-levodopa (SINEMET)  mg per tablet 2024 Outside Facility (Specify) Yes Yes   Sig: Take 1 tablet by mouth 3 (three) times a day   cefdinir (OMNICEF) 300 mg capsule Not Taking Outside Facility (Specify) Yes No   Patient not taking: Reported on 2024   cephalexin (KEFLEX) 500 mg capsule Not Taking Outside Facility (Specify) Yes No   Patient not taking: Reported on 2024   docusate sodium (COLACE) 100 mg capsule 2024  Yes Yes   Sig: Take 100 mg by mouth daily   famotidine (PEPCID) 20 mg tablet Not Taking Outside Facility (Specify) Yes No   Sig: Take 20 mg by mouth daily   Patient not taking: Reported on 2024   levothyroxine 25 mcg tablet 2024 Outside Facility (Specify) Yes Yes   magnesium hydroxide (MILK OF MAGNESIA) 400 mg/5 mL oral suspension  Outside Facility (Specify) Yes No   Sig: Take by mouth   pantoprazole (PROTONIX) 40 mg tablet 2024 Outside Facility (Specify) Yes Yes   senna (Senokot) 8.6 MG tablet  Outside Facility (Specify) Yes No   Sig: Take 2 tablets by mouth   senna-docusate sodium (SENOKOT S) 8.6-50 mg per tablet  Outside Facility (Specify) Yes No   Si (one) time each day at the same time.      Facility-Administered Medications: None     Allergies   Allergen Reactions    Lactose - Food Allergy Other (See Comments)    Sulfa Antibiotics Other (See Comments)       Objective   Vitals: Blood pressure 112/59, pulse 74, temperature (!)  "95.9 °F (35.5 °C), resp. rate 16, height 5' 9\" (1.753 m), weight 80 kg (176 lb 5.9 oz), SpO2 (!) 85%.  Orthostatic Blood Pressures      Flowsheet Row Most Recent Value   Blood Pressure 112/59 filed at 09/04/2024 0717   Patient Position - Orthostatic VS Lying filed at 09/02/2024 0352              Intake/Output Summary (Last 24 hours) at 9/4/2024 1404  Last data filed at 9/4/2024 0531  Gross per 24 hour   Intake --   Output 1200 ml   Net -1200 ml       Invasive Devices       Peripheral Intravenous Line  Duration             Peripheral IV Dorsal (posterior);Right Hand -- days              Drain  Duration             Ureteral Internal Stent Right ureter 6 Fr. 6 days    Urethral Catheter Other (Comment) 20 Fr. 6 days                    Physical Exam  Vitals and nursing note reviewed.   Constitutional:       Appearance: Normal appearance. He is normal weight. He is ill-appearing (Chronically).   HENT:      Right Ear: External ear normal.      Left Ear: External ear normal.   Eyes:      General: No scleral icterus.        Right eye: No discharge.         Left eye: No discharge.   Cardiovascular:      Rate and Rhythm: Normal rate and regular rhythm.      Pulses: Normal pulses.      Heart sounds: Normal heart sounds. No murmur heard.  Pulmonary:      Effort: Pulmonary effort is normal. No accessory muscle usage or respiratory distress.      Breath sounds: Examination of the right-lower field reveals decreased breath sounds. Examination of the left-lower field reveals decreased breath sounds. Decreased breath sounds present.   Abdominal:      General: Bowel sounds are normal. There is no distension.      Palpations: Abdomen is soft.   Genitourinary:     Comments: Scrotal edema  Musculoskeletal:      Right lower leg: Edema present.      Left lower leg: Edema present.      Comments: Generalized edema but worse and lower extremities and scrotum   Skin:     General: Skin is warm.      Capillary Refill: Capillary refill takes less " than 2 seconds.   Neurological:      General: No focal deficit present.      Mental Status: He is alert. Mental status is at baseline.   Psychiatric:         Mood and Affect: Mood normal.       Lab Results: I have personally reviewed pertinent lab results.    CBC with diff:   Results from last 7 days   Lab Units 09/03/24  0530   WBC Thousand/uL 6.61   RBC Million/uL 2.77*   HEMOGLOBIN g/dL 7.0*   HEMATOCRIT % 23.0*   MCV fL 83   MCH pg 25.3*   MCHC g/dL 30.4*   RDW % 17.6*   MPV fL 9.6   PLATELETS Thousands/uL 280     CMP:   Results from last 7 days   Lab Units 09/03/24  0530 08/31/24  0513 08/30/24  0558   SODIUM mmol/L 138   < > 134*   CHLORIDE mmol/L 109*   < > 106   CO2 mmol/L 24   < > 18*   BUN mg/dL 22   < > 32*   CREATININE mg/dL 1.53*   < > 1.73*   CALCIUM mg/dL 7.1*   < > 7.6*   AST U/L  --   --  14   ALT U/L  --   --  4*   ALK PHOS U/L  --   --  130*   EGFR ml/min/1.73sq m 42   < > 36    < > = values in this interval not displayed.     HS Troponin:   0   Lab Value Date/Time    HSTNI0 10 08/26/2024 1256    HSTNI2 9 08/26/2024 1525     BNP:   Results from last 7 days   Lab Units 09/03/24  0530   POTASSIUM mmol/L 4.3   CHLORIDE mmol/L 109*   CO2 mmol/L 24   BUN mg/dL 22   CREATININE mg/dL 1.53*   CALCIUM mg/dL 7.1*   EGFR ml/min/1.73sq m 42     Magnesium:   Results from last 7 days   Lab Units 09/03/24  0530   MAGNESIUM mg/dL 2.2     Lipid Profile:     Imaging: I have personally reviewed pertinent reports.    EKG: admission EKG demonstrated sinus rhythm with nonspecific ST and T wave abnormalities  VTE Prophylaxis: Sequential compression device (Venodyne)     Code Status: Level 1 - Full Code  Advance Directive and Living Will:      Power of :    POLST:      Linette DANIELS  Cardiology

## 2024-09-05 PROBLEM — R62.7 FAILURE TO THRIVE IN ADULT: Status: ACTIVE | Noted: 2024-09-05

## 2024-09-05 PROBLEM — Z87.440 HISTORY OF RECURRENT UTIS: Status: ACTIVE | Noted: 2024-09-05

## 2024-09-05 PROBLEM — B96.4 BACTEREMIA DUE TO PROTEUS SPECIES: Status: ACTIVE | Noted: 2024-08-27

## 2024-09-05 LAB
ANION GAP SERPL CALCULATED.3IONS-SCNC: 7 MMOL/L (ref 4–13)
BASOPHILS # BLD AUTO: 0.02 THOUSANDS/ÂΜL (ref 0–0.1)
BASOPHILS NFR BLD AUTO: 0 % (ref 0–1)
BUN SERPL-MCNC: 22 MG/DL (ref 5–25)
CALCIUM SERPL-MCNC: 7.9 MG/DL (ref 8.4–10.2)
CHLORIDE SERPL-SCNC: 99 MMOL/L (ref 96–108)
CO2 SERPL-SCNC: 30 MMOL/L (ref 21–32)
CREAT SERPL-MCNC: 1.29 MG/DL (ref 0.6–1.3)
EOSINOPHIL # BLD AUTO: 0.06 THOUSAND/ÂΜL (ref 0–0.61)
EOSINOPHIL NFR BLD AUTO: 1 % (ref 0–6)
ERYTHROCYTE [DISTWIDTH] IN BLOOD BY AUTOMATED COUNT: 17.3 % (ref 11.6–15.1)
GFR SERPL CREATININE-BSD FRML MDRD: 52 ML/MIN/1.73SQ M
GLUCOSE SERPL-MCNC: 208 MG/DL (ref 65–140)
GLUCOSE SERPL-MCNC: 240 MG/DL (ref 65–140)
GLUCOSE SERPL-MCNC: 251 MG/DL (ref 65–140)
GLUCOSE SERPL-MCNC: 362 MG/DL (ref 65–140)
GLUCOSE SERPL-MCNC: 376 MG/DL (ref 65–140)
HCT VFR BLD AUTO: 26.4 % (ref 36.5–49.3)
HGB BLD-MCNC: 7.9 G/DL (ref 12–17)
IMM GRANULOCYTES # BLD AUTO: 0.06 THOUSAND/UL (ref 0–0.2)
IMM GRANULOCYTES NFR BLD AUTO: 1 % (ref 0–2)
LYMPHOCYTES # BLD AUTO: 0.62 THOUSANDS/ÂΜL (ref 0.6–4.47)
LYMPHOCYTES NFR BLD AUTO: 11 % (ref 14–44)
MCH RBC QN AUTO: 25 PG (ref 26.8–34.3)
MCHC RBC AUTO-ENTMCNC: 29.9 G/DL (ref 31.4–37.4)
MCV RBC AUTO: 84 FL (ref 82–98)
MONOCYTES # BLD AUTO: 0.5 THOUSAND/ÂΜL (ref 0.17–1.22)
MONOCYTES NFR BLD AUTO: 9 % (ref 4–12)
NEUTROPHILS # BLD AUTO: 4.64 THOUSANDS/ÂΜL (ref 1.85–7.62)
NEUTS SEG NFR BLD AUTO: 78 % (ref 43–75)
NRBC BLD AUTO-RTO: 0 /100 WBCS
PLATELET # BLD AUTO: 319 THOUSANDS/UL (ref 149–390)
PMV BLD AUTO: 9.5 FL (ref 8.9–12.7)
POTASSIUM SERPL-SCNC: 4.2 MMOL/L (ref 3.5–5.3)
RBC # BLD AUTO: 3.16 MILLION/UL (ref 3.88–5.62)
SODIUM SERPL-SCNC: 136 MMOL/L (ref 135–147)
WBC # BLD AUTO: 5.9 THOUSAND/UL (ref 4.31–10.16)

## 2024-09-05 PROCEDURE — 92526 ORAL FUNCTION THERAPY: CPT | Performed by: SPEECH-LANGUAGE PATHOLOGIST

## 2024-09-05 PROCEDURE — 82948 REAGENT STRIP/BLOOD GLUCOSE: CPT

## 2024-09-05 PROCEDURE — 97535 SELF CARE MNGMENT TRAINING: CPT

## 2024-09-05 PROCEDURE — 80048 BASIC METABOLIC PNL TOTAL CA: CPT

## 2024-09-05 PROCEDURE — 97530 THERAPEUTIC ACTIVITIES: CPT

## 2024-09-05 PROCEDURE — 85025 COMPLETE CBC W/AUTO DIFF WBC: CPT

## 2024-09-05 PROCEDURE — 99232 SBSQ HOSP IP/OBS MODERATE 35: CPT | Performed by: INTERNAL MEDICINE

## 2024-09-05 PROCEDURE — 99232 SBSQ HOSP IP/OBS MODERATE 35: CPT

## 2024-09-05 RX ORDER — LANOLIN ALCOHOL/MO/W.PET/CERES
1 CREAM (GRAM) TOPICAL
Status: DISCONTINUED | OUTPATIENT
Start: 2024-09-05 | End: 2024-09-10 | Stop reason: HOSPADM

## 2024-09-05 RX ADMIN — CEFPODOXIME PROXETIL 400 MG: 200 TABLET, FILM COATED ORAL at 07:56

## 2024-09-05 RX ADMIN — INSULIN LISPRO 10 UNITS: 100 INJECTION, SOLUTION INTRAVENOUS; SUBCUTANEOUS at 16:00

## 2024-09-05 RX ADMIN — FUROSEMIDE 20 MG: 10 INJECTION, SOLUTION INTRAMUSCULAR; INTRAVENOUS at 07:55

## 2024-09-05 RX ADMIN — CARBIDOPA AND LEVODOPA 1 TABLET: 25; 100 TABLET ORAL at 08:02

## 2024-09-05 RX ADMIN — PANTOPRAZOLE SODIUM 40 MG: 40 TABLET, DELAYED RELEASE ORAL at 05:32

## 2024-09-05 RX ADMIN — INSULIN LISPRO 10 UNITS: 100 INJECTION, SOLUTION INTRAVENOUS; SUBCUTANEOUS at 11:43

## 2024-09-05 RX ADMIN — LEVOTHYROXINE SODIUM 25 MCG: 25 TABLET ORAL at 05:32

## 2024-09-05 RX ADMIN — HEPARIN SODIUM 5000 UNITS: 5000 INJECTION, SOLUTION INTRAVENOUS; SUBCUTANEOUS at 21:05

## 2024-09-05 RX ADMIN — FERROUS SULFATE TAB 325 MG (65 MG ELEMENTAL FE) 325 MG: 325 (65 FE) TAB at 07:55

## 2024-09-05 RX ADMIN — INSULIN LISPRO 4 UNITS: 100 INJECTION, SOLUTION INTRAVENOUS; SUBCUTANEOUS at 21:06

## 2024-09-05 RX ADMIN — MUPIROCIN 1 APPLICATION: 20 OINTMENT TOPICAL at 08:01

## 2024-09-05 RX ADMIN — FUROSEMIDE 20 MG: 10 INJECTION, SOLUTION INTRAMUSCULAR; INTRAVENOUS at 16:01

## 2024-09-05 RX ADMIN — HEPARIN SODIUM 5000 UNITS: 5000 INJECTION, SOLUTION INTRAVENOUS; SUBCUTANEOUS at 14:58

## 2024-09-05 RX ADMIN — INSULIN LISPRO 4 UNITS: 100 INJECTION, SOLUTION INTRAVENOUS; SUBCUTANEOUS at 07:56

## 2024-09-05 RX ADMIN — ALBUMIN (HUMAN) 25 G: 0.25 INJECTION, SOLUTION INTRAVENOUS at 07:55

## 2024-09-05 RX ADMIN — Medication 1 TABLET: at 11:42

## 2024-09-05 RX ADMIN — MUPIROCIN 1 APPLICATION: 20 OINTMENT TOPICAL at 21:06

## 2024-09-05 RX ADMIN — ALBUMIN (HUMAN) 25 G: 0.25 INJECTION, SOLUTION INTRAVENOUS at 15:00

## 2024-09-05 RX ADMIN — CEFPODOXIME PROXETIL 400 MG: 200 TABLET, FILM COATED ORAL at 16:00

## 2024-09-05 RX ADMIN — CARBIDOPA AND LEVODOPA 1 TABLET: 25; 100 TABLET ORAL at 18:20

## 2024-09-05 RX ADMIN — INSULIN GLARGINE 12 UNITS: 100 INJECTION, SOLUTION SUBCUTANEOUS at 21:05

## 2024-09-05 NOTE — PROGRESS NOTES
Formerly [facility]  Progress Note  Name: Chris Bojorquez I  MRN: [id number]  Unit/Bed#: [address] [place] [address] I Date of Admission: 8/26/2024   Date of Service: 9/5/2024 I Hospital Day: 10    Assessment & Plan   Emphysematous pyelitis  Assessment & Plan  Pt underwent cytoscopy with right stone extraction and right ureteral stent placement.  Resume IV Ceftriaxone (Day 8/10).  Urology and ID following      Status post cystoscopy right stent removal bladder stones day #6 per urology   Foleys catheter in place . Pending further recommendations from urology at this time.   Patient completed 7 days course ceftriaxone   Cefpodoxime Day#2     Am labs     Acute metabolic encephalopathy-resolved as of 9/4/2024  Assessment & Plan  Likely secondary to sepsis/UTI vs hospital delirium combined with dementia.   CT head is negative for acute process.  Mental status continues to improve since admission.  Continue neurochecks    Volume overload  Assessment & Plan  Most probably secondary to IVF adminsteration given patient with complicated pyelitis and dehydration on admission.   Also with shock state that was managed per ICU team .     Echo completed on 8/30 with normal EF, G1DD     Over the past 24-48 hrs it was brought to my attention scrotal edema .   Also with bilateral pitting edema      -IVF were stopped on 9/3  -s/p lasix 20 mg IV x 2 with net 1400 output      Still with pertinent scrotal edema today , peripheral edema , venous stasis however improved since IV diuresis was   Started by cardiology on 9/4   Continue medical management per specialist       Venous stasis  Assessment & Plan  Noted bilaterally   Mostly secondary to IVF since admission      -d/c IVF on 9/3   -patient is tolerating oral diet   -duiresis with caution   -monitor I/Os     Scrotal swelling  Assessment & Plan  Most probably secondary to fluid overload from IVFs that were received during the hospitalization.     -s/p lasix 20 mg x 1 9/3 with  good net output    Trial lasix 20 mg x 1 today  Monitor I/Os   Case discussed with urology; dr kaiser will evaluate at bedside.     Anemia  Assessment & Plan  Iron deficiency anemia   Continue iron supplementation   Improving   Follow CBC     Gastroesophageal reflux disease  Assessment & Plan  Continue PPI    Bacteremia  Assessment & Plan  Proteus Bacteremia noted.   Resume IV Ceftriaxone.  ID following     Parkinson disease  Assessment & Plan  Continue Sinemet.  PT/OT evaluation for discharge needs     Hypothyroidism  Assessment & Plan  Continue Levothyroxine    PABLO (acute kidney injury) (HCC)  Assessment & Plan  Possibly multifactorial septic shock/hypotension and obstructive uropathy.  Pt is s/p ureteral stent placement with stone extraction.  Continue IV fluid hydration.  Will follow BMP.  Urology following     Bun/Cr improving   Continue to monitor   Labs pending       Uncontrolled type 2 diabetes mellitus with hyperglycemia (HCC)  Assessment & Plan  Lab Results   Component Value Date    HGBA1C 14.6 (H) 08/26/2024     Recent Labs     09/03/24  1555 09/03/24  2045 09/04/24  0746 09/04/24  1148   POCGLU 195* 232* 177* 273*     Blood Sugar Average: Last 72 hrs:  (P) [id number]    Resume Lantus 12 U Qhs   SSI regimen     Moderate protein-calorie malnutrition (HCC)  Assessment & Plan  Malnutrition Findings:   Adult Malnutrition type: Chronic illness  Adult Degree of Malnutrition: Malnutrition of moderate degree  Consult nutrition services Malnutrition Characteristics: Muscle loss, Fat loss, Weight loss     360 Statement: related to inadequate energy intake as evidenced by 10 % weight loss last 7-8 months, depression of temples, sunken orbital, wasted interosseous. Treatment: Oral diet and nutrition supplements    BMI Findings:      Body mass index is 26.05 kg/m².       Essential hypertension  Assessment & Plan  Norvasc initially held due to labile BP/sepsis.  Will restart once appropriate     * Septic  shock (HCC)-resolved as of 8/31/2024  Assessment & Plan  Evidenced by fever, tachycardia, bandemia, and hypotension responsive to IVF  In setting of bacteremia likely due to urinary source due to chronic indwelling Rivera present on admit, evidenced by pyelitis, UA results requiring IV cefepime and transition to ceftriaxone  Bp still on the soft side, Isolyte at 75 cc per hour; will give bolus 250 cc over 1 hour  Lactic acid 0.8  1/2 blood cultures growing proteus   Urine culture growing proteus  ID following  Changed cefepime to ceftriaxone 2 grams IV  and continue vancomycin; MRSA from nares positive for MRSA   Trend cbc and fever curve               VTE Pharmacologic Prophylaxis: VTE Score: 5 Moderate Risk (Score 3-4) - Pharmacological DVT Prophylaxis Ordered: heparin.    Mobility:   Basic Mobility Inpatient Raw Score: 7  -[facility] Goal: 2: Bed activities/Dependent transfer  JH-HLM Achieved: 1: Laying in bed  JH-HLM Goal achieved. Continue to encourage appropriate mobility.    Patient Centered Rounds: I performed bedside rounds with nursing staff today.   Discussions with Specialists or Other Care Team Provider: cardiology, Urology     Education and Discussions with Family / Patient: Updated  (daughter) at bedside.    Total Time Spent on Date of Encounter in care of patient: 30 mins. This time was spent on one or more of the following: performing physical exam; counseling and coordination of care; obtaining or reviewing history; documenting in the medical record; reviewing/ordering tests, medications or procedures; communicating with other healthcare professionals and discussing with patient's family/caregivers.    Current Length of Stay: 10 day(s)  Current Patient Status: Inpatient   Certification Statement: The patient will continue to require additional inpatient hospital stay due to volume overload , UTI   Discharge Plan: Anticipate discharge in 48 hrs to TBD     Code Status: Level 1 - Full  Code    Subjective:   Patient was seen today at bedside. He is alert and oriented at baseline per family at bedside.   Patient refuses blood work. Family available for support and will come daily at 8 am for blood work. Patient continues to report scrotal pressure however improved compared to yesterday.     Otherwise no other complaints.     Objective:     Vitals:   Temp (24hrs), Av.6 °F (36.4 °C), Min:97.5 °F (36.4 °C), Max:97.7 °F (36.5 °C)    Temp:  [97.5 °F (36.4 °C)-97.7 °F (36.5 °C)] 97.6 °F (36.4 °C)  HR:  [45-69] 62  Resp:  [19-20] 19  BP: (113-131)/(61-64) 123/62  SpO2:  [94 %-98 %] 94 %  Body mass index is 24.75 kg/m².     Input and Output Summary (last 24 hours):     Intake/Output Summary (Last 24 hours) at 2024 0857  Last data filed at 2024 0622  Gross per 24 hour   Intake --   Output 2775 ml   Net -2775 ml       Physical Exam:   Physical Exam  Vitals reviewed. Exam conducted with a chaperone present.   Constitutional:       General: He is not in acute distress.     Appearance: Normal appearance.   HENT:      Head: Normocephalic and atraumatic.      Mouth/Throat:      Mouth: Mucous membranes are moist.   Eyes:      Conjunctiva/sclera: Conjunctivae normal.      Pupils: Pupils are equal, round, and reactive to light.   Cardiovascular:      Rate and Rhythm: Normal rate and regular rhythm.      Pulses: Normal pulses.           Carotid pulses are 2+ on the right side and 2+ on the left side.       Radial pulses are 2+ on the right side and 2+ on the left side.        Dorsalis pedis pulses are 2+ on the right side and 2+ on the left side.      Heart sounds: Normal heart sounds, S1 normal and S2 normal. No murmur heard.  Pulmonary:      Effort: No tachypnea, bradypnea or accessory muscle usage.      Breath sounds: Normal breath sounds and air entry. No decreased breath sounds, wheezing, rhonchi or rales.   Abdominal:      General: Abdomen is flat. Bowel sounds are normal.      Palpations: Abdomen is  soft.   Genitourinary:     Comments: Diffuse scrotal swelling noted. No overlying erythema, discharge noted.   Improved around 20 % compared to yesterday   Non tender to palpation   Musculoskeletal:      Right lower leg: Edema present.      Left lower leg: Edema present.   Neurological:      Mental Status: He is alert and oriented to person, place, and time. Mental status is at baseline.          Additional Data:     Labs:  Results from last 7 days   Lab Units 09/05/24  0820   WBC Thousand/uL 5.90   HEMOGLOBIN g/dL 7.9*   HEMATOCRIT % 26.4*   PLATELETS Thousands/uL 319   SEGS PCT % 78*   LYMPHO PCT % 11*   MONO PCT % 9   EOS PCT % 1     Results from last 7 days   Lab Units 09/03/24  0530 08/31/24  0513 08/30/24  0558   SODIUM mmol/L 138   < > 134*   POTASSIUM mmol/L 4.3   < > 4.0   CHLORIDE mmol/L 109*   < > 106   CO2 mmol/L 24   < > 18*   BUN mg/dL 22   < > 32*   CREATININE mg/dL 1.53*   < > 1.73*   ANION GAP mmol/L 5   < > 10   CALCIUM mg/dL 7.1*   < > 7.6*   ALBUMIN g/dL  --   --  2.4*   TOTAL BILIRUBIN mg/dL  --   --  0.35   ALK PHOS U/L  --   --  130*   ALT U/L  --   --  4*   AST U/L  --   --  14   GLUCOSE RANDOM mg/dL 234*   < > 334*    < > = values in this interval not displayed.         Results from last 7 days   Lab Units 09/05/24  0714 09/04/24  1957 09/04/24  1614 09/04/24  1148 09/04/24  0746 09/03/24  2045 09/03/24  1555 09/03/24  1124 09/03/24  0745 09/02/24  2104 09/02/24  1601 09/02/24  1154   POC GLUCOSE mg/dl 240* 277* 349* 273* 177* 232* 195* 294* 219* 357* 248* 198*         Results from last 7 days   Lab Units 08/30/24  0558 08/29/24 2001   PROCALCITONIN ng/ml 11.85* 13.21*       Lines/Drains:  Invasive Devices       Peripheral Intravenous Line  Duration             Peripheral IV Dorsal (posterior);Right Hand -- days              Drain  Duration             Ureteral Internal Stent Right ureter 6 Fr. 6 days    Urethral Catheter Other (Comment) 20 Fr. 6 days                  Urinary  Catheter:  Goal for removal: Voiding trial when ambulation improves               Imaging: No pertinent imaging reviewed.    Recent Cultures (last 7 days):         Last 24 Hours Medication List:   Current Facility-Administered Medications   Medication Dose Route Frequency Provider Last Rate    acetaminophen  1,000 mg Intravenous Q6H PRN JanaFRANCES Peralta 1,000 mg (08/31/24 2131)    Albumin 25%  25 g Intravenous BID (diuretic) FRANCES Mendez      And    furosemide  20 mg Intravenous BID (diuretic) FRANCES Mendez      carbidopa-levodopa  1 tablet Oral BID Jana FRANCES Dupont      cefpodoxime  400 mg Oral BID With Meals Lori Menjivar MD      ferrous sulfate  325 mg Oral Daily With Breakfast FRANCES Hubbard      heparin (porcine)  5,000 Units Subcutaneous Q8H [facility] FRANCES Hubbard      insulin glargine  12 Units Subcutaneous HS Ward Cross MD      insulin lispro  2-12 Units Subcutaneous TID AC JanaFRANCES Peralta      insulin lispro  2-12 Units Subcutaneous HS JanaFRANCES Peralta      levothyroxine  25 mcg Oral Early Morning Jana FRANCES Dupont      mupirocin   Nasal Q12H [facility] Ward Cross MD      pantoprazole  40 mg Oral Early Morning JanaFRANCES Peralta          Today, Patient Was Seen By: Ward Cross MD    **Please Note: This note may have been constructed using a voice recognition system.**

## 2024-09-05 NOTE — PHYSICAL THERAPY NOTE
PT TREATMENT       09/05/24 1304   PT Last Visit   PT Visit Date 09/05/24   Note Type   Note Type Treatment   Pain Assessment   Pain Assessment Tool 0-10   Pain Score No Pain  (minimal to no pain at rest ; severe pain to touch/mobility)   Pain Location/Orientation Orientation: Bilateral;Location: Leg   Pain Onset/Description Descriptor: Sore   Patient's Stated Pain Goal No pain   Hospital Pain Intervention(s) Repositioned;Ambulation/increased activity   Multiple Pain Sites No   Restrictions/Precautions   Weight Bearing Precautions Per Order No   Other Precautions Bed Alarm;Chair Alarm;Fall Risk;Pain   General   Chart Reviewed Yes   Family/Caregiver Present No   Cognition   Overall Cognitive Status Impaired   Arousal/Participation Cooperative   Orientation Level Oriented to person;Disoriented to time   Following Commands Follows one step commands with increased time or repetition   Subjective   Subjective Pt agreeable to PT session with verbal encouragement   Bed Mobility   Supine to Sit 1  Dependent   Additional items Assist x 2   Sit to Supine 1  Dependent   Additional items Assist x 2   Additional Comments Able to tolerate sitting for approx 2-3 minutes with poor- to zero sitting balance ; significant posterior lean, dependent to maintain sitting position   Transfers   Sit to Stand Unable to assess   Stand to Sit Unable to assess   Balance   Static Sitting Zero   Activity Tolerance   Activity Tolerance Patient limited by fatigue;Patient limited by pain   Nurse Made Aware yes RN Dileep   Exercises   Neuro re-ed Able to perform supine/seated exercise including ankle pumps, quad sets, LAQ x 5-10 reps bilat  (limited flexion ROM at B knees in sitting, increased B knee pain with attempts to better position bilat feet on floor)   Assessment   Prognosis Good   Problem List Decreased strength;Decreased endurance;Impaired balance;Decreased mobility;Pain;Decreased safety awareness;Impaired judgement   Assessment Pt seen  for PT session this afternoon. Pt dependent for bed mobility x 2 person assist + requires Max A/Dep to maintain sitting at EOB for approx 2-3 minutes. Pt with minimal participation in exericse with verbal encouragement. Repositioned for comfort with bed in chair position with all needs within reach.  The patient's AM-PAC Basic Mobility Inpatient Short Form Raw Score is 6. A Raw score of less than or equal to 16 suggests the patient may benefit from discharge to post-acute rehabilitation services. Please also refer to the recommendation of the Physical Therapist for safe discharge planning.     Goals   Patient Goals none stated   Plan   Treatment/Interventions ADL retraining;Functional transfer training;LE strengthening/ROM;Therapeutic exercise;Endurance training;Bed mobility;Gait training;Spoke to nursing;OT   Progress Slow progress, decreased activity tolerance   PT Frequency 2-3x/wk  (Decrease in frequency ; pt mostly bed bound at baseline with minimal use of Don lift for OOB ; will continue to trial pt's ability to participate with PT)   Discharge Recommendation   Rehab Resource Intensity Level, PT III (Minimum Resource Intensity)   AM-PAC Basic Mobility Inpatient   Turning in Flat Bed Without Bedrails 1   Lying on Back to Sitting on Edge of Flat Bed Without Bedrails 1   Moving Bed to Chair 1   Standing Up From Chair Using Arms 1   Walk in Room 1   Climb 3-5 Stairs With Railing 1   Basic Mobility Inpatient Raw Score 6   Turning Head Towards Sound 3   Follow Simple Instructions 3   Low Function Basic Mobility Raw Score  12   Low Function Basic Mobility Standardized Score  18.33   [facility] Highest Level Of Mobility   JH-HLM Goal 2: Bed activities/Dependent transfer   JH-HLM Achieved 3: Sit at edge of bed   End of Consult   Patient Position at End of Consult Supine;Bed/Chair alarm activated;All needs within reach   Licensure   [place] License Number  Mari Booth [id number]

## 2024-09-05 NOTE — PLAN OF CARE
Problem: Prexisting or High Potential for Compromised Skin Integrity  Goal: Skin integrity is maintained or improved  Description: INTERVENTIONS:  - Identify patients at risk for skin breakdown  - Assess and monitor skin integrity  - Assess and monitor nutrition and hydration status  - Monitor labs   - Assess for incontinence   - Turn and reposition patient  - Assist with mobility/ambulation  - Relieve pressure over bony prominences  - Avoid friction and shearing  - Provide appropriate hygiene as needed including keeping skin clean and dry  - Evaluate need for skin moisturizer/barrier cream  - Collaborate with interdisciplinary team   - Patient/family teaching  - Consider wound care consult   Outcome: Progressing     Problem: GENITOURINARY - ADULT  Goal: Maintains or returns to baseline urinary function  Description: INTERVENTIONS:  - Assess urinary function  - Encourage oral fluids to ensure adequate hydration if ordered  - Administer IV fluids as ordered to ensure adequate hydration  - Administer ordered medications as needed  - Offer frequent toileting  - Follow urinary retention protocol if ordered  Outcome: Progressing     Problem: GENITOURINARY - ADULT  Goal: Urinary catheter remains patent  Description: INTERVENTIONS:  - Assess patency of urinary catheter  - If patient has a chronic yang, consider changing catheter if non-functioning  - Follow guidelines for intermittent irrigation of non-functioning urinary catheter  Outcome: Progressing     Problem: METABOLIC, FLUID AND ELECTROLYTES - ADULT  Goal: Electrolytes maintained within normal limits  Description: INTERVENTIONS:  - Monitor labs and assess patient for signs and symptoms of electrolyte imbalances  - Administer electrolyte replacement as ordered  - Monitor response to electrolyte replacements, including repeat lab results as appropriate  - Instruct patient on fluid and nutrition as appropriate  Outcome: Progressing     Problem: METABOLIC, FLUID AND  ELECTROLYTES - ADULT  Goal: Fluid balance maintained  Description: INTERVENTIONS:  - Monitor labs   - Monitor I/O and WT  - Instruct patient on fluid and nutrition as appropriate  - Assess for signs & symptoms of volume excess or deficit  Outcome: Progressing     Problem: SKIN/TISSUE INTEGRITY - ADULT  Goal: Skin Integrity remains intact(Skin Breakdown Prevention)  Description: Assess:  -Perform Sushil assessment every 12  -Clean and moisturize skin every 12  -Inspect skin when repositioning, toileting, and assisting with ADLS    -Assess extremities for adequate circulation and sensation     Bed Management:  -Have minimal linens on bed & keep smooth, unwrinkled  -Change linens as needed when moist or perspiring  -Avoid sitting or lying in one position for more than 2 hours while in bed  -Keep HOB at 30degrees     Toileting:  -Offer bedside commode  -Assess for incontinence every 2  -Use incontinent care products after each incontinent episode such as foam    Activity:  -Mobilize patient 3 times a day  -Encourage activity   -Encourage or provide ROM exercises   -Turn and reposition patient every 2 Hours  -Use appropriate equipment to lift or move patient in bed  -Instruct/ Assist with weight shifting every 45 when out of bed in chair  -Consider limitation of chair time 3 hour intervals    Skin Care:  -Avoid use of baby powder, tape, friction and shearing, hot water or constrictive clothing  -Relieve pressure over bony prominences using foam  -Do not massage red bony areas    Next Steps:  -Teach patient strategies to minimize risks such as repositioning   -Consider consults to  interdisciplinary teams such as PT  Outcome: Progressing     Problem: HEMATOLOGIC - ADULT  Goal: Maintains hematologic stability  Description: INTERVENTIONS  - Assess for signs and symptoms of bleeding or hemorrhage  - Monitor labs  - Administer supportive blood products/factors as ordered and appropriate  Outcome: Progressing     Problem:  MUSCULOSKELETAL - ADULT  Goal: Maintain or return mobility to safest level of function  Description: INTERVENTIONS:  - Assess patient's ability to carry out ADLs; assess patient's baseline for ADL function and identify physical deficits which impact ability to perform ADLs (bathing, care of mouth/teeth, toileting, grooming, dressing, etc.)  - Assess/evaluate cause of self-care deficits   - Assess range of motion  - Assess patient's mobility  - Assess patient's need for assistive devices and provide as appropriate  - Encourage maximum independence but intervene and supervise when necessary  - Involve family in performance of ADLs  - Assess for home care needs following discharge   - Consider OT consult to assist with ADL evaluation and planning for discharge  - Provide patient education as appropriate  Outcome: Progressing     Problem: PAIN - ADULT  Goal: Verbalizes/displays adequate comfort level or baseline comfort level  Description: Interventions:  - Encourage patient to monitor pain and request assistance  - Assess pain using appropriate pain scale  - Administer analgesics based on type and severity of pain and evaluate response  - Implement non-pharmacological measures as appropriate and evaluate response  - Consider cultural and social influences on pain and pain management  - Notify physician/advanced practitioner if interventions unsuccessful or patient reports new pain  Outcome: Progressing     Problem: INFECTION - ADULT  Goal: Absence or prevention of progression during hospitalization  Description: INTERVENTIONS:  - Assess and monitor for signs and symptoms of infection  - Monitor lab/diagnostic results  - Monitor all insertion sites, i.e. indwelling lines, tubes, and drains  - Monitor endotracheal if appropriate and nasal secretions for changes in amount and color  - Earp appropriate cooling/warming therapies per order  - Administer medications as ordered  - Instruct and encourage patient and family  to use good hand hygiene technique  - Identify and instruct in appropriate isolation precautions for identified infection/condition  Outcome: Progressing     Problem: INFECTION - ADULT  Goal: Absence of fever/infection during neutropenic period  Description: INTERVENTIONS:  - Monitor WBC    Outcome: Progressing     Problem: SAFETY ADULT  Goal: Patient will remain free of falls  Description: INTERVENTIONS:  - Educate patient/family on patient safety including physical limitations  - Instruct patient to call for assistance with activity   - Consult OT/PT to assist with strengthening/mobility   - Keep Call bell within reach  - Keep bed low and locked with side rails adjusted as appropriate  - Keep care items and personal belongings within reach  - Initiate and maintain comfort rounds  - Make Fall Risk Sign visible to staff  - Offer Toileting every 2 Hours, in advance of need  - Initiate/Maintain bed alarm  - Obtain necessary fall risk management equipment: alarm  - Apply yellow socks and bracelet for high fall risk patients  - Consider moving patient to room near nurses station  Outcome: Progressing

## 2024-09-05 NOTE — CASE MANAGEMENT
Case Management Progress Note    Patient name Chris Bojorquez  Location [address] [address]/[address]-* MRN [id number]  : 1945 Date 2024       LOS (days): 10  Geometric Mean LOS (GMLOS) (days): 5.9  Days to GMLOS:-3.9        OBJECTIVE:        Current admission status: Inpatient  Preferred Pharmacy:   PATIENT/FAMILY REPORTS NO PREFERRED PHARMACY  No address on file      Primary Care Provider: No primary care provider on file.    Primary Insurance: MEDICARE  Secondary Insurance: [employer] [facility]    PROGRESS NOTE:  Patient discussed in weekly LOS meeting. D/C plan remains transfer to [facility] ( SNF ) when patient is medically cleared.  Assigned CM will continue to follow for same.

## 2024-09-05 NOTE — SPEECH THERAPY NOTE
Speech Language/Pathology    Speech/Language Pathology Progress Note    Patient Name: Chris Bojorquez  Today's Date: 9/5/2024    Subjective:  Patients daughter in law present at bedside. She reports he is on a baseline diet of regular/thin and brought him regular solids last night. QIANA is reportedly improved to baseline.     Objective:  Patient seen semi-upright in bed ( refused to sit all the way up due to back pain). He consumed 1/2 a turkey sandwich with lettuce and thin liquids. Mastication was prolonged/min reduced but eventually adequate appearing with the materials administered today.  Bolus formation and transfer were delayed but functional with no significant oral residue noted.  No overt s/s reduced oral control. Swallowing initiation appeared prompt.  Laryngeal rise was palpated and judged to be within functional limits.  No coughing, throat clearing, change in vocal quality or respiratory status noted today.     Education was provided to the patient and his family on recommendations/aspiration precautions as well as diet options. Reciprocal comprehension was verbally expressed. The patient reports he would prefer a chopped diet at this time.    Assessment:  Patient with improvement in QIANA and oral dysphagia- continues to present with no sxs of pharyngeal dysphagia however mild risk due to poor positioning.    Plan/Recommendations:  Consider dysphagia 3/thin liquids  Meds as tolerated    Encourage upright position with all po intake    Will f/u    Princess Faustin M.S., CCC-SLP  Speech Language Pathologist   Available via Secure Chat  [place] #[id number]  [place] #[id number]

## 2024-09-05 NOTE — ASSESSMENT & PLAN NOTE
Lab Results   Component Value Date    HGBA1C 14.6 (H) 08/26/2024       Recent Labs     09/04/24  1148 09/04/24  1614 09/04/24 1957 09/05/24  0714   POCGLU 273* 349* 277* 240*       Blood Sugar Average: Last 72 hrs:  (P) 246.1717123298512469    Needs better blood sugar management  currently on insulin  managed per primary team

## 2024-09-05 NOTE — OCCUPATIONAL THERAPY NOTE
Occupational Therapy Progress Note     Patient Name: Chris Bojorquez  Today's Date: 9/5/2024  Problem List  Active Problems:    Essential hypertension    Emphysematous pyelitis    Moderate protein-calorie malnutrition (HCC)    Uncontrolled type 2 diabetes mellitus with hyperglycemia (HCC)    PABLO (acute kidney injury) (HCC)    Hypothyroidism    Parkinson disease    Bacteremia due to Proteus species    Gastroesophageal reflux disease    Anemia    Scrotal swelling    Venous stasis    Volume overload    History of recurrent UTIs    Failure to thrive in adult       09/05/24 1307   OT Last Visit   OT Visit Date 09/05/24  (Thursday)   Note Type   Note Type Treatment  (tx session [id number])   Pain Assessment   Pain Assessment Tool FLACC   Pain Location/Orientation Orientation: Bilateral;Location: Leg   Pain Onset/Description   (touch / movement)   Effect of Pain on Daily Activities limits activity tolerance during ADLs and sitting tolerance   Patient's Stated Pain Goal No pain   Hospital Pain Intervention(s) Repositioned;Ambulation/increased activity;Emotional support   Multiple Pain Sites Yes   Pain 2   Pain Location/Orientation 2 Location: Groin  (upon sitting at EOB)   Restrictions/Precautions   Other Precautions Contact/isolation;Cognitive;Bed Alarm;O2;Fall Risk;Pain;Multiple lines   Lifestyle   Autonomy Pt needs assistance w/ ADL/ IADL at baseline from staff at [facility]   Reciprocal Relationships Supoprtive staff at [facility]   ADL   Where Assessed Supine, bed  (upright partial chair position)   Eating Assistance 5  Supervision/Setup   Eating Deficit Increased time to complete;Supervision/safety  (able to feed self using R UE w/ + time after set- up)   Eating Comments upirght in partial chair position   Grooming Assistance 5  Supervision/Setup   Grooming Deficit Setup;Supervision/safety;Verbal cueing;Increased time to complete   Grooming Comments supine HOB elevated to wipe / blow nose w/ + time and encouragement to actively  "participate   UB Dressing Assistance 1  Total Assistance   UB Dressing Deficit Setup;Steadying;Verbal cueing;Supervision/safety;Increased time to complete   UB Dressing Comments seated at EOB to manage gown up and over shoulders; around back   LB Dressing Assistance Unable to assess   Bed Mobility   Supine to Sit 1  Dependent   Additional items Assist x 2;Increased time required;Verbal cues;LE management;HOB elevated  (to pt's L)   Sit to Supine 1  Dependent   Additional items Assist x 2;Increased time required;Verbal cues;LE management   Additional Comments Pt tolerated sitting at EOB approx 3 minutes w/ max A <> total A to maintain sitting balance at EOB. Pt upright in partial chair position in bed at end of session w/ needs met, call bell in reach and bed alarm activated eating lunch   Transfers   Sit to Stand Unable to assess   Stand to Sit Unable to assess   Stand pivot Unable to assess   Functional Mobility   Additional Comments Will continue to assess as appropriate. Pt required total A at baseilne OOB to w/c   Subjective   Subjective \"My groin is hurting\" (stated seated at EOB)   Cognition   Overall Cognitive Status Impaired   Arousal/Participation Cooperative   Attention Attends with cues to redirect   Orientation Level Oriented to person;Oriented to place   Memory Decreased short term memory;Decreased recall of recent events   Following Commands Follows one step commands with increased time or repetition   Comments Identified pt by full name and wristband.   Activity Tolerance   Activity Tolerance Patient limited by fatigue;Patient limited by pain   Medical Staff Made Aware care coordination w/ PTMari due to decreased activity tolerance, regression from baseline, and skilled physical assistance required.   Assessment   Assessment Pt seen for skilled OT tx session focusing on activity engagement, pt education and challenging activity tolerance. Pt agreeable to participate and attempt to sit at EOB. Pt " tolerated sitting at EOB approx three minutes w/ max A <> total A to maintain balance. Pt demonstrated improved activity tolerance this afternoon and agreeable to chair position in bed. Pt participate in grooming / feeding upright in partial chair position. Pt continues to demonstrate generalized weakness / deconditioning, decreased activity tolerance, decreased sitting tolerance. Pt would benefit from continued OT In acute care. Will continue to follow.   Plan   Treatment Interventions ADL retraining;Functional transfer training;UE strengthening/ROM;Endurance training;Equipment evaluation/education;Patient/family training;Continued evaluation;Energy conservation;Activityengagement   Goal Expiration Date 09/11/24   OT Treatment Day 1  (Thursday 9/5/24)   OT Frequency 1-2x/wk   Discharge Recommendation   Rehab Resource Intensity Level, OT II (Moderate Resource Intensity)   AM-PAC Daily Activity Inpatient   Lower Body Dressing 1   Bathing 1   Toileting 1   Upper Body Dressing 1   Grooming 2   Eating 3   Daily Activity Raw Score 9   Turning Head Towards Sound 4   Follow Simple Instructions 2   Low Function Daily Activity Raw Score 15   Low Function Daily Activity Standardized Score  26.28   AM-PAC Applied Cognition Inpatient   Following a Speech/Presentation 2   Understanding Ordinary Conversation 3   Taking Medications 1   Remembering Where Things Are Placed or Put Away 2   Remembering List of 4-5 Errands 1   Taking Care of Complicated Tasks 1   Applied Cognition Raw Score 10   Applied Cognition Standardized Score 24.98   Barthel Index   Feeding 5   Bathing 0   Grooming Score 0   Dressing Score 0   Bladder Score 0   Bowels Score 0   Toilet Use Score 0   Transfers (Bed/Chair) Score 0   Mobility (Level Surface) Score 0   Stairs Score 0   Barthel Index Score 5   End of Consult   Education Provided Yes   Patient Position at End of Consult Supine;Bed/Chair alarm activated;All needs within reach   Nurse Communication Nurse  aware of consult   Licensure   [place] License Number  Barbra Bayrosales, OTR/L [id number]        The patient's raw score on the AM-PAC Daily Activity Inpatient Short Form is 9. A raw score of less than 19 suggests the patient may benefit from discharge to post-acute rehabilitation services. Please refer to the recommendation of the Occupational Therapist for safe discharge planning.    Barbra Baker, OTR/L  [id number]  [id number]

## 2024-09-05 NOTE — PROGRESS NOTES
[facility]  Progress Note  Name: Chris Bojorquez I  MRN: [id number]  Unit/Bed#: 4 [address] I Date of Admission: 8/26/2024   Date of Service: 9/5/2024 I Hospital Day: 10    Assessment & Plan   Volume overload  Assessment & Plan  Patient with significant scrotal edema and mild total body edema  primary team was having difficulty diuresing patient as it was causing hypotension  9/4/2024 started diuresing patient with albumin 25%, 25 g one half hour prior to Lasix 20 mg IV on BID basis with improvement in his generalized edema.  Continue monitor I and O, daily weights and labs    Bacteremia due to Proteus species  Assessment & Plan  Followed by infectious disease  on Vantin 400 mg BID    Uncontrolled type 2 diabetes mellitus with hyperglycemia (HCC)  Assessment & Plan  Lab Results   Component Value Date    HGBA1C 14.6 (H) 08/26/2024       Recent Labs     09/04/24  1148 09/04/24  1614 09/04/24  1957 09/05/24  0714   POCGLU 273* 349* 277* 240*       Blood Sugar Average: Last 72 hrs:  (P) 246.5129939643044936    Needs better blood sugar management  currently on insulin  managed per primary team      Anemia  Assessment & Plan  Secondary to chronic disease    Parkinson disease  Assessment & Plan  On Sinemet 25/100 mg BID    History of recurrent UTIs  Assessment & Plan  Patient with chronic Rivera    Moderate protein-calorie malnutrition (HCC)  Assessment & Plan  Malnutrition Findings:   Adult Malnutrition type: Chronic illness  Adult Degree of Malnutrition: Malnutrition of moderate degree  Malnutrition Characteristics: Muscle loss, Fat loss, Weight loss      admission albumin level was 2 with total protein of 5.6.  Poor PO intake  discussed with family following up for protein supplements in the outpatient setting            360 Statement: related to inadequate energy intake as evidenced by 10 % weight loss last 7-8 months, depression of temples, sunken orbital, wasted interosseous. Treatment:  "Oral diet and nutrition supplements    BMI Findings:           Body mass index is 24.75 kg/m².       Failure to thrive in adult  Assessment & Plan  Patient with poor appetite and low protein level              Subjective/Objective   Subjective: Patient seen and examined. Skin integrity and turgor appears to be improved and he is diuresis well on current albumin and Lasix cocktail. Continue current plan of care for another 24 hours and will reevaluate.    Objective:   Vitals: /62   Pulse 62   Temp 97.6 °F (36.4 °C)   Resp 19   Ht 5' 9\" (1.753 m)   Wt 76 kg (167 lb 9.6 oz)   SpO2 94%   BMI 24.75 kg/m²   Vitals:    09/04/24 0600 09/05/24 0600   Weight: 80 kg (176 lb 5.9 oz) 76 kg (167 lb 9.6 oz)     Orthostatic Blood Pressures      Flowsheet Row Most Recent Value   Blood Pressure 123/62 filed at 09/05/2024 0755   Patient Position - Orthostatic VS Lying filed at 09/02/2024 0352              Intake/Output Summary (Last 24 hours) at 9/5/2024 0923  Last data filed at 9/5/2024 0622  Gross per 24 hour   Intake --   Output 2775 ml   Net -2775 ml       Invasive Devices       Peripheral Intravenous Line  Duration             Peripheral IV Dorsal (posterior);Right Hand -- days              Drain  Duration             Ureteral Internal Stent Right ureter 6 Fr. 6 days    Urethral Catheter Other (Comment) 20 Fr. 6 days                  Physical Exam:   Physical Exam  Vitals and nursing note reviewed.   Constitutional:       Appearance: Normal appearance. He is normal weight. He is ill-appearing (Chronically).   HENT:      Right Ear: External ear normal.      Left Ear: External ear normal.   Eyes:      General: No scleral icterus.        Right eye: No discharge.         Left eye: No discharge.   Cardiovascular:      Rate and Rhythm: Normal rate and regular rhythm.      Pulses: Normal pulses.      Heart sounds: Murmur heard.   Pulmonary:      Effort: Pulmonary effort is normal. No respiratory distress.      Breath " sounds: Normal breath sounds.   Abdominal:      General: Bowel sounds are normal. There is no distension.      Palpations: Abdomen is soft.   Musculoskeletal:      Right lower leg: No edema.      Left lower leg: No edema.   Skin:     General: Skin is warm and dry.      Capillary Refill: Capillary refill takes less than 2 seconds.   Neurological:      General: No focal deficit present.      Mental Status: He is alert and oriented to person, place, and time. Mental status is at baseline.   Psychiatric:         Mood and Affect: Mood normal.          Lab Results: I have personally reviewed pertinent lab results.    CBC with diff:   Results from last 7 days   Lab Units 09/05/24  0820   WBC Thousand/uL 5.90   RBC Million/uL 3.16*   HEMOGLOBIN g/dL 7.9*   HEMATOCRIT % 26.4*   MCV fL 84   MCH pg 25.0*   MCHC g/dL 29.9*   RDW % 17.3*   MPV fL 9.5   PLATELETS Thousands/uL 319     CMP:   Results from last 7 days   Lab Units 09/05/24  0820 08/31/24  0513 08/30/24  0558   SODIUM mmol/L 136   < > 134*   CHLORIDE mmol/L 99   < > 106   CO2 mmol/L 30   < > 18*   BUN mg/dL 22   < > 32*   CREATININE mg/dL 1.29   < > 1.73*   CALCIUM mg/dL 7.9*   < > 7.6*   AST U/L  --   --  14   ALT U/L  --   --  4*   ALK PHOS U/L  --   --  130*   EGFR ml/min/1.73sq m 52   < > 36    < > = values in this interval not displayed.     HS Troponin:   0   Lab Value Date/Time    HSTNI0 10 08/26/2024 1256    HSTNI2 9 08/26/2024 1525     BNP:   Results from last 7 days   Lab Units 09/05/24  0820   POTASSIUM mmol/L 4.2   CHLORIDE mmol/L 99   CO2 mmol/L 30   BUN mg/dL 22   CREATININE mg/dL 1.29   CALCIUM mg/dL 7.9*   EGFR ml/min/1.73sq m 52       Magnesium:   Results from last 7 days   Lab Units 09/03/24  0530   MAGNESIUM mg/dL 2.2     Lipid Profile:     Imaging: I have personally reviewed pertinent reports.      VTE Mechanical Prophylaxis: sequential compression device    Linette DANIELS  Cardiology

## 2024-09-05 NOTE — ASSESSMENT & PLAN NOTE
Patient with significant scrotal edema and mild total body edema  primary team was having difficulty diuresing patient as it was causing hypotension  9/4/2024 started diuresing patient with albumin 25%, 25 g one half hour prior to Lasix 20 mg IV on BID basis with improvement in his generalized edema.  Continue monitor I and O, daily weights and labs

## 2024-09-05 NOTE — PLAN OF CARE
Problem: Prexisting or High Potential for Compromised Skin Integrity  Goal: Skin integrity is maintained or improved  Description: INTERVENTIONS:  - Identify patients at risk for skin breakdown  - Assess and monitor skin integrity  - Assess and monitor nutrition and hydration status  - Monitor labs   - Assess for incontinence   - Turn and reposition patient  - Assist with mobility/ambulation  - Relieve pressure over bony prominences  - Avoid friction and shearing  - Provide appropriate hygiene as needed including keeping skin clean and dry  - Evaluate need for skin moisturizer/barrier cream  - Collaborate with interdisciplinary team   - Patient/family teaching  - Consider wound care consult   Outcome: Progressing     Problem: GENITOURINARY - ADULT  Goal: Maintains or returns to baseline urinary function  Description: INTERVENTIONS:  - Assess urinary function  - Encourage oral fluids to ensure adequate hydration if ordered  - Administer IV fluids as ordered to ensure adequate hydration  - Administer ordered medications as needed  - Offer frequent toileting  - Follow urinary retention protocol if ordered  Outcome: Progressing  Goal: Urinary catheter remains patent  Description: INTERVENTIONS:  - Assess patency of urinary catheter  - If patient has a chronic yang, consider changing catheter if non-functioning  - Follow guidelines for intermittent irrigation of non-functioning urinary catheter  Outcome: Progressing     Problem: METABOLIC, FLUID AND ELECTROLYTES - ADULT  Goal: Electrolytes maintained within normal limits  Description: INTERVENTIONS:  - Monitor labs and assess patient for signs and symptoms of electrolyte imbalances  - Administer electrolyte replacement as ordered  - Monitor response to electrolyte replacements, including repeat lab results as appropriate  - Instruct patient on fluid and nutrition as appropriate  Outcome: Progressing  Goal: Fluid balance maintained  Description: INTERVENTIONS:  -  Monitor labs   - Monitor I/O and WT  - Instruct patient on fluid and nutrition as appropriate  - Assess for signs & symptoms of volume excess or deficit  Outcome: Progressing  Goal: Glucose maintained within target range  Description: INTERVENTIONS:  - Monitor Blood Glucose as ordered  - Assess for signs and symptoms of hyperglycemia and hypoglycemia  - Administer ordered medications to maintain glucose within target range  - Assess nutritional intake and initiate nutrition service referral as needed  Outcome: Progressing     Problem: SAFETY ADULT  Goal: Patient will remain free of falls  Description: INTERVENTIONS:  - Educate patient/family on patient safety including physical limitations  - Instruct patient to call for assistance with activity   - Consult OT/PT to assist with strengthening/mobility   - Keep Call bell within reach  - Keep bed low and locked with side rails adjusted as appropriate  - Keep care items and personal belongings within reach  - Initiate and maintain comfort rounds  - Make Fall Risk Sign visible to staff  - Offer Toileting every 2 Hours, in advance of need  - Initiate/Maintain bed alarm  - Obtain necessary fall risk management equipment: alarm  - Apply yellow socks and bracelet for high fall risk patients  - Consider moving patient to room near nurses station  Outcome: Progressing     Problem: Nutrition/Hydration-ADULT  Goal: Nutrient/Hydration intake appropriate for improving, restoring or maintaining nutritional needs  Description: Monitor and assess patient's nutrition/hydration status for malnutrition. Collaborate with interdisciplinary team and initiate plan and interventions as ordered.  Monitor patient's weight and dietary intake as ordered or per policy. Utilize nutrition screening tool and intervene as necessary. Determine patient's food preferences and provide high-protein, high-caloric foods as appropriate.     INTERVENTIONS:  - Monitor oral intake, urinary output, labs, and  treatment plans  - Assess nutrition and hydration status and recommend course of action  - Evaluate amount of meals eaten  - Assist patient with eating if necessary   - Allow adequate time for meals  - Recommend/ encourage appropriate diets, oral nutritional supplements, and vitamin/mineral supplements  - Assess need for intravenous fluids  - Provide specific nutrition/hydration education as appropriate  - Include patient/family/caregiver in decisions related to nutrition  Outcome: Progressing

## 2024-09-05 NOTE — ASSESSMENT & PLAN NOTE
Malnutrition Findings:   Adult Malnutrition type: Chronic illness  Adult Degree of Malnutrition: Malnutrition of moderate degree  Malnutrition Characteristics: Muscle loss, Fat loss, Weight loss      admission albumin level was 2 with total protein of 5.6.  Poor PO intake  discussed with family following up for protein supplements in the outpatient setting            360 Statement: related to inadequate energy intake as evidenced by 10 % weight loss last 7-8 months, depression of temples, sunken orbital, wasted interosseous. Treatment: Oral diet and nutrition supplements    BMI Findings:           Body mass index is 24.75 kg/m².

## 2024-09-05 NOTE — PLAN OF CARE
Problem: OCCUPATIONAL THERAPY ADULT  Goal: Performs self-care activities at highest level of function for planned discharge setting.  See evaluation for individualized goals.  Description: Treatment Interventions: ADL retraining, Functional transfer training, UE strengthening/ROM, Endurance training, Cognitive reorientation, Patient/family training, Equipment evaluation/education, Activityengagement, Compensatory technique education          See flowsheet documentation for full assessment, interventions and recommendations.   Outcome: Progressing  Note: Limitation: Decreased ADL status, Decreased UE strength, Decreased Safe judgement during ADL, Decreased endurance, Decreased self-care trans, Decreased high-level ADLs, Decreased cognition, Decreased UE ROM (decreased balance)  Prognosis: Good  Assessment: Pt seen for skilled OT tx session focusing on activity engagement, pt education and challenging activity tolerance. Pt agreeable to participate and attempt to sit at EOB. Pt tolerated sitting at EOB approx three minutes w/ max A <> total A to maintain balance. Pt demonstrated improved activity tolerance this afternoon and agreeable to chair position in bed. Pt participate in grooming / feeding upright in partial chair position. Pt continues to demonstrate generalized weakness / deconditioning, decreased activity tolerance, decreased sitting tolerance. Pt would benefit from continued OT In acute care. Will continue to follow.     Rehab Resource Intensity Level, OT: II (Moderate Resource Intensity)

## 2024-09-06 LAB
ANION GAP SERPL CALCULATED.3IONS-SCNC: 5 MMOL/L (ref 4–13)
BASOPHILS # BLD AUTO: 0.01 THOUSANDS/ÂΜL (ref 0–0.1)
BASOPHILS NFR BLD AUTO: 0 % (ref 0–1)
BUN SERPL-MCNC: 29 MG/DL (ref 5–25)
CALCIUM SERPL-MCNC: 7.7 MG/DL (ref 8.4–10.2)
CHLORIDE SERPL-SCNC: 99 MMOL/L (ref 96–108)
CO2 SERPL-SCNC: 31 MMOL/L (ref 21–32)
CREAT SERPL-MCNC: 1.37 MG/DL (ref 0.6–1.3)
EOSINOPHIL # BLD AUTO: 0.04 THOUSAND/ÂΜL (ref 0–0.61)
EOSINOPHIL NFR BLD AUTO: 1 % (ref 0–6)
ERYTHROCYTE [DISTWIDTH] IN BLOOD BY AUTOMATED COUNT: 17.2 % (ref 11.6–15.1)
GFR SERPL CREATININE-BSD FRML MDRD: 49 ML/MIN/1.73SQ M
GLUCOSE SERPL-MCNC: 225 MG/DL (ref 65–140)
GLUCOSE SERPL-MCNC: 244 MG/DL (ref 65–140)
GLUCOSE SERPL-MCNC: 275 MG/DL (ref 65–140)
GLUCOSE SERPL-MCNC: 309 MG/DL (ref 65–140)
GLUCOSE SERPL-MCNC: 313 MG/DL (ref 65–140)
HCT VFR BLD AUTO: 24.4 % (ref 36.5–49.3)
HGB BLD-MCNC: 7.4 G/DL (ref 12–17)
IMM GRANULOCYTES # BLD AUTO: 0.05 THOUSAND/UL (ref 0–0.2)
IMM GRANULOCYTES NFR BLD AUTO: 1 % (ref 0–2)
LYMPHOCYTES # BLD AUTO: 0.76 THOUSANDS/ÂΜL (ref 0.6–4.47)
LYMPHOCYTES NFR BLD AUTO: 11 % (ref 14–44)
MCH RBC QN AUTO: 25.1 PG (ref 26.8–34.3)
MCHC RBC AUTO-ENTMCNC: 30.3 G/DL (ref 31.4–37.4)
MCV RBC AUTO: 83 FL (ref 82–98)
MONOCYTES # BLD AUTO: 0.57 THOUSAND/ÂΜL (ref 0.17–1.22)
MONOCYTES NFR BLD AUTO: 8 % (ref 4–12)
NEUTROPHILS # BLD AUTO: 5.78 THOUSANDS/ÂΜL (ref 1.85–7.62)
NEUTS SEG NFR BLD AUTO: 79 % (ref 43–75)
NRBC BLD AUTO-RTO: 0 /100 WBCS
PLATELET # BLD AUTO: 336 THOUSANDS/UL (ref 149–390)
PMV BLD AUTO: 9.1 FL (ref 8.9–12.7)
POTASSIUM SERPL-SCNC: 4.4 MMOL/L (ref 3.5–5.3)
RBC # BLD AUTO: 2.95 MILLION/UL (ref 3.88–5.62)
SODIUM SERPL-SCNC: 135 MMOL/L (ref 135–147)
WBC # BLD AUTO: 7.21 THOUSAND/UL (ref 4.31–10.16)

## 2024-09-06 PROCEDURE — 99232 SBSQ HOSP IP/OBS MODERATE 35: CPT | Performed by: INTERNAL MEDICINE

## 2024-09-06 PROCEDURE — 80048 BASIC METABOLIC PNL TOTAL CA: CPT

## 2024-09-06 PROCEDURE — 82948 REAGENT STRIP/BLOOD GLUCOSE: CPT

## 2024-09-06 PROCEDURE — 85025 COMPLETE CBC W/AUTO DIFF WBC: CPT

## 2024-09-06 PROCEDURE — 99232 SBSQ HOSP IP/OBS MODERATE 35: CPT

## 2024-09-06 RX ORDER — INSULIN GLARGINE 100 [IU]/ML
14 INJECTION, SOLUTION SUBCUTANEOUS
Status: DISCONTINUED | OUTPATIENT
Start: 2024-09-06 | End: 2024-09-07

## 2024-09-06 RX ORDER — SENNOSIDES 8.6 MG
1 TABLET ORAL 2 TIMES DAILY
Status: DISCONTINUED | OUTPATIENT
Start: 2024-09-06 | End: 2024-09-10 | Stop reason: HOSPADM

## 2024-09-06 RX ADMIN — Medication 1 TABLET: at 07:59

## 2024-09-06 RX ADMIN — FUROSEMIDE 20 MG: 10 INJECTION, SOLUTION INTRAMUSCULAR; INTRAVENOUS at 08:00

## 2024-09-06 RX ADMIN — LEVOTHYROXINE SODIUM 25 MCG: 25 TABLET ORAL at 05:49

## 2024-09-06 RX ADMIN — HEPARIN SODIUM 5000 UNITS: 5000 INJECTION, SOLUTION INTRAVENOUS; SUBCUTANEOUS at 21:10

## 2024-09-06 RX ADMIN — CEFPODOXIME PROXETIL 400 MG: 200 TABLET, FILM COATED ORAL at 15:58

## 2024-09-06 RX ADMIN — PANTOPRAZOLE SODIUM 40 MG: 40 TABLET, DELAYED RELEASE ORAL at 05:49

## 2024-09-06 RX ADMIN — INSULIN LISPRO 8 UNITS: 100 INJECTION, SOLUTION INTRAVENOUS; SUBCUTANEOUS at 12:05

## 2024-09-06 RX ADMIN — CARBIDOPA AND LEVODOPA 1 TABLET: 25; 100 TABLET ORAL at 09:17

## 2024-09-06 RX ADMIN — MUPIROCIN 1 APPLICATION: 20 OINTMENT TOPICAL at 21:10

## 2024-09-06 RX ADMIN — ALBUMIN (HUMAN) 25 G: 0.25 INJECTION, SOLUTION INTRAVENOUS at 08:00

## 2024-09-06 RX ADMIN — FERROUS SULFATE TAB 325 MG (65 MG ELEMENTAL FE) 325 MG: 325 (65 FE) TAB at 07:59

## 2024-09-06 RX ADMIN — INSULIN GLARGINE 14 UNITS: 100 INJECTION, SOLUTION SUBCUTANEOUS at 21:10

## 2024-09-06 RX ADMIN — FUROSEMIDE 20 MG: 10 INJECTION, SOLUTION INTRAMUSCULAR; INTRAVENOUS at 16:07

## 2024-09-06 RX ADMIN — INSULIN LISPRO 6 UNITS: 100 INJECTION, SOLUTION INTRAVENOUS; SUBCUTANEOUS at 21:10

## 2024-09-06 RX ADMIN — CEFPODOXIME PROXETIL 400 MG: 200 TABLET, FILM COATED ORAL at 08:13

## 2024-09-06 RX ADMIN — ALBUMIN (HUMAN) 25 G: 0.25 INJECTION, SOLUTION INTRAVENOUS at 15:19

## 2024-09-06 RX ADMIN — INSULIN LISPRO 8 UNITS: 100 INJECTION, SOLUTION INTRAVENOUS; SUBCUTANEOUS at 16:00

## 2024-09-06 RX ADMIN — INSULIN LISPRO 4 UNITS: 100 INJECTION, SOLUTION INTRAVENOUS; SUBCUTANEOUS at 08:00

## 2024-09-06 RX ADMIN — SENNOSIDES 8.6 MG: 8.6 TABLET, FILM COATED ORAL at 09:17

## 2024-09-06 NOTE — ASSESSMENT & PLAN NOTE
Malnutrition Findings:   Adult Malnutrition type: Chronic illness  Adult Degree of Malnutrition: Malnutrition of moderate degree  Malnutrition Characteristics: Muscle loss, Fat loss, Weight loss      admission albumin level was 2 with total protein of 5.6.  Poor PO intake  discussed with family following up for protein supplements in the outpatient setting            360 Statement: related to inadequate energy intake as evidenced by 10 % weight loss last 7-8 months, depression of temples, sunken orbital, wasted interosseous. Treatment: Oral diet and nutrition supplements    BMI Findings:           Body mass index is 24.22 kg/m².

## 2024-09-06 NOTE — PLAN OF CARE
Problem: Prexisting or High Potential for Compromised Skin Integrity  Goal: Skin integrity is maintained or improved  Description: INTERVENTIONS:  - Identify patients at risk for skin breakdown  - Assess and monitor skin integrity  - Assess and monitor nutrition and hydration status  - Monitor labs   - Assess for incontinence   - Turn and reposition patient  - Assist with mobility/ambulation  - Relieve pressure over bony prominences  - Avoid friction and shearing  - Provide appropriate hygiene as needed including keeping skin clean and dry  - Evaluate need for skin moisturizer/barrier cream  - Collaborate with interdisciplinary team   - Patient/family teaching  - Consider wound care consult   9/6/2024 1346 by Albina Mcknight RN  Outcome: Progressing  9/6/2024 1346 by Albina Mcknight RN  Outcome: Progressing     Problem: GENITOURINARY - ADULT  Goal: Maintains or returns to baseline urinary function  Description: INTERVENTIONS:  - Assess urinary function  - Encourage oral fluids to ensure adequate hydration if ordered  - Administer IV fluids as ordered to ensure adequate hydration  - Administer ordered medications as needed  - Offer frequent toileting  - Follow urinary retention protocol if ordered  9/6/2024 1346 by Albina Mcknight RN  Outcome: Progressing  9/6/2024 1346 by Albina Mcknight RN  Outcome: Progressing  Goal: Urinary catheter remains patent  Description: INTERVENTIONS:  - Assess patency of urinary catheter  - If patient has a chronic yang, consider changing catheter if non-functioning  - Follow guidelines for intermittent irrigation of non-functioning urinary catheter  9/6/2024 1346 by Albina Mcknight RN  Outcome: Progressing  9/6/2024 1346 by Albina Mcknight RN  Outcome: Progressing     Problem: METABOLIC, FLUID AND ELECTROLYTES - ADULT  Goal: Electrolytes maintained within normal limits  Description: INTERVENTIONS:  - Monitor labs and assess patient for signs and symptoms of electrolyte imbalances  - Administer  electrolyte replacement as ordered  - Monitor response to electrolyte replacements, including repeat lab results as appropriate  - Instruct patient on fluid and nutrition as appropriate  9/6/2024 1346 by Albina Mcknight RN  Outcome: Progressing  9/6/2024 1346 by Albina Mcknight RN  Outcome: Progressing  Goal: Fluid balance maintained  Description: INTERVENTIONS:  - Monitor labs   - Monitor I/O and WT  - Instruct patient on fluid and nutrition as appropriate  - Assess for signs & symptoms of volume excess or deficit  9/6/2024 1346 by Albina Mcknight RN  Outcome: Progressing  9/6/2024 1346 by Albina Mcknight RN  Outcome: Progressing  Goal: Glucose maintained within target range  Description: INTERVENTIONS:  - Monitor Blood Glucose as ordered  - Assess for signs and symptoms of hyperglycemia and hypoglycemia  - Administer ordered medications to maintain glucose within target range  - Assess nutritional intake and initiate nutrition service referral as needed  9/6/2024 1346 by Albina Mcknight RN  Outcome: Progressing  9/6/2024 1346 by Albina Mcknight RN  Outcome: Progressing     Problem: SKIN/TISSUE INTEGRITY - ADULT  Goal: Skin Integrity remains intact(Skin Breakdown Prevention)  Description: Assess:  -Perform Sushil assessment every 12  -Clean and moisturize skin every 12  -Inspect skin when repositioning, toileting, and assisting with ADLS    -Assess extremities for adequate circulation and sensation     Bed Management:  -Have minimal linens on bed & keep smooth, unwrinkled  -Change linens as needed when moist or perspiring  -Avoid sitting or lying in one position for more than 2 hours while in bed  -Keep HOB at 30degrees     Toileting:  -Offer bedside commode  -Assess for incontinence every 2  -Use incontinent care products after each incontinent episode such as foam    Activity:  -Mobilize patient 3 times a day  -Encourage activity   -Encourage or provide ROM exercises   -Turn and reposition patient every 2 Hours  -Use  appropriate equipment to lift or move patient in bed  -Instruct/ Assist with weight shifting every 45 when out of bed in chair  -Consider limitation of chair time 3 hour intervals    Skin Care:  -Avoid use of baby powder, tape, friction and shearing, hot water or constrictive clothing  -Relieve pressure over bony prominences using foam  -Do not massage red bony areas    Next Steps:  -Teach patient strategies to minimize risks such as repositioning   -Consider consults to  interdisciplinary teams such as PT  9/6/2024 1346 by Albina Mcknight RN  Outcome: Progressing  9/6/2024 1346 by Albina Mcknight RN  Outcome: Progressing     Problem: HEMATOLOGIC - ADULT  Goal: Maintains hematologic stability  Description: INTERVENTIONS  - Assess for signs and symptoms of bleeding or hemorrhage  - Monitor labs  - Administer supportive blood products/factors as ordered and appropriate  9/6/2024 1346 by Albina Mcknight RN  Outcome: Progressing  9/6/2024 1346 by Albina Mcknight RN  Outcome: Progressing     Problem: MUSCULOSKELETAL - ADULT  Goal: Maintain or return mobility to safest level of function  Description: INTERVENTIONS:  - Assess patient's ability to carry out ADLs; assess patient's baseline for ADL function and identify physical deficits which impact ability to perform ADLs (bathing, care of mouth/teeth, toileting, grooming, dressing, etc.)  - Assess/evaluate cause of self-care deficits   - Assess range of motion  - Assess patient's mobility  - Assess patient's need for assistive devices and provide as appropriate  - Encourage maximum independence but intervene and supervise when necessary  - Involve family in performance of ADLs  - Assess for home care needs following discharge   - Consider OT consult to assist with ADL evaluation and planning for discharge  - Provide patient education as appropriate  9/6/2024 1346 by Albina Mcknight RN  Outcome: Progressing  9/6/2024 1346 by Albina Mcknight RN  Outcome: Progressing     Problem: PAIN -  ADULT  Goal: Verbalizes/displays adequate comfort level or baseline comfort level  Description: Interventions:  - Encourage patient to monitor pain and request assistance  - Assess pain using appropriate pain scale  - Administer analgesics based on type and severity of pain and evaluate response  - Implement non-pharmacological measures as appropriate and evaluate response  - Consider cultural and social influences on pain and pain management  - Notify physician/advanced practitioner if interventions unsuccessful or patient reports new pain  9/6/2024 1346 by Albina Mcknight RN  Outcome: Progressing  9/6/2024 1346 by Albina Mcknight RN  Outcome: Progressing     Problem: INFECTION - ADULT  Goal: Absence or prevention of progression during hospitalization  Description: INTERVENTIONS:  - Assess and monitor for signs and symptoms of infection  - Monitor lab/diagnostic results  - Monitor all insertion sites, i.e. indwelling lines, tubes, and drains  - Monitor endotracheal if appropriate and nasal secretions for changes in amount and color  - [place] appropriate cooling/warming therapies per order  - Administer medications as ordered  - Instruct and encourage patient and family to use good hand hygiene technique  - Identify and instruct in appropriate isolation precautions for identified infection/condition  9/6/2024 1346 by Albina Mcknight RN  Outcome: Progressing  9/6/2024 1346 by Albina Mcknight RN  Outcome: Progressing  Goal: Absence of fever/infection during neutropenic period  Description: INTERVENTIONS:  - Monitor WBC    9/6/2024 1346 by Albina Mcknight RN  Outcome: Progressing  9/6/2024 1346 by Albina Mcknight RN  Outcome: Progressing     Problem: SAFETY ADULT  Goal: Patient will remain free of falls  Description: INTERVENTIONS:  - Educate patient/family on patient safety including physical limitations  - Instruct patient to call for assistance with activity   - Consult OT/PT to assist with strengthening/mobility   - Keep  Call bell within reach  - Keep bed low and locked with side rails adjusted as appropriate  - Keep care items and personal belongings within reach  - Initiate and maintain comfort rounds  - Make Fall Risk Sign visible to staff  - Offer Toileting every 2 Hours, in advance of need  - Initiate/Maintain bed alarm  - Obtain necessary fall risk management equipment: alarm  - Apply yellow socks and bracelet for high fall risk patients  - Consider moving patient to room near nurses station  9/6/2024 1346 by Albina Mcknight RN  Outcome: Progressing  9/6/2024 1346 by Albina Mcknight RN  Outcome: Progressing     Problem: DISCHARGE PLANNING  Goal: Discharge to home or other facility with appropriate resources  Description: INTERVENTIONS:  - Identify barriers to discharge w/patient and caregiver  - Arrange for needed discharge resources and transportation as appropriate  - Identify discharge learning needs (meds, wound care, etc.)  - Arrange for interpretive services to assist at discharge as needed  - Refer to Case Management Department for coordinating discharge planning if the patient needs post-hospital services based on physician/advanced practitioner order or complex needs related to functional status, cognitive ability, or social support system  9/6/2024 1346 by Albina Mcknight RN  Outcome: Progressing  9/6/2024 1346 by Albina Mcknight RN  Outcome: Progressing

## 2024-09-06 NOTE — ASSESSMENT & PLAN NOTE
Most probably secondary to IVF adminsteration given patient with complicated pyelitis and dehydration on admission.   Also with shock state that was managed per ICU team .     Echo completed on 8/30 with normal EF, G1DD         -IVF were stopped on 9/3  -s/p lasix 20 mg IV x 2 with net 1400 output      Still with pertinent scrotal edema today , peripheral edema , venous stasis however improved since IV diuresis was   Started by cardiology on 9/4   Remarkable improvement with diuresis  Continue medical management per specialist

## 2024-09-06 NOTE — ASSESSMENT & PLAN NOTE
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  primary team was having difficulty diuresing patient as it was causing hypotension  9/4/2024 started diuresing patient with albumin 25%, 25 g one half hour prior to Lasix 20 mg IV on BID basis with improvement in his generalized edema.  9/6/2024 patient has lost approximately 13 pounds since starting albumin Lasix cocktail  Will continue and reevaluate in 24 hours  Continue monitor I and O, daily weights and labs

## 2024-09-06 NOTE — PROGRESS NOTES
[facility]  Progress Note  Name: Chris Bojorquez I  MRN: [id number]  Unit/Bed#: [address] [place] [address] Date of Admission: 8/26/2024   Date of Service: 9/6/2024 I Hospital Day: 11    Assessment & Plan   Emphysematous pyelitis  Assessment & Plan  Pt underwent cytoscopy with right stone extraction and right ureteral stent placement.  Resume IV Ceftriaxone (Day 8/10).  Urology and ID following      Status post cystoscopy right stent removal bladder stones day #6 per urology   Foleys catheter in place . Pending further recommendations from urology at this time.   Patient completed 7 days course ceftriaxone   Cefpodoxime Day#3     Am labs     Acute metabolic encephalopathy-resolved as of 9/4/2024  Assessment & Plan  Likely secondary to sepsis/UTI vs hospital delirium combined with dementia.   CT head is negative for acute process.  Mental status continues to improve since admission.  Continue neurochecks    Failure to thrive in adult  Assessment & Plan  Nutrition consult   Speech following   Currently on dysphagia diet     History of recurrent UTIs  Assessment & Plan  Secondary to foleys catheter     Volume overload  Assessment & Plan  Most probably secondary to IVF adminsteration given patient with complicated pyelitis and dehydration on admission.   Also with shock state that was managed per ICU team .     Echo completed on 8/30 with normal EF, G1DD         -IVF were stopped on 9/3  -s/p lasix 20 mg IV x 2 with net 1400 output      Still with pertinent scrotal edema today , peripheral edema , venous stasis however improved since IV diuresis was   Started by cardiology on 9/4   Remarkable improvement with diuresis  Continue medical management per specialist       Venous stasis  Assessment & Plan  Noted bilaterally   Mostly secondary to IVF since admission      -d/c IVF on 9/3   -patient is tolerating oral diet   -duiresis with caution   -monitor I/Os     Scrotal swelling  Assessment & Plan  Most  probably secondary to fluid overload from IVFs that were received during the hospitalization.     -s/p lasix 20 mg x 1 9/3 with good net output      Monitor I/Os   Case discussed with urology; dr kaiser will evaluate at bedside.   Refer to A and P for volume overload     Anemia  Assessment & Plan  Iron deficiency anemia   Continue iron supplementation   Improving   Follow CBC     Gastroesophageal reflux disease  Assessment & Plan  Continue PPI    Bacteremia due to Proteus species  Assessment & Plan  Proteus Bacteremia noted.     Urinary source  Transitioned to cefpodoxime  ID team following     Parkinson disease  Assessment & Plan  Continue Sinemet.  PT/OT evaluation for discharge needs     Hypothyroidism  Assessment & Plan  Continue Levothyroxine    PABLO (acute kidney injury) (HCC)  Assessment & Plan  Possibly multifactorial septic shock/hypotension and obstructive uropathy.  Pt is s/p ureteral stent placement with stone extraction.  Continue IV fluid hydration.  Will follow BMP.  Urology following     Bun/Cr improving   Continue to monitor   Labs pending       Uncontrolled type 2 diabetes mellitus with hyperglycemia (HCC)  Assessment & Plan  Lab Results   Component Value Date    HGBA1C 14.6 (H) 08/26/2024     Recent Labs     09/05/24  1141 09/05/24  1550 09/05/24 2001 09/06/24  0704   POCGLU 376* 362* 208* 225*     Blood Sugar Average: Last 72 hrs:  (P) 263.1526215706986428    Resume Lantus 14 U Qhs   SSI regimen     Moderate protein-calorie malnutrition (HCC)  Assessment & Plan  Malnutrition Findings:   Adult Malnutrition type: Chronic illness  Adult Degree of Malnutrition: Malnutrition of moderate degree  Consult nutrition services Malnutrition Characteristics: Muscle loss, Fat loss, Weight loss     360 Statement: related to inadequate energy intake as evidenced by 10 % weight loss last 7-8 months, depression of temples, sunken orbital, wasted interosseous. Treatment: Oral diet and nutrition supplements    BMI  Findings:      Body mass index is 24.22 kg/m².       Essential hypertension  Assessment & Plan  Norvasc initially held due to labile BP/sepsis.  Will restart once appropriate     * Septic shock (HCC)-resolved as of 8/31/2024  Assessment & Plan  Evidenced by fever, tachycardia, bandemia, and hypotension responsive to IVF  In setting of bacteremia likely due to urinary source due to chronic indwelling Rivera present on admit, evidenced by pyelitis, UA results requiring IV cefepime and transition to ceftriaxone  Bp still on the soft side, Isolyte at 75 cc per hour; will give bolus 250 cc over 1 hour  Lactic acid 0.8  1/2 blood cultures growing proteus   Urine culture growing proteus  ID following  Changed cefepime to ceftriaxone 2 grams IV  and continue vancomycin; MRSA from nares positive for MRSA   Trend cbc and fever curve               VTE Pharmacologic Prophylaxis: VTE Score: 5 High Risk (Score >/= 5) - Pharmacological DVT Prophylaxis Ordered: heparin. Sequential Compression Devices Ordered.    Mobility:   Basic Mobility Inpatient Raw Score: 6  JH-HLM Goal: 2: Bed activities/Dependent transfer  JH-HLM Achieved: 2: Bed activities/Dependent transfer  JH-HLM Goal achieved. Continue to encourage appropriate mobility.    Patient Centered Rounds: I performed bedside rounds with nursing staff today.   Discussions with Specialists or Other Care Team Provider: cardiology     Education and Discussions with Family / Patient: Updated  (daughter) at bedside.    Total Time Spent on Date of Encounter in care of patient: 30 mins. This time was spent on one or more of the following: performing physical exam; counseling and coordination of care; obtaining or reviewing history; documenting in the medical record; reviewing/ordering tests, medications or procedures; communicating with other healthcare professionals and discussing with patient's family/caregivers.    Current Length of Stay: 11 day(s)  Current Patient  Status: Inpatient   Certification Statement: The patient will continue to require additional inpatient hospital stay due to volume overload, Pyelitis   Discharge Plan: Anticipate discharge in 24-48 hrs to TBD    Code Status: Level 1 - Full Code    Subjective:   Patient was seen today at bedside. Reports no active complaints.   No chest pain or tightness, SOB or cough, dizziness or light headedness, N/V, Diarrhea of constipation.   Tolerating oral diet         Objective:     Vitals:   Temp (24hrs), Av.1 °F (36.7 °C), Min:97.2 °F (36.2 °C), Max:98.4 °F (36.9 °C)    Temp:  [97.2 °F (36.2 °C)-98.4 °F (36.9 °C)] 98.4 °F (36.9 °C)  HR:  [59-70] 67  Resp:  [16-20] 16  BP: (119-129)/(62-64) 124/63  SpO2:  [98 %-99 %] 98 %  Body mass index is 24.22 kg/m².     Input and Output Summary (last 24 hours):     Intake/Output Summary (Last 24 hours) at 2024 1032  Last data filed at 2024 2136  Gross per 24 hour   Intake --   Output 2000 ml   Net -2000 ml       Physical Exam:   Physical Exam  Vitals and nursing note reviewed.   Constitutional:       General: He is not in acute distress.     Appearance: Normal appearance.   HENT:      Head: Normocephalic and atraumatic.      Mouth/Throat:      Mouth: Mucous membranes are moist.   Eyes:      Conjunctiva/sclera: Conjunctivae normal.      Pupils: Pupils are equal, round, and reactive to light.   Cardiovascular:      Rate and Rhythm: Normal rate and regular rhythm.      Pulses: Normal pulses.           Carotid pulses are 2+ on the right side and 2+ on the left side.       Radial pulses are 2+ on the right side and 2+ on the left side.        Dorsalis pedis pulses are 2+ on the right side and 2+ on the left side.      Heart sounds: Normal heart sounds, S1 normal and S2 normal. No murmur heard.     Comments: Scrotal edema with remarkable improvement. Around 30-40% improvement since noted on    Pulmonary:      Effort: No tachypnea, bradypnea or accessory muscle usage.       Breath sounds: Normal breath sounds and air entry. No decreased breath sounds, wheezing, rhonchi or rales.   Abdominal:      General: Abdomen is flat. Bowel sounds are normal. There is no distension.      Palpations: Abdomen is soft.      Tenderness: There is no abdominal tenderness.   Musculoskeletal:      Right lower le+ Pitting Edema present.      Left lower le+ Pitting Edema present.   Neurological:      Mental Status: He is alert and oriented to person, place, and time. Mental status is at baseline.          Additional Data:     Labs:  Results from last 7 days   Lab Units 24  0822   WBC Thousand/uL 7.21   HEMOGLOBIN g/dL 7.4*   HEMATOCRIT % 24.4*   PLATELETS Thousands/uL 336   SEGS PCT % 79*   LYMPHO PCT % 11*   MONO PCT % 8   EOS PCT % 1     Results from last 7 days   Lab Units 24  0822   SODIUM mmol/L 135   POTASSIUM mmol/L 4.4   CHLORIDE mmol/L 99   CO2 mmol/L 31   BUN mg/dL 29*   CREATININE mg/dL 1.37*   ANION GAP mmol/L 5   CALCIUM mg/dL 7.7*   GLUCOSE RANDOM mg/dL 244*         Results from last 7 days   Lab Units 24  0704 24  2001 24  1550 24  1141 24  0714 24  1957 24  1614 24  1148 24  0746 24  2045 24  1555 24  1124   POC GLUCOSE mg/dl 225* 208* 362* 376* 240* 277* 349* 273* 177* 232* 195* 294*               Lines/Drains:  Invasive Devices       Peripheral Intravenous Line  Duration             Peripheral IV Dorsal (posterior);Right Hand -- days              Drain  Duration             Ureteral Internal Stent Right ureter 6 Fr. 7 days    Urethral Catheter Other (Comment) 20 Fr. 7 days                  Urinary Catheter:  Goal for removal:  per urology                Imaging: No pertinent imaging reviewed.    Recent Cultures (last 7 days):         Last 24 Hours Medication List:   Current Facility-Administered Medications   Medication Dose Route Frequency Provider Last Rate    acetaminophen  1,000 mg Intravenous  Q6H PRN Jana SHANTE DupontNP 1,000 mg (08/31/24 2131)    Albumin 25%  25 g Intravenous BID (diuretic) FRANCES Mendez 0 g (09/05/24 1558)    And    furosemide  20 mg Intravenous BID (diuretic) FRANCES eMndez      calcium carbonate-vitamin D  1 tablet Oral Daily With Breakfast Ward Cross MD      carbidopa-levodopa  1 tablet Oral BID Jana FRANCES Dupont      cefpodoxime  400 mg Oral BID With Meals Lori Menjivar MD      ferrous sulfate  325 mg Oral Daily With Breakfast Jana Kathy Jurado, FRANCES      heparin (porcine)  5,000 Units Subcutaneous Q8H RAHUL Jnaa Kathy Jurado, FRANCES      insulin glargine  14 Units Subcutaneous HS Ward Cross MD      insulin lispro  2-12 Units Subcutaneous TID AC Jana Kathy Jurado, FRANCES      insulin lispro  2-12 Units Subcutaneous HS Jana Kathy Jurado, CRNP      levothyroxine  25 mcg Oral Early Morning Jana Kathy Jurado, FRANCES      mupirocin   Nasal Q12H [facility] Ward Cross MD      pantoprazole  40 mg Oral Early Morning Jana Kathy Jurado, FRANCES      senna  1 tablet Oral BID Ward Cross MD          Today, Patient Was Seen By: Ward Cross MD    **Please Note: This note may have been constructed using a voice recognition system.**

## 2024-09-06 NOTE — ASSESSMENT & PLAN NOTE
Lab Results   Component Value Date    HGBA1C 14.6 (H) 08/26/2024     Recent Labs     09/05/24  1141 09/05/24  1550 09/05/24 2001 09/06/24  0704   POCGLU 376* 362* 208* 225*     Blood Sugar Average: Last 72 hrs:  (P) [id number]    Resume Lantus 14 U Qhs   SSI regimen

## 2024-09-06 NOTE — PROGRESS NOTES
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  primary team was having difficulty diuresing patient as it was causing hypotension  9/4/2024 started diuresing patient with albumin 25%, 25 g one half hour prior to Lasix 20 mg IV on BID basis with improvement in his generalized edema.  9/6/2024 patient has lost approximately 13 pounds since starting albumin Lasix cocktail  Will continue and reevaluate in 24 hours  Continue monitor I and O, daily weights and labs    Bacteremia due to Proteus species  Assessment & Plan  Followed by infectious disease  on Vantin 400 mg BID    Uncontrolled type 2 diabetes mellitus with hyperglycemia (HCC)  Assessment & Plan  Lab Results   Component Value Date    HGBA1C 14.6 (H) 08/26/2024       Recent Labs     09/05/24  1141 09/05/24  1550 09/05/24 2001 09/06/24  0704   POCGLU 376* 362* 208* 225*         Blood Sugar Average: Last 72 hrs:  (P) [id number]    Needs better blood sugar management  currently on insulin  managed per primary team      Anemia  Assessment & Plan  Secondary to chronic disease  improving with diuresis    Parkinson disease  Assessment & Plan  On Sinemet 25/100 mg BID    History of recurrent UTIs  Assessment & Plan  Patient with chronic Rivera    Moderate protein-calorie malnutrition (HCC)  Assessment & Plan  Malnutrition Findings:   Adult Malnutrition type: Chronic illness  Adult Degree of Malnutrition: Malnutrition of moderate degree  Malnutrition Characteristics: Muscle loss,  "Fat loss, Weight loss      admission albumin level was 2 with total protein of 5.6.  Poor PO intake  discussed with family following up for protein supplements in the outpatient setting            360 Statement: related to inadequate energy intake as evidenced by 10 % weight loss last 7-8 months, depression of temples, sunken orbital, wasted interosseous. Treatment: Oral diet and nutrition supplements    BMI Findings:           Body mass index is 24.22 kg/m².       Failure to thrive in adult  Assessment & Plan  Patient with poor appetite and low protein level              Subjective/Objective   Subjective: Patient seen and examined. More awake and conversive of today. Still with mild total body edema and significant scrotal edema which is slowly improving. Will continue albumin and Lasix for another 24 hours and reevaluate in the a.m.    Objective:     Vitals: /63   Pulse 67   Temp 98.4 °F (36.9 °C)   Resp 16   Ht 5' 9\" (1.753 m)   Wt 74.4 kg (164 lb)   SpO2 98%   BMI 24.22 kg/m²   Vitals:    09/05/24 0600 09/06/24 0620   Weight: 76 kg (167 lb 9.6 oz) 74.4 kg (164 lb)     Orthostatic Blood Pressures      Flowsheet Row Most Recent Value   Blood Pressure 124/63 filed at 09/06/2024 0730   Patient Position - Orthostatic VS Lying filed at 09/02/2024 0352              Intake/Output Summary (Last 24 hours) at 9/6/2024 0803  Last data filed at 9/5/2024 2136  Gross per 24 hour   Intake --   Output 2000 ml   Net -2000 ml       Invasive Devices       Peripheral Intravenous Line  Duration             Peripheral IV Dorsal (posterior);Right Hand -- days              Drain  Duration             Ureteral Internal Stent Right ureter 6 Fr. 7 days    Urethral Catheter Other (Comment) 20 Fr. 7 days                  Physical Exam:   Physical Exam  Vitals and nursing note reviewed.   Constitutional:       Appearance: Normal appearance. He is normal weight. He is ill-appearing (Chronically).   HENT:      Right Ear: External " ear normal.      Left Ear: External ear normal.   Eyes:      General: No scleral icterus.        Right eye: No discharge.         Left eye: No discharge.   Cardiovascular:      Rate and Rhythm: Normal rate and regular rhythm.      Pulses: Normal pulses.      Heart sounds: Normal heart sounds.   Pulmonary:      Effort: Pulmonary effort is normal.      Breath sounds: Examination of the right-lower field reveals decreased breath sounds. Examination of the left-lower field reveals decreased breath sounds. Decreased breath sounds present.      Comments: Poor inspiratory effort  Abdominal:      General: Bowel sounds are normal. There is no distension.      Palpations: Abdomen is soft.   Genitourinary:     Comments: Scrotal edema  Musculoskeletal:      Right lower leg: Edema present.      Left lower leg: Edema present.   Skin:     General: Skin is warm and dry.      Capillary Refill: Capillary refill takes less than 2 seconds.   Neurological:      General: No focal deficit present.      Mental Status: He is alert and oriented to person, place, and time. Mental status is at baseline.   Psychiatric:         Mood and Affect: Mood normal.       Lab Results: I have personally reviewed pertinent lab results.    CBC with diff:   Results from last 7 days   Lab Units 09/05/24  0820   WBC Thousand/uL 5.90   RBC Million/uL 3.16*   HEMOGLOBIN g/dL 7.9*   HEMATOCRIT % 26.4*   MCV fL 84   MCH pg 25.0*   MCHC g/dL 29.9*   RDW % 17.3*   MPV fL 9.5   PLATELETS Thousands/uL 319     CMP:   Results from last 7 days   Lab Units 09/05/24  0820   SODIUM mmol/L 136   CHLORIDE mmol/L 99   CO2 mmol/L 30   BUN mg/dL 22   CREATININE mg/dL 1.29   CALCIUM mg/dL 7.9*   EGFR ml/min/1.73sq m 52     HS Troponin:   0   Lab Value Date/Time    HSTNI0 10 08/26/2024 1256    HSTNI2 9 08/26/2024 1525     BNP:   Results from last 7 days   Lab Units 09/05/24  0820   POTASSIUM mmol/L 4.2   CHLORIDE mmol/L 99   CO2 mmol/L 30   BUN mg/dL 22   CREATININE mg/dL 1.29    CALCIUM mg/dL 7.9*   EGFR ml/min/1.73sq m 52     Magnesium:   Results from last 7 days   Lab Units 09/03/24  0530   MAGNESIUM mg/dL 2.2     Lipid Profile:     Imaging: I have personally reviewed pertinent reports.      VTE Mechanical Prophylaxis: sequential compression device    Linette DANIELS  Cardiology

## 2024-09-06 NOTE — CASE MANAGEMENT
Case Management Discharge Planning Note    Patient name Chris Bojorquez  Location [address] [place] [address]/[address] [place] [address]-* MRN [id number]  : 1945 Date 2024       Current Admission Date: 2024  Current Admission Diagnosis:Essential hypertension   Patient Active Problem List    Diagnosis Date Noted Date Diagnosed    History of recurrent UTIs 2024     Failure to thrive in adult 2024     Scrotal swelling 2024     Venous stasis 2024     Volume overload 2024     Anemia 2024     Gastroesophageal reflux disease 2024     Bacteremia due to Proteus species 2024     Essential hypertension 2024     Emphysematous pyelitis 2024     Moderate protein-calorie malnutrition (HCC) 2024     Uncontrolled type 2 diabetes mellitus with hyperglycemia (HCC) 2024     PABLO (acute kidney injury) (HCC) 2024     Hypothyroidism 2024     Parkinson disease 2024       LOS (days): 11  Geometric Mean LOS (GMLOS) (days): 5.9  Days to GMLOS:-5     OBJECTIVE:  Risk of Unplanned Readmission Score: 20.08       Current admission status: Inpatient   Preferred Pharmacy:   PATIENT/FAMILY REPORTS NO PREFERRED PHARMACY  No address on file      Primary Care Provider: No primary care provider on file.    Primary Insurance: MEDICARE  Secondary Insurance: [employer] [facility]    DISCHARGE DETAILS:     CM contacted family/caregiver?: Yes (Voice mail left for son Derek to review IMM)  Were Treatment Team discharge recommendations reviewed with patient/caregiver?: Yes  Did patient/caregiver verbalize understanding of patient care needs?: N/A- going to facility  Were patient/caregiver advised of the risks associated with not following Treatment Team discharge recommendations?: Yes    Contacts  Patient Contacts: Derek Bojorquez (son)  Relationship to Patient:: Family  Contact Method: Phone  Phone Number: [phone]  Reason/Outcome: Discharge Planning    Other  Referral/Resources/Interventions Provided:  Interventions: Facility Return, SNF    Would you like to participate in our [facility] service program?  : No - Declined    Treatment Team Recommendation: Facility Return, SNF  Discharge Destination Plan:: Facility Return, SNF  Transport at Discharge : BLS Ambulance         IMM Given (Date):: 09/06/24  IMM Given to:: Family (IMM reviewed via voice mail to nurys Reed and contact information provided for any questions.  Copy given to patient and copy placed in scan bin for chart.)

## 2024-09-06 NOTE — ASSESSMENT & PLAN NOTE
Most probably secondary to fluid overload from IVFs that were received during the hospitalization.     -s/p lasix 20 mg x 1 9/3 with good net output      Monitor I/Os   Case discussed with urology; dr kaiser will evaluate at bedside.   Refer to A and P for volume overload

## 2024-09-06 NOTE — PLAN OF CARE
Problem: Prexisting or High Potential for Compromised Skin Integrity  Goal: Skin integrity is maintained or improved  Description: INTERVENTIONS:  - Identify patients at risk for skin breakdown  - Assess and monitor skin integrity  - Assess and monitor nutrition and hydration status  - Monitor labs   - Assess for incontinence   - Turn and reposition patient  - Assist with mobility/ambulation  - Relieve pressure over bony prominences  - Avoid friction and shearing  - Provide appropriate hygiene as needed including keeping skin clean and dry  - Evaluate need for skin moisturizer/barrier cream  - Collaborate with interdisciplinary team   - Patient/family teaching  - Consider wound care consult   Outcome: Progressing     Problem: GENITOURINARY - ADULT  Goal: Maintains or returns to baseline urinary function  Description: INTERVENTIONS:  - Assess urinary function  - Encourage oral fluids to ensure adequate hydration if ordered  - Administer IV fluids as ordered to ensure adequate hydration  - Administer ordered medications as needed  - Offer frequent toileting  - Follow urinary retention protocol if ordered  Outcome: Progressing  Goal: Urinary catheter remains patent  Description: INTERVENTIONS:  - Assess patency of urinary catheter  - If patient has a chronic yang, consider changing catheter if non-functioning  - Follow guidelines for intermittent irrigation of non-functioning urinary catheter  Outcome: Progressing     Problem: METABOLIC, FLUID AND ELECTROLYTES - ADULT  Goal: Electrolytes maintained within normal limits  Description: INTERVENTIONS:  - Monitor labs and assess patient for signs and symptoms of electrolyte imbalances  - Administer electrolyte replacement as ordered  - Monitor response to electrolyte replacements, including repeat lab results as appropriate  - Instruct patient on fluid and nutrition as appropriate  Outcome: Progressing  Goal: Fluid balance maintained  Description: INTERVENTIONS:  -  Monitor labs   - Monitor I/O and WT  - Instruct patient on fluid and nutrition as appropriate  - Assess for signs & symptoms of volume excess or deficit  Outcome: Progressing  Goal: Glucose maintained within target range  Description: INTERVENTIONS:  - Monitor Blood Glucose as ordered  - Assess for signs and symptoms of hyperglycemia and hypoglycemia  - Administer ordered medications to maintain glucose within target range  - Assess nutritional intake and initiate nutrition service referral as needed  Outcome: Progressing     Problem: SKIN/TISSUE INTEGRITY - ADULT  Goal: Skin Integrity remains intact(Skin Breakdown Prevention)  Description: Assess:  -Perform Sushil assessment every 12  -Clean and moisturize skin every 12  -Inspect skin when repositioning, toileting, and assisting with ADLS    -Assess extremities for adequate circulation and sensation     Bed Management:  -Have minimal linens on bed & keep smooth, unwrinkled  -Change linens as needed when moist or perspiring  -Avoid sitting or lying in one position for more than 2 hours while in bed  -Keep HOB at 30degrees     Toileting:  -Offer bedside commode  -Assess for incontinence every 2  -Use incontinent care products after each incontinent episode such as foam    Activity:  -Mobilize patient 3 times a day  -Encourage activity   -Encourage or provide ROM exercises   -Turn and reposition patient every 2 Hours  -Use appropriate equipment to lift or move patient in bed  -Instruct/ Assist with weight shifting every 45 when out of bed in chair  -Consider limitation of chair time 3 hour intervals    Skin Care:  -Avoid use of baby powder, tape, friction and shearing, hot water or constrictive clothing  -Relieve pressure over bony prominences using foam  -Do not massage red bony areas    Next Steps:  -Teach patient strategies to minimize risks such as repositioning   -Consider consults to  interdisciplinary teams such as PT  Outcome: Progressing     Problem:  HEMATOLOGIC - ADULT  Goal: Maintains hematologic stability  Description: INTERVENTIONS  - Assess for signs and symptoms of bleeding or hemorrhage  - Monitor labs  - Administer supportive blood products/factors as ordered and appropriate  Outcome: Progressing     Problem: MUSCULOSKELETAL - ADULT  Goal: Maintain or return mobility to safest level of function  Description: INTERVENTIONS:  - Assess patient's ability to carry out ADLs; assess patient's baseline for ADL function and identify physical deficits which impact ability to perform ADLs (bathing, care of mouth/teeth, toileting, grooming, dressing, etc.)  - Assess/evaluate cause of self-care deficits   - Assess range of motion  - Assess patient's mobility  - Assess patient's need for assistive devices and provide as appropriate  - Encourage maximum independence but intervene and supervise when necessary  - Involve family in performance of ADLs  - Assess for home care needs following discharge   - Consider OT consult to assist with ADL evaluation and planning for discharge  - Provide patient education as appropriate  Outcome: Progressing     Problem: PAIN - ADULT  Goal: Verbalizes/displays adequate comfort level or baseline comfort level  Description: Interventions:  - Encourage patient to monitor pain and request assistance  - Assess pain using appropriate pain scale  - Administer analgesics based on type and severity of pain and evaluate response  - Implement non-pharmacological measures as appropriate and evaluate response  - Consider cultural and social influences on pain and pain management  - Notify physician/advanced practitioner if interventions unsuccessful or patient reports new pain  Outcome: Progressing     Problem: INFECTION - ADULT  Goal: Absence or prevention of progression during hospitalization  Description: INTERVENTIONS:  - Assess and monitor for signs and symptoms of infection  - Monitor lab/diagnostic results  - Monitor all insertion sites,  i.e. indwelling lines, tubes, and drains  - Monitor endotracheal if appropriate and nasal secretions for changes in amount and color  - [place] appropriate cooling/warming therapies per order  - Administer medications as ordered  - Instruct and encourage patient and family to use good hand hygiene technique  - Identify and instruct in appropriate isolation precautions for identified infection/condition  Outcome: Progressing  Goal: Absence of fever/infection during neutropenic period  Description: INTERVENTIONS:  - Monitor WBC    Outcome: Progressing     Problem: SAFETY ADULT  Goal: Patient will remain free of falls  Description: INTERVENTIONS:  - Educate patient/family on patient safety including physical limitations  - Instruct patient to call for assistance with activity   - Consult OT/PT to assist with strengthening/mobility   - Keep Call bell within reach  - Keep bed low and locked with side rails adjusted as appropriate  - Keep care items and personal belongings within reach  - Initiate and maintain comfort rounds  - Make Fall Risk Sign visible to staff  - Offer Toileting every 2 Hours, in advance of need  - Initiate/Maintain bed alarm  - Obtain necessary fall risk management equipment: alarm  - Apply yellow socks and bracelet for high fall risk patients  - Consider moving patient to room near nurses station  Outcome: Progressing     Problem: DISCHARGE PLANNING  Goal: Discharge to home or other facility with appropriate resources  Description: INTERVENTIONS:  - Identify barriers to discharge w/patient and caregiver  - Arrange for needed discharge resources and transportation as appropriate  - Identify discharge learning needs (meds, wound care, etc.)  - Arrange for interpretive services to assist at discharge as needed  - Refer to Case Management Department for coordinating discharge planning if the patient needs post-hospital services based on physician/advanced practitioner order or complex needs related to  functional status, cognitive ability, or social support system  Outcome: Progressing     Problem: Nutrition/Hydration-ADULT  Goal: Nutrient/Hydration intake appropriate for improving, restoring or maintaining nutritional needs  Description: Monitor and assess patient's nutrition/hydration status for malnutrition. Collaborate with interdisciplinary team and initiate plan and interventions as ordered.  Monitor patient's weight and dietary intake as ordered or per policy. Utilize nutrition screening tool and intervene as necessary. Determine patient's food preferences and provide high-protein, high-caloric foods as appropriate.     INTERVENTIONS:  - Monitor oral intake, urinary output, labs, and treatment plans  - Assess nutrition and hydration status and recommend course of action  - Evaluate amount of meals eaten  - Assist patient with eating if necessary   - Allow adequate time for meals  - Recommend/ encourage appropriate diets, oral nutritional supplements, and vitamin/mineral supplements  - Order, calculate, and assess calorie counts as needed  - Recommend, monitor, and adjust tube feedings and TPN/PPN based on assessed needs  - Assess need for intravenous fluids  - Provide specific nutrition/hydration education as appropriate  - Include patient/family/caregiver in decisions related to nutrition  Outcome: Progressing

## 2024-09-06 NOTE — ASSESSMENT & PLAN NOTE
Pt underwent cytoscopy with right stone extraction and right ureteral stent placement.  Resume IV Ceftriaxone (Day 8/10).  Urology and ID following      Status post cystoscopy right stent removal bladder stones day #6 per urology   Foleys catheter in place . Pending further recommendations from urology at this time.   Patient completed 7 days course ceftriaxone   Cefpodoxime Day#3     Am labs

## 2024-09-06 NOTE — PROGRESS NOTES
"Progress Note - Urology      Patient: Chris Bojorquez   : 1945 Sex: male   MRN: [id number]     CSN: [id number]  Unit/Bed#: [address] [place] [address]     SUBJECTIVE:   Patient seen on afternoon rounds   doing better more alert awake  Scrotal swelling better      Objective   Vitals: /64   Pulse 79   Temp 99.4 °F (37.4 °C)   Resp 15   Ht 5' 9\" (1.753 m)   Wt 74.4 kg (164 lb)   SpO2 93%   BMI 24.22 kg/m²     I/O last 24 hours:  In: 1170 [P.O.:1170]  Out: 2800 [Urine:2800]      Physical Exam:   General Alert awake   Normocephalic atraumatic PERRLA  Lungs clear bilaterally  Cardiac normal S1 normal S2  Abdomen soft, flank pain  Scrotal swelling less   extremities no edema      Lab Results: CBC:   Lab Results   Component Value Date    WBC 7.21 2024    HGB 7.4 (L) 2024    HCT 24.4 (L) 2024    MCV 83 2024     2024    RBC 2.95 (L) 2024    MCH 25.1 (L) 2024    MCHC 30.3 (L) 2024    RDW 17.2 (H) 2024    MPV 9.1 2024    NRBC 0 2024     CMP:   Lab Results   Component Value Date    CL 99 2024    CO2 31 2024    BUN 29 (H) 2024    CREATININE 1.37 (H) 2024    CALCIUM 7.7 (L) 2024    AST 14 2024    ALT 4 (L) 2024    ALKPHOS 130 (H) 2024    EGFR 49 2024     Urinalysis:   Lab Results   Component Value Date    COLORU Yellow 2024    CLARITYU Slightly Cloudy 2024    SPECGRAV 1.015 2024    PHUR 7.0 2024    LEUKOCYTESUR Large (A) 2024    NITRITE Negative 2024    GLUCOSEU 1000 (1%) (A) 2024    KETONESU 10 (1+) (A) 2024    BILIRUBINUR Negative 2024    BLOODU Large (A) 2024     Urine Culture:   Lab Results   Component Value Date    URINECX 10,000-19,000 cfu/ml Proteus mirabilis (A) 2024    URINECX <10,000 cfu/ml 2024     PSA: No results found for: \"PSA\"      Assessment/ Plan:  Complicated UTI emphysematous pyelitis  Status post " cystoscopy stent insertion removed bladder stone day #8  Eighth day of ceftriaxone  Need 1 more day        Ciro Hughes MD

## 2024-09-06 NOTE — ASSESSMENT & PLAN NOTE
Lab Results   Component Value Date    HGBA1C 14.6 (H) 08/26/2024       Recent Labs     09/05/24  1141 09/05/24  1550 09/05/24 2001 09/06/24  0704   POCGLU 376* 362* 208* 225*         Blood Sugar Average: Last 72 hrs:  (P) [id number]    Needs better blood sugar management  currently on insulin  managed per primary team

## 2024-09-07 PROBLEM — D50.9 IRON DEFICIENCY ANEMIA: Status: ACTIVE | Noted: 2024-08-30

## 2024-09-07 LAB
ANION GAP SERPL CALCULATED.3IONS-SCNC: 5 MMOL/L (ref 4–13)
BASOPHILS # BLD AUTO: 0.02 THOUSANDS/ÂΜL (ref 0–0.1)
BASOPHILS NFR BLD AUTO: 0 % (ref 0–1)
BUN SERPL-MCNC: 35 MG/DL (ref 5–25)
CALCIUM SERPL-MCNC: 8.4 MG/DL (ref 8.4–10.2)
CHLORIDE SERPL-SCNC: 97 MMOL/L (ref 96–108)
CO2 SERPL-SCNC: 33 MMOL/L (ref 21–32)
CREAT SERPL-MCNC: 1.56 MG/DL (ref 0.6–1.3)
EOSINOPHIL # BLD AUTO: 0.05 THOUSAND/ÂΜL (ref 0–0.61)
EOSINOPHIL NFR BLD AUTO: 1 % (ref 0–6)
ERYTHROCYTE [DISTWIDTH] IN BLOOD BY AUTOMATED COUNT: 17.2 % (ref 11.6–15.1)
GFR SERPL CREATININE-BSD FRML MDRD: 41 ML/MIN/1.73SQ M
GLUCOSE SERPL-MCNC: 184 MG/DL (ref 65–140)
GLUCOSE SERPL-MCNC: 266 MG/DL (ref 65–140)
GLUCOSE SERPL-MCNC: 277 MG/DL (ref 65–140)
GLUCOSE SERPL-MCNC: 284 MG/DL (ref 65–140)
GLUCOSE SERPL-MCNC: 292 MG/DL (ref 65–140)
HCT VFR BLD AUTO: 23.3 % (ref 36.5–49.3)
HGB BLD-MCNC: 7 G/DL (ref 12–17)
IMM GRANULOCYTES # BLD AUTO: 0.03 THOUSAND/UL (ref 0–0.2)
IMM GRANULOCYTES NFR BLD AUTO: 1 % (ref 0–2)
LYMPHOCYTES # BLD AUTO: 0.8 THOUSANDS/ÂΜL (ref 0.6–4.47)
LYMPHOCYTES NFR BLD AUTO: 14 % (ref 14–44)
MCH RBC QN AUTO: 25.2 PG (ref 26.8–34.3)
MCHC RBC AUTO-ENTMCNC: 30 G/DL (ref 31.4–37.4)
MCV RBC AUTO: 84 FL (ref 82–98)
MONOCYTES # BLD AUTO: 0.51 THOUSAND/ÂΜL (ref 0.17–1.22)
MONOCYTES NFR BLD AUTO: 9 % (ref 4–12)
NEUTROPHILS # BLD AUTO: 4.23 THOUSANDS/ÂΜL (ref 1.85–7.62)
NEUTS SEG NFR BLD AUTO: 75 % (ref 43–75)
NRBC BLD AUTO-RTO: 0 /100 WBCS
PLATELET # BLD AUTO: 329 THOUSANDS/UL (ref 149–390)
PMV BLD AUTO: 9.5 FL (ref 8.9–12.7)
POTASSIUM SERPL-SCNC: 4.3 MMOL/L (ref 3.5–5.3)
RBC # BLD AUTO: 2.78 MILLION/UL (ref 3.88–5.62)
SODIUM SERPL-SCNC: 135 MMOL/L (ref 135–147)
WBC # BLD AUTO: 5.64 THOUSAND/UL (ref 4.31–10.16)

## 2024-09-07 PROCEDURE — 80048 BASIC METABOLIC PNL TOTAL CA: CPT

## 2024-09-07 PROCEDURE — 85025 COMPLETE CBC W/AUTO DIFF WBC: CPT

## 2024-09-07 PROCEDURE — 99232 SBSQ HOSP IP/OBS MODERATE 35: CPT

## 2024-09-07 PROCEDURE — 82948 REAGENT STRIP/BLOOD GLUCOSE: CPT

## 2024-09-07 RX ORDER — INSULIN GLARGINE 100 [IU]/ML
16 INJECTION, SOLUTION SUBCUTANEOUS
Status: DISCONTINUED | OUTPATIENT
Start: 2024-09-07 | End: 2024-09-08

## 2024-09-07 RX ORDER — NYSTATIN 100000 [USP'U]/G
1 POWDER TOPICAL 2 TIMES DAILY
Status: DISCONTINUED | OUTPATIENT
Start: 2024-09-07 | End: 2024-09-10 | Stop reason: HOSPADM

## 2024-09-07 RX ORDER — ALBUMIN (HUMAN) 12.5 G/50ML
25 SOLUTION INTRAVENOUS
Status: DISCONTINUED | OUTPATIENT
Start: 2024-09-08 | End: 2024-09-08

## 2024-09-07 RX ORDER — FUROSEMIDE 10 MG/ML
20 INJECTION INTRAMUSCULAR; INTRAVENOUS
Status: DISCONTINUED | OUTPATIENT
Start: 2024-09-08 | End: 2024-09-08

## 2024-09-07 RX ADMIN — IRON SUCROSE 300 MG: 20 INJECTION, SOLUTION INTRAVENOUS at 14:22

## 2024-09-07 RX ADMIN — MUPIROCIN 1 APPLICATION: 20 OINTMENT TOPICAL at 09:18

## 2024-09-07 RX ADMIN — Medication 1 TABLET: at 09:15

## 2024-09-07 RX ADMIN — SENNOSIDES 8.6 MG: 8.6 TABLET, FILM COATED ORAL at 09:16

## 2024-09-07 RX ADMIN — FUROSEMIDE 20 MG: 10 INJECTION, SOLUTION INTRAMUSCULAR; INTRAVENOUS at 09:15

## 2024-09-07 RX ADMIN — INSULIN LISPRO 6 UNITS: 100 INJECTION, SOLUTION INTRAVENOUS; SUBCUTANEOUS at 12:45

## 2024-09-07 RX ADMIN — CARBIDOPA AND LEVODOPA 1 TABLET: 25; 100 TABLET ORAL at 09:16

## 2024-09-07 RX ADMIN — CEFPODOXIME PROXETIL 400 MG: 200 TABLET, FILM COATED ORAL at 09:18

## 2024-09-07 RX ADMIN — LEVOTHYROXINE SODIUM 25 MCG: 25 TABLET ORAL at 05:01

## 2024-09-07 RX ADMIN — INSULIN LISPRO 6 UNITS: 100 INJECTION, SOLUTION INTRAVENOUS; SUBCUTANEOUS at 21:04

## 2024-09-07 RX ADMIN — INSULIN LISPRO 2 UNITS: 100 INJECTION, SOLUTION INTRAVENOUS; SUBCUTANEOUS at 09:18

## 2024-09-07 RX ADMIN — INSULIN LISPRO 6 UNITS: 100 INJECTION, SOLUTION INTRAVENOUS; SUBCUTANEOUS at 16:45

## 2024-09-07 RX ADMIN — INSULIN GLARGINE 16 UNITS: 100 INJECTION, SOLUTION SUBCUTANEOUS at 21:04

## 2024-09-07 RX ADMIN — HEPARIN SODIUM 5000 UNITS: 5000 INJECTION, SOLUTION INTRAVENOUS; SUBCUTANEOUS at 05:01

## 2024-09-07 RX ADMIN — CEFPODOXIME PROXETIL 400 MG: 200 TABLET, FILM COATED ORAL at 17:07

## 2024-09-07 RX ADMIN — NYSTATIN 1 APPLICATION: 100000 POWDER TOPICAL at 21:05

## 2024-09-07 RX ADMIN — ALBUMIN (HUMAN) 25 G: 0.25 INJECTION, SOLUTION INTRAVENOUS at 09:12

## 2024-09-07 RX ADMIN — SENNOSIDES 8.6 MG: 8.6 TABLET, FILM COATED ORAL at 17:07

## 2024-09-07 RX ADMIN — FERROUS SULFATE TAB 325 MG (65 MG ELEMENTAL FE) 325 MG: 325 (65 FE) TAB at 09:16

## 2024-09-07 RX ADMIN — PANTOPRAZOLE SODIUM 40 MG: 40 TABLET, DELAYED RELEASE ORAL at 05:01

## 2024-09-07 RX ADMIN — NYSTATIN 1 APPLICATION: 100000 POWDER TOPICAL at 12:47

## 2024-09-07 RX ADMIN — MUPIROCIN 1 APPLICATION: 20 OINTMENT TOPICAL at 21:05

## 2024-09-07 RX ADMIN — CARBIDOPA AND LEVODOPA 1 TABLET: 25; 100 TABLET ORAL at 17:07

## 2024-09-07 NOTE — ASSESSMENT & PLAN NOTE
Pt underwent cytoscopy with right stone extraction and right ureteral stent placement.  Resume IV Ceftriaxone (Day 8/10).  Urology and ID following      Status post cystoscopy right stent removal bladder stones day #6 per urology   Foleys catheter in place . Pending further recommendations from urology at this time.   Patient completed 7 days course ceftriaxone   Cefpodoxime Day#4    Am labs

## 2024-09-07 NOTE — ASSESSMENT & PLAN NOTE
Possibly multifactorial septic shock/hypotension and obstructive uropathy.  Pt is s/p ureteral stent placement with stone extraction.  Continue IV fluid hydration.  Will follow BMP.  Urology following     Bun/Cr downtrending today   Hold PM lasix dose and re evaluate in the morning .   Patient is still with scrotal edema/ peripheral edema; improving  Continue to monitor

## 2024-09-07 NOTE — PLAN OF CARE
Problem: Prexisting or High Potential for Compromised Skin Integrity  Goal: Skin integrity is maintained or improved  Description: INTERVENTIONS:  - Identify patients at risk for skin breakdown  - Assess and monitor skin integrity  - Assess and monitor nutrition and hydration status  - Monitor labs   - Assess for incontinence   - Turn and reposition patient  - Assist with mobility/ambulation  - Relieve pressure over bony prominences  - Avoid friction and shearing  - Provide appropriate hygiene as needed including keeping skin clean and dry  - Evaluate need for skin moisturizer/barrier cream  - Collaborate with interdisciplinary team   - Patient/family teaching  - Consider wound care consult   Outcome: Progressing     Problem: GENITOURINARY - ADULT  Goal: Maintains or returns to baseline urinary function  Description: INTERVENTIONS:  - Assess urinary function  - Encourage oral fluids to ensure adequate hydration if ordered  - Administer IV fluids as ordered to ensure adequate hydration  - Administer ordered medications as needed  - Offer frequent toileting  - Follow urinary retention protocol if ordered  Outcome: Progressing  Goal: Urinary catheter remains patent  Description: INTERVENTIONS:  - Assess patency of urinary catheter  - If patient has a chronic yang, consider changing catheter if non-functioning  - Follow guidelines for intermittent irrigation of non-functioning urinary catheter  Outcome: Progressing     Problem: METABOLIC, FLUID AND ELECTROLYTES - ADULT  Goal: Electrolytes maintained within normal limits  Description: INTERVENTIONS:  - Monitor labs and assess patient for signs and symptoms of electrolyte imbalances  - Administer electrolyte replacement as ordered  - Monitor response to electrolyte replacements, including repeat lab results as appropriate  - Instruct patient on fluid and nutrition as appropriate  Outcome: Progressing  Goal: Fluid balance maintained  Description: INTERVENTIONS:  -  Monitor labs   - Monitor I/O and WT  - Instruct patient on fluid and nutrition as appropriate  - Assess for signs & symptoms of volume excess or deficit  Outcome: Progressing  Goal: Glucose maintained within target range  Description: INTERVENTIONS:  - Monitor Blood Glucose as ordered  - Assess for signs and symptoms of hyperglycemia and hypoglycemia  - Administer ordered medications to maintain glucose within target range  - Assess nutritional intake and initiate nutrition service referral as needed  Outcome: Progressing     Problem: SKIN/TISSUE INTEGRITY - ADULT  Goal: Skin Integrity remains intact(Skin Breakdown Prevention)  Description: Assess:  -Perform Sushil assessment every 12  -Clean and moisturize skin every 12  -Inspect skin when repositioning, toileting, and assisting with ADLS    -Assess extremities for adequate circulation and sensation     Bed Management:  -Have minimal linens on bed & keep smooth, unwrinkled  -Change linens as needed when moist or perspiring  -Avoid sitting or lying in one position for more than 2 hours while in bed  -Keep HOB at 30degrees     Toileting:  -Offer bedside commode  -Assess for incontinence every 2  -Use incontinent care products after each incontinent episode such as foam    Activity:  -Mobilize patient 3 times a day  -Encourage activity   -Encourage or provide ROM exercises   -Turn and reposition patient every 2 Hours  -Use appropriate equipment to lift or move patient in bed  -Instruct/ Assist with weight shifting every 45 when out of bed in chair  -Consider limitation of chair time 3 hour intervals    Skin Care:  -Avoid use of baby powder, tape, friction and shearing, hot water or constrictive clothing  -Relieve pressure over bony prominences using foam  -Do not massage red bony areas    Next Steps:  -Teach patient strategies to minimize risks such as repositioning   -Consider consults to  interdisciplinary teams such as PT  Outcome: Progressing     Problem:  HEMATOLOGIC - ADULT  Goal: Maintains hematologic stability  Description: INTERVENTIONS  - Assess for signs and symptoms of bleeding or hemorrhage  - Monitor labs  - Administer supportive blood products/factors as ordered and appropriate  Outcome: Progressing     Problem: MUSCULOSKELETAL - ADULT  Goal: Maintain or return mobility to safest level of function  Description: INTERVENTIONS:  - Assess patient's ability to carry out ADLs; assess patient's baseline for ADL function and identify physical deficits which impact ability to perform ADLs (bathing, care of mouth/teeth, toileting, grooming, dressing, etc.)  - Assess/evaluate cause of self-care deficits   - Assess range of motion  - Assess patient's mobility  - Assess patient's need for assistive devices and provide as appropriate  - Encourage maximum independence but intervene and supervise when necessary  - Involve family in performance of ADLs  - Assess for home care needs following discharge   - Consider OT consult to assist with ADL evaluation and planning for discharge  - Provide patient education as appropriate  Outcome: Progressing     Problem: PAIN - ADULT  Goal: Verbalizes/displays adequate comfort level or baseline comfort level  Description: Interventions:  - Encourage patient to monitor pain and request assistance  - Assess pain using appropriate pain scale  - Administer analgesics based on type and severity of pain and evaluate response  - Implement non-pharmacological measures as appropriate and evaluate response  - Consider cultural and social influences on pain and pain management  - Notify physician/advanced practitioner if interventions unsuccessful or patient reports new pain  Outcome: Progressing     Problem: INFECTION - ADULT  Goal: Absence or prevention of progression during hospitalization  Description: INTERVENTIONS:  - Assess and monitor for signs and symptoms of infection  - Monitor lab/diagnostic results  - Monitor all insertion sites,  i.e. indwelling lines, tubes, and drains  - Monitor endotracheal if appropriate and nasal secretions for changes in amount and color  - [place] appropriate cooling/warming therapies per order  - Administer medications as ordered  - Instruct and encourage patient and family to use good hand hygiene technique  - Identify and instruct in appropriate isolation precautions for identified infection/condition  Outcome: Progressing  Goal: Absence of fever/infection during neutropenic period  Description: INTERVENTIONS:  - Monitor WBC    Outcome: Progressing     Problem: SAFETY ADULT  Goal: Patient will remain free of falls  Description: INTERVENTIONS:  - Educate patient/family on patient safety including physical limitations  - Instruct patient to call for assistance with activity   - Consult OT/PT to assist with strengthening/mobility   - Keep Call bell within reach  - Keep bed low and locked with side rails adjusted as appropriate  - Keep care items and personal belongings within reach  - Initiate and maintain comfort rounds  - Make Fall Risk Sign visible to staff  - Offer Toileting every 2 Hours, in advance of need  - Initiate/Maintain bed alarm  - Obtain necessary fall risk management equipment: alarm  - Apply yellow socks and bracelet for high fall risk patients  - Consider moving patient to room near nurses station  Outcome: Progressing     Problem: DISCHARGE PLANNING  Goal: Discharge to home or other facility with appropriate resources  Description: INTERVENTIONS:  - Identify barriers to discharge w/patient and caregiver  - Arrange for needed discharge resources and transportation as appropriate  - Identify discharge learning needs (meds, wound care, etc.)  - Arrange for interpretive services to assist at discharge as needed  - Refer to Case Management Department for coordinating discharge planning if the patient needs post-hospital services based on physician/advanced practitioner order or complex needs related to  functional status, cognitive ability, or social support system  Outcome: Progressing     Problem: Nutrition/Hydration-ADULT  Goal: Nutrient/Hydration intake appropriate for improving, restoring or maintaining nutritional needs  Description: Monitor and assess patient's nutrition/hydration status for malnutrition. Collaborate with interdisciplinary team and initiate plan and interventions as ordered.  Monitor patient's weight and dietary intake as ordered or per policy. Utilize nutrition screening tool and intervene as necessary. Determine patient's food preferences and provide high-protein, high-caloric foods as appropriate.     INTERVENTIONS:  - Monitor oral intake, urinary output, labs, and treatment plans  - Assess nutrition and hydration status and recommend course of action  - Evaluate amount of meals eaten  - Assist patient with eating if necessary   - Allow adequate time for meals  - Recommend/ encourage appropriate diets, oral nutritional supplements, and vitamin/mineral supplements  - Order, calculate, and assess calorie counts as needed  - Recommend, monitor, and adjust tube feedings and TPN/PPN based on assessed needs  - Assess need for intravenous fluids  - Provide specific nutrition/hydration education as appropriate  - Include patient/family/caregiver in decisions related to nutrition  Outcome: Progressing

## 2024-09-07 NOTE — ASSESSMENT & PLAN NOTE
Most probably secondary to IVF adminsteration given patient with complicated pyelitis and dehydration on admission.   Also with shock state that was managed per ICU team .     Echo completed on 8/30 with normal EF, G1DD         -IVF were stopped on 9/3  -Currently on Lasix 20 mg twice daily with albumin per cardiology recommendations  -He is diuresing well.  Continue to monitor I/os, BMP.     Still with pertinent scrotal edema today , peripheral edema , venous stasis however improved since IV diuresis was   Started by cardiology on 9/4   Remarkable improvement with diuresis  Agreed with cardiology to hold lasix for the next 24 hrs and re evaluate given PABLO   Continue medical management per specialist

## 2024-09-07 NOTE — ASSESSMENT & PLAN NOTE
Malnutrition Findings:   Adult Malnutrition type: Chronic illness  Adult Degree of Malnutrition: Malnutrition of moderate degree  Consult nutrition services Malnutrition Characteristics: Muscle loss, Fat loss, Weight loss     360 Statement: related to inadequate energy intake as evidenced by 10 % weight loss last 7-8 months, depression of temples, sunken orbital, wasted interosseous. Treatment: Oral diet and nutrition supplements    BMI Findings:      Body mass index is 24.22 kg/m².

## 2024-09-07 NOTE — ASSESSMENT & PLAN NOTE
Pt underwent cytoscopy with right stone extraction and right ureteral stent placement.  Resume IV Ceftriaxone (Day 8/10).  Urology and ID following      Status post cystoscopy right stent removal bladder stones day #6 per urology   Foleys catheter in place . Pending further recommendations from urology at this time.   Patient completed 7 days course ceftriaxone   Cefpodoxime Day#5    Am labs

## 2024-09-07 NOTE — ASSESSMENT & PLAN NOTE
Most probably secondary to IVF adminsteration given patient with complicated pyelitis and dehydration on admission.   Also with shock state that was managed per ICU team .     Echo completed on 8/30 with normal EF, G1DD         -IVF were stopped on 9/3  -Currently on Lasix 20 mg twice daily with albumin per cardiology recommendations  -He is diuresing well.  Continue to monitor I/os, BMP.     Still with pertinent scrotal edema today , peripheral edema , venous stasis however improved since IV diuresis was   Started by cardiology on 9/4   Remarkable improvement with diuresis  Continue medical management per specialist

## 2024-09-07 NOTE — PLAN OF CARE
Problem: Prexisting or High Potential for Compromised Skin Integrity  Goal: Skin integrity is maintained or improved  Description: INTERVENTIONS:  - Identify patients at risk for skin breakdown  - Assess and monitor skin integrity  - Assess and monitor nutrition and hydration status  - Monitor labs   - Assess for incontinence   - Turn and reposition patient  - Assist with mobility/ambulation  - Relieve pressure over bony prominences  - Avoid friction and shearing  - Provide appropriate hygiene as needed including keeping skin clean and dry  - Evaluate need for skin moisturizer/barrier cream  - Collaborate with interdisciplinary team   - Patient/family teaching  - Consider wound care consult   Outcome: Progressing     Problem: GENITOURINARY - ADULT  Goal: Maintains or returns to baseline urinary function  Description: INTERVENTIONS:  - Assess urinary function  - Encourage oral fluids to ensure adequate hydration if ordered  - Administer IV fluids as ordered to ensure adequate hydration  - Administer ordered medications as needed  - Offer frequent toileting  - Follow urinary retention protocol if ordered  Outcome: Progressing  Goal: Urinary catheter remains patent  Description: INTERVENTIONS:  - Assess patency of urinary catheter  - If patient has a chronic yang, consider changing catheter if non-functioning  - Follow guidelines for intermittent irrigation of non-functioning urinary catheter  Outcome: Progressing     Problem: METABOLIC, FLUID AND ELECTROLYTES - ADULT  Goal: Electrolytes maintained within normal limits  Description: INTERVENTIONS:  - Monitor labs and assess patient for signs and symptoms of electrolyte imbalances  - Administer electrolyte replacement as ordered  - Monitor response to electrolyte replacements, including repeat lab results as appropriate  - Instruct patient on fluid and nutrition as appropriate  Outcome: Progressing  Goal: Fluid balance maintained  Description: INTERVENTIONS:  -  Monitor labs   - Monitor I/O and WT  - Instruct patient on fluid and nutrition as appropriate  - Assess for signs & symptoms of volume excess or deficit  Outcome: Progressing  Goal: Glucose maintained within target range  Description: INTERVENTIONS:  - Monitor Blood Glucose as ordered  - Assess for signs and symptoms of hyperglycemia and hypoglycemia  - Administer ordered medications to maintain glucose within target range  - Assess nutritional intake and initiate nutrition service referral as needed  Outcome: Progressing     Problem: SKIN/TISSUE INTEGRITY - ADULT  Goal: Skin Integrity remains intact(Skin Breakdown Prevention)  Description: Assess:  -Perform Sushil assessment every 12  -Clean and moisturize skin every 12  -Inspect skin when repositioning, toileting, and assisting with ADLS    -Assess extremities for adequate circulation and sensation     Bed Management:  -Have minimal linens on bed & keep smooth, unwrinkled  -Change linens as needed when moist or perspiring  -Avoid sitting or lying in one position for more than 2 hours while in bed  -Keep HOB at 30degrees     Toileting:  -Offer bedside commode  -Assess for incontinence every 2  -Use incontinent care products after each incontinent episode such as foam    Activity:  -Mobilize patient 3 times a day  -Encourage activity   -Encourage or provide ROM exercises   -Turn and reposition patient every 2 Hours  -Use appropriate equipment to lift or move patient in bed  -Instruct/ Assist with weight shifting every 45 when out of bed in chair  -Consider limitation of chair time 3 hour intervals    Skin Care:  -Avoid use of baby powder, tape, friction and shearing, hot water or constrictive clothing  -Relieve pressure over bony prominences using foam  -Do not massage red bony areas    Next Steps:  -Teach patient strategies to minimize risks such as repositioning   -Consider consults to  interdisciplinary teams such as PT  Outcome: Progressing     Problem:  HEMATOLOGIC - ADULT  Goal: Maintains hematologic stability  Description: INTERVENTIONS  - Assess for signs and symptoms of bleeding or hemorrhage  - Monitor labs  - Administer supportive blood products/factors as ordered and appropriate  Outcome: Progressing     Problem: MUSCULOSKELETAL - ADULT  Goal: Maintain or return mobility to safest level of function  Description: INTERVENTIONS:  - Assess patient's ability to carry out ADLs; assess patient's baseline for ADL function and identify physical deficits which impact ability to perform ADLs (bathing, care of mouth/teeth, toileting, grooming, dressing, etc.)  - Assess/evaluate cause of self-care deficits   - Assess range of motion  - Assess patient's mobility  - Assess patient's need for assistive devices and provide as appropriate  - Encourage maximum independence but intervene and supervise when necessary  - Involve family in performance of ADLs  - Assess for home care needs following discharge   - Consider OT consult to assist with ADL evaluation and planning for discharge  - Provide patient education as appropriate  Outcome: Progressing     Problem: INFECTION - ADULT  Goal: Absence or prevention of progression during hospitalization  Description: INTERVENTIONS:  - Assess and monitor for signs and symptoms of infection  - Monitor lab/diagnostic results  - Monitor all insertion sites, i.e. indwelling lines, tubes, and drains  - Monitor endotracheal if appropriate and nasal secretions for changes in amount and color  - [place] appropriate cooling/warming therapies per order  - Administer medications as ordered  - Instruct and encourage patient and family to use good hand hygiene technique  - Identify and instruct in appropriate isolation precautions for identified infection/condition  Outcome: Progressing  Goal: Absence of fever/infection during neutropenic period  Description: INTERVENTIONS:  - Monitor WBC    Outcome: Progressing     Problem: SAFETY ADULT  Goal:  Patient will remain free of falls  Description: INTERVENTIONS:  - Educate patient/family on patient safety including physical limitations  - Instruct patient to call for assistance with activity   - Consult OT/PT to assist with strengthening/mobility   - Keep Call bell within reach  - Keep bed low and locked with side rails adjusted as appropriate  - Keep care items and personal belongings within reach  - Initiate and maintain comfort rounds  - Make Fall Risk Sign visible to staff  - Offer Toileting every 2 Hours, in advance of need  - Initiate/Maintain bed alarm  - Obtain necessary fall risk management equipment: alarm  - Apply yellow socks and bracelet for high fall risk patients  - Consider moving patient to room near nurses station  Outcome: Progressing     Problem: DISCHARGE PLANNING  Goal: Discharge to home or other facility with appropriate resources  Description: INTERVENTIONS:  - Identify barriers to discharge w/patient and caregiver  - Arrange for needed discharge resources and transportation as appropriate  - Identify discharge learning needs (meds, wound care, etc.)  - Arrange for interpretive services to assist at discharge as needed  - Refer to Case Management Department for coordinating discharge planning if the patient needs post-hospital services based on physician/advanced practitioner order or complex needs related to functional status, cognitive ability, or social support system  Outcome: Progressing     Problem: Nutrition/Hydration-ADULT  Goal: Nutrient/Hydration intake appropriate for improving, restoring or maintaining nutritional needs  Description: Monitor and assess patient's nutrition/hydration status for malnutrition. Collaborate with interdisciplinary team and initiate plan and interventions as ordered.  Monitor patient's weight and dietary intake as ordered or per policy. Utilize nutrition screening tool and intervene as necessary. Determine patient's food preferences and provide  high-protein, high-caloric foods as appropriate.     INTERVENTIONS:  - Monitor oral intake, urinary output, labs, and treatment plans  - Assess nutrition and hydration status and recommend course of action  - Evaluate amount of meals eaten  - Assist patient with eating if necessary   - Allow adequate time for meals  - Recommend/ encourage appropriate diets, oral nutritional supplements, and vitamin/mineral supplements  - Order, calculate, and assess calorie counts as needed  - Recommend, monitor, and adjust tube feedings and TPN/PPN based on assessed needs  - Assess need for intravenous fluids  - Provide specific nutrition/hydration education as appropriate  - Include patient/family/caregiver in decisions related to nutrition  Outcome: Progressing

## 2024-09-07 NOTE — PHYSICAL THERAPY NOTE
09/07/24 1438   Note Type   Note Type Cancelled Session   Cancel Reasons Medical status  (Attempted to see pt for PT session. Per JUSTYNA Powell, hold pt today for PT - pt easily agitated and is resting comfortably. Will follow as appropriate.)   Licensure   [place] License Number  Carmel L Kristine, JULIÁN

## 2024-09-07 NOTE — ASSESSMENT & PLAN NOTE
Iron deficiency anemia   On iron supplements   Mostly secondary to FTT ( dietary)     Given progression of anemia will hold DVT PPE for 24 hrs and re evaluate  S/p venofer 300 mg x 1   Start venofer 100 mg od   Follow CBC

## 2024-09-07 NOTE — ASSESSMENT & PLAN NOTE
Lab Results   Component Value Date    HGBA1C 14.6 (H) 08/26/2024     Recent Labs     09/06/24  1548 09/06/24  2109 09/07/24  0736 09/07/24  1158   POCGLU 313* 275* 184* 292*     Blood Sugar Average: Last 72 hrs:  (P) [id number]    Resume Lantus 14 U Qhs   SSI regimen

## 2024-09-07 NOTE — ASSESSMENT & PLAN NOTE
Lab Results   Component Value Date    HGBA1C 14.6 (H) 08/26/2024     Recent Labs     09/06/24  1121 09/06/24  1548 09/06/24  2109 09/07/24  0736   POCGLU 309* 313* 275* 184*     Blood Sugar Average: Last 72 hrs:  (P) [id number]    Resume Lantus 14 U Qhs   SSI regimen

## 2024-09-07 NOTE — PROGRESS NOTES
Formerly [facility]  Progress Note  Name: Chris Bojorquez I  MRN: [id number]  Unit/Bed#: [address] [place] [address] Date of Admission: 8/26/2024   Date of Service: 9/7/2024 I Hospital Day: 12    Assessment & Plan   Emphysematous pyelitis  Assessment & Plan  Pt underwent cytoscopy with right stone extraction and right ureteral stent placement.  Resume IV Ceftriaxone (Day 8/10).  Urology and ID following      Status post cystoscopy right stent removal bladder stones day #6 per urology   Foleys catheter in place . Pending further recommendations from urology at this time.   Patient completed 7 days course ceftriaxone   Cefpodoxime Day#4    Am labs     Acute metabolic encephalopathy-resolved as of 9/4/2024  Assessment & Plan  Likely secondary to sepsis/UTI vs hospital delirium combined with dementia.   CT head is negative for acute process.  Mental status continues to improve since admission.  Continue neurochecks    Failure to thrive in adult  Assessment & Plan  Nutrition consult   Speech following   Currently on dysphagia diet     History of recurrent UTIs  Assessment & Plan  Secondary to foleys catheter     Volume overload  Assessment & Plan  Most probably secondary to IVF adminsteration given patient with complicated pyelitis and dehydration on admission.   Also with shock state that was managed per ICU team .     Echo completed on 8/30 with normal EF, G1DD         -IVF were stopped on 9/3  -Currently on Lasix 20 mg twice daily with albumin per cardiology recommendations  -He is diuresing well.  Continue to monitor I/os, BMP.     Still with pertinent scrotal edema today , peripheral edema , venous stasis however improved since IV diuresis was   Started by cardiology on 9/4   Remarkable improvement with diuresis  Continue medical management per specialist       Venous stasis  Assessment & Plan  Noted bilaterally   Mostly secondary to IVF since admission      -d/c IVF on 9/3   -patient is tolerating oral diet    -duiresis with caution   -monitor I/Os     Scrotal swelling  Assessment & Plan  Most probably secondary to fluid overload from IVFs that were received during the hospitalization.     -s/p lasix 20 mg x 1 9/3 with good net output      Monitor I/Os   Case discussed with urology; dr kaiser will evaluate at bedside.   Refer to A and P for volume overload     Iron deficiency anemia  Assessment & Plan  Iron deficiency anemia   On iron supplements   Mostly secondary to FTT ( dietary)     Given progression of anemia will hold DVT PPE for 24 hrs and re evaluate  S/p venofer 300 mg x 1   Start venofer 100 mg od tomorrow   Follow CBC    Gastroesophageal reflux disease  Assessment & Plan  Continue PPI    Bacteremia due to Proteus species  Assessment & Plan  Proteus Bacteremia noted.     Urinary source  Transitioned to cefpodoxime  ID team following     Parkinson disease  Assessment & Plan  Continue Sinemet.  PT/OT evaluation for discharge needs     Hypothyroidism  Assessment & Plan  Continue Levothyroxine    PABLO (acute kidney injury) (HCC)  Assessment & Plan  Possibly multifactorial septic shock/hypotension and obstructive uropathy.  Pt is s/p ureteral stent placement with stone extraction.  Continue IV fluid hydration.  Will follow BMP.  Urology following     Bun/Cr increasing today   Hold PM lasix dose and re evaluate in the morning .   Patient is still with scrotal edema/ peripheral edema  Continue to monitor         Uncontrolled type 2 diabetes mellitus with hyperglycemia (HCC)  Assessment & Plan  Lab Results   Component Value Date    HGBA1C 14.6 (H) 08/26/2024     Recent Labs     09/06/24  1548 09/06/24  2109 09/07/24  0736 09/07/24  1158   POCGLU 313* 275* 184* 292*     Blood Sugar Average: Last 72 hrs:  (P) 275.4569090081710211    Resume Lantus 14 U Qhs   SSI regimen     Moderate protein-calorie malnutrition (HCC)  Assessment & Plan  Malnutrition Findings:   Adult Malnutrition type: Chronic illness  Adult Degree of  Malnutrition: Malnutrition of moderate degree  Consult nutrition services Malnutrition Characteristics: Muscle loss, Fat loss, Weight loss     360 Statement: related to inadequate energy intake as evidenced by 10 % weight loss last 7-8 months, depression of temples, sunken orbital, wasted interosseous. Treatment: Oral diet and nutrition supplements    BMI Findings:      Body mass index is 24.22 kg/m².       Essential hypertension  Assessment & Plan  Norvasc initially held due to labile BP/sepsis.  Will restart once appropriate     * Septic shock (HCC)-resolved as of 8/31/2024  Assessment & Plan  Evidenced by fever, tachycardia, bandemia, and hypotension responsive to IVF  In setting of bacteremia likely due to urinary source due to chronic indwelling Rivera present on admit, evidenced by pyelitis, UA results requiring IV cefepime and transition to ceftriaxone  Bp still on the soft side, Isolyte at 75 cc per hour; will give bolus 250 cc over 1 hour  Lactic acid 0.8  1/2 blood cultures growing proteus   Urine culture growing proteus  ID following  Changed cefepime to ceftriaxone 2 grams IV  and continue vancomycin; MRSA from nares positive for MRSA   Trend cbc and fever curve               VTE Pharmacologic Prophylaxis: VTE Score: 5 Moderate Risk (Score 3-4) - Pharmacological DVT Prophylaxis Ordered: heparin.    Mobility:   Basic Mobility Inpatient Raw Score: 6  JH-HLM Goal: 2: Bed activities/Dependent transfer  JH-HLM Achieved: 2: Bed activities/Dependent transfer  JH-HLM Goal achieved. Continue to encourage appropriate mobility.    Patient Centered Rounds: I performed bedside rounds with nursing staff today.   Discussions with Specialists or Other Care Team Provider: cardiology     Education and Discussions with Family / Patient: Updated  (son) via phone.    Total Time Spent on Date of Encounter in care of patient: 30 mins. This time was spent on one or more of the following: performing physical exam;  counseling and coordination of care; obtaining or reviewing history; documenting in the medical record; reviewing/ordering tests, medications or procedures; communicating with other healthcare professionals and discussing with patient's family/caregivers.    Current Length of Stay: 12 day(s)  Current Patient Status: Inpatient   Certification Statement: The patient will continue to require additional inpatient hospital stay due to volume overload, UTI   Discharge Plan: Anticipate discharge in 24-48 hrs to home.    Code Status: Level 1 - Full Code    Subjective:   Patient was seen today at bedside.  He is upset about being hospitalized.  Alert and oriented at baseline per family.  Answering questions accordingly.  No active complaints.      Objective:     Vitals:   Temp (24hrs), Av.7 °F (37.6 °C), Min:99.2 °F (37.3 °C), Max:100.3 °F (37.9 °C)    Temp:  [99.2 °F (37.3 °C)-100.3 °F (37.9 °C)] 99.2 °F (37.3 °C)  HR:  [64-79] 68  Resp:  [15-22] 17  BP: (123-137)/(61-73) 126/61  SpO2:  [91 %-96 %] 95 %  Body mass index is 24.22 kg/m².     Input and Output Summary (last 24 hours):     Intake/Output Summary (Last 24 hours) at 2024 1506  Last data filed at 2024 2227  Gross per 24 hour   Intake 270 ml   Output 1120 ml   Net -850 ml       Physical Exam:   Physical Exam  Vitals and nursing note reviewed.   Constitutional:       General: He is not in acute distress.     Appearance: Normal appearance.   HENT:      Head: Normocephalic and atraumatic.      Mouth/Throat:      Mouth: Mucous membranes are moist.   Eyes:      Conjunctiva/sclera: Conjunctivae normal.      Pupils: Pupils are equal, round, and reactive to light.   Cardiovascular:      Rate and Rhythm: Normal rate and regular rhythm.      Pulses: Normal pulses.           Carotid pulses are 2+ on the right side and 2+ on the left side.       Radial pulses are 2+ on the right side and 2+ on the left side.        Dorsalis pedis pulses are 2+ on the right side and  2+ on the left side.      Heart sounds: Normal heart sounds, S1 normal and S2 normal. No murmur heard.     Comments: Scrotal edema noted on previous days.  S/p diuresis with improvement around 60 to 70% today.  Pulmonary:      Effort: No tachypnea, bradypnea or accessory muscle usage.      Breath sounds: Normal breath sounds and air entry. No decreased breath sounds, wheezing, rhonchi or rales.   Abdominal:      General: Abdomen is flat. Bowel sounds are normal. There is no distension.      Palpations: Abdomen is soft.      Tenderness: There is no abdominal tenderness.   Musculoskeletal:      Right lower le+ Pitting Edema present.      Left lower le+ Pitting Edema present.   Neurological:      Mental Status: He is alert and oriented to person, place, and time. Mental status is at baseline.          Additional Data:     Labs:  Results from last 7 days   Lab Units 24  1234   WBC Thousand/uL 5.64   HEMOGLOBIN g/dL 7.0*   HEMATOCRIT % 23.3*   PLATELETS Thousands/uL 329   SEGS PCT % 75   LYMPHO PCT % 14   MONO PCT % 9   EOS PCT % 1     Results from last 7 days   Lab Units 24  1234   SODIUM mmol/L 135   POTASSIUM mmol/L 4.3   CHLORIDE mmol/L 97   CO2 mmol/L 33*   BUN mg/dL 35*   CREATININE mg/dL 1.56*   ANION GAP mmol/L 5   CALCIUM mg/dL 8.4   GLUCOSE RANDOM mg/dL 284*         Results from last 7 days   Lab Units 24  1158 24  0736 24  2109 24  1548 24  1121 24  0704 24  1550 24  1141 24  0714 24  1957 24  1614   POC GLUCOSE mg/dl 292* 184* 275* 313* 309* 225* 208* 362* 376* 240* 277* 349*               Lines/Drains:  Invasive Devices       Peripheral Intravenous Line  Duration             Peripheral IV 24 Dorsal (posterior);Left Hand 1 day              Drain  Duration             Ureteral Internal Stent Right ureter 6 Fr. 9 days    Urethral Catheter Other (Comment) 20 Fr. 9 days                  Urinary  Catheter:  Goal for removal:  chornic yang                 Imaging: No pertinent imaging reviewed.    Recent Cultures (last 7 days):         Last 24 Hours Medication List:   Current Facility-Administered Medications   Medication Dose Route Frequency Provider Last Rate    acetaminophen  1,000 mg Intravenous Q6H PRN FRANCES Hubbard 1,000 mg (08/31/24 2131)    [START ON 9/8/2024] Albumin 25%  25 g Intravenous BID (diuretic) Ward Cross MD      And    [START ON 9/8/2024] furosemide  20 mg Intravenous BID (diuretic) Ward Cross MD      calcium carbonate-vitamin D  1 tablet Oral Daily With Breakfast Ward Cross MD      carbidopa-levodopa  1 tablet Oral BID FRANCES Hubbard      cefpodoxime  400 mg Oral BID With Meals Lori Menjivar MD      insulin glargine  16 Units Subcutaneous HS Ward Cross MD      insulin lispro  2-12 Units Subcutaneous TID AC FRANCES Hubbard      insulin lispro  2-12 Units Subcutaneous HS FRANCES Hubbard      [START ON 9/8/2024] iron sucrose  100 mg Intravenous Daily Ward Cross MD      iron sucrose  300 mg Intravenous Once Ward Cross  mg (09/07/24 1422)    levothyroxine  25 mcg Oral Early Morning FRANCES Hubbard      mupirocin   Nasal Q12H RAHUL Ward Cross MD      nystatin  1 Application Topical BID Ward Cross MD      pantoprazole  40 mg Oral Early Morning FRANCES Hubbard      senna  1 tablet Oral BID Ward Cross MD          Today, Patient Was Seen By: Ward Cross MD    **Please Note: This note may have been constructed using a voice recognition system.**

## 2024-09-08 PROBLEM — L30.4 INTERTRIGO: Status: ACTIVE | Noted: 2024-09-08

## 2024-09-08 LAB
ANION GAP SERPL CALCULATED.3IONS-SCNC: 5 MMOL/L (ref 4–13)
BASOPHILS # BLD AUTO: 0.02 THOUSANDS/ÂΜL (ref 0–0.1)
BASOPHILS NFR BLD AUTO: 0 % (ref 0–1)
BUN SERPL-MCNC: 36 MG/DL (ref 5–25)
CALCIUM SERPL-MCNC: 8.3 MG/DL (ref 8.4–10.2)
CHLORIDE SERPL-SCNC: 99 MMOL/L (ref 96–108)
CO2 SERPL-SCNC: 32 MMOL/L (ref 21–32)
CREAT SERPL-MCNC: 1.47 MG/DL (ref 0.6–1.3)
EOSINOPHIL # BLD AUTO: 0.05 THOUSAND/ÂΜL (ref 0–0.61)
EOSINOPHIL NFR BLD AUTO: 1 % (ref 0–6)
ERYTHROCYTE [DISTWIDTH] IN BLOOD BY AUTOMATED COUNT: 16.8 % (ref 11.6–15.1)
GFR SERPL CREATININE-BSD FRML MDRD: 45 ML/MIN/1.73SQ M
GLUCOSE SERPL-MCNC: 185 MG/DL (ref 65–140)
GLUCOSE SERPL-MCNC: 211 MG/DL (ref 65–140)
GLUCOSE SERPL-MCNC: 70 MG/DL (ref 65–140)
GLUCOSE SERPL-MCNC: 97 MG/DL (ref 65–140)
HCT VFR BLD AUTO: 23.6 % (ref 36.5–49.3)
HGB BLD-MCNC: 7 G/DL (ref 12–17)
IMM GRANULOCYTES # BLD AUTO: 0.04 THOUSAND/UL (ref 0–0.2)
IMM GRANULOCYTES NFR BLD AUTO: 1 % (ref 0–2)
LYMPHOCYTES # BLD AUTO: 0.89 THOUSANDS/ÂΜL (ref 0.6–4.47)
LYMPHOCYTES NFR BLD AUTO: 13 % (ref 14–44)
MCH RBC QN AUTO: 24.8 PG (ref 26.8–34.3)
MCHC RBC AUTO-ENTMCNC: 29.7 G/DL (ref 31.4–37.4)
MCV RBC AUTO: 84 FL (ref 82–98)
MONOCYTES # BLD AUTO: 0.77 THOUSAND/ÂΜL (ref 0.17–1.22)
MONOCYTES NFR BLD AUTO: 11 % (ref 4–12)
NEUTROPHILS # BLD AUTO: 5.29 THOUSANDS/ÂΜL (ref 1.85–7.62)
NEUTS SEG NFR BLD AUTO: 74 % (ref 43–75)
NRBC BLD AUTO-RTO: 0 /100 WBCS
PLATELET # BLD AUTO: 343 THOUSANDS/UL (ref 149–390)
PMV BLD AUTO: 9.1 FL (ref 8.9–12.7)
POTASSIUM SERPL-SCNC: 4.1 MMOL/L (ref 3.5–5.3)
RBC # BLD AUTO: 2.82 MILLION/UL (ref 3.88–5.62)
SODIUM SERPL-SCNC: 136 MMOL/L (ref 135–147)
WBC # BLD AUTO: 7.06 THOUSAND/UL (ref 4.31–10.16)

## 2024-09-08 PROCEDURE — 80048 BASIC METABOLIC PNL TOTAL CA: CPT

## 2024-09-08 PROCEDURE — 99232 SBSQ HOSP IP/OBS MODERATE 35: CPT

## 2024-09-08 PROCEDURE — 82948 REAGENT STRIP/BLOOD GLUCOSE: CPT

## 2024-09-08 PROCEDURE — 85025 COMPLETE CBC W/AUTO DIFF WBC: CPT

## 2024-09-08 RX ORDER — FUROSEMIDE 10 MG/ML
20 INJECTION INTRAMUSCULAR; INTRAVENOUS DAILY
Status: DISCONTINUED | OUTPATIENT
Start: 2024-09-08 | End: 2024-09-09

## 2024-09-08 RX ORDER — ALBUMIN (HUMAN) 12.5 G/50ML
25 SOLUTION INTRAVENOUS DAILY
Status: DISCONTINUED | OUTPATIENT
Start: 2024-09-08 | End: 2024-09-09

## 2024-09-08 RX ORDER — INSULIN GLARGINE 100 [IU]/ML
14 INJECTION, SOLUTION SUBCUTANEOUS
Status: DISCONTINUED | OUTPATIENT
Start: 2024-09-08 | End: 2024-09-10 | Stop reason: HOSPADM

## 2024-09-08 RX ADMIN — CEFPODOXIME PROXETIL 400 MG: 200 TABLET, FILM COATED ORAL at 17:04

## 2024-09-08 RX ADMIN — FUROSEMIDE 20 MG: 10 INJECTION, SOLUTION INTRAMUSCULAR; INTRAVENOUS at 09:17

## 2024-09-08 RX ADMIN — IRON SUCROSE 100 MG: 20 INJECTION, SOLUTION INTRAVENOUS at 09:20

## 2024-09-08 RX ADMIN — INSULIN LISPRO 2 UNITS: 100 INJECTION, SOLUTION INTRAVENOUS; SUBCUTANEOUS at 17:05

## 2024-09-08 RX ADMIN — CARBIDOPA AND LEVODOPA 1 TABLET: 25; 100 TABLET ORAL at 17:05

## 2024-09-08 RX ADMIN — NYSTATIN 1 APPLICATION: 100000 POWDER TOPICAL at 20:18

## 2024-09-08 RX ADMIN — NYSTATIN 1 APPLICATION: 100000 POWDER TOPICAL at 09:20

## 2024-09-08 RX ADMIN — SENNOSIDES 8.6 MG: 8.6 TABLET, FILM COATED ORAL at 17:05

## 2024-09-08 RX ADMIN — ALBUMIN (HUMAN) 25 G: 0.25 INJECTION, SOLUTION INTRAVENOUS at 08:27

## 2024-09-08 RX ADMIN — CEFPODOXIME PROXETIL 400 MG: 200 TABLET, FILM COATED ORAL at 09:19

## 2024-09-08 RX ADMIN — INSULIN GLARGINE 14 UNITS: 100 INJECTION, SOLUTION SUBCUTANEOUS at 20:17

## 2024-09-08 RX ADMIN — PANTOPRAZOLE SODIUM 40 MG: 40 TABLET, DELAYED RELEASE ORAL at 06:25

## 2024-09-08 RX ADMIN — LEVOTHYROXINE SODIUM 25 MCG: 25 TABLET ORAL at 06:25

## 2024-09-08 NOTE — PROGRESS NOTES
[facility]  Progress Note  Name: Chris Bojorquez I  MRN: [id number]  Unit/Bed#: [address] [place] [address] Date of Admission: 8/26/2024   Date of Service: 9/8/2024 I Hospital Day: 13    Assessment & Plan   Emphysematous pyelitis  Assessment & Plan  Pt underwent cytoscopy with right stone extraction and right ureteral stent placement.  Resume IV Ceftriaxone (Day 8/10).  Urology and ID following      Status post cystoscopy right stent removal bladder stones day #6 per urology   Foleys catheter in place . Pending further recommendations from urology at this time.   Patient completed 7 days course ceftriaxone   Cefpodoxime Day#5    Am labs     Acute metabolic encephalopathy-resolved as of 9/4/2024  Assessment & Plan  Likely secondary to sepsis/UTI vs hospital delirium combined with dementia.   CT head is negative for acute process.  Mental status continues to improve since admission.  Continue neurochecks    Intertrigo  Assessment & Plan  Nystatin powder daily     Failure to thrive in adult  Assessment & Plan  Nutrition consult   Speech following   Currently on dysphagia diet     History of recurrent UTIs  Assessment & Plan  Secondary to foleys catheter     Volume overload  Assessment & Plan  Most probably secondary to IVF adminsteration given patient with complicated pyelitis and dehydration on admission.   Also with shock state that was managed per ICU team .     Echo completed on 8/30 with normal EF, G1DD         -IVF were stopped on 9/3  -Currently on Lasix 20 mg twice daily with albumin per cardiology recommendations  -He is diuresing well.  Continue to monitor I/os, BMP.     Still with pertinent scrotal edema today , peripheral edema , venous stasis however improved since IV diuresis was   Started by cardiology on 9/4   Remarkable improvement with diuresis  Agreed with cardiology to hold lasix for the next 24 hrs and re evaluate given PABLO   Continue medical management per specialist       Venous  stasis  Assessment & Plan  Noted bilaterally   Mostly secondary to IVF since admission      -d/c IVF on 9/3   -patient is tolerating oral diet   -duiresis with caution   -monitor I/Os     Scrotal swelling  Assessment & Plan  Most probably secondary to fluid overload from IVFs that were received during the hospitalization.     -s/p lasix 20 mg x 1 9/3 with good net output      Monitor I/Os   Case discussed with urology; dr kaiser will evaluate at bedside.   Refer to A and P for volume overload     Iron deficiency anemia  Assessment & Plan  Iron deficiency anemia   On iron supplements   Mostly secondary to FTT ( dietary)     Given progression of anemia will hold DVT PPE for 24 hrs and re evaluate  S/p venofer 300 mg x 1   Start venofer 100 mg od   Follow CBC    Gastroesophageal reflux disease  Assessment & Plan  Continue PPI    Bacteremia due to Proteus species  Assessment & Plan  Proteus Bacteremia noted.     Urinary source  Transitioned to cefpodoxime  ID team following     Parkinson disease  Assessment & Plan  Continue Sinemet.  PT/OT evaluation for discharge needs     Hypothyroidism  Assessment & Plan  Continue Levothyroxine    PBALO (acute kidney injury) (HCC)  Assessment & Plan  Possibly multifactorial septic shock/hypotension and obstructive uropathy.  Pt is s/p ureteral stent placement with stone extraction.  Continue IV fluid hydration.  Will follow BMP.  Urology following     Bun/Cr downtrending today   Hold PM lasix dose and re evaluate in the morning .   Patient is still with scrotal edema/ peripheral edema; improving  Continue to monitor         Uncontrolled type 2 diabetes mellitus with hyperglycemia (HCC)  Assessment & Plan  Lab Results   Component Value Date    HGBA1C 14.6 (H) 08/26/2024     Recent Labs     09/06/24  1548 09/06/24  2109 09/07/24  0736 09/07/24  1158   POCGLU 313* 275* 184* 292*     Blood Sugar Average: Last 72 hrs:  (P) 275.6928805500186353    Resume Lantus 14 U Qhs   SSI regimen      Moderate protein-calorie malnutrition (HCC)  Assessment & Plan  Malnutrition Findings:   Adult Malnutrition type: Chronic illness  Adult Degree of Malnutrition: Malnutrition of moderate degree  Consult nutrition services Malnutrition Characteristics: Muscle loss, Fat loss, Weight loss     360 Statement: related to inadequate energy intake as evidenced by 10 % weight loss last 7-8 months, depression of temples, sunken orbital, wasted interosseous. Treatment: Oral diet and nutrition supplements    BMI Findings:      Body mass index is 24.22 kg/m².       Essential hypertension  Assessment & Plan  Norvasc initially held due to labile BP/sepsis.  Will restart once appropriate     * Septic shock (HCC)-resolved as of 8/31/2024  Assessment & Plan  Evidenced by fever, tachycardia, bandemia, and hypotension responsive to IVF  In setting of bacteremia likely due to urinary source due to chronic indwelling Rivera present on admit, evidenced by pyelitis, UA results requiring IV cefepime and transition to ceftriaxone  Bp still on the soft side, Isolyte at 75 cc per hour; will give bolus 250 cc over 1 hour  Lactic acid 0.8  1/2 blood cultures growing proteus   Urine culture growing proteus  ID following  Changed cefepime to ceftriaxone 2 grams IV  and continue vancomycin; MRSA from nares positive for MRSA   Trend cbc and fever curve               VTE Pharmacologic Prophylaxis: VTE Score: 5 Moderate Risk (Score 3-4) - Pharmacological DVT Prophylaxis Contraindicated. Sequential Compression Devices Ordered.    Mobility:   Basic Mobility Inpatient Raw Score: 6  JH-HLM Goal: 2: Bed activities/Dependent transfer  JH-HLM Achieved: 2: Bed activities/Dependent transfer  JH-HLM Goal achieved. Continue to encourage appropriate mobility.    Patient Centered Rounds: I performed bedside rounds with nursing staff today.   Discussions with Specialists or Other Care Team Provider: cardiology     Education and Discussions with Family / Patient:  Updated  (son) via phone.    Total Time Spent on Date of Encounter in care of patient: 30 mins. This time was spent on one or more of the following: performing physical exam; counseling and coordination of care; obtaining or reviewing history; documenting in the medical record; reviewing/ordering tests, medications or procedures; communicating with other healthcare professionals and discussing with patient's family/caregivers.    Current Length of Stay: 13 day(s)  Current Patient Status: Inpatient   Certification Statement: The patient will continue to require additional inpatient hospital stay due to PABLO , Volume overload   Discharge Plan: Anticipate discharge in 24-48 hrs to home.    Code Status: Level 1 - Full Code    Subjective:   Patient was seen today at bedside. Lying comfortable in bed. Answers questions appropriately   Tolerating oral diet.   Denies any pain       Objective:     Vitals:   Temp (24hrs), Av.5 °F (36.9 °C), Min:97.7 °F (36.5 °C), Max:99.1 °F (37.3 °C)    Temp:  [97.7 °F (36.5 °C)-99.1 °F (37.3 °C)] 98.8 °F (37.1 °C)  HR:  [65-77] 65  Resp:  [18] 18  BP: (121-125)/(60-62) 121/60  SpO2:  [93 %-96 %] 94 %  Body mass index is 21.46 kg/m².     Input and Output Summary (last 24 hours):     Intake/Output Summary (Last 24 hours) at 2024 1053  Last data filed at 2024 0601  Gross per 24 hour   Intake 250 ml   Output 1600 ml   Net -1350 ml       Physical Exam:   Physical Exam  Vitals and nursing note reviewed.   Constitutional:       General: He is not in acute distress.     Appearance: Normal appearance.   HENT:      Head: Normocephalic and atraumatic.      Mouth/Throat:      Mouth: Mucous membranes are moist.   Eyes:      Conjunctiva/sclera: Conjunctivae normal.      Pupils: Pupils are equal, round, and reactive to light.   Cardiovascular:      Rate and Rhythm: Normal rate and regular rhythm.      Pulses: Normal pulses.           Carotid pulses are 2+ on the right side and 2+  on the left side.       Radial pulses are 2+ on the right side and 2+ on the left side.        Dorsalis pedis pulses are 2+ on the right side and 2+ on the left side.      Heart sounds: Normal heart sounds, S1 normal and S2 normal. No murmur heard.  Pulmonary:      Effort: No tachypnea, bradypnea or accessory muscle usage.      Breath sounds: Normal breath sounds and air entry. No decreased breath sounds, wheezing, rhonchi or rales.   Abdominal:      General: Abdomen is flat. Bowel sounds are normal. There is no distension.      Palpations: Abdomen is soft.      Tenderness: There is no abdominal tenderness.   Genitourinary:     Comments: Scrotal edema noted. Similar to yesterday which is around 60-70% improved from time since first noted.   Musculoskeletal:      Right lower leg: Edema present.      Left lower leg: Edema present.   Skin:     Capillary Refill: Capillary refill takes less than 2 seconds.   Neurological:      Mental Status: He is alert and oriented to person, place, and time. Mental status is at baseline.   Psychiatric:         Mood and Affect: Mood normal.         Behavior: Behavior normal.          Additional Data:     Labs:  Results from last 7 days   Lab Units 09/08/24  0630   WBC Thousand/uL 7.06   HEMOGLOBIN g/dL 7.0*   HEMATOCRIT % 23.6*   PLATELETS Thousands/uL 343   SEGS PCT % 74   LYMPHO PCT % 13*   MONO PCT % 11   EOS PCT % 1     Results from last 7 days   Lab Units 09/08/24  0630   SODIUM mmol/L 136   POTASSIUM mmol/L 4.1   CHLORIDE mmol/L 99   CO2 mmol/L 32   BUN mg/dL 36*   CREATININE mg/dL 1.47*   ANION GAP mmol/L 5   CALCIUM mg/dL 8.3*   GLUCOSE RANDOM mg/dL 70         Results from last 7 days   Lab Units 09/07/24 2008 09/07/24  1547 09/07/24  1158 09/07/24  0736 09/06/24  2109 09/06/24  1548 09/06/24  1121 09/06/24  0704 09/05/24 2001 09/05/24  1550 09/05/24  1141 09/05/24  0714   POC GLUCOSE mg/dl 277* 266* 292* 184* 275* 313* 309* 225* 208* 362* 376* 240*                Lines/Drains:  Invasive Devices       Peripheral Intravenous Line  Duration             Peripheral IV 09/08/24 Proximal;Right;Ventral (anterior) Forearm <1 day              Drain  Duration             Ureteral Internal Stent Right ureter 6 Fr. 9 days    Urethral Catheter Other (Comment) 20 Fr. 9 days                  Urinary Catheter:  Goal for removal:  chronic yang catheter in place               Imaging: No pertinent imaging reviewed.    Recent Cultures (last 7 days):         Last 24 Hours Medication List:   Current Facility-Administered Medications   Medication Dose Route Frequency Provider Last Rate    acetaminophen  1,000 mg Intravenous Q6H PRN FRANCES Hubbard 1,000 mg (08/31/24 2131)    Albumin 25%  25 g Intravenous Daily Ward Cross MD      And    furosemide  20 mg Intravenous Daily Ward Cross MD      calcium carbonate-vitamin D  1 tablet Oral Daily With Breakfast Ward Cross MD      carbidopa-levodopa  1 tablet Oral BID FRANCES Hubbard      cefpodoxime  400 mg Oral BID With Meals Lori Menjivar MD      insulin glargine  16 Units Subcutaneous HS Ward Cross MD      insulin lispro  2-12 Units Subcutaneous TID AC FRANCES Hubbard      insulin lispro  2-12 Units Subcutaneous HS FRANCES Hubbard      iron sucrose  100 mg Intravenous Daily Ward Cross MD      levothyroxine  25 mcg Oral Early Morning FRANCES Hubbard      nystatin  1 Application Topical BID Ward Cross MD      pantoprazole  40 mg Oral Early Morning FRANCES Hubbard      senna  1 tablet Oral BID Ward Cross MD          Today, Patient Was Seen By: Ward Cross MD    **Please Note: This note may have been constructed using a voice recognition system.**

## 2024-09-08 NOTE — QUICK NOTE
I had a lengthy discussion with the son POA and patient at bedside.   Risks/benefits of blood transfusion explained to patient and POA at bedside. Including allergic reaction/TACO/infection    They agree with the above.   Consent given to nursing

## 2024-09-09 PROBLEM — R78.81 BACTEREMIA DUE TO PROTEUS SPECIES: Status: RESOLVED | Noted: 2024-08-27 | Resolved: 2024-09-09

## 2024-09-09 PROBLEM — N12 EMPHYSEMATOUS PYELITIS: Status: RESOLVED | Noted: 2024-08-26 | Resolved: 2024-09-09

## 2024-09-09 PROBLEM — B96.4 BACTEREMIA DUE TO PROTEUS SPECIES: Status: RESOLVED | Noted: 2024-08-27 | Resolved: 2024-09-09

## 2024-09-09 PROBLEM — K59.00 CONSTIPATION: Status: ACTIVE | Noted: 2024-09-09

## 2024-09-09 LAB
ANION GAP SERPL CALCULATED.3IONS-SCNC: 9 MMOL/L (ref 4–13)
BASOPHILS # BLD AUTO: 0.02 THOUSANDS/ÂΜL (ref 0–0.1)
BASOPHILS NFR BLD AUTO: 0 % (ref 0–1)
BUN SERPL-MCNC: 35 MG/DL (ref 5–25)
CALCIUM SERPL-MCNC: 8.6 MG/DL (ref 8.4–10.2)
CHLORIDE SERPL-SCNC: 96 MMOL/L (ref 96–108)
CO2 SERPL-SCNC: 30 MMOL/L (ref 21–32)
CREAT SERPL-MCNC: 1.53 MG/DL (ref 0.6–1.3)
EOSINOPHIL # BLD AUTO: 0.07 THOUSAND/ÂΜL (ref 0–0.61)
EOSINOPHIL NFR BLD AUTO: 1 % (ref 0–6)
ERYTHROCYTE [DISTWIDTH] IN BLOOD BY AUTOMATED COUNT: 17 % (ref 11.6–15.1)
GFR SERPL CREATININE-BSD FRML MDRD: 42 ML/MIN/1.73SQ M
GLUCOSE SERPL-MCNC: 212 MG/DL (ref 65–140)
GLUCOSE SERPL-MCNC: 229 MG/DL (ref 65–140)
GLUCOSE SERPL-MCNC: 265 MG/DL (ref 65–140)
GLUCOSE SERPL-MCNC: 276 MG/DL (ref 65–140)
HCT VFR BLD AUTO: 23.2 % (ref 36.5–49.3)
HGB BLD-MCNC: 7.1 G/DL (ref 12–17)
IMM GRANULOCYTES # BLD AUTO: 0.04 THOUSAND/UL (ref 0–0.2)
IMM GRANULOCYTES NFR BLD AUTO: 1 % (ref 0–2)
LYMPHOCYTES # BLD AUTO: 0.72 THOUSANDS/ÂΜL (ref 0.6–4.47)
LYMPHOCYTES NFR BLD AUTO: 14 % (ref 14–44)
MAGNESIUM SERPL-MCNC: 2.1 MG/DL (ref 1.9–2.7)
MCH RBC QN AUTO: 25.6 PG (ref 26.8–34.3)
MCHC RBC AUTO-ENTMCNC: 30.6 G/DL (ref 31.4–37.4)
MCV RBC AUTO: 84 FL (ref 82–98)
MONOCYTES # BLD AUTO: 0.62 THOUSAND/ÂΜL (ref 0.17–1.22)
MONOCYTES NFR BLD AUTO: 12 % (ref 4–12)
NEUTROPHILS # BLD AUTO: 3.73 THOUSANDS/ÂΜL (ref 1.85–7.62)
NEUTS SEG NFR BLD AUTO: 72 % (ref 43–75)
NRBC BLD AUTO-RTO: 0 /100 WBCS
PLATELET # BLD AUTO: 303 THOUSANDS/UL (ref 149–390)
PMV BLD AUTO: 9.4 FL (ref 8.9–12.7)
POTASSIUM SERPL-SCNC: 4.6 MMOL/L (ref 3.5–5.3)
RBC # BLD AUTO: 2.77 MILLION/UL (ref 3.88–5.62)
SODIUM SERPL-SCNC: 135 MMOL/L (ref 135–147)
WBC # BLD AUTO: 5.2 THOUSAND/UL (ref 4.31–10.16)

## 2024-09-09 PROCEDURE — 83735 ASSAY OF MAGNESIUM: CPT

## 2024-09-09 PROCEDURE — 99232 SBSQ HOSP IP/OBS MODERATE 35: CPT

## 2024-09-09 PROCEDURE — 85025 COMPLETE CBC W/AUTO DIFF WBC: CPT

## 2024-09-09 PROCEDURE — 99232 SBSQ HOSP IP/OBS MODERATE 35: CPT | Performed by: INTERNAL MEDICINE

## 2024-09-09 PROCEDURE — 80048 BASIC METABOLIC PNL TOTAL CA: CPT

## 2024-09-09 PROCEDURE — 82948 REAGENT STRIP/BLOOD GLUCOSE: CPT

## 2024-09-09 PROCEDURE — 92526 ORAL FUNCTION THERAPY: CPT

## 2024-09-09 RX ORDER — FUROSEMIDE 10 MG/ML
20 INJECTION INTRAMUSCULAR; INTRAVENOUS DAILY
Status: DISCONTINUED | OUTPATIENT
Start: 2024-09-09 | End: 2024-09-10

## 2024-09-09 RX ORDER — FUROSEMIDE 10 MG/ML
20 INJECTION INTRAMUSCULAR; INTRAVENOUS EVERY EVENING
Status: DISCONTINUED | OUTPATIENT
Start: 2024-09-09 | End: 2024-09-09

## 2024-09-09 RX ORDER — ALBUMIN (HUMAN) 12.5 G/50ML
25 SOLUTION INTRAVENOUS DAILY
Status: DISCONTINUED | OUTPATIENT
Start: 2024-09-09 | End: 2024-09-10

## 2024-09-09 RX ORDER — ALBUMIN (HUMAN) 12.5 G/50ML
25 SOLUTION INTRAVENOUS EVERY EVENING
Status: DISCONTINUED | OUTPATIENT
Start: 2024-09-09 | End: 2024-09-09

## 2024-09-09 RX ORDER — POLYETHYLENE GLYCOL 3350 17 G/17G
17 POWDER, FOR SOLUTION ORAL 2 TIMES DAILY
Status: COMPLETED | OUTPATIENT
Start: 2024-09-09 | End: 2024-09-09

## 2024-09-09 RX ADMIN — ALBUMIN (HUMAN) 25 G: 0.25 INJECTION, SOLUTION INTRAVENOUS at 11:54

## 2024-09-09 RX ADMIN — CARBIDOPA AND LEVODOPA 1 TABLET: 25; 100 TABLET ORAL at 17:55

## 2024-09-09 RX ADMIN — NYSTATIN 1 APPLICATION: 100000 POWDER TOPICAL at 09:11

## 2024-09-09 RX ADMIN — POLYETHYLENE GLYCOL 3350 17 G: 17 POWDER, FOR SOLUTION ORAL at 11:57

## 2024-09-09 RX ADMIN — FUROSEMIDE 20 MG: 10 INJECTION, SOLUTION INTRAMUSCULAR; INTRAVENOUS at 12:12

## 2024-09-09 RX ADMIN — POLYETHYLENE GLYCOL 3350 17 G: 17 POWDER, FOR SOLUTION ORAL at 17:55

## 2024-09-09 RX ADMIN — CARBIDOPA AND LEVODOPA 1 TABLET: 25; 100 TABLET ORAL at 08:29

## 2024-09-09 RX ADMIN — NYSTATIN 1 APPLICATION: 100000 POWDER TOPICAL at 20:21

## 2024-09-09 RX ADMIN — INSULIN LISPRO 6 UNITS: 100 INJECTION, SOLUTION INTRAVENOUS; SUBCUTANEOUS at 16:39

## 2024-09-09 RX ADMIN — INSULIN LISPRO 4 UNITS: 100 INJECTION, SOLUTION INTRAVENOUS; SUBCUTANEOUS at 21:07

## 2024-09-09 RX ADMIN — IRON SUCROSE 100 MG: 20 INJECTION, SOLUTION INTRAVENOUS at 09:05

## 2024-09-09 RX ADMIN — SENNOSIDES 8.6 MG: 8.6 TABLET, FILM COATED ORAL at 08:29

## 2024-09-09 RX ADMIN — Medication 1 TABLET: at 08:29

## 2024-09-09 RX ADMIN — INSULIN GLARGINE 14 UNITS: 100 INJECTION, SOLUTION SUBCUTANEOUS at 20:20

## 2024-09-09 RX ADMIN — SENNOSIDES 8.6 MG: 8.6 TABLET, FILM COATED ORAL at 17:55

## 2024-09-09 NOTE — NURSING NOTE
Pt is becoming more and more difficult with care;striking out at staff when providing care.Refused all morning meds.Unable to do morning labs as well as pt is combative.

## 2024-09-09 NOTE — ASSESSMENT & PLAN NOTE
Lab Results   Component Value Date    HGBA1C 14.6 (H) 08/26/2024     Recent Labs     09/07/24 2008 09/08/24  1207 09/08/24  1555 09/08/24 1949   POCGLU 277* 97 185* 211*     Blood Sugar Average: Last 72 hrs:  (P) 239.1908704786470072    Resume Lantus 14 U Qhs   SSI regimen

## 2024-09-09 NOTE — PROGRESS NOTES
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  primary team was having difficulty diuresing patient as it was causing hypotension  9/4/2024 started diuresing patient with albumin 25%, 25 g one half hour prior to Lasix 20 mg IV on BID basis with improvement in his generalized edema.  9/6/2024 patient has lost approximately 13 pounds since starting albumin Lasix cocktail  IV Lasix and albumin was decreased to daily on 9/8/2024, will continue this another 24 hours and reevaluate  weights appear to be stable as he is been 145 pounds the last two days  Continue monitor I and O, daily weights and labs    Bacteremia due to Proteus species  Assessment & Plan  Followed by infectious disease  completed Vantin 400 mg BID    Uncontrolled type 2 diabetes mellitus with hyperglycemia (HCC)  Assessment & Plan  Lab Results   Component Value Date    HGBA1C 14.6 (H) 08/26/2024       Recent Labs     09/07/24 2008 09/08/24  1207 09/08/24  1555 09/08/24  1949   POCGLU 277* 97 185* 211*         Blood Sugar Average: Last 72 hrs:  (P) [id number]    Needs better blood sugar management  currently on insulin  managed per primary team      Iron deficiency anemia  Assessment & Plan  Secondary to chronic disease  improving with diuresis  patient receiving IV venofer per primary team    Parkinson disease  Assessment & Plan  On Sinemet 25/100 mg BID    History of recurrent UTIs  Assessment & Plan  Patient with chronic Rivera    Moderate protein-calorie  "malnutrition ([facility])  Assessment & Plan  Malnutrition Findings:   Adult Malnutrition type: Chronic illness  Adult Degree of Malnutrition: Malnutrition of moderate degree  Malnutrition Characteristics: Muscle loss, Fat loss, Weight loss      admission albumin level was 2 with total protein of 5.6.  Poor PO intake  discussed with family following up for protein supplements in the outpatient setting            360 Statement: related to inadequate energy intake as evidenced by 10 % weight loss last 7-8 months, depression of temples, sunken orbital, wasted interosseous. Treatment: Oral diet and nutrition supplements    BMI Findings:           Body mass index is 21.45 kg/m².       Failure to thrive in adult  Assessment & Plan  Patient with poor appetite and low protein level  per nursing staff patient is eating better.        Subjective/Objective   Subjective: Patient seen and examined. He is resting comfortably in bed. He is diuresis well and skin turgor appears better. Scrotum still with some edema but greatly improved. Will check CMP for albumin and total protein in the a.m.    Objective:   Vitals: /60   Pulse 62   Temp 98.7 °F (37.1 °C) (Oral)   Resp 18   Ht 5' 9\" (1.753 m)   Wt 65.9 kg (145 lb 4.5 oz)   SpO2 97%   BMI 21.45 kg/m²   Vitals:    09/08/24 0600 09/09/24 0600   Weight: 65.9 kg (145 lb 4.8 oz) 65.9 kg (145 lb 4.5 oz)     Orthostatic Blood Pressures      Flowsheet Row Most Recent Value   Blood Pressure 119/60 filed at 09/09/2024 0731   Patient Position - Orthostatic VS Lying filed at 09/02/2024 0352              Intake/Output Summary (Last 24 hours) at 9/9/2024 1027  Last data filed at 9/9/2024 0905  Gross per 24 hour   Intake 370 ml   Output 2700 ml   Net -2330 ml       Invasive Devices       Peripheral Intravenous Line  Duration             Peripheral IV 09/08/24 Proximal;Right;Ventral (anterior) Forearm 1 day              Drain  Duration             Ureteral Internal Stent Right ureter 6 Fr. 10 " days    Urethral Catheter Other (Comment) 20 Fr. 10 days                  Physical Exam:   Physical Exam  Vitals and nursing note reviewed.   Constitutional:       Appearance: Normal appearance. He is normal weight. He is ill-appearing (Chronically).   HENT:      Right Ear: External ear normal.      Left Ear: External ear normal.   Eyes:      General: No scleral icterus.        Right eye: No discharge.         Left eye: No discharge.   Cardiovascular:      Rate and Rhythm: Normal rate and regular rhythm.      Pulses: Normal pulses.   Pulmonary:      Effort: Pulmonary effort is normal. No respiratory distress.      Breath sounds: Normal breath sounds.   Abdominal:      General: Bowel sounds are normal. There is no distension.      Palpations: Abdomen is soft.   Genitourinary:     Comments: Scrotal edema improving  Musculoskeletal:      Right lower leg: No edema.      Left lower leg: No edema.   Skin:     General: Skin is warm and dry.      Capillary Refill: Capillary refill takes less than 2 seconds.   Neurological:      General: No focal deficit present.      Mental Status: He is alert and oriented to person, place, and time. Mental status is at baseline.   Psychiatric:         Mood and Affect: Mood normal.       Lab Results: I have personally reviewed pertinent lab results.    CBC with diff:   Results from last 7 days   Lab Units 09/08/24  0630   WBC Thousand/uL 7.06   RBC Million/uL 2.82*   HEMOGLOBIN g/dL 7.0*   HEMATOCRIT % 23.6*   MCV fL 84   MCH pg 24.8*   MCHC g/dL 29.7*   RDW % 16.8*   MPV fL 9.1   PLATELETS Thousands/uL 343     CMP:   Results from last 7 days   Lab Units 09/08/24  0630   SODIUM mmol/L 136   CHLORIDE mmol/L 99   CO2 mmol/L 32   BUN mg/dL 36*   CREATININE mg/dL 1.47*   CALCIUM mg/dL 8.3*   EGFR ml/min/1.73sq m 45     HS Troponin:   0   Lab Value Date/Time    HSTNI0 10 08/26/2024 1256    HSTNI2 9 08/26/2024 1525     BNP:   Results from last 7 days   Lab Units 09/08/24  0630   POTASSIUM mmol/L  4.1   CHLORIDE mmol/L 99   CO2 mmol/L 32   BUN mg/dL 36*   CREATININE mg/dL 1.47*   CALCIUM mg/dL 8.3*   EGFR ml/min/1.73sq m 45     Magnesium:   Results from last 7 days   Lab Units 09/03/24  0530   MAGNESIUM mg/dL 2.2     Lipid Profile:     Imaging: I have personally reviewed pertinent reports.      VTE Mechanical Prophylaxis: sequential compression device    Linette DANIELS  Cardiology

## 2024-09-09 NOTE — ASSESSMENT & PLAN NOTE
Malnutrition Findings:   Adult Malnutrition type: Chronic illness  Adult Degree of Malnutrition: Malnutrition of moderate degree  Malnutrition Characteristics: Muscle loss, Fat loss, Weight loss      admission albumin level was 2 with total protein of 5.6.  Poor PO intake  discussed with family following up for protein supplements in the outpatient setting            360 Statement: related to inadequate energy intake as evidenced by 10 % weight loss last 7-8 months, depression of temples, sunken orbital, wasted interosseous. Treatment: Oral diet and nutrition supplements    BMI Findings:           Body mass index is 21.45 kg/m².

## 2024-09-09 NOTE — SPEECH THERAPY NOTE
"Speech/Language Pathology Progress Note    Patient Name: Chris Bojorquez  Today's Date: 9/9/2024     Subjective:  \"I'd like cookies.\"    Objective:  Pt was seen for diagnostic dysphagia therapy to ensure tolerance of current diet (recently upgraded to Dysphagia 3) and to assess potential for safe upgrade to regular textured food).  Pt was alert and positioned upright.    Pt was assessed with peaches, thin liquid soft and hard cookies. Mastication was timely and effective.  Bolus formation and transfer were effective.  Swallow initiation appeared prompt. Laryngeal rise was good by palpation. No coughing, throat clearing, c/o stasis, multiple swallows, change in vocal quality noted c po intake today.     Assessment:  Pt presented with no s/s oral/pharyngeal dysphagia today.  Baseline diet=regular textured food, thin liquid.    Plan/Recommendations:  Please consider upgrade/return to regular textured food, continue thin liquid.   No new additional SLP recommendations at this time (will monitor from a distance while he remains hospitalized).     Sofi Cat MS CCC-SLP  [place] License [id number]       "

## 2024-09-09 NOTE — PLAN OF CARE
Problem: Prexisting or High Potential for Compromised Skin Integrity  Goal: Skin integrity is maintained or improved  Description: INTERVENTIONS:  - Identify patients at risk for skin breakdown  - Assess and monitor skin integrity  - Assess and monitor nutrition and hydration status  - Monitor labs   - Assess for incontinence   - Turn and reposition patient  - Assist with mobility/ambulation  - Relieve pressure over bony prominences  - Avoid friction and shearing  - Provide appropriate hygiene as needed including keeping skin clean and dry  - Evaluate need for skin moisturizer/barrier cream  - Collaborate with interdisciplinary team   - Patient/family teaching  - Consider wound care consult   Outcome: Progressing     Problem: GENITOURINARY - ADULT  Goal: Maintains or returns to baseline urinary function  Description: INTERVENTIONS:  - Assess urinary function  - Encourage oral fluids to ensure adequate hydration if ordered  - Administer IV fluids as ordered to ensure adequate hydration  - Administer ordered medications as needed  - Offer frequent toileting  - Follow urinary retention protocol if ordered  Outcome: Progressing  Goal: Urinary catheter remains patent  Description: INTERVENTIONS:  - Assess patency of urinary catheter  - If patient has a chronic yang, consider changing catheter if non-functioning  - Follow guidelines for intermittent irrigation of non-functioning urinary catheter  Outcome: Progressing     Problem: METABOLIC, FLUID AND ELECTROLYTES - ADULT  Goal: Electrolytes maintained within normal limits  Description: INTERVENTIONS:  - Monitor labs and assess patient for signs and symptoms of electrolyte imbalances  - Administer electrolyte replacement as ordered  - Monitor response to electrolyte replacements, including repeat lab results as appropriate  - Instruct patient on fluid and nutrition as appropriate  Outcome: Progressing  Goal: Fluid balance maintained  Description: INTERVENTIONS:  -  Monitor labs   - Monitor I/O and WT  - Instruct patient on fluid and nutrition as appropriate  - Assess for signs & symptoms of volume excess or deficit  Outcome: Progressing  Goal: Glucose maintained within target range  Description: INTERVENTIONS:  - Monitor Blood Glucose as ordered  - Assess for signs and symptoms of hyperglycemia and hypoglycemia  - Administer ordered medications to maintain glucose within target range  - Assess nutritional intake and initiate nutrition service referral as needed  Outcome: Progressing     Problem: SKIN/TISSUE INTEGRITY - ADULT  Goal: Skin Integrity remains intact(Skin Breakdown Prevention)  Description: Assess:  -Perform Sushil assessment every 12  -Clean and moisturize skin every 12  -Inspect skin when repositioning, toileting, and assisting with ADLS    -Assess extremities for adequate circulation and sensation     Bed Management:  -Have minimal linens on bed & keep smooth, unwrinkled  -Change linens as needed when moist or perspiring  -Avoid sitting or lying in one position for more than 2 hours while in bed  -Keep HOB at 30degrees     Toileting:  -Offer bedside commode  -Assess for incontinence every 2  -Use incontinent care products after each incontinent episode such as foam    Activity:  -Mobilize patient 3 times a day  -Encourage activity   -Encourage or provide ROM exercises   -Turn and reposition patient every 2 Hours  -Use appropriate equipment to lift or move patient in bed  -Instruct/ Assist with weight shifting every 45 when out of bed in chair  -Consider limitation of chair time 3 hour intervals    Skin Care:  -Avoid use of baby powder, tape, friction and shearing, hot water or constrictive clothing  -Relieve pressure over bony prominences using foam  -Do not massage red bony areas    Next Steps:  -Teach patient strategies to minimize risks such as repositioning   -Consider consults to  interdisciplinary teams such as PT  Outcome: Progressing     Problem:  HEMATOLOGIC - ADULT  Goal: Maintains hematologic stability  Description: INTERVENTIONS  - Assess for signs and symptoms of bleeding or hemorrhage  - Monitor labs  - Administer supportive blood products/factors as ordered and appropriate  Outcome: Progressing     Problem: MUSCULOSKELETAL - ADULT  Goal: Maintain or return mobility to safest level of function  Description: INTERVENTIONS:  - Assess patient's ability to carry out ADLs; assess patient's baseline for ADL function and identify physical deficits which impact ability to perform ADLs (bathing, care of mouth/teeth, toileting, grooming, dressing, etc.)  - Assess/evaluate cause of self-care deficits   - Assess range of motion  - Assess patient's mobility  - Assess patient's need for assistive devices and provide as appropriate  - Encourage maximum independence but intervene and supervise when necessary  - Involve family in performance of ADLs  - Assess for home care needs following discharge   - Consider OT consult to assist with ADL evaluation and planning for discharge  - Provide patient education as appropriate  Outcome: Progressing     Problem: PAIN - ADULT  Goal: Verbalizes/displays adequate comfort level or baseline comfort level  Description: Interventions:  - Encourage patient to monitor pain and request assistance  - Assess pain using appropriate pain scale  - Administer analgesics based on type and severity of pain and evaluate response  - Implement non-pharmacological measures as appropriate and evaluate response  - Consider cultural and social influences on pain and pain management  - Notify physician/advanced practitioner if interventions unsuccessful or patient reports new pain  Outcome: Progressing     Problem: INFECTION - ADULT  Goal: Absence or prevention of progression during hospitalization  Description: INTERVENTIONS:  - Assess and monitor for signs and symptoms of infection  - Monitor lab/diagnostic results  - Monitor all insertion sites,  i.e. indwelling lines, tubes, and drains  - Monitor endotracheal if appropriate and nasal secretions for changes in amount and color  - [place] appropriate cooling/warming therapies per order  - Administer medications as ordered  - Instruct and encourage patient and family to use good hand hygiene technique  - Identify and instruct in appropriate isolation precautions for identified infection/condition  Outcome: Progressing  Goal: Absence of fever/infection during neutropenic period  Description: INTERVENTIONS:  - Monitor WBC    Outcome: Progressing     Problem: SAFETY ADULT  Goal: Patient will remain free of falls  Description: INTERVENTIONS:  - Educate patient/family on patient safety including physical limitations  - Instruct patient to call for assistance with activity   - Consult OT/PT to assist with strengthening/mobility   - Keep Call bell within reach  - Keep bed low and locked with side rails adjusted as appropriate  - Keep care items and personal belongings within reach  - Initiate and maintain comfort rounds  - Make Fall Risk Sign visible to staff  - Offer Toileting every 2 Hours, in advance of need  - Initiate/Maintain bed alarm  - Obtain necessary fall risk management equipment: alarm  - Apply yellow socks and bracelet for high fall risk patients  - Consider moving patient to room near nurses station  Outcome: Progressing     Problem: DISCHARGE PLANNING  Goal: Discharge to home or other facility with appropriate resources  Description: INTERVENTIONS:  - Identify barriers to discharge w/patient and caregiver  - Arrange for needed discharge resources and transportation as appropriate  - Identify discharge learning needs (meds, wound care, etc.)  - Arrange for interpretive services to assist at discharge as needed  - Refer to Case Management Department for coordinating discharge planning if the patient needs post-hospital services based on physician/advanced practitioner order or complex needs related to  functional status, cognitive ability, or social support system  Outcome: Progressing     Problem: Nutrition/Hydration-ADULT  Goal: Nutrient/Hydration intake appropriate for improving, restoring or maintaining nutritional needs  Description: Monitor and assess patient's nutrition/hydration status for malnutrition. Collaborate with interdisciplinary team and initiate plan and interventions as ordered.  Monitor patient's weight and dietary intake as ordered or per policy. Utilize nutrition screening tool and intervene as necessary. Determine patient's food preferences and provide high-protein, high-caloric foods as appropriate.     INTERVENTIONS:  - Monitor oral intake, urinary output, labs, and treatment plans  - Assess nutrition and hydration status and recommend course of action  - Evaluate amount of meals eaten  - Assist patient with eating if necessary   - Allow adequate time for meals  - Recommend/ encourage appropriate diets, oral nutritional supplements, and vitamin/mineral supplements  - Order, calculate, and assess calorie counts as needed  - Recommend, monitor, and adjust tube feedings and TPN/PPN based on assessed needs  - Assess need for intravenous fluids  - Provide specific nutrition/hydration education as appropriate  - Include patient/family/caregiver in decisions related to nutrition  Outcome: Progressing

## 2024-09-09 NOTE — ASSESSMENT & PLAN NOTE
Iron deficiency anemia   On iron supplements   Mostly secondary to FTT ( dietary)     Given progression of anemia will hold DVT PPE for 24 hrs and re evaluate  S/p venofer 300 mg x 1   Start venofer 100 mg od   Follow CBC    Unfortunately unable to obtain CBC today.  Patient is refusing.  I had a lengthy discussion with the patient about obtaining blood work and he will not allow nursing to complete blood test.  I called the patient's family and left a voice message.  On previous days family were at bedside when blood work was completed.

## 2024-09-09 NOTE — ASSESSMENT & PLAN NOTE
Lab Results   Component Value Date    HGBA1C 14.6 (H) 08/26/2024       Recent Labs     09/07/24 2008 09/08/24  1207 09/08/24  1555 09/08/24  1949   POCGLU 277* 97 185* 211*         Blood Sugar Average: Last 72 hrs:  (P) [id number]    Needs better blood sugar management  currently on insulin  managed per primary team

## 2024-09-09 NOTE — ASSESSMENT & PLAN NOTE
Possibly multifactorial septic shock/hypotension and obstructive uropathy.  Pt is s/p ureteral stent placement with stone extraction.  Continue IV fluid hydration.  Will follow BMP.  Urology following     Bun/Cr downtrending 9/8  Pending repeat BMP today.  Lasix down titrated to 40 mg OD

## 2024-09-09 NOTE — PLAN OF CARE
Problem: Prexisting or High Potential for Compromised Skin Integrity  Goal: Skin integrity is maintained or improved  Description: INTERVENTIONS:  - Identify patients at risk for skin breakdown  - Assess and monitor skin integrity  - Assess and monitor nutrition and hydration status  - Monitor labs   - Assess for incontinence   - Turn and reposition patient  - Assist with mobility/ambulation  - Relieve pressure over bony prominences  - Avoid friction and shearing  - Provide appropriate hygiene as needed including keeping skin clean and dry  - Evaluate need for skin moisturizer/barrier cream  - Collaborate with interdisciplinary team   - Patient/family teaching  - Consider wound care consult   Outcome: Progressing     Problem: GENITOURINARY - ADULT  Goal: Maintains or returns to baseline urinary function  Description: INTERVENTIONS:  - Assess urinary function  - Encourage oral fluids to ensure adequate hydration if ordered  - Administer IV fluids as ordered to ensure adequate hydration  - Administer ordered medications as needed  - Offer frequent toileting  - Follow urinary retention protocol if ordered  Outcome: Progressing  Goal: Urinary catheter remains patent  Description: INTERVENTIONS:  - Assess patency of urinary catheter  - If patient has a chronic yang, consider changing catheter if non-functioning  - Follow guidelines for intermittent irrigation of non-functioning urinary catheter  Outcome: Progressing     Problem: METABOLIC, FLUID AND ELECTROLYTES - ADULT  Goal: Electrolytes maintained within normal limits  Description: INTERVENTIONS:  - Monitor labs and assess patient for signs and symptoms of electrolyte imbalances  - Administer electrolyte replacement as ordered  - Monitor response to electrolyte replacements, including repeat lab results as appropriate  - Instruct patient on fluid and nutrition as appropriate  Outcome: Progressing  Goal: Fluid balance maintained  Description: INTERVENTIONS:  -  Monitor labs   - Monitor I/O and WT  - Instruct patient on fluid and nutrition as appropriate  - Assess for signs & symptoms of volume excess or deficit  Outcome: Progressing  Goal: Glucose maintained within target range  Description: INTERVENTIONS:  - Monitor Blood Glucose as ordered  - Assess for signs and symptoms of hyperglycemia and hypoglycemia  - Administer ordered medications to maintain glucose within target range  - Assess nutritional intake and initiate nutrition service referral as needed  Outcome: Progressing     Problem: SKIN/TISSUE INTEGRITY - ADULT  Goal: Skin Integrity remains intact(Skin Breakdown Prevention)  Description: Assess:  -Perform Sushil assessment every 12  -Clean and moisturize skin every 12  -Inspect skin when repositioning, toileting, and assisting with ADLS    -Assess extremities for adequate circulation and sensation     Bed Management:  -Have minimal linens on bed & keep smooth, unwrinkled  -Change linens as needed when moist or perspiring  -Avoid sitting or lying in one position for more than 2 hours while in bed  -Keep HOB at 30degrees     Toileting:  -Offer bedside commode  -Assess for incontinence every 2  -Use incontinent care products after each incontinent episode such as foam    Activity:  -Mobilize patient 3 times a day  -Encourage activity   -Encourage or provide ROM exercises   -Turn and reposition patient every 2 Hours  -Use appropriate equipment to lift or move patient in bed  -Instruct/ Assist with weight shifting every 45 when out of bed in chair  -Consider limitation of chair time 3 hour intervals    Skin Care:  -Avoid use of baby powder, tape, friction and shearing, hot water or constrictive clothing  -Relieve pressure over bony prominences using foam  -Do not massage red bony areas    Next Steps:  -Teach patient strategies to minimize risks such as repositioning   -Consider consults to  interdisciplinary teams such as PT  Outcome: Progressing     Problem:  HEMATOLOGIC - ADULT  Goal: Maintains hematologic stability  Description: INTERVENTIONS  - Assess for signs and symptoms of bleeding or hemorrhage  - Monitor labs  - Administer supportive blood products/factors as ordered and appropriate  Outcome: Progressing     Problem: MUSCULOSKELETAL - ADULT  Goal: Maintain or return mobility to safest level of function  Description: INTERVENTIONS:  - Assess patient's ability to carry out ADLs; assess patient's baseline for ADL function and identify physical deficits which impact ability to perform ADLs (bathing, care of mouth/teeth, toileting, grooming, dressing, etc.)  - Assess/evaluate cause of self-care deficits   - Assess range of motion  - Assess patient's mobility  - Assess patient's need for assistive devices and provide as appropriate  - Encourage maximum independence but intervene and supervise when necessary  - Involve family in performance of ADLs  - Assess for home care needs following discharge   - Consider OT consult to assist with ADL evaluation and planning for discharge  - Provide patient education as appropriate  Outcome: Progressing     Problem: PAIN - ADULT  Goal: Verbalizes/displays adequate comfort level or baseline comfort level  Description: Interventions:  - Encourage patient to monitor pain and request assistance  - Assess pain using appropriate pain scale  - Administer analgesics based on type and severity of pain and evaluate response  - Implement non-pharmacological measures as appropriate and evaluate response  - Consider cultural and social influences on pain and pain management  - Notify physician/advanced practitioner if interventions unsuccessful or patient reports new pain  Outcome: Progressing     Problem: INFECTION - ADULT  Goal: Absence or prevention of progression during hospitalization  Description: INTERVENTIONS:  - Assess and monitor for signs and symptoms of infection  - Monitor lab/diagnostic results  - Monitor all insertion sites,  i.e. indwelling lines, tubes, and drains  - Monitor endotracheal if appropriate and nasal secretions for changes in amount and color  - [place] appropriate cooling/warming therapies per order  - Administer medications as ordered  - Instruct and encourage patient and family to use good hand hygiene technique  - Identify and instruct in appropriate isolation precautions for identified infection/condition  Outcome: Progressing  Goal: Absence of fever/infection during neutropenic period  Description: INTERVENTIONS:  - Monitor WBC    Outcome: Progressing     Problem: SAFETY ADULT  Goal: Patient will remain free of falls  Description: INTERVENTIONS:  - Educate patient/family on patient safety including physical limitations  - Instruct patient to call for assistance with activity   - Consult OT/PT to assist with strengthening/mobility   - Keep Call bell within reach  - Keep bed low and locked with side rails adjusted as appropriate  - Keep care items and personal belongings within reach  - Initiate and maintain comfort rounds  - Make Fall Risk Sign visible to staff  - Offer Toileting every 2 Hours, in advance of need  - Initiate/Maintain bed alarm  - Obtain necessary fall risk management equipment: alarm  - Apply yellow socks and bracelet for high fall risk patients  - Consider moving patient to room near nurses station  Outcome: Progressing     Problem: DISCHARGE PLANNING  Goal: Discharge to home or other facility with appropriate resources  Description: INTERVENTIONS:  - Identify barriers to discharge w/patient and caregiver  - Arrange for needed discharge resources and transportation as appropriate  - Identify discharge learning needs (meds, wound care, etc.)  - Arrange for interpretive services to assist at discharge as needed  - Refer to Case Management Department for coordinating discharge planning if the patient needs post-hospital services based on physician/advanced practitioner order or complex needs related to  functional status, cognitive ability, or social support system  Outcome: Progressing     Problem: Nutrition/Hydration-ADULT  Goal: Nutrient/Hydration intake appropriate for improving, restoring or maintaining nutritional needs  Description: Monitor and assess patient's nutrition/hydration status for malnutrition. Collaborate with interdisciplinary team and initiate plan and interventions as ordered.  Monitor patient's weight and dietary intake as ordered or per policy. Utilize nutrition screening tool and intervene as necessary. Determine patient's food preferences and provide high-protein, high-caloric foods as appropriate.     INTERVENTIONS:  - Monitor oral intake, urinary output, labs, and treatment plans  - Assess nutrition and hydration status and recommend course of action  - Evaluate amount of meals eaten  - Assist patient with eating if necessary   - Allow adequate time for meals  - Recommend/ encourage appropriate diets, oral nutritional supplements, and vitamin/mineral supplements  - Order, calculate, and assess calorie counts as needed  - Recommend, monitor, and adjust tube feedings and TPN/PPN based on assessed needs  - Assess need for intravenous fluids  - Provide specific nutrition/hydration education as appropriate  - Include patient/family/caregiver in decisions related to nutrition  Outcome: Progressing

## 2024-09-09 NOTE — PROGRESS NOTES
[facility]  Progress Note  Name: Chris Bojorquez I  MRN: [id number]  Unit/Bed#: [address] [place] [address] I Date of Admission: 8/26/2024   Date of Service: 9/9/2024 I Hospital Day: 14    Assessment & Plan   Acute metabolic encephalopathy-resolved as of 9/4/2024  Assessment & Plan  Likely secondary to sepsis/UTI vs hospital delirium combined with dementia.   CT head is negative for acute process.  Mental status continues to improve since admission.  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  Resume IV Ceftriaxone (Day 8/10).  Urology and ID following      Status post cystoscopy right stent removal bladder stones day #6 per urology   Foleys catheter in place .   Patient completed 7 days course ceftriaxone   Completed cefpodoxime course on 9/8/24 per ID and urology teams.     Am labs     Constipation  Assessment & Plan  Continue senna BID  Start miralax  Monitor I/Os     Intertrigo  Assessment & Plan  Nystatin powder daily     Failure to thrive in adult  Assessment & Plan  Nutrition consult   Speech following   Currently on dysphagia diet     History of recurrent UTIs  Assessment & Plan  Secondary to foleys catheter     Volume overload  Assessment & Plan  Most probably secondary to IVF adminsteration given patient with complicated pyelitis and dehydration on admission.   Also with shock state that was managed per ICU team .     Echo completed on 8/30 with normal EF, G1DD         -IVF were stopped on 9/3  -Currently on Lasix 20 mg twice daily with albumin per cardiology recommendations  -He is diuresing well.  Continue to monitor I/os, BMP.  -Lower extremity edema trace today.  -Scrotal edema improved around 70%.      Remarkable improvement with diuresis  Continue lasix at reduced dose as agreed with cardiology   Continue medical management per specialist     Pending  repeat BMP today;  patient is not allowing labs. I had a discussion with the patient and still wont allow labs. I called the family and LVM . Patient was agreable to do labs on previous days when family were at bedside.         Venous stasis  Assessment & Plan  Noted bilaterally   Mostly secondary to IVF since admission      -d/c IVF on 9/3   -patient is tolerating oral diet   -duiresis with caution   -monitor I/Os     Scrotal swelling  Assessment & Plan  Most probably secondary to fluid overload from IVFs that were received during the hospitalization.     -s/p lasix 20 mg x 1 9/3 with good net output      Monitor I/Os   Case discussed with urology; dr kaiser will evaluate at bedside.   Refer to A and P for volume overload     Iron deficiency anemia  Assessment & Plan  Iron deficiency anemia   On iron supplements   Mostly secondary to FTT ( dietary)     Given progression of anemia will hold DVT PPE for 24 hrs and re evaluate  S/p venofer 300 mg x 1   Start venofer 100 mg od   Follow CBC    Unfortunately unable to obtain CBC today.  Patient is refusing.  I had a lengthy discussion with the patient about obtaining blood work and he will not allow nursing to complete blood test.  I called the patient's family and left a voice message.  On previous days family were at bedside when blood work was completed.    Gastroesophageal reflux disease  Assessment & Plan  Continue PPI    Parkinson disease  Assessment & Plan  Continue Sinemet.  PT/OT evaluation for discharge needs     Hypothyroidism  Assessment & Plan  Continue Levothyroxine    PABLO (acute kidney injury) (HCC)  Assessment & Plan  Possibly multifactorial septic shock/hypotension and obstructive uropathy.  Pt is s/p ureteral stent placement with stone extraction.  Continue IV fluid hydration.  Will follow BMP.  Urology following     Bun/Cr downtrending 9/8  Pending repeat BMP today.  Lasix down titrated to 40 mg OD        Uncontrolled type 2 diabetes mellitus with  hyperglycemia (HCC)  Assessment & Plan  Lab Results   Component Value Date    HGBA1C 14.6 (H) 08/26/2024     Recent Labs     09/07/24 2008 09/08/24  1207 09/08/24  1555 09/08/24  1949   POCGLU 277* 97 185* 211*     Blood Sugar Average: Last 72 hrs:  (P) [id number]    Resume Lantus 14 U Qhs   SSI regimen     Moderate protein-calorie malnutrition (HCC)  Assessment & Plan  Malnutrition Findings:   Adult Malnutrition type: Chronic illness  Adult Degree of Malnutrition: Malnutrition of moderate degree  Consult nutrition services Malnutrition Characteristics: Muscle loss, Fat loss, Weight loss     360 Statement: related to inadequate energy intake as evidenced by 10 % weight loss last 7-8 months, depression of temples, sunken orbital, wasted interosseous. Treatment: Oral diet and nutrition supplements    BMI Findings:      Body mass index is 21.45 kg/m².       Essential hypertension  Assessment & Plan  Norvasc initially held due to labile BP/sepsis.  Will restart once appropriate     Bacteremia due to Proteus species-resolved as of 9/9/2024  Assessment & Plan  Proteus Bacteremia noted.     Repeat blood cultures with no growth at 5 days  Urinary source  Completed cefpodoxime course through 9/8/2024 per infectious disease  Monitor vitals    * Septic shock (HCC)-resolved as of 8/31/2024  Assessment & Plan  Evidenced by fever, tachycardia, bandemia, and hypotension responsive to IVF  In setting of bacteremia likely due to urinary source due to chronic indwelling Rivera present on admit, evidenced by pyelitis, UA results requiring IV cefepime and transition to ceftriaxone  Bp still on the soft side, Isolyte at 75 cc per hour; will give bolus 250 cc over 1 hour  Lactic acid 0.8  1/2 blood cultures growing proteus   Urine culture growing proteus  ID following  Changed cefepime to ceftriaxone 2 grams IV  and continue vancomycin; MRSA from nares positive for MRSA   Trend cbc and fever curve               VTE  Pharmacologic Prophylaxis: VTE Score: 5 Moderate Risk (Score 3-4) - Pharmacological DVT Prophylaxis Contraindicated. Sequential Compression Devices Ordered.    Mobility:   Basic Mobility Inpatient Raw Score: 6  JH-HLM Goal: 2: Bed activities/Dependent transfer  JH-HLM Achieved: 2: Bed activities/Dependent transfer  JH-HLM Goal achieved. Continue to encourage appropriate mobility.    Patient Centered Rounds: I performed bedside rounds with nursing staff today.   Discussions with Specialists or Other Care Team Provider: cardiology, urology     Education and Discussions with Family / Patient: Updated  (son) via phone.    Total Time Spent on Date of Encounter in care of patient: 30 mins. This time was spent on one or more of the following: performing physical exam; counseling and coordination of care; obtaining or reviewing history; documenting in the medical record; reviewing/ordering tests, medications or procedures; communicating with other healthcare professionals and discussing with patient's family/caregivers.    Current Length of Stay: 14 day(s)  Current Patient Status: Inpatient   Certification Statement: The patient will continue to require additional inpatient hospital stay due to urinary retention, PABLO volume overload,   Discharge Plan: Anticipate discharge tomorrow to TBD     Code Status: Level 1 - Full Code    Subjective:   Patient was seen today at bedside.  At baseline alertness and orientation.  He continues to refuse blood work.  Does not have any concerns or questions at this time.  I had a lengthy discussion with patient regarding performing blood work.  He continues to refuse.    During my discussion with him he reported constipation with no bowel movement for the past 5 days.  I had a lengthy discussion with him regarding the importance of taking stool softeners given that he had been refusing them intermittently over the same time..    Objective:     Vitals:   Temp (24hrs), Av.8  °F (37.1 °C), Min:98.7 °F (37.1 °C), Max:98.9 °F (37.2 °C)    Temp:  [98.7 °F (37.1 °C)-98.9 °F (37.2 °C)] 98.7 °F (37.1 °C)  HR:  [62-76] 62  Resp:  [18] 18  BP: (118-120)/(53-60) 119/60  SpO2:  [92 %-97 %] 97 %  Body mass index is 21.45 kg/m².     Input and Output Summary (last 24 hours):     Intake/Output Summary (Last 24 hours) at 9/9/2024 1204  Last data filed at 9/9/2024 1100  Gross per 24 hour   Intake 370 ml   Output 3350 ml   Net -2980 ml       Physical Exam:   Physical Exam  Vitals and nursing note reviewed.   Constitutional:       General: He is not in acute distress.     Appearance: Normal appearance.   HENT:      Head: Normocephalic and atraumatic.      Mouth/Throat:      Mouth: Mucous membranes are moist.   Eyes:      Conjunctiva/sclera: Conjunctivae normal.      Pupils: Pupils are equal, round, and reactive to light.   Cardiovascular:      Rate and Rhythm: Normal rate and regular rhythm.      Pulses: Normal pulses.           Carotid pulses are 2+ on the right side and 2+ on the left side.       Radial pulses are 2+ on the right side and 2+ on the left side.        Dorsalis pedis pulses are 2+ on the right side and 2+ on the left side.      Heart sounds: Normal heart sounds, S1 normal and S2 normal. No murmur heard.     Comments: LE: Trace pitting edema  Scrotal edema with 70% improvement   Pulmonary:      Effort: No tachypnea, bradypnea or accessory muscle usage.      Breath sounds: Normal breath sounds and air entry. No decreased breath sounds, wheezing, rhonchi or rales.   Abdominal:      General: Abdomen is flat. Bowel sounds are normal. There is no distension.      Palpations: Abdomen is soft.      Tenderness: There is no abdominal tenderness.   Skin:     General: Skin is warm.      Capillary Refill: Capillary refill takes less than 2 seconds.   Neurological:      Mental Status: He is alert and oriented to person, place, and time. Mental status is at baseline.   Psychiatric:         Mood and  Affect: Mood normal.         Behavior: Behavior normal.          Additional Data:     Labs:  Results from last 7 days   Lab Units 09/08/24  0630   WBC Thousand/uL 7.06   HEMOGLOBIN g/dL 7.0*   HEMATOCRIT % 23.6*   PLATELETS Thousands/uL 343   SEGS PCT % 74   LYMPHO PCT % 13*   MONO PCT % 11   EOS PCT % 1     Results from last 7 days   Lab Units 09/08/24  0630   SODIUM mmol/L 136   POTASSIUM mmol/L 4.1   CHLORIDE mmol/L 99   CO2 mmol/L 32   BUN mg/dL 36*   CREATININE mg/dL 1.47*   ANION GAP mmol/L 5   CALCIUM mg/dL 8.3*   GLUCOSE RANDOM mg/dL 70         Results from last 7 days   Lab Units 09/08/24  1949 09/08/24  1555 09/08/24  1207 09/07/24  2008 09/07/24  1547 09/07/24  1158 09/07/24  0736 09/06/24  2109 09/06/24  1548 09/06/24  1121 09/06/24  0704 09/05/24 2001   POC GLUCOSE mg/dl 211* 185* 97 277* 266* 292* 184* 275* 313* 309* 225* 208*               Lines/Drains:  Invasive Devices       Peripheral Intravenous Line  Duration             Peripheral IV 09/08/24 Proximal;Right;Ventral (anterior) Forearm 1 day              Drain  Duration             Ureteral Internal Stent Right ureter 6 Fr. 10 days    Urethral Catheter Other (Comment) 20 Fr. 10 days                  Urinary Catheter:  Goal for removal:  voiding trial per urology                Imaging: No pertinent imaging reviewed.    Recent Cultures (last 7 days):         Last 24 Hours Medication List:   Current Facility-Administered Medications   Medication Dose Route Frequency Provider Last Rate    acetaminophen  1,000 mg Intravenous Q6H PRN FRANCES Hubbard 1,000 mg (08/31/24 2131)    Albumin 25%  25 g Intravenous Daily FRANCES Mendez      And    furosemide  20 mg Intravenous Daily FRANCES Mendez      calcium carbonate-vitamin D  1 tablet Oral Daily With Breakfast Ward Cross MD      carbidopa-levodopa  1 tablet Oral BID FRANCES Hubbard      insulin glargine  14 Units Subcutaneous HS Ward Cross MD      insulin lispro  2-12 Units  Subcutaneous TID AC Jana Kathy Jurado, FRANCES      insulin lispro  2-12 Units Subcutaneous HS Jana FRANCES Dupont      iron sucrose  100 mg Intravenous Daily Ward Cross MD      levothyroxine  25 mcg Oral Early Morning JanaFRANCES Peralta      nystatin  1 Application Topical BID Ward Cross MD      pantoprazole  40 mg Oral Early Morning JanaFRANCES Peralta      polyethylene glycol  17 g Oral BID Ward Cross MD      senna  1 tablet Oral BID Ward Cross MD          Today, Patient Was Seen By: Ward Cross MD    **Please Note: This note may have been constructed using a voice recognition system.**

## 2024-09-09 NOTE — ASSESSMENT & PLAN NOTE
Proteus Bacteremia noted.     Repeat blood cultures with no growth at 5 days  Urinary source  Completed cefpodoxime course through 9/8/2024 per infectious disease  Monitor vitals

## 2024-09-09 NOTE — ASSESSMENT & PLAN NOTE
Most probably secondary to IVF adminsteration given patient with complicated pyelitis and dehydration on admission.   Also with shock state that was managed per ICU team .     Echo completed on 8/30 with normal EF, G1DD         -IVF were stopped on 9/3  -Currently on Lasix 20 mg twice daily with albumin per cardiology recommendations  -He is diuresing well.  Continue to monitor I/os, BMP.  -Lower extremity edema trace today.  -Scrotal edema improved around 70%.      Remarkable improvement with diuresis  Continue lasix at reduced dose as agreed with cardiology   Continue medical management per specialist     Pending repeat BMP today;  patient is not allowing labs. I had a discussion with the patient and still wont allow labs. I called the family and LVM . Patient was agreable to do labs on previous days when family were at bedside.

## 2024-09-09 NOTE — ASSESSMENT & PLAN NOTE
Patient with significant scrotal edema and mild total body edema  was admitted on 8/26/2024 from [facility] with altered mental status and patient was found to have Emphysematous pyelitis  if weights are correct he has gained approximately 30 pounds in hospital.  primary team was having difficulty diuresing patient as it was causing hypotension  9/4/2024 started diuresing patient with albumin 25%, 25 g one half hour prior to Lasix 20 mg IV on BID basis with improvement in his generalized edema.  9/6/2024 patient has lost approximately 13 pounds since starting albumin Lasix cocktail  IV Lasix and albumin was decreased to daily on 9/8/2024, will continue this another 24 hours and reevaluate  weights appear to be stable as he is been 145 pounds the last two days  Continue monitor I and O, daily weights and labs

## 2024-09-09 NOTE — ASSESSMENT & PLAN NOTE
Secondary to chronic disease  improving with diuresis  patient receiving IV venofer per primary team

## 2024-09-09 NOTE — PROGRESS NOTES
"Progress Note - Urology      Patient: Chris Bojorquez   : 1945 Sex: male   MRN: [id number]     CSN: [id number]  Unit/Bed#: [address] [place] [address]     SUBJECTIVE:   Patient seen on morning rounds  Scrotal swelling getting better  Still present  Finished course of antibiotics      Objective   Vitals: /63   Pulse 68   Temp 98.2 °F (36.8 °C)   Resp 18   Ht 5' 9\" (1.753 m)   Wt 65.9 kg (145 lb 4.5 oz)   SpO2 93%   BMI 21.45 kg/m²     I/O last 24 hours:  In: 610 [P.O.:600; I.V.:10]  Out: 4100 [Urine:4100]      Physical Exam:   General Alert awake   Normocephalic atraumatic PERRLA  Lungs clear bilaterally  Cardiac normal S1 normal S2  Abdomen soft, flank pain  Mild scrotal edema  Rivera draining clear  Extremities no edema      Lab Results: CBC:   Lab Results   Component Value Date    WBC 5.20 2024    HGB 7.1 (L) 2024    HCT 23.2 (L) 2024    MCV 84 2024     2024    RBC 2.77 (L) 2024    MCH 25.6 (L) 2024    MCHC 30.6 (L) 2024    RDW 17.0 (H) 2024    MPV 9.4 2024    NRBC 0 2024     CMP:   Lab Results   Component Value Date    CL 96 2024    CO2 30 2024    BUN 35 (H) 2024    CREATININE 1.53 (H) 2024    CALCIUM 8.6 2024    AST 14 2024    ALT 4 (L) 2024    ALKPHOS 130 (H) 2024    EGFR 42 2024     Urinalysis:   Lab Results   Component Value Date    COLORU Yellow 2024    CLARITYU Slightly Cloudy 2024    SPECGRAV 1.015 2024    PHUR 7.0 2024    LEUKOCYTESUR Large (A) 2024    NITRITE Negative 2024    GLUCOSEU 1000 (1%) (A) 2024    KETONESU 10 (1+) (A) 2024    BILIRUBINUR Negative 2024    BLOODU Large (A) 2024     Urine Culture:   Lab Results   Component Value Date    URINECX 10,000-19,000 cfu/ml Proteus mirabilis (A) 2024    URINECX <10,000 cfu/ml 2024     PSA: No results found for: \"PSA\"      Assessment/ Plan:  Complicated " UTI emphysematous cystitis  Right renal stones  Fluid overload scrotal edema  Rivera catheter draining clear urine  Patient finished 7-day course of antibiotics  Could attempt voiding trial tomorrow prior to discharge  Will be scheduled for cystoscopy right ureteroscopy and 1 week          Ciro Hughes MD

## 2024-09-09 NOTE — ASSESSMENT & PLAN NOTE
Pt underwent cytoscopy with right stone extraction and right ureteral stent placement.  Resume IV Ceftriaxone (Day 8/10).  Urology and ID following      Status post cystoscopy right stent removal bladder stones day #6 per urology   Foleys catheter in place .   Patient completed 7 days course ceftriaxone   Completed cefpodoxime course on 9/8/24 per ID and urology teams.     Am labs

## 2024-09-09 NOTE — ASSESSMENT & PLAN NOTE
Malnutrition Findings:   Adult Malnutrition type: Chronic illness  Adult Degree of Malnutrition: Malnutrition of moderate degree  Consult nutrition services Malnutrition Characteristics: Muscle loss, Fat loss, Weight loss     360 Statement: related to inadequate energy intake as evidenced by 10 % weight loss last 7-8 months, depression of temples, sunken orbital, wasted interosseous. Treatment: Oral diet and nutrition supplements    BMI Findings:      Body mass index is 21.45 kg/m².

## 2024-09-10 VITALS
BODY MASS INDEX: 23.42 KG/M2 | TEMPERATURE: 99.8 F | RESPIRATION RATE: 20 BRPM | HEIGHT: 69 IN | WEIGHT: 158.1 LBS | SYSTOLIC BLOOD PRESSURE: 137 MMHG | OXYGEN SATURATION: 94 % | HEART RATE: 83 BPM | DIASTOLIC BLOOD PRESSURE: 72 MMHG

## 2024-09-10 PROBLEM — K21.9 GASTROESOPHAGEAL REFLUX DISEASE: Status: RESOLVED | Noted: 2024-08-28 | Resolved: 2024-09-10

## 2024-09-10 PROBLEM — N50.89 SCROTAL SWELLING: Status: RESOLVED | Noted: 2024-09-04 | Resolved: 2024-09-10

## 2024-09-10 PROBLEM — I10 ESSENTIAL HYPERTENSION: Status: RESOLVED | Noted: 2024-08-26 | Resolved: 2024-09-10

## 2024-09-10 PROBLEM — I87.8 VENOUS STASIS: Status: RESOLVED | Noted: 2024-09-04 | Resolved: 2024-09-10

## 2024-09-10 PROBLEM — L30.4 INTERTRIGO: Status: RESOLVED | Noted: 2024-09-08 | Resolved: 2024-09-10

## 2024-09-10 PROBLEM — N17.9 AKI (ACUTE KIDNEY INJURY) (HCC): Status: RESOLVED | Noted: 2024-08-26 | Resolved: 2024-09-10

## 2024-09-10 PROBLEM — E03.9 HYPOTHYROIDISM: Status: RESOLVED | Noted: 2024-08-26 | Resolved: 2024-09-10

## 2024-09-10 PROBLEM — K59.00 CONSTIPATION: Status: RESOLVED | Noted: 2024-09-09 | Resolved: 2024-09-10

## 2024-09-10 LAB
ALBUMIN SERPL BCG-MCNC: 3.4 G/DL (ref 3.5–5)
ALP SERPL-CCNC: 111 U/L (ref 34–104)
ALT SERPL W P-5'-P-CCNC: 7 U/L (ref 7–52)
ANION GAP SERPL CALCULATED.3IONS-SCNC: 7 MMOL/L (ref 4–13)
AST SERPL W P-5'-P-CCNC: 20 U/L (ref 13–39)
BASOPHILS # BLD AUTO: 0.04 THOUSANDS/ÂΜL (ref 0–0.1)
BASOPHILS NFR BLD AUTO: 1 % (ref 0–1)
BILIRUB SERPL-MCNC: 0.59 MG/DL (ref 0.2–1)
BUN SERPL-MCNC: 39 MG/DL (ref 5–25)
CALCIUM ALBUM COR SERPL-MCNC: 9.5 MG/DL (ref 8.3–10.1)
CALCIUM SERPL-MCNC: 9 MG/DL (ref 8.4–10.2)
CHLORIDE SERPL-SCNC: 98 MMOL/L (ref 96–108)
CO2 SERPL-SCNC: 28 MMOL/L (ref 21–32)
CREAT SERPL-MCNC: 1.53 MG/DL (ref 0.6–1.3)
EOSINOPHIL # BLD AUTO: 0.08 THOUSAND/ÂΜL (ref 0–0.61)
EOSINOPHIL NFR BLD AUTO: 1 % (ref 0–6)
ERYTHROCYTE [DISTWIDTH] IN BLOOD BY AUTOMATED COUNT: 17 % (ref 11.6–15.1)
GFR SERPL CREATININE-BSD FRML MDRD: 42 ML/MIN/1.73SQ M
GLUCOSE SERPL-MCNC: 182 MG/DL (ref 65–140)
GLUCOSE SERPL-MCNC: 192 MG/DL (ref 65–140)
GLUCOSE SERPL-MCNC: 195 MG/DL (ref 65–140)
HCT VFR BLD AUTO: 23.9 % (ref 36.5–49.3)
HGB BLD-MCNC: 7.2 G/DL (ref 12–17)
IMM GRANULOCYTES # BLD AUTO: 0.03 THOUSAND/UL (ref 0–0.2)
IMM GRANULOCYTES NFR BLD AUTO: 1 % (ref 0–2)
LYMPHOCYTES # BLD AUTO: 0.74 THOUSANDS/ÂΜL (ref 0.6–4.47)
LYMPHOCYTES NFR BLD AUTO: 13 % (ref 14–44)
MCH RBC QN AUTO: 25.2 PG (ref 26.8–34.3)
MCHC RBC AUTO-ENTMCNC: 30.1 G/DL (ref 31.4–37.4)
MCV RBC AUTO: 84 FL (ref 82–98)
MONOCYTES # BLD AUTO: 0.76 THOUSAND/ÂΜL (ref 0.17–1.22)
MONOCYTES NFR BLD AUTO: 13 % (ref 4–12)
NEUTROPHILS # BLD AUTO: 4.17 THOUSANDS/ÂΜL (ref 1.85–7.62)
NEUTS SEG NFR BLD AUTO: 71 % (ref 43–75)
NRBC BLD AUTO-RTO: 0 /100 WBCS
PLATELET # BLD AUTO: 314 THOUSANDS/UL (ref 149–390)
PMV BLD AUTO: 9.2 FL (ref 8.9–12.7)
POTASSIUM SERPL-SCNC: 5.5 MMOL/L (ref 3.5–5.3)
PROT SERPL-MCNC: 6.7 G/DL (ref 6.4–8.4)
RBC # BLD AUTO: 2.86 MILLION/UL (ref 3.88–5.62)
SODIUM SERPL-SCNC: 133 MMOL/L (ref 135–147)
WBC # BLD AUTO: 5.82 THOUSAND/UL (ref 4.31–10.16)

## 2024-09-10 PROCEDURE — 99232 SBSQ HOSP IP/OBS MODERATE 35: CPT | Performed by: INTERNAL MEDICINE

## 2024-09-10 PROCEDURE — 85025 COMPLETE CBC W/AUTO DIFF WBC: CPT

## 2024-09-10 PROCEDURE — 82948 REAGENT STRIP/BLOOD GLUCOSE: CPT

## 2024-09-10 PROCEDURE — 99239 HOSP IP/OBS DSCHRG MGMT >30: CPT | Performed by: INTERNAL MEDICINE

## 2024-09-10 PROCEDURE — 80053 COMPREHEN METABOLIC PANEL: CPT | Performed by: NURSE PRACTITIONER

## 2024-09-10 RX ORDER — INSULIN GLARGINE 100 [IU]/ML
14 INJECTION, SOLUTION SUBCUTANEOUS
Start: 2024-09-10

## 2024-09-10 RX ORDER — NYSTATIN 100000 [USP'U]/G
1 POWDER TOPICAL 2 TIMES DAILY
Start: 2024-09-10

## 2024-09-10 RX ORDER — FUROSEMIDE 20 MG
20 TABLET ORAL DAILY
Start: 2024-09-11

## 2024-09-10 RX ORDER — INSULIN LISPRO 100 [IU]/ML
2-12 INJECTION, SOLUTION INTRAVENOUS; SUBCUTANEOUS
Start: 2024-09-10

## 2024-09-10 RX ORDER — FUROSEMIDE 20 MG
20 TABLET ORAL DAILY
Status: DISCONTINUED | OUTPATIENT
Start: 2024-09-10 | End: 2024-09-10 | Stop reason: HOSPADM

## 2024-09-10 RX ADMIN — FUROSEMIDE 20 MG: 20 TABLET ORAL at 12:26

## 2024-09-10 RX ADMIN — INSULIN LISPRO 2 UNITS: 100 INJECTION, SOLUTION INTRAVENOUS; SUBCUTANEOUS at 12:27

## 2024-09-10 RX ADMIN — SENNOSIDES 8.6 MG: 8.6 TABLET, FILM COATED ORAL at 10:32

## 2024-09-10 RX ADMIN — Medication 1 TABLET: at 10:32

## 2024-09-10 RX ADMIN — CARBIDOPA AND LEVODOPA 1 TABLET: 25; 100 TABLET ORAL at 10:32

## 2024-09-10 RX ADMIN — IRON SUCROSE 100 MG: 20 INJECTION, SOLUTION INTRAVENOUS at 10:35

## 2024-09-10 RX ADMIN — LEVOTHYROXINE SODIUM 25 MCG: 25 TABLET ORAL at 05:44

## 2024-09-10 RX ADMIN — INSULIN LISPRO 2 UNITS: 100 INJECTION, SOLUTION INTRAVENOUS; SUBCUTANEOUS at 10:33

## 2024-09-10 RX ADMIN — NYSTATIN 1 APPLICATION: 100000 POWDER TOPICAL at 10:33

## 2024-09-10 RX ADMIN — PANTOPRAZOLE SODIUM 40 MG: 40 TABLET, DELAYED RELEASE ORAL at 05:44

## 2024-09-10 NOTE — PLAN OF CARE
Problem: METABOLIC, FLUID AND ELECTROLYTES - ADULT  Goal: Electrolytes maintained within normal limits  Description: INTERVENTIONS:  - Monitor labs and assess patient for signs and symptoms of electrolyte imbalances  - Administer electrolyte replacement as ordered  - Monitor response to electrolyte replacements, including repeat lab results as appropriate  - Instruct patient on fluid and nutrition as appropriate  Outcome: Progressing     Problem: SKIN/TISSUE INTEGRITY - ADULT  Goal: Skin Integrity remains intact(Skin Breakdown Prevention)  Description: Assess:  -Perform Sushil assessment every 12  -Clean and moisturize skin every 12  -Inspect skin when repositioning, toileting, and assisting with ADLS    -Assess extremities for adequate circulation and sensation     Bed Management:  -Have minimal linens on bed & keep smooth, unwrinkled  -Change linens as needed when moist or perspiring  -Avoid sitting or lying in one position for more than 2 hours while in bed  -Keep HOB at 30degrees     Toileting:  -Offer bedside commode  -Assess for incontinence every 2  -Use incontinent care products after each incontinent episode such as foam    Activity:  -Mobilize patient 3 times a day  -Encourage activity   -Encourage or provide ROM exercises   -Turn and reposition patient every 2 Hours  -Use appropriate equipment to lift or move patient in bed  -Instruct/ Assist with weight shifting every 45 when out of bed in chair  -Consider limitation of chair time 3 hour intervals    Skin Care:  -Avoid use of baby powder, tape, friction and shearing, hot water or constrictive clothing  -Relieve pressure over bony prominences using foam  -Do not massage red bony areas    Next Steps:  -Teach patient strategies to minimize risks such as repositioning   -Consider consults to  interdisciplinary teams such as PT  Outcome: Progressing

## 2024-09-10 NOTE — ASSESSMENT & PLAN NOTE
Proteus Bacteremia noted.     Repeat blood cultures with no growth at 5 days  Urinary source  Completed cefpodoxime course through 9/8/2024 per infectious disease

## 2024-09-10 NOTE — ASSESSMENT & PLAN NOTE
Iron deficiency anemia   On iron supplements   Mostly secondary to FTT ( dietary)     Patient received a dose of Venofer 300 mg IV followed by 100 mg daily    HemoGlobin has been stable  Results from last 7 days   Lab Units 09/10/24  0625 09/09/24  1450 09/08/24  0630 09/07/24  1234 09/06/24  0822 09/05/24  0820   HEMOGLOBIN g/dL 7.2* 7.1* 7.0* 7.0* 7.4* 7.9*   HEMATOCRIT % 23.9* 23.2* 23.6* 23.3* 24.4* 26.4*   MCV fL 84 84 84 84 83 84

## 2024-09-10 NOTE — ASSESSMENT & PLAN NOTE
Evidenced by fever, tachycardia, bandemia, and hypotension responsive to IVF  In setting of bacteremia likely due to urinary source due to chronic indwelling Rivera present on admit, evidenced by pyelitis, UA results requiring IV cefepime and transition to ceftriaxone  Was given aggressive IV hydration initially but did not need pressors  Lactic acid 0.8  1/2 blood cultures growing proteus   Urine culture growing proteus  ID evaluated the patient during the hospital stay  Patient has been on IV cefepime which was later de-escalated to IV ceftriaxone and eventually changed to cefpodoxime.  Patient completed 10-day course.

## 2024-09-10 NOTE — CASE MANAGEMENT
Case Management Discharge Planning Note    Patient name Chris Bojorquez  Location [address] [address]/[address] [address] MRN [id number]  : 1945 Date 9/10/2024       Current Admission Date: 2024  Current Admission Diagnosis:Essential hypertension   Patient Active Problem List    Diagnosis Date Noted Date Diagnosed    Constipation 2024     Intertrigo 2024     History of recurrent UTIs 2024     Failure to thrive in adult 2024     Scrotal swelling 2024     Venous stasis 2024     Volume overload 2024     Iron deficiency anemia 2024     Gastroesophageal reflux disease 2024     Essential hypertension 2024     Moderate protein-calorie malnutrition (HCC) 2024     Uncontrolled type 2 diabetes mellitus with hyperglycemia (HCC) 2024     PABLO (acute kidney injury) (HCC) 2024     Hypothyroidism 2024     Parkinson disease 2024       LOS (days): 15  Geometric Mean LOS (GMLOS) (days): 5.9  Days to GMLOS:-8.8     OBJECTIVE:  Risk of Unplanned Readmission Score: 16.8       Current admission status: Inpatient   Preferred Pharmacy:   PATIENT/FAMILY REPORTS NO PREFERRED PHARMACY  No address on file      Primary Care Provider: No primary care provider on file.    Primary Insurance: MEDICARE  Secondary Insurance: [employer] [place] [employer] [facility]    DISCHARGE DETAILS:    Discharge planning discussed with:: Derek Bojorquez (son)      CM contacted family/caregiver?: Yes (Voice mail left for son Derek to review IMM and update on discharge plan)  Were Treatment Team discharge recommendations reviewed with patient/caregiver?: Yes  Did patient/caregiver verbalize understanding of patient care needs?: N/A- going to facility  Were patient/caregiver advised of the risks associated with not following Treatment Team discharge recommendations?: Yes    Contacts  Patient Contacts: Derek Nielsenrenetta (son)  Relationship to Patient:: Family  Contact Method: Phone  Phone  Number: [phone]  Reason/Outcome: Emergency Contact, Discharge Planning    Requested Home Health Care         Is the patient interested in HHC at discharge?: No    DME Referral Provided  Referral made for DME?: No    Other Referral/Resources/Interventions Provided:  Interventions: Facility Return, SNF, Transportation    Would you like to participate in our [facility] service program?  : No - Declined    Treatment Team Recommendation: Facility Return, SNF  Discharge Destination Plan:: Facility Return, SNF  Transport at Discharge : [facility]  Dispatcher Contacted: Yes  Number/Name of Dispatcher: [facility] [employer]  Transported by (Company and Unit #): [facility]  ETA of Transport (Date): 09/10/24  ETA of Transport (Time): 1445         IMM Given (Date):: 09/10/24  IMM Given to:: Family (IMM reviewed via voice mail to nurys Reed and contact information provided for any questions. Copy given to patient and copy placed in scan bin for chart.)        Accepting Facility Name, City & State : [facility]  Receiving Facility/Agency Phone Number: [phone]

## 2024-09-10 NOTE — ASSESSMENT & PLAN NOTE
Most probably secondary to IVF adminsteration given patient with complicated pyelitis and dehydration on admission.   Also with shock state that was managed per ICU team .     Echo completed on 8/30 with normal EF, grade 1 diastolic dysfunction  -IVF were stopped on 9/3  -Currently on Lasix 20 mg twice daily with albumin per cardiology recommendations with very good diuresis  Scrotal edema has improved along with lower extremity edema  Patient to be discharged Lasix 20 mg p.o. daily

## 2024-09-10 NOTE — DISCHARGE SUMMARY
Discharge Summary - Hospitalist   Name: Chris Bojorquez 79 y.o. male I MRN: [id number]  Unit/Bed#: 4 [address] Date of Admission: 8/26/2024   Date of Service: 9/10/2024 I Hospital Day: 15     Assessment & Plan  Uncontrolled type 2 diabetes mellitus with hyperglycemia (HCC)  Lab Results   Component Value Date    HGBA1C 14.6 (H) 08/26/2024     Recent Labs     09/09/24  1550 09/09/24  2000 09/10/24  0716 09/10/24  1127   POCGLU 276* 229* 182* 195*   Blood Sugar Average: Last 72 hrs:  (P) 217.4829247430666886    Continue Lantus 14 units nightly with Humalog sliding scale with diabetic diet  Iron deficiency anemia  Iron deficiency anemia   On iron supplements   Mostly secondary to FTT ( dietary)     Patient received a dose of Venofer 300 mg IV followed by 100 mg daily    HemoGlobin has been stable  Results from last 7 days   Lab Units 09/10/24  0625 09/09/24  1450 09/08/24  0630 09/07/24  1234 09/06/24  0822 09/05/24  0820   HEMOGLOBIN g/dL 7.2* 7.1* 7.0* 7.0* 7.4* 7.9*   HEMATOCRIT % 23.9* 23.2* 23.6* 23.3* 24.4* 26.4*   MCV fL 84 84 84 84 83 84      Volume overload  Most probably secondary to IVF adminsteration given patient with complicated pyelitis and dehydration on admission.   Also with shock state that was managed per ICU team .     Echo completed on 8/30 with normal EF, grade 1 diastolic dysfunction  -IVF were stopped on 9/3  -Currently on Lasix 20 mg twice daily with albumin per cardiology recommendations with very good diuresis  Scrotal edema has improved along with lower extremity edema  Patient to be discharged Lasix 20 mg p.o. daily        Moderate protein-calorie malnutrition (HCC)  Malnutrition Findings:   Adult Malnutrition type: Chronic illness  Adult Degree of Malnutrition: Malnutrition of moderate degree  Consult nutrition services Malnutrition Characteristics: Muscle loss, Fat loss, Weight loss     360 Statement: related to inadequate energy intake as evidenced by 10 % weight loss last 7-8  months, depression of temples, sunken orbital, wasted interosseous. Treatment: Oral diet and nutrition supplements    BMI Findings:      Body mass index is 23.35 kg/m².   Continue diet and nutritional supplementation as tolerated    Parkinson disease  Continue Sinemet.    Continue PT/OT  History of recurrent UTIs  Secondary to foleys catheter   Voiding trial as outpatient  Failure to thrive in adult  Nutrition consult   Speech following   Currently on  diabetic diet  PABLO (acute kidney injury) (HCC) (Resolved: 9/10/2024)  Possibly multifactorial septic shock/hypotension and obstructive uropathy.  Pt is s/p ureteral stent placement with stone extraction.    Patient initially received aggressive IV hydration  Later patient was started on Lasix with albumin in the setting of fluid overload with stable creatinine.      Septic shock (HCC) (Resolved: 8/31/2024)  Evidenced by fever, tachycardia, bandemia, and hypotension responsive to IVF  In setting of bacteremia likely due to urinary source due to chronic indwelling Rivera present on admit, evidenced by pyelitis, UA results requiring IV cefepime and transition to ceftriaxone  Was given aggressive IV hydration initially but did not need pressors  Lactic acid 0.8  1/2 blood cultures growing proteus   Urine culture growing proteus  ID evaluated the patient during the hospital stay  Patient has been on IV cefepime which was later de-escalated to IV ceftriaxone and eventually changed to cefpodoxime.  Patient completed 10-day course.    Bacteremia due to Proteus species (Resolved: 9/9/2024)  Proteus Bacteremia noted.     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     Resolved by  Ward  MD Tay    Acute metabolic encephalopathy 9/4/2024     Resolved by  Ward Cross MD    PABLO (acute kidney injury) ([facility]) 9/10/2024     Resolved by  FRANCES Mendez    Hypothyroidism 9/10/2024     Resolved by  FRANCES Mendez    * (Principal) Septic shock ([facility]) 8/31/2024     Resolved by  Shante Palacio MD    Bacteremia due to Proteus species 9/9/2024     Resolved by  Ward Cross MD    Gastroesophageal reflux disease 9/10/2024     Resolved by  FRANCES Mendez    Scrotal swelling 9/10/2024     Resolved by  FRANCES Mendez    Venous stasis 9/10/2024     Resolved by  FRANCES Mendez    Intertrigo 9/10/2024     Resolved by  FRANCES Mendez    Constipation 9/10/2024     Resolved by  FRANCES Mendez        Discharging Physician / Practitioner: Silverio Penn MD  PCP: No primary care provider on file.  Admission Date:   Admission Orders (From admission, onward)       Ordered        08/26/24 1818  INPATIENT ADMISSION  Once                          Discharge Date: 09/10/24    Consultations During Hospital Stay:  Urology, ID, cardiology, critical care    Procedures Performed:   Cystoscopy with insertion of right ureteral stent and removal of large 3 cm bladder stone    Significant Findings / Test Results:   CT head showed no acute intracranial abnormality  CT abdomen pelvis with worsening findings of UTI on the right suspicious for emphysematous pyelitis, urothelial thickening throughout the right renal collecting system as well as gas within the renal collecting system.  Delayed nephrogram on the right due to obstructive uropathy.  Decreased calculus volume in the right kidney.  Bladder calculus with diffuse bladder wall thickening compatible with cystitis         Outpatient Tests Requested:  Voiding trial as outpatient with an outpatient follow-up with urology    Complications: None    Reason for Admission: Altered mental status    Hospital Course:   Chris Mckeonkrystal is a 79 y.o.  "male patient with past medical history of hypertension, renal calculi, hypothyroidism, diabetes who originally presented to the hospital on 8/26/2024 due to confusion and change in mental status from the nursing home.  Patient was up to the hospital for emphysematous pyelitis and acute metabolic encephalopathy.  Patient was initially on IV cefepime.  Patient was seen by urology and later underwent cystoscopy with insertion of right ureteral stent and removal of large bladder stone.  Patient was also seen by ID and patient not have Proteus bacteremia and patient's antibiotics were de-escalated to IV ceftriaxone.  Patient also hypotensive and needed aggressive IV hydration.  Later patient developed scrotal edema and leg swelling and was seen in consult with cardiology and echo showed normal EF.  Patient had Lasix with albumin with very good diuresis.  Patient completed his course of antibiotics with cefpodoxime.  Patient continued to remain stable and will be discharged back to nursing home    Please see above list of diagnoses and related plan for additional information.     Condition at Discharge: fair    Discharge Day Visit / Exam:   Subjective: Patient reports that he is feeling lousy.  Denies any abdominal pain, chest pain, shortness of breath  Vitals: Blood Pressure: 137/72 (09/10/24 0825)  Pulse: 83 (09/10/24 0825)  Temperature: 99.8 °F (37.7 °C) (09/10/24 0825)  Temp Source: Oral (09/10/24 0825)  Respirations: 20 (09/10/24 0825)  Height: 5' 9\" (175.3 cm) (08/30/24 0800)  Weight - Scale: 71.7 kg (158 lb 1.6 oz) (09/10/24 0600)  SpO2: 94 % (09/10/24 0825)  Exam:   Physical Exam  Constitutional:       General: He is not in acute distress.  HENT:      Head: Normocephalic and atraumatic.   Eyes:      Conjunctiva/sclera: Conjunctivae normal.      Pupils: Pupils are equal, round, and reactive to light.   Cardiovascular:      Rate and Rhythm: Normal rate and regular rhythm.      Heart sounds: Normal heart sounds. "   Pulmonary:      Effort: Pulmonary effort is normal. No respiratory distress.      Breath sounds: No wheezing, rhonchi or rales.   Chest:      Chest wall: No tenderness.   Abdominal:      General: Bowel sounds are normal. There is no distension.      Palpations: Abdomen is soft.      Tenderness: There is no abdominal tenderness. There is no guarding or rebound.   Genitourinary:     Comments: Rivera with clear urine  Musculoskeletal:         General: No edema.      Cervical back: Normal range of motion and neck supple.   Skin:     General: Skin is warm and dry.      Findings: No rash.   Neurological:      Mental Status: He is alert.      Cranial Nerves: No cranial nerve deficit.            Discharge instructions/Information to patient and family:   See after visit summary for information provided to patient and family.      Provisions for Follow-Up Care:  See after visit summary for information related to follow-up care and any pertinent home health orders.      Mobility at time of Discharge:   Basic Mobility Inpatient Raw Score: 6  JH-HLM Goal: 2: Bed activities/Dependent transfer  JH-HLM Achieved: 2: Bed activities/Dependent transfer  HLM Goal NOT achieved. Continue to encourage mobility in post discharge setting.     Disposition:   Other Skilled Nursing Facility at [facility]    Planned Readmission:     Discharge Medications:  See after visit summary for reconciled discharge medications provided to patient and/or family.      Administrative Statements   Discharge Statement:  I have spent a total time of 35 minutes in caring for this patient on the day of the visit/encounter. >30 minutes of time was spent on: Diagnostic results, Instructions for management, Importance of tx compliance, Risk factor reductions, Impressions, Counseling / Coordination of care, and Reviewing / ordering tests, medicine, procedures  .    **Please Note: This note may have been constructed using a voice recognition system**

## 2024-09-10 NOTE — [place] UNIVERSAL TRANSFER FORM
"[place] UNIVERSAL TRANSFER FORM  (ALL ITEMS MUST BE COMPLETED)    1. TRANSFER FROM: [facility]      TRANSFER TO: [facility]    2. DATE OF TRANSFER: 9/10/2024                        TIME OF TRANSFER: 1445    3. PATIENT NAME: Chris Bojorquez,        YOB: 1945                             GENDER: male    4. LANGUAGE:   English    5. PHYSICIAN NAME:  Silverio Penn MD                   PHONE: [phone]    6. CODE STATUS: Level 1 - Full Code        Out of Hospital DNR Attached: Full Code    7. :                                      :  Yes           Health Care Representative/Proxy:  Yes           Legal Guardian:  No             NAME OF:           HEALTH CARE REPRESENTATIVE/PROXY:                                         OR           LEGAL GUARDIAN, IF NOT :                                               PHONE:  (Day)           (Night)                        (Cell)    8. REASON FOR TRANSFER: Deconditioning, Moderate protein-calorie malnutrition, Parkinsons disease, history of recurrent UTI's.            V/S: /72 (BP Location: Right arm)   Pulse 83   Temp 99.8 °F (37.7 °C) (Oral)   Resp 20   Ht 5' 9\" (1.753 m)   Wt 71.7 kg (158 lb 1.6 oz)   SpO2 94%   BMI 23.35 kg/m²           PAIN: No    9. PRIMARY DIAGNOSIS: Septic shock (HCC)      Secondary Diagnosis:         Pacemaker: No      Internal Defib: No          Mental Health Diagnosis (if Applicable):    10. RESTRAINTS: No    11. RESPIRATORY NEEDS: No    12. ISOLATION/PRECAUTION: Contact isolation    13. ALLERGY: Lactose - food allergy and Sulfa antibiotics    14. SENSORY:       Delayed speech    15. SKIN CONDITION: Pink excoriation, discoloration of lower extremeties.    16. DIET: Diabetic cardiac diet    17. IV ACCESS: No    18. PERSONAL ITEMS SENT WITH PATIENT: gown and personal blue blanket  19. ATTACHED DOCUMENTS: MUST ATTACH CURRENT MEDICATION " INFORMATION: AVS dicharge summary    20. AT RISK ALERTS:Fall        HARM TO: N/A    21. WEIGHT BEARING STATUS:         Left Leg: Limited        Right Leg: Limited    22. MENTAL STATUS:Alert to person    23. FUNCTION:        Walk: Not able        Transfer: Not able         Toilet: Not able        Feed: Can feed self    24. IMMUNIZATIONS/SCREENING:     There is no immunization history on file for this patient.    25. BOWEL: incontinent, last bm 9/10/2024    26. BLADDER: Rivera    27. SENDING FACILITY CONTACT: Carmen Sotomayor RN                 Title: RN        Unit: [address] [place] [address]        Phone: [phone]          REC'G FACILITY CONTACT (if known):        Title:        Unit:         Phone:         FORM PREFILLED BY (if applicable)       Title:       Unit:        Phone:         FORM COMPLETED BY Carmen Sotomayor RN      Title: [facility]      Phone: [phone]

## 2024-09-10 NOTE — ASSESSMENT & PLAN NOTE
Possibly multifactorial septic shock/hypotension and obstructive uropathy.  Pt is s/p ureteral stent placement with stone extraction.    Patient initially received aggressive IV hydration  Later patient was started on Lasix with albumin in the setting of fluid overload with stable creatinine.

## 2024-09-10 NOTE — ASSESSMENT & PLAN NOTE
Malnutrition Findings:   Adult Malnutrition type: Chronic illness  Adult Degree of Malnutrition: Malnutrition of moderate degree  Consult nutrition services Malnutrition Characteristics: Muscle loss, Fat loss, Weight loss     360 Statement: related to inadequate energy intake as evidenced by 10 % weight loss last 7-8 months, depression of temples, sunken orbital, wasted interosseous. Treatment: Oral diet and nutrition supplements    BMI Findings:      Body mass index is 23.35 kg/m².   Continue diet and nutritional supplementation as tolerated

## 2024-09-10 NOTE — ASSESSMENT & PLAN NOTE
Lab Results   Component Value Date    HGBA1C 14.6 (H) 08/26/2024       Recent Labs     09/09/24  1550 09/09/24  2000 09/10/24  0716 09/10/24  1127   POCGLU 276* 229* 182* 195*       Blood Sugar Average: Last 72 hrs:  (P) [id number]    Needs better blood sugar management  currently on insulin  managed per primary team

## 2024-09-10 NOTE — ASSESSMENT & PLAN NOTE
Malnutrition Findings:   Adult Malnutrition type: Chronic illness  Adult Degree of Malnutrition: Malnutrition of moderate degree  Malnutrition Characteristics: Muscle loss, Fat loss, Weight loss      admission albumin level was 2 with total protein of 5.6.  Poor PO intake  discussed with family following up for protein supplements in the outpatient setting  9/10/2024 albumin level up to 3.4            360 Statement: related to inadequate energy intake as evidenced by 10 % weight loss last 7-8 months, depression of temples, sunken orbital, wasted interosseous. Treatment: Oral diet and nutrition supplements    BMI Findings:           Body mass index is 23.35 kg/m².

## 2024-09-10 NOTE — PLAN OF CARE
Problem: Prexisting or High Potential for Compromised Skin Integrity  Goal: Skin integrity is maintained or improved  Description: INTERVENTIONS:  - Identify patients at risk for skin breakdown  - Assess and monitor skin integrity  - Assess and monitor nutrition and hydration status  - Monitor labs   - Assess for incontinence   - Turn and reposition patient  - Assist with mobility/ambulation  - Relieve pressure over bony prominences  - Avoid friction and shearing  - Provide appropriate hygiene as needed including keeping skin clean and dry  - Evaluate need for skin moisturizer/barrier cream  - Collaborate with interdisciplinary team   - Patient/family teaching  - Consider wound care consult   Outcome: Progressing     Problem: GENITOURINARY - ADULT  Goal: Maintains or returns to baseline urinary function  Description: INTERVENTIONS:  - Assess urinary function  - Encourage oral fluids to ensure adequate hydration if ordered  - Administer IV fluids as ordered to ensure adequate hydration  - Administer ordered medications as needed  - Offer frequent toileting  - Follow urinary retention protocol if ordered  Outcome: Progressing  Goal: Urinary catheter remains patent  Description: INTERVENTIONS:  - Assess patency of urinary catheter  - If patient has a chronic yang, consider changing catheter if non-functioning  - Follow guidelines for intermittent irrigation of non-functioning urinary catheter  Outcome: Progressing     Problem: METABOLIC, FLUID AND ELECTROLYTES - ADULT  Goal: Electrolytes maintained within normal limits  Description: INTERVENTIONS:  - Monitor labs and assess patient for signs and symptoms of electrolyte imbalances  - Administer electrolyte replacement as ordered  - Monitor response to electrolyte replacements, including repeat lab results as appropriate  - Instruct patient on fluid and nutrition as appropriate  Outcome: Progressing  Goal: Fluid balance maintained  Description: INTERVENTIONS:  -  Monitor labs   - Monitor I/O and WT  - Instruct patient on fluid and nutrition as appropriate  - Assess for signs & symptoms of volume excess or deficit  Outcome: Progressing  Goal: Glucose maintained within target range  Description: INTERVENTIONS:  - Monitor Blood Glucose as ordered  - Assess for signs and symptoms of hyperglycemia and hypoglycemia  - Administer ordered medications to maintain glucose within target range  - Assess nutritional intake and initiate nutrition service referral as needed  Outcome: Progressing     Problem: SKIN/TISSUE INTEGRITY - ADULT  Goal: Skin Integrity remains intact(Skin Breakdown Prevention)  Description: Assess:  -Perform Sushil assessment every 12  -Clean and moisturize skin every 12  -Inspect skin when repositioning, toileting, and assisting with ADLS    -Assess extremities for adequate circulation and sensation     Bed Management:  -Have minimal linens on bed & keep smooth, unwrinkled  -Change linens as needed when moist or perspiring  -Avoid sitting or lying in one position for more than 2 hours while in bed  -Keep HOB at 30degrees     Toileting:  -Offer bedside commode  -Assess for incontinence every 2  -Use incontinent care products after each incontinent episode such as foam    Activity:  -Mobilize patient 3 times a day  -Encourage activity   -Encourage or provide ROM exercises   -Turn and reposition patient every 2 Hours  -Use appropriate equipment to lift or move patient in bed  -Instruct/ Assist with weight shifting every 45 when out of bed in chair  -Consider limitation of chair time 3 hour intervals    Skin Care:  -Avoid use of baby powder, tape, friction and shearing, hot water or constrictive clothing  -Relieve pressure over bony prominences using foam  -Do not massage red bony areas    Next Steps:  -Teach patient strategies to minimize risks such as repositioning   -Consider consults to  interdisciplinary teams such as PT  Outcome: Progressing     Problem:  HEMATOLOGIC - ADULT  Goal: Maintains hematologic stability  Description: INTERVENTIONS  - Assess for signs and symptoms of bleeding or hemorrhage  - Monitor labs  - Administer supportive blood products/factors as ordered and appropriate  Outcome: Progressing     Problem: MUSCULOSKELETAL - ADULT  Goal: Maintain or return mobility to safest level of function  Description: INTERVENTIONS:  - Assess patient's ability to carry out ADLs; assess patient's baseline for ADL function and identify physical deficits which impact ability to perform ADLs (bathing, care of mouth/teeth, toileting, grooming, dressing, etc.)  - Assess/evaluate cause of self-care deficits   - Assess range of motion  - Assess patient's mobility  - Assess patient's need for assistive devices and provide as appropriate  - Encourage maximum independence but intervene and supervise when necessary  - Involve family in performance of ADLs  - Assess for home care needs following discharge   - Consider OT consult to assist with ADL evaluation and planning for discharge  - Provide patient education as appropriate  Outcome: Progressing     Problem: SAFETY ADULT  Goal: Patient will remain free of falls  Description: INTERVENTIONS:  - Educate patient/family on patient safety including physical limitations  - Instruct patient to call for assistance with activity   - Consult OT/PT to assist with strengthening/mobility   - Keep Call bell within reach  - Keep bed low and locked with side rails adjusted as appropriate  - Keep care items and personal belongings within reach  - Initiate and maintain comfort rounds  - Make Fall Risk Sign visible to staff  - Offer Toileting every 2 Hours, in advance of need  - Initiate/Maintain bed alarm  - Obtain necessary fall risk management equipment: alarm  - Apply yellow socks and bracelet for high fall risk patients  - Consider moving patient to room near nurses station  Outcome: Progressing     Problem: DISCHARGE PLANNING  Goal:  Discharge to home or other facility with appropriate resources  Description: INTERVENTIONS:  - Identify barriers to discharge w/patient and caregiver  - Arrange for needed discharge resources and transportation as appropriate  - Identify discharge learning needs (meds, wound care, etc.)  - Arrange for interpretive services to assist at discharge as needed  - Refer to Case Management Department for coordinating discharge planning if the patient needs post-hospital services based on physician/advanced practitioner order or complex needs related to functional status, cognitive ability, or social support system  Outcome: Progressing     Problem: DISCHARGE PLANNING  Goal: Discharge to home or other facility with appropriate resources  Description: INTERVENTIONS:  - Identify barriers to discharge w/patient and caregiver  - Arrange for needed discharge resources and transportation as appropriate  - Identify discharge learning needs (meds, wound care, etc.)  - Arrange for interpretive services to assist at discharge as needed  - Refer to Case Management Department for coordinating discharge planning if the patient needs post-hospital services based on physician/advanced practitioner order or complex needs related to functional status, cognitive ability, or social support system  Outcome: Progressing     Problem: Nutrition/Hydration-ADULT  Goal: Nutrient/Hydration intake appropriate for improving, restoring or maintaining nutritional needs  Description: Monitor and assess patient's nutrition/hydration status for malnutrition. Collaborate with interdisciplinary team and initiate plan and interventions as ordered.  Monitor patient's weight and dietary intake as ordered or per policy. Utilize nutrition screening tool and intervene as necessary. Determine patient's food preferences and provide high-protein, high-caloric foods as appropriate.     INTERVENTIONS:  - Monitor oral intake, urinary output, labs, and treatment plans  -  Assess nutrition and hydration status and recommend course of action  - Evaluate amount of meals eaten  - Assist patient with eating if necessary   - Allow adequate time for meals  - Recommend/ encourage appropriate diets, oral nutritional supplements, and vitamin/mineral supplements  - Order, calculate, and assess calorie counts as needed  - Recommend, monitor, and adjust tube feedings and TPN/PPN based on assessed needs  - Assess need for intravenous fluids  - Provide specific nutrition/hydration education as appropriate  - Include patient/family/caregiver in decisions related to nutrition  Outcome: Progressing

## 2024-09-10 NOTE — ASSESSMENT & PLAN NOTE
Patient with poor appetite and low protein level  per nursing staff patient is eating better.      Patient appears to be stable from a cardiac standpoint. We will sign off at this time. Please not hesitate to contact again during this hospitalization if needed

## 2024-09-10 NOTE — ASSESSMENT & PLAN NOTE
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  primary team was having difficulty diuresing patient as it was causing hypotension  9/4/2024 started diuresing patient with albumin 25%, 25 g one half hour prior to Lasix 20 mg IV on BID basis with improvement in his generalized edema.  9/6/2024 patient has lost approximately 13 pounds since starting albumin Lasix cocktail  IV Lasix and albumin was discontinued on 9/10/2024  patient transition to Lasix 20 mg PO per primary service  weights appear to be stable as he is been 145 pounds the last two days  Continue monitor I and O, daily weights and labs

## 2024-09-10 NOTE — ASSESSMENT & PLAN NOTE
Lab Results   Component Value Date    HGBA1C 14.6 (H) 08/26/2024     Recent Labs     09/09/24  1550 09/09/24  2000 09/10/24  0716 09/10/24  1127   POCGLU 276* 229* 182* 195*   Blood Sugar Average: Last 72 hrs:  (P) 217.4919439996987505    Continue Lantus 14 units nightly with Humalog sliding scale with diabetic diet

## 2024-09-10 NOTE — PROGRESS NOTES
Progress Note - Cardiology   Name: Chris Bojorquez 79 y.o. 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  primary team was having difficulty diuresing patient as it was causing hypotension  9/4/2024 started diuresing patient with albumin 25%, 25 g one half hour prior to Lasix 20 mg IV on BID basis with improvement in his generalized edema.  9/6/2024 patient has lost approximately 13 pounds since starting albumin Lasix cocktail  IV Lasix and albumin was discontinued on 9/10/2024  patient transition to Lasix 20 mg PO per primary service  weights appear to be stable as he is been 145 pounds the last two days  Continue monitor I and O, daily weights and labs  Uncontrolled type 2 diabetes mellitus with hyperglycemia (HCC)  Lab Results   Component Value Date    HGBA1C 14.6 (H) 08/26/2024       Recent Labs     09/09/24  1550 09/09/24  2000 09/10/24  0716 09/10/24  1127   POCGLU 276* 229* 182* 195*       Blood Sugar Average: Last 72 hrs:  (P) 217.8877893367743121    Needs better blood sugar management  currently on insulin  managed per primary team    Iron deficiency anemia  Secondary to chronic disease  improving with diuresis  patient receiving IV venofer per primary team  Parkinson disease  On Sinemet 25/100 mg BID  History of recurrent UTIs  Patient with chronic Rivera  Moderate protein-calorie malnutrition (HCC)  Malnutrition Findings:   Adult Malnutrition type: Chronic illness  Adult Degree of Malnutrition: Malnutrition of moderate degree  Malnutrition Characteristics: Muscle loss, Fat loss, Weight loss      admission  albumin level was 2 with total protein of 5.6.  Poor PO intake  discussed with family following up for protein supplements in the outpatient setting  9/10/2024 albumin level up to 3.4            360 Statement: related to inadequate energy intake as evidenced by 10 % weight loss last 7-8 months, depression of temples, sunken orbital, wasted interosseous. Treatment: Oral diet and nutrition supplements    BMI Findings:           Body mass index is 23.35 kg/m².     Failure to thrive in adult  Patient with poor appetite and low protein level  per nursing staff patient is eating better.      Patient appears to be stable from a cardiac standpoint. We will sign off at this time. Please not hesitate to contact again during this hospitalization if needed    History of Present Illness   Subjective: Patient seen and examined. No complaints offered. He is resting comfortably in bed.    Objective      Temp:  [97.7 °F (36.5 °C)-99.8 °F (37.7 °C)] 99.8 °F (37.7 °C)  HR:  [68-83] 83  Resp:  [18-20] 20  BP: (123-137)/(63-72) 137/72  O2 Device: None (Room air)   Vitals:    09/10/24 0524 09/10/24 0600   Weight: 71.7 kg (158 lb 1.6 oz) 71.7 kg (158 lb 1.6 oz)     Orthostatic Blood Pressures      Flowsheet Row Most Recent Value   Blood Pressure 137/72 filed at 09/10/2024 0825   Patient Position - Orthostatic VS Lying filed at 09/02/2024 0352             I/O last 24 hours:  In: 960 [P.O.:960]  Out: 2800 [Urine:2800]  Lines/Drains/Airways       Active Status       Name Placement date Placement time Site Days    Urethral Catheter Other (Comment) 20 Fr. 08/29/24  1345  Other (Comment)  11    Ureteral Internal Stent Right ureter 6 Fr. 08/29/24  1342  Right ureter  11                  Physical Exam  Vitals and nursing note reviewed.   Constitutional:       Appearance: Normal appearance. He is normal weight. He is ill-appearing (Chronically).   HENT:      Right Ear: External ear normal.      Left Ear: External ear normal.   Eyes:      General:  No scleral icterus.        Right eye: No discharge.         Left eye: No discharge.   Cardiovascular:      Rate and Rhythm: Normal rate and regular rhythm.      Pulses: Normal pulses.   Pulmonary:      Effort: Pulmonary effort is normal.      Breath sounds: Normal breath sounds.   Abdominal:      General: Bowel sounds are normal. There is no distension.      Palpations: Abdomen is soft.   Musculoskeletal:      Right lower leg: No edema.      Left lower leg: No edema.   Skin:     General: Skin is warm and dry.      Capillary Refill: Capillary refill takes less than 2 seconds.   Neurological:      General: No focal deficit present.      Mental Status: He is alert and oriented to person, place, and time. Mental status is at baseline.   Psychiatric:         Mood and Affect: Mood normal.          Lab Results: I have reviewed the following results: CBC/BMP:   .     09/09/24  1450 09/10/24  0522 09/10/24  0625   WBC 5.20  --  5.82   HGB 7.1*  --  7.2*   HCT 23.2*  --  23.9*     --  314   SODIUM 135 133*  --    K 4.6 5.5*  --    CL 96 98  --    CO2 30 28  --    BUN 35* 39*  --    CREATININE 1.53* 1.53*  --    GLUC 265* 192*  --    MG 2.1  --   --        VTE Mechanical Prophylaxis: sequential compression device    Linette DANIELS  Cardiology

## 2024-09-25 ENCOUNTER — ANESTHESIA EVENT (OUTPATIENT)
Dept: PERIOP | Facility: HOSPITAL | Age: 79
DRG: 698 | End: 2024-09-25
Payer: MEDICARE

## 2024-09-25 NOTE — ANESTHESIA PREPROCEDURE EVALUATION
Procedure:  CYSTOSCOPY URETEROSCOPY WITH LITHOTRIPSY HOLMIUM LASER, RETROGRADE PYELOGRAM AND INSERTION STENT URETERAL/patient at [facility] (Right: Bladder)    Relevant Problems   ENDO   (+) Hypothyroidism   (+) Uncontrolled type 2 diabetes mellitus with hyperglycemia (HCC)      GI/HEPATIC   (+) Gastroesophageal reflux disease      HEMATOLOGY   (+) Iron deficiency anemia      Neurology/Sleep   (+) Parkinson disease (HCC)        Physical Exam    Airway    Mallampati score: II  TM Distance: >3 FB  Neck ROM: full     Dental       Cardiovascular  Rhythm: regular, Rate: normal    Pulmonary   Breath sounds clear to auscultation    Other Findings        Anesthesia Plan  ASA Score- 3     Anesthesia Type- general with ASA Monitors.         Additional Monitors:     Airway Plan: LMA.           Plan Factors-    Chart reviewed.        Patient is not a current smoker.              Induction- intravenous.    Postoperative Plan- Plan for postoperative opioid use.     Perioperative Resuscitation Plan - Level 1 - Full Code.       Informed Consent- Anesthetic plan and risks discussed with patient.  I personally reviewed this patient with the CRNA. Discussed and agreed on the Anesthesia Plan with the CRNA..

## 2024-09-25 NOTE — H&P
H&P Exam - Urology       Patient: Chris Bojorquez   : 1945 Sex: male   MRN: [id number]     CSN: [id number]      History of Present Illness   HPI:  Chris Bojorquez is a 79 y.o. male who presents with history of large right renal stones 1.8 cm status post urgent cystoscopy right stent exchange laser large bladder stone 3 weeks ago right emphysematous pyelitis returning from nursing home now to undergo definitive cystoscopy right ureteroscopy lithotripsy stent exchange family aware risk of staged procedure in light of multiple large stones risk of anesthesia infection bleeding additional procedures        Review of Systems:   Constitutional:  Negative for activity change, fever, chills and diaphoresis.   HENT: Negative for hearing loss and trouble swallowing.   Eyes: Negative for itching and visual disturbance.   Respiratory: Negative for chest tightness and shortness of breath.   Cardiovascular: Negative for chest pain, edema.   Gastrointestinal: Negative for abdominal distention, na abdominal pain, constipation, diarrhea, Nausea and vomiting.   Genitourinary: Negative for decreased urine volume, difficulty urinating, dysuria, enuresis, frequency, hematuria and urgency.   Musculoskeletal: Negative for gait problem and myalgias.   Neurological: Negative for dizziness and headaches.   Hematological: Does not bruise/bleed easily.       Historical Information   Past Medical History:   Diagnosis Date    Acute metabolic encephalopathy 2024    Bacteremia due to Proteus species 2024    Emphysematous pyelitis 2024     Past Surgical History:   Procedure Laterality Date    IL CYSTO BLADDER W/URETERAL CATHETERIZATION Right 2024    Procedure: CYSTOSCOPY WITH INSERTION RIGHT STENT URETERAL removal large 3cm bladder stone;  Surgeon: Ciro Hughes MD;  Location: [facility];  Service: Urology     Social History   Social History     Substance and Sexual Activity   Alcohol Use Not Currently     Social  History     Substance and Sexual Activity   Drug Use Not on file     Social History     Tobacco Use   Smoking Status Unknown   Smokeless Tobacco Not on file     Family History: No family history on file.    Meds/Allergies   No medications prior to admission.  Allergies   Allergen Reactions    Lactose - Food Allergy Other (See Comments)    Sulfa Antibiotics Other (See Comments)       Objective   Vitals: There were no vitals taken for this visit.    Physical Exam:  General Alert awake   Normocephalic atraumatic PERRLA  Lungs clear bilaterally  Cardiac normal S1 normal S2  Abdomen soft, flank pain  Extremities no edema    No intake/output data recorded.    Invasive Devices       Drain  Duration             Ureteral Internal Stent Right ureter 6 Fr. 27 days    Urethral Catheter Other (Comment) 20 Fr. 27 days                        Lab Results: CBC:   Lab Results   Component Value Date    WBC 5.82 09/10/2024    HGB 7.2 (L) 09/10/2024    HCT 23.9 (L) 09/10/2024    MCV 84 09/10/2024     09/10/2024    RBC 2.86 (L) 09/10/2024    MCH 25.2 (L) 09/10/2024    MCHC 30.1 (L) 09/10/2024    RDW 17.0 (H) 09/10/2024    MPV 9.2 09/10/2024    NRBC 0 09/10/2024     CMP:   Lab Results   Component Value Date    CL 98 09/10/2024    CO2 28 09/10/2024    BUN 39 (H) 09/10/2024    CREATININE 1.53 (H) 09/10/2024    CALCIUM 9.0 09/10/2024    AST 20 09/10/2024    ALT 7 09/10/2024    ALKPHOS 111 (H) 09/10/2024    EGFR 42 09/10/2024     Urinalysis:   Lab Results   Component Value Date    COLORU Yellow 08/26/2024    CLARITYU Slightly Cloudy 08/26/2024    SPECGRAV 1.015 08/26/2024    PHUR 7.0 08/26/2024    LEUKOCYTESUR Large (A) 08/26/2024    NITRITE Negative 08/26/2024    GLUCOSEU 1000 (1%) (A) 08/26/2024    KETONESU 10 (1+) (A) 08/26/2024    BILIRUBINUR Negative 08/26/2024    BLOODU Large (A) 08/26/2024     Urine Culture:   Lab Results   Component Value Date    URINECX 10,000-19,000 cfu/ml Proteus mirabilis (A) 08/26/2024    URINECX <10,000  "cfu/ml 08/26/2024     PSA: No results found for: \"PSA\"        Assessment/ Plan:  Cystoscopy right flexible ureteroscopy lithotripsy stent exchange      Ciro Hughes MD  "

## 2024-09-26 ENCOUNTER — APPOINTMENT (OUTPATIENT)
Dept: RADIOLOGY | Facility: HOSPITAL | Age: 79
DRG: 698 | End: 2024-09-26
Payer: MEDICARE

## 2024-09-26 ENCOUNTER — HOSPITAL ENCOUNTER (INPATIENT)
Facility: HOSPITAL | Age: 79
Discharge: NURSING, REHAB OR LONG-TERM CARE | DRG: 698 | End: 2024-09-26
Attending: SPECIALIST | Admitting: SPECIALIST
Payer: MEDICARE

## 2024-09-26 ENCOUNTER — ANESTHESIA (OUTPATIENT)
Dept: PERIOP | Facility: HOSPITAL | Age: 79
DRG: 698 | End: 2024-09-26
Payer: MEDICARE

## 2024-09-26 DIAGNOSIS — R21 RASH: ICD-10-CM

## 2024-09-26 DIAGNOSIS — N39.0 UTI (URINARY TRACT INFECTION): ICD-10-CM

## 2024-09-26 DIAGNOSIS — E06.3 HYPOTHYROIDISM DUE TO HASHIMOTO'S THYROIDITIS: ICD-10-CM

## 2024-09-26 DIAGNOSIS — D50.9 IRON DEFICIENCY ANEMIA, UNSPECIFIED IRON DEFICIENCY ANEMIA TYPE: ICD-10-CM

## 2024-09-26 DIAGNOSIS — R79.89 ELEVATED SERUM CREATININE: ICD-10-CM

## 2024-09-26 DIAGNOSIS — E03.8 HYPOTHYROIDISM DUE TO HASHIMOTO'S THYROIDITIS: ICD-10-CM

## 2024-09-26 DIAGNOSIS — N20.0 CALCULUS OF KIDNEY: Primary | ICD-10-CM

## 2024-09-26 LAB
GLUCOSE SERPL-MCNC: 162 MG/DL (ref 65–140)
GLUCOSE SERPL-MCNC: 178 MG/DL (ref 65–140)

## 2024-09-26 PROCEDURE — 74420 UROGRAPHY RTRGR +-KUB: CPT

## 2024-09-26 PROCEDURE — 87081 CULTURE SCREEN ONLY: CPT | Performed by: SPECIALIST

## 2024-09-26 PROCEDURE — 94760 N-INVAS EAR/PLS OXIMETRY 1: CPT

## 2024-09-26 PROCEDURE — 99221 1ST HOSP IP/OBS SF/LOW 40: CPT | Performed by: NURSE PRACTITIONER

## 2024-09-26 PROCEDURE — 3E1K88Z IRRIGATION OF GENITOURINARY TRACT USING IRRIGATING SUBSTANCE, VIA NATURAL OR ARTIFICIAL OPENING ENDOSCOPIC: ICD-10-PCS | Performed by: SPECIALIST

## 2024-09-26 PROCEDURE — 82948 REAGENT STRIP/BLOOD GLUCOSE: CPT

## 2024-09-26 PROCEDURE — C2617 STENT, NON-COR, TEM W/O DEL: HCPCS | Performed by: SPECIALIST

## 2024-09-26 PROCEDURE — 0TC68ZZ EXTIRPATION OF MATTER FROM RIGHT URETER, VIA NATURAL OR ARTIFICIAL OPENING ENDOSCOPIC: ICD-10-PCS | Performed by: SPECIALIST

## 2024-09-26 PROCEDURE — 87086 URINE CULTURE/COLONY COUNT: CPT | Performed by: SPECIALIST

## 2024-09-26 PROCEDURE — 0T768DZ DILATION OF RIGHT URETER WITH INTRALUMINAL DEVICE, VIA NATURAL OR ARTIFICIAL OPENING ENDOSCOPIC: ICD-10-PCS | Performed by: SPECIALIST

## 2024-09-26 PROCEDURE — 0TP98DZ REMOVAL OF INTRALUMINAL DEVICE FROM URETER, VIA NATURAL OR ARTIFICIAL OPENING ENDOSCOPIC: ICD-10-PCS | Performed by: SPECIALIST

## 2024-09-26 DEVICE — INLAY URETERAL STENT W/O GUIDEWIRE
Type: IMPLANTABLE DEVICE | Site: BLADDER | Status: FUNCTIONAL
Brand: BARD® INLAY® URETERAL STENT

## 2024-09-26 RX ORDER — CEFEPIME HYDROCHLORIDE 2 G/50ML
2000 INJECTION, SOLUTION INTRAVENOUS EVERY 12 HOURS
Status: DISCONTINUED | OUTPATIENT
Start: 2024-09-26 | End: 2024-09-26

## 2024-09-26 RX ORDER — DEXAMETHASONE SODIUM PHOSPHATE 10 MG/ML
INJECTION, SOLUTION INTRAMUSCULAR; INTRAVENOUS AS NEEDED
Status: DISCONTINUED | OUTPATIENT
Start: 2024-09-26 | End: 2024-09-26

## 2024-09-26 RX ORDER — INSULIN LISPRO 100 [IU]/ML
1-5 INJECTION, SOLUTION INTRAVENOUS; SUBCUTANEOUS
Status: DISCONTINUED | OUTPATIENT
Start: 2024-09-27 | End: 2024-09-29

## 2024-09-26 RX ORDER — INSULIN GLARGINE 100 [IU]/ML
14 INJECTION, SOLUTION SUBCUTANEOUS
Status: DISCONTINUED | OUTPATIENT
Start: 2024-09-26 | End: 2024-09-27

## 2024-09-26 RX ORDER — CEFAZOLIN SODIUM 1 G/50ML
1000 SOLUTION INTRAVENOUS ONCE
Status: COMPLETED | OUTPATIENT
Start: 2024-09-26 | End: 2024-09-26

## 2024-09-26 RX ORDER — DOCUSATE SODIUM 100 MG/1
100 CAPSULE, LIQUID FILLED ORAL DAILY
Status: DISPENSED | OUTPATIENT
Start: 2024-09-27

## 2024-09-26 RX ORDER — LIDOCAINE HYDROCHLORIDE 10 MG/ML
INJECTION, SOLUTION EPIDURAL; INFILTRATION; INTRACAUDAL; PERINEURAL AS NEEDED
Status: DISCONTINUED | OUTPATIENT
Start: 2024-09-26 | End: 2024-09-26

## 2024-09-26 RX ORDER — SODIUM CHLORIDE 9 MG/ML
75 INJECTION, SOLUTION INTRAVENOUS CONTINUOUS
Status: DISCONTINUED | OUTPATIENT
Start: 2024-09-26 | End: 2024-09-28

## 2024-09-26 RX ORDER — FUROSEMIDE 20 MG
20 TABLET ORAL DAILY
Status: DISCONTINUED | OUTPATIENT
Start: 2024-09-27 | End: 2024-09-26

## 2024-09-26 RX ORDER — FLUCONAZOLE 100 MG/1
200 TABLET ORAL DAILY
Status: DISCONTINUED | OUTPATIENT
Start: 2024-09-27 | End: 2024-09-26

## 2024-09-26 RX ORDER — MEPERIDINE HYDROCHLORIDE 25 MG/ML
12.5 INJECTION INTRAMUSCULAR; INTRAVENOUS; SUBCUTANEOUS
Status: DISCONTINUED | OUTPATIENT
Start: 2024-09-26 | End: 2024-09-26 | Stop reason: HOSPADM

## 2024-09-26 RX ORDER — ONDANSETRON 2 MG/ML
4 INJECTION INTRAMUSCULAR; INTRAVENOUS ONCE AS NEEDED
Status: DISCONTINUED | OUTPATIENT
Start: 2024-09-26 | End: 2024-09-26 | Stop reason: HOSPADM

## 2024-09-26 RX ORDER — CEFEPIME HYDROCHLORIDE 1 G/50ML
1000 INJECTION, SOLUTION INTRAVENOUS ONCE
Status: COMPLETED | OUTPATIENT
Start: 2024-09-26 | End: 2024-09-27

## 2024-09-26 RX ORDER — ONDANSETRON 2 MG/ML
4 INJECTION INTRAMUSCULAR; INTRAVENOUS EVERY 6 HOURS PRN
Status: ACTIVE | OUTPATIENT
Start: 2024-09-26

## 2024-09-26 RX ORDER — OXYBUTYNIN CHLORIDE 5 MG/1
5 TABLET ORAL 3 TIMES DAILY
Status: CANCELLED | OUTPATIENT
Start: 2024-09-26

## 2024-09-26 RX ORDER — FENTANYL CITRATE/PF 50 MCG/ML
25 SYRINGE (ML) INJECTION
Status: DISCONTINUED | OUTPATIENT
Start: 2024-09-26 | End: 2024-09-26 | Stop reason: HOSPADM

## 2024-09-26 RX ORDER — FUROSEMIDE 20 MG
20 TABLET ORAL DAILY
Status: DISCONTINUED | OUTPATIENT
Start: 2024-09-29 | End: 2024-09-26

## 2024-09-26 RX ORDER — ONDANSETRON 2 MG/ML
INJECTION INTRAMUSCULAR; INTRAVENOUS AS NEEDED
Status: DISCONTINUED | OUTPATIENT
Start: 2024-09-26 | End: 2024-09-26

## 2024-09-26 RX ORDER — FLUCONAZOLE 100 MG/1
200 TABLET ORAL
Status: DISCONTINUED | OUTPATIENT
Start: 2024-09-26 | End: 2024-09-26

## 2024-09-26 RX ORDER — FUROSEMIDE 20 MG
20 TABLET ORAL DAILY
Status: DISCONTINUED | OUTPATIENT
Start: 2024-09-28 | End: 2024-09-28

## 2024-09-26 RX ORDER — ACETAMINOPHEN 325 MG/1
650 TABLET ORAL EVERY 6 HOURS PRN
Status: ACTIVE | OUTPATIENT
Start: 2024-09-26

## 2024-09-26 RX ORDER — MIDAZOLAM HYDROCHLORIDE 2 MG/2ML
INJECTION, SOLUTION INTRAMUSCULAR; INTRAVENOUS AS NEEDED
Status: DISCONTINUED | OUTPATIENT
Start: 2024-09-26 | End: 2024-09-26

## 2024-09-26 RX ORDER — MAGNESIUM HYDROXIDE/ALUMINUM HYDROXICE/SIMETHICONE 120; 1200; 1200 MG/30ML; MG/30ML; MG/30ML
30 SUSPENSION ORAL EVERY 6 HOURS PRN
Status: ACTIVE | OUTPATIENT
Start: 2024-09-26

## 2024-09-26 RX ORDER — FLUCONAZOLE 100 MG/1
100 TABLET ORAL ONCE
Status: DISCONTINUED | OUTPATIENT
Start: 2024-09-26 | End: 2024-09-27

## 2024-09-26 RX ORDER — LEVOFLOXACIN 5 MG/ML
500 INJECTION, SOLUTION INTRAVENOUS EVERY 24 HOURS
Status: DISCONTINUED | OUTPATIENT
Start: 2024-09-26 | End: 2024-09-26

## 2024-09-26 RX ORDER — AMOXICILLIN 250 MG
1 CAPSULE ORAL
Status: DISPENSED | OUTPATIENT
Start: 2024-09-26

## 2024-09-26 RX ORDER — CEFEPIME HYDROCHLORIDE 1 G/50ML
1000 INJECTION, SOLUTION INTRAVENOUS EVERY 12 HOURS
Status: DISCONTINUED | OUTPATIENT
Start: 2024-09-26 | End: 2024-09-26

## 2024-09-26 RX ORDER — SACCHAROMYCES BOULARDII 250 MG
250 CAPSULE ORAL 2 TIMES DAILY
Status: DISPENSED | OUTPATIENT
Start: 2024-09-26

## 2024-09-26 RX ORDER — SODIUM CHLORIDE, SODIUM LACTATE, POTASSIUM CHLORIDE, CALCIUM CHLORIDE 600; 310; 30; 20 MG/100ML; MG/100ML; MG/100ML; MG/100ML
75 INJECTION, SOLUTION INTRAVENOUS CONTINUOUS
Status: DISCONTINUED | OUTPATIENT
Start: 2024-09-26 | End: 2024-09-26

## 2024-09-26 RX ORDER — INSULIN LISPRO 100 [IU]/ML
2-12 INJECTION, SOLUTION INTRAVENOUS; SUBCUTANEOUS
Status: DISCONTINUED | OUTPATIENT
Start: 2024-09-26 | End: 2024-09-26

## 2024-09-26 RX ORDER — LORAZEPAM 2 MG/ML
0.5 INJECTION INTRAMUSCULAR ONCE
Status: COMPLETED | OUTPATIENT
Start: 2024-09-26 | End: 2024-09-26

## 2024-09-26 RX ORDER — PANTOPRAZOLE SODIUM 40 MG/1
40 TABLET, DELAYED RELEASE ORAL
Status: DISPENSED | OUTPATIENT
Start: 2024-09-27

## 2024-09-26 RX ORDER — INSULIN LISPRO 100 [IU]/ML
1-5 INJECTION, SOLUTION INTRAVENOUS; SUBCUTANEOUS
Status: ACTIVE | OUTPATIENT
Start: 2024-09-26

## 2024-09-26 RX ORDER — LEVOTHYROXINE SODIUM 25 UG/1
25 TABLET ORAL
Status: DISPENSED | OUTPATIENT
Start: 2024-09-27

## 2024-09-26 RX ORDER — CARBIDOPA AND LEVODOPA 25; 100 MG/1; MG/1
1 TABLET ORAL 3 TIMES DAILY
Status: DISPENSED | OUTPATIENT
Start: 2024-09-26

## 2024-09-26 RX ORDER — FENTANYL CITRATE 50 UG/ML
INJECTION, SOLUTION INTRAMUSCULAR; INTRAVENOUS AS NEEDED
Status: DISCONTINUED | OUTPATIENT
Start: 2024-09-26 | End: 2024-09-26

## 2024-09-26 RX ORDER — PROPOFOL 10 MG/ML
INJECTION, EMULSION INTRAVENOUS AS NEEDED
Status: DISCONTINUED | OUTPATIENT
Start: 2024-09-26 | End: 2024-09-26

## 2024-09-26 RX ADMIN — FENTANYL CITRATE 50 MCG: 50 INJECTION, SOLUTION INTRAMUSCULAR; INTRAVENOUS at 16:18

## 2024-09-26 RX ADMIN — LIDOCAINE HYDROCHLORIDE 50 MG: 10 INJECTION, SOLUTION EPIDURAL; INFILTRATION; INTRACAUDAL; PERINEURAL at 16:14

## 2024-09-26 RX ADMIN — PROPOFOL 100 MG: 10 INJECTION, EMULSION INTRAVENOUS at 16:14

## 2024-09-26 RX ADMIN — SODIUM CHLORIDE, SODIUM LACTATE, POTASSIUM CHLORIDE, AND CALCIUM CHLORIDE: .6; .31; .03; .02 INJECTION, SOLUTION INTRAVENOUS at 16:08

## 2024-09-26 RX ADMIN — FENTANYL CITRATE 50 MCG: 50 INJECTION, SOLUTION INTRAMUSCULAR; INTRAVENOUS at 16:25

## 2024-09-26 RX ADMIN — DEXAMETHASONE SODIUM PHOSPHATE 10 MG: 10 INJECTION, SOLUTION INTRAMUSCULAR; INTRAVENOUS at 16:19

## 2024-09-26 RX ADMIN — CEFAZOLIN SODIUM 1000 MG: 1 SOLUTION INTRAVENOUS at 16:13

## 2024-09-26 RX ADMIN — SODIUM CHLORIDE 75 ML/HR: 0.9 INJECTION, SOLUTION INTRAVENOUS at 23:18

## 2024-09-26 RX ADMIN — LORAZEPAM 0.5 MG: 2 INJECTION INTRAMUSCULAR; INTRAVENOUS at 23:23

## 2024-09-26 RX ADMIN — MIDAZOLAM 1 MG: 1 INJECTION INTRAMUSCULAR; INTRAVENOUS at 16:11

## 2024-09-26 RX ADMIN — ONDANSETRON 4 MG: 2 INJECTION INTRAMUSCULAR; INTRAVENOUS at 16:19

## 2024-09-26 NOTE — OP NOTE
OPERATIVE REPORT  PATIENT NAME: Chris Bojorquez    :  1945  MRN: [id number]  Pt Location: [facility] OR ROOM 04    SURGERY DATE: 2024    Surgeons and Role:     * Ciro Hughes MD - Primary    Preop Diagnosis:  Renal stones [N20.0]  Right renal stones    Post-Op Diagnosis Codes:     * Renal stones [N20.0]  Migrated right stent  Right distal ureteral stone  Complicated UTI    Procedure(s):  Right - CYSTOSCOPY URETEROSCOPY. INSERTION STENT URETERAL. STONE EXTRACTION.  Exchange stent    Specimen(s):  * No specimens in log *    Estimated Blood Loss:   Minimal    Drains:  Urethral Catheter Non-latex 22 Fr. (Active)   Number of days: 0       Ureteral Internal Stent Right ureter 6 Fr. (Active)   Number of days: 0       Anesthesia Type:   General/LMA    Operative Indications:  Renal stones [N20.0]  79-year-old male from [facility] undergoing emergency cystoscopy right stent placement few weeks ago for right emphysematous pyelitis multiple stones return to the OR today to undergo cystoscopy right ureteroscopy lithotripsy stent exchange aware risk of anesthesia bleeding infection son POA signed consent aware of staged procedure in light of number and size of large stones does not wish to consider chronic stent exchanges    Operative Findings:  #1 migrated right stent  Ureteroscopy stent removal//stone removal  pyuric urine in the ureter/yeast debris  Right ureter attempts at right flexible ureteroscopy stone extraction aborted  28 cm 6 Slovak stent exchanged with polyuric urine noted      Complications:   None    Procedure and Technique:  Patient identified in the surgical preop unit with son present consent reviewed signed wish to proceed with procedure after anesthesia was induced placed in the modified lithotomy position draped and prepped in a sterile fashion timeout formed 22 Slovak cystoscope passed through the phimotic foreskin and glans and into the anterior to confirm the other maladies posterior to  confirmed a 3.0 cm bilobar obstructing prostate scope inserted the bladder lumen there was tremendous bladder debris consistent with yeast noted the stent was not identified whatsoever and all the debris fluoroscopic views confirmed the stent to have migrated up into the distal ureter the debris was irrigated out and the scope was inserted into the area of the right orifice with the stent noted with a grasper the stent was pulled out into the bladder 4 mm stone also pulled out a 0.038 wire passed up with difficulty into the right renal pelvis and backloaded off as a safety.  The cystoscope was placed and the right stent removed bladder was irrigated of yeast debris over the wire a 28 cm 6 Ivorian stent was passed the wire removed scope removed 18 Ivorian Rivera inserted left the bed drainage patient had procedure well will be started on appropriate antibiotics and be rescheduled for possible flexible ureteroscopy stone extraction a few weeks   I was present for the entire procedure.    Patient Disposition:  PACU              SIGNATURE: Ciro Hughes MD  DATE: September 26, 2024  TIME: 4:52 PM

## 2024-09-26 NOTE — ANESTHESIA POSTPROCEDURE EVALUATION
Post-Op Assessment Note    CV Status:  Stable  Pain Score: 0    Pain management: adequate       Mental Status:  Sleepy and arousable   Hydration Status:  Stable   PONV Controlled:  None   Airway Patency:  Patent  Two or more mitigation strategies used for obstructive sleep apnea   Post Op Vitals Reviewed: Yes    No anethesia notable event occurred.    Staff: MARITA               /66 (09/26/24 1652)    Temp (!) 97 °F (36.1 °C) (09/26/24 1652)    Pulse 64 (09/26/24 1652)   Resp 16 (09/26/24 1652)    SpO2 100 % (09/26/24 1652)

## 2024-09-26 NOTE — PROGRESS NOTES
"Progress Note - Urology      Patient: Chris Bojorquez   : 1945 Sex: male   MRN: [id number]     CSN: [id number]  Unit/Bed#: OR POOL     SUBJECTIVE:   Patient in surgical preop unit with son POA  To undergo cystoscopy right ureteroscopy laser staged procedure in light of multiple large stones  Where risk of anesthesia infection bleeding additional urologic procedures      Objective   Vitals: /68   Pulse 63   Temp (!) 96.5 °F (35.8 °C) (Temporal)   Resp 18   SpO2 99%     No intake/output data recorded.      Physical Exam:   General Alert awake   Normocephalic atraumatic PERRLA  Lungs clear bilaterally  Cardiac normal S1 normal S2  Abdomen soft, flank pain  Extremities no edema      Lab Results: CBC:   Lab Results   Component Value Date    WBC 5.82 09/10/2024    HGB 7.2 (L) 09/10/2024    HCT 23.9 (L) 09/10/2024    MCV 84 09/10/2024     09/10/2024    RBC 2.86 (L) 09/10/2024    MCH 25.2 (L) 09/10/2024    MCHC 30.1 (L) 09/10/2024    RDW 17.0 (H) 09/10/2024    MPV 9.2 09/10/2024    NRBC 0 09/10/2024     CMP:   Lab Results   Component Value Date    CL 98 09/10/2024    CO2 28 09/10/2024    BUN 39 (H) 09/10/2024    CREATININE 1.53 (H) 09/10/2024    CALCIUM 9.0 09/10/2024    AST 20 09/10/2024    ALT 7 09/10/2024    ALKPHOS 111 (H) 09/10/2024    EGFR 42 09/10/2024     Urinalysis:   Lab Results   Component Value Date    COLORU Yellow 2024    CLARITYU Slightly Cloudy 2024    SPECGRAV 1.015 2024    PHUR 7.0 2024    LEUKOCYTESUR Large (A) 2024    NITRITE Negative 2024    GLUCOSEU 1000 (1%) (A) 2024    KETONESU 10 (1+) (A) 2024    BILIRUBINUR Negative 2024    BLOODU Large (A) 2024     Urine Culture:   Lab Results   Component Value Date    URINECX 10,000-19,000 cfu/ml Proteus mirabilis (A) 2024    URINECX <10,000 cfu/ml 2024     PSA: No results found for: \"PSA\"      Assessment/ Plan:  Cystoscopy right ureteroscopy laser " stent          Ciro Hughes MD

## 2024-09-27 PROBLEM — R21 RASH: Status: ACTIVE | Noted: 2024-09-27

## 2024-09-27 PROBLEM — I51.89 DIASTOLIC DYSFUNCTION: Status: ACTIVE | Noted: 2024-09-27

## 2024-09-27 LAB
ABO GROUP BLD: NORMAL
ANION GAP SERPL CALCULATED.3IONS-SCNC: 5 MMOL/L (ref 4–13)
ANION GAP SERPL CALCULATED.3IONS-SCNC: 6 MMOL/L (ref 4–13)
ANION GAP SERPL CALCULATED.3IONS-SCNC: 9 MMOL/L (ref 4–13)
BACTERIA UR CULT: NORMAL
BLD GP AB SCN SERPL QL: NEGATIVE
BUN SERPL-MCNC: 37 MG/DL (ref 5–25)
BUN SERPL-MCNC: 38 MG/DL (ref 5–25)
BUN SERPL-MCNC: 41 MG/DL (ref 5–25)
CALCIUM SERPL-MCNC: 7.8 MG/DL (ref 8.4–10.2)
CALCIUM SERPL-MCNC: 7.8 MG/DL (ref 8.4–10.2)
CALCIUM SERPL-MCNC: 8.1 MG/DL (ref 8.4–10.2)
CHLORIDE SERPL-SCNC: 103 MMOL/L (ref 96–108)
CHLORIDE SERPL-SCNC: 103 MMOL/L (ref 96–108)
CHLORIDE SERPL-SCNC: 99 MMOL/L (ref 96–108)
CO2 SERPL-SCNC: 22 MMOL/L (ref 21–32)
CO2 SERPL-SCNC: 23 MMOL/L (ref 21–32)
CO2 SERPL-SCNC: 24 MMOL/L (ref 21–32)
CREAT SERPL-MCNC: 1.62 MG/DL (ref 0.6–1.3)
CREAT SERPL-MCNC: 1.66 MG/DL (ref 0.6–1.3)
CREAT SERPL-MCNC: 1.83 MG/DL (ref 0.6–1.3)
ERYTHROCYTE [DISTWIDTH] IN BLOOD BY AUTOMATED COUNT: 17.2 % (ref 11.6–15.1)
GFR SERPL CREATININE-BSD FRML MDRD: 34 ML/MIN/1.73SQ M
GFR SERPL CREATININE-BSD FRML MDRD: 38 ML/MIN/1.73SQ M
GFR SERPL CREATININE-BSD FRML MDRD: 40 ML/MIN/1.73SQ M
GLUCOSE SERPL-MCNC: 134 MG/DL (ref 65–140)
GLUCOSE SERPL-MCNC: 159 MG/DL (ref 65–140)
GLUCOSE SERPL-MCNC: 191 MG/DL (ref 65–140)
GLUCOSE SERPL-MCNC: 210 MG/DL (ref 65–140)
GLUCOSE SERPL-MCNC: 234 MG/DL (ref 65–140)
GLUCOSE SERPL-MCNC: 236 MG/DL (ref 65–140)
GLUCOSE SERPL-MCNC: 287 MG/DL (ref 65–140)
HCT VFR BLD AUTO: 25.9 % (ref 36.5–49.3)
HGB BLD-MCNC: 7.8 G/DL (ref 12–17)
INR PPP: 1.08 (ref 0.85–1.19)
LACTATE SERPL-SCNC: 1.1 MMOL/L (ref 0.5–2)
MAGNESIUM SERPL-MCNC: 2 MG/DL (ref 1.9–2.7)
MAGNESIUM SERPL-MCNC: 2.4 MG/DL (ref 1.9–2.7)
MCH RBC QN AUTO: 25.7 PG (ref 26.8–34.3)
MCHC RBC AUTO-ENTMCNC: 30.1 G/DL (ref 31.4–37.4)
MCV RBC AUTO: 86 FL (ref 82–98)
PLATELET # BLD AUTO: 214 THOUSANDS/UL (ref 149–390)
PMV BLD AUTO: 8.8 FL (ref 8.9–12.7)
POTASSIUM SERPL-SCNC: 5.1 MMOL/L (ref 3.5–5.3)
POTASSIUM SERPL-SCNC: 5.1 MMOL/L (ref 3.5–5.3)
POTASSIUM SERPL-SCNC: 5.8 MMOL/L (ref 3.5–5.3)
PROCALCITONIN SERPL-MCNC: 0.46 NG/ML
PROTHROMBIN TIME: 14.5 SECONDS (ref 12.3–15)
RBC # BLD AUTO: 3.03 MILLION/UL (ref 3.88–5.62)
RH BLD: POSITIVE
SODIUM SERPL-SCNC: 131 MMOL/L (ref 135–147)
SODIUM SERPL-SCNC: 131 MMOL/L (ref 135–147)
SODIUM SERPL-SCNC: 132 MMOL/L (ref 135–147)
SPECIMEN EXPIRATION DATE: NORMAL
WBC # BLD AUTO: 10.02 THOUSAND/UL (ref 4.31–10.16)

## 2024-09-27 PROCEDURE — 83605 ASSAY OF LACTIC ACID: CPT | Performed by: NURSE PRACTITIONER

## 2024-09-27 PROCEDURE — 99221 1ST HOSP IP/OBS SF/LOW 40: CPT | Performed by: STUDENT IN AN ORGANIZED HEALTH CARE EDUCATION/TRAINING PROGRAM

## 2024-09-27 PROCEDURE — 86900 BLOOD TYPING SEROLOGIC ABO: CPT | Performed by: NURSE PRACTITIONER

## 2024-09-27 PROCEDURE — 85610 PROTHROMBIN TIME: CPT | Performed by: NURSE PRACTITIONER

## 2024-09-27 PROCEDURE — 83735 ASSAY OF MAGNESIUM: CPT | Performed by: NURSE PRACTITIONER

## 2024-09-27 PROCEDURE — 84145 PROCALCITONIN (PCT): CPT | Performed by: NURSE PRACTITIONER

## 2024-09-27 PROCEDURE — 86850 RBC ANTIBODY SCREEN: CPT | Performed by: NURSE PRACTITIONER

## 2024-09-27 PROCEDURE — 86901 BLOOD TYPING SEROLOGIC RH(D): CPT | Performed by: NURSE PRACTITIONER

## 2024-09-27 PROCEDURE — 87040 BLOOD CULTURE FOR BACTERIA: CPT | Performed by: NURSE PRACTITIONER

## 2024-09-27 PROCEDURE — 83880 ASSAY OF NATRIURETIC PEPTIDE: CPT | Performed by: NURSE PRACTITIONER

## 2024-09-27 PROCEDURE — 85027 COMPLETE CBC AUTOMATED: CPT | Performed by: NURSE PRACTITIONER

## 2024-09-27 PROCEDURE — 80048 BASIC METABOLIC PNL TOTAL CA: CPT | Performed by: NURSE PRACTITIONER

## 2024-09-27 PROCEDURE — 82948 REAGENT STRIP/BLOOD GLUCOSE: CPT

## 2024-09-27 RX ORDER — INSULIN GLARGINE 100 [IU]/ML
16 INJECTION, SOLUTION SUBCUTANEOUS
Status: DISCONTINUED | OUTPATIENT
Start: 2024-09-27 | End: 2024-09-27

## 2024-09-27 RX ORDER — FLUCONAZOLE 2 MG/ML
200 INJECTION, SOLUTION INTRAVENOUS EVERY 24 HOURS
Status: DISPENSED | OUTPATIENT
Start: 2024-09-27 | End: 2024-10-02

## 2024-09-27 RX ORDER — SODIUM POLYSTYRENE SULFONATE 4.1 MEQ/G
15 POWDER, FOR SUSPENSION ORAL; RECTAL ONCE
Status: COMPLETED | OUTPATIENT
Start: 2024-09-27 | End: 2024-09-27

## 2024-09-27 RX ORDER — LORAZEPAM 2 MG/ML
0.5 INJECTION INTRAMUSCULAR ONCE
Status: COMPLETED | OUTPATIENT
Start: 2024-09-27 | End: 2024-09-27

## 2024-09-27 RX ORDER — NYSTATIN 100000 [USP'U]/G
POWDER TOPICAL 2 TIMES DAILY
Status: DISPENSED | OUTPATIENT
Start: 2024-09-27

## 2024-09-27 RX ORDER — CEFEPIME HYDROCHLORIDE 2 G/50ML
2000 INJECTION, SOLUTION INTRAVENOUS EVERY 12 HOURS
Status: DISPENSED | OUTPATIENT
Start: 2024-09-27

## 2024-09-27 RX ORDER — CEFEPIME HYDROCHLORIDE 1 G/50ML
1000 INJECTION, SOLUTION INTRAVENOUS ONCE
Status: COMPLETED | OUTPATIENT
Start: 2024-09-27 | End: 2024-09-27

## 2024-09-27 RX ORDER — INSULIN GLARGINE 100 [IU]/ML
14 INJECTION, SOLUTION SUBCUTANEOUS
Status: DISPENSED | OUTPATIENT
Start: 2024-09-27

## 2024-09-27 RX ADMIN — CEFEPIME HYDROCHLORIDE 1000 MG: 1 INJECTION, SOLUTION INTRAVENOUS at 02:52

## 2024-09-27 RX ADMIN — INSULIN LISPRO 1 UNITS: 100 INJECTION, SOLUTION INTRAVENOUS; SUBCUTANEOUS at 22:05

## 2024-09-27 RX ADMIN — SODIUM CHLORIDE 100 ML/HR: 0.9 INJECTION, SOLUTION INTRAVENOUS at 22:35

## 2024-09-27 RX ADMIN — INSULIN LISPRO 2 UNITS: 100 INJECTION, SOLUTION INTRAVENOUS; SUBCUTANEOUS at 12:52

## 2024-09-27 RX ADMIN — SODIUM CHLORIDE 250 ML: 0.9 INJECTION, SOLUTION INTRAVENOUS at 03:44

## 2024-09-27 RX ADMIN — SENNOSIDES AND DOCUSATE SODIUM 1 TABLET: 50; 8.6 TABLET ORAL at 22:05

## 2024-09-27 RX ADMIN — CARBIDOPA AND LEVODOPA 1 TABLET: 25; 100 TABLET ORAL at 22:05

## 2024-09-27 RX ADMIN — Medication 250 MG: at 17:04

## 2024-09-27 RX ADMIN — CEFEPIME HYDROCHLORIDE 2000 MG: 2 INJECTION, SOLUTION INTRAVENOUS at 23:34

## 2024-09-27 RX ADMIN — INSULIN LISPRO 1 UNITS: 100 INJECTION, SOLUTION INTRAVENOUS; SUBCUTANEOUS at 08:04

## 2024-09-27 RX ADMIN — INSULIN LISPRO 1 UNITS: 100 INJECTION, SOLUTION INTRAVENOUS; SUBCUTANEOUS at 17:03

## 2024-09-27 RX ADMIN — CEFEPIME HYDROCHLORIDE 1000 MG: 1 INJECTION, SOLUTION INTRAVENOUS at 00:37

## 2024-09-27 RX ADMIN — FLUCONAZOLE 200 MG: 2 INJECTION, SOLUTION INTRAVENOUS at 03:27

## 2024-09-27 RX ADMIN — INSULIN HUMAN 3 UNITS: 100 INJECTION, SOLUTION PARENTERAL at 17:03

## 2024-09-27 RX ADMIN — Medication 250 MG: at 09:33

## 2024-09-27 RX ADMIN — SODIUM CHLORIDE 100 ML/HR: 0.9 INJECTION, SOLUTION INTRAVENOUS at 13:32

## 2024-09-27 RX ADMIN — NYSTATIN: 100000 POWDER TOPICAL at 12:57

## 2024-09-27 RX ADMIN — CEFEPIME HYDROCHLORIDE 2000 MG: 2 INJECTION, SOLUTION INTRAVENOUS at 13:31

## 2024-09-27 RX ADMIN — SODIUM POLYSTYRENE SULFONATE 15 G: 1 POWDER ORAL; RECTAL at 19:37

## 2024-09-27 RX ADMIN — LORAZEPAM 0.5 MG: 2 INJECTION INTRAMUSCULAR; INTRAVENOUS at 22:33

## 2024-09-27 RX ADMIN — DOCUSATE SODIUM 100 MG: 100 CAPSULE, LIQUID FILLED ORAL at 09:33

## 2024-09-27 RX ADMIN — INSULIN GLARGINE 14 UNITS: 100 INJECTION, SOLUTION SUBCUTANEOUS at 22:05

## 2024-09-27 RX ADMIN — NYSTATIN 1 APPLICATION: 100000 POWDER TOPICAL at 17:06

## 2024-09-27 NOTE — CASE MANAGEMENT
Case Management Discharge Planning Note    Patient name Chris Bojorquez  Location [address] [place] [address] [place] [address] MRN [id number]  : 1945 Date 2024       Current Admission Date: 2024  Current Admission Diagnosis:Elevated serum creatinine   Patient Active Problem List    Diagnosis Date Noted Date Diagnosed    Diastolic dysfunction 2024     Right nephrolithiasis 2024     Elevated serum creatinine 2024     History of recurrent UTIs 2024     Failure to thrive in adult 2024     Volume overload 2024     Iron deficiency anemia 2024     Gastroesophageal reflux disease 2024     Sepsis (HCC) 2024     Moderate protein-calorie malnutrition (HCC) 2024     Uncontrolled type 2 diabetes mellitus with hyperglycemia (HCC) 2024     Hypothyroidism 2024     Parkinson disease 2024       LOS (days): 0  Geometric Mean LOS (GMLOS) (days):   Days to GMLOS:     OBJECTIVE:     Current admission status: Outpatient Surgery   Preferred Pharmacy:   PATIENT/FAMILY REPORTS NO PREFERRED PHARMACY  No address on file      Primary Care Provider: No primary care provider on file.    Primary Insurance: MEDICARE  Secondary Insurance: [id number] [facility]    DISCHARGE DETAILS:    CM contacted family/caregiver?: Yes  Were Treatment Team discharge recommendations reviewed with patient/caregiver?: Yes  Did patient/caregiver verbalize understanding of patient care needs?: N/A- going to facility    Contacts  Patient Contacts: Derek Bojorquez (son)  Relationship to Patient:: Family  Contact Method: Phone  Phone Number: [phone]  Reason/Outcome: Discharge Planning    Other Referral/Resources/Interventions Provided:  Interventions: Facility Return, SNF  Referral Comments: Discharge planned.  Pt will be returning to [facility]. Wheelchair van transport has been arranged.  SW did receive call back message from son.  SW called son once again to discuss plans and  discharge timeframe.  Son agreeable with plan.  SW also spoke with Edilia, nursing supervisor at [facility], to discuss plan and discharge timeframe.    Treatment Team Recommendation: Facility Return, SNF  Discharge Destination Plan:: Facility Return, SNF  Transport at Discharge : Wheelchair van     Number/Name of Dispatcher: [place]  Transported by (Company and Unit #): [employer]  ETA of Transport (Date): 09/27/24  ETA of Transport (Time): 1600            Accepting Facility Name, City & State : [place], [place]  Receiving Facility/Agency Phone Number: [phone]

## 2024-09-27 NOTE — ASSESSMENT & PLAN NOTE
Evolving after procedure, as evidenced by fever 102.4, tachycardia, with suspected source of infection complicated UTI as above.  Sepsis workup ordered.  On IV cefepime and Diflucan  Follow-up urine culture and blood cultures  Gentle IV hydration  Monitor fever curve

## 2024-09-27 NOTE — ASSESSMENT & PLAN NOTE
Secondary to chronic Rivera catheter.  Recent urine culture as above  Urine culture pending  Patient started on cefepime Diflucan due to pyuric urine in the ureter with yeast debris found in OR.  Follow-up on urine culture

## 2024-09-27 NOTE — ASSESSMENT & PLAN NOTE
Lab Results   Component Value Date    HGBA1C 14.6 (H) 08/26/2024   Patient was started on Lantus 14 units at bedtime with SSI ACHS on recent hospitalization.  Will continue.  A1c was 14.6.   Tight glycemic control in view of recurrent UTIs.

## 2024-09-27 NOTE — ASSESSMENT & PLAN NOTE
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  Urine culture grew low colony Proteus mirabilis. 1/2 blood cultures grew Proteus mirabilis. Patient received cefepime, then Rocephin, then cefpodoxime for 10-day total.  Patient presented with PABLO as well.  Creatinine was 1.95 on admission.  Peaked to 2.15.  Creatinine 1.53 on discharge.  CT showed decreased calculus volume in the right kidney, interval passage of calculi suspected; delayed nephrogram on the right likely due to obstructive uropathy; bladder calculus with diffuse bladder wall thickening compatible with cystitis.  Patient underwent urgent cystoscopy with insertion of right ureteral stent and removal of large 3 cm bladder stone on 8/29.  Patient's hospital course was complicated by fluid overload, required Lasix with albumin.  2D echo showed normal EF, grade 1 diastolic dysfunction.  Baseline creatinine 0.7- 0.8 in past year prior to recent hospitalization.  Creatinine 1.62 on 9/27 and increased to 1.66 on 9/28  Continue gentle hydration  Lasix held on 9/27; resume Lasix on 9/28  Intake and output  Repeat BMP in the morning

## 2024-09-27 NOTE — PROGRESS NOTES
Progress Note - Hospitalist   Name: Chris Bojorquez 79 y.o. 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  Urine culture grew low colony Proteus mirabilis. 1/2 blood cultures grew Proteus mirabilis. Patient received cefepime, then Rocephin, then cefpodoxime for 10-day total.  Patient presented with PABLO as well.  Creatinine was 1.95 on admission.  Peaked to 2.15.  Creatinine 1.53 on discharge.  CT showed decreased calculus volume in the right kidney, interval passage of calculi suspected; delayed nephrogram on the right likely due to obstructive uropathy; bladder calculus with diffuse bladder wall thickening compatible with cystitis.  Patient underwent urgent cystoscopy with insertion of right ureteral stent and removal of large 3 cm bladder stone on 8/29.  Patient's hospital course was complicated by fluid overload, required Lasix with albumin.  2D echo showed normal EF, grade 1 diastolic dysfunction.  Baseline creatinine 0.7- 0.8 in past year prior to recent hospitalization.  Cr 1.62 >1.75  Continue gentle hydration  Hold Lasix restart when appropriate  Intake and output  Repeat BMP in the morning      Iron deficiency anemia  Baseline hemoglobin appears to be around 9s.  Hemoglobin was 10.3 on 8/26, trended down to 7s on recent hospitalization.  Hemoglobin was 7.2 on discharge.  Patient received IV Venofer.  Iron was less than 10, ferritin 163.  Repeat CBC stat.  Transfusion consent obtained from son over the phone.  Start MVI    History of recurrent UTIs  Secondary to chronic Rivera catheter.  Recent urine culture as above  Follow-up urine culture from today  Patient started on cefepime Diflucan  due to pyuric urine in the ureter with yeast debris found in OR.    Right nephrolithiasis  As above  Patient underwent elective cystoscopy uteroscopy, stone extraction, right ureteral stent exchange today.  Found to have migrated right stent, pyuric urine in the ureter with yeast debris.  Will start on cefepime Diflucan  Follow-up urine culture  Rivera draining bloody urine.  Management per primary.  Uncontrolled type 2 diabetes mellitus with hyperglycemia (HCC)    Lab Results   Component Value Date    HGBA1C 14.6 (H) 08/26/2024   Patient was started on Lantus 14 units at bedtime with SSI ACHS on recent hospitalization.  Will continue.  A1c was 14.6.   Tight glycemic control in view of  recurrent UTIs.      Hypothyroidism  Continue Synthroid  Parkinson disease (HCC)  Continue Sinemet  Gastroesophageal reflux disease  Continue PPI  Diastolic dysfunction  2D echo on 8/30 in recent hospitalization showed EF 55%, grade 1 diastolic dysfunction, RV systolic pressure is mildly elevated at 51 mmHg,no  significant valvular disease.  Patient was discharged on Lasix 20 mg p.o. daily due to fluid overload during hospitalization.  Will hold Lasix for 1 day.  Daily weight, intake output      Sepsis (HCC)   Evolving after procedure, as evidenced by fever 102.4, tachycardia, with suspected source of infection complicated UTI as above.  BCx-no growth 48H  On IV cefepime and Diflucan  Follow-up urine culture   Gentle IV hydration  Monitor fever curve          VTE Pharmacologic Prophylaxis: VTE Score: 6 High Risk (Score >/= 5) - Pharmacological DVT Prophylaxis Contraindicated. Sequential Compression Devices Ordered.    Mobility:   Basic Mobility Inpatient Raw Score: 6  JH-HLM Goal: 2: Bed activities/Dependent transfer  JH-HLM Achieved: 2: Bed activities/Dependent transfer  JH-HLM Goal achieved. Continue to encourage appropriate mobility.    Patient Centered Rounds: I evaluated the patient without nursing staff present due to alternate  patient care responsibilities    Discussions with Specialists or Other Care Team Provider: Urology primary        Current Length of Stay: 0 day(s)  Current Patient Status: Outpatient Surgery   Certification Statement: Per primary  Discharge Plan: Anticipate discharge in 24-48 hrs to prior assisted or independent living facility.    Code Status: Prior    Subjective   Patient seen and examined at bedside, reported no headache, blurry vision, chest pain, shortness of breath, stated that he ate Astra meatloaf and that roast beef.  Denied any fever or chills.  Nursing reported no events.    Objective     Vitals:   Temp (24hrs), Av.3 °F (36.8 °C), Min:96.5 °F (35.8 °C), Max:102.4 °F (39.1 °C)    Temp:  [96.5 °F (35.8 °C)-102.4 °F (39.1 °C)] 98.3 °F (36.8 °C)  HR:  [] 73  Resp:  [16-20] 18  BP: ()/(53-79) 113/60  SpO2:  [89 %-100 %] 95 %  Body mass index is 22.42 kg/m².     Input and Output Summary (last 24 hours):     Intake/Output Summary (Last 24 hours) at 2024 0931  Last data filed at 2024 0400  Gross per 24 hour   Intake 450 ml   Output 450 ml   Net 0 ml       Physical Exam  Vitals reviewed.   Constitutional:       General: He is not in acute distress.     Appearance: Normal appearance.   HENT:      Head: Atraumatic.      Nose: No congestion.   Eyes:      General: No scleral icterus.     Pupils: Pupils are equal, round, and reactive to light.   Cardiovascular:      Rate and Rhythm: Normal rate.      Heart sounds: No murmur heard.  Pulmonary:      Effort: No respiratory distress.      Breath sounds: No wheezing, rhonchi or rales.   Abdominal:      Palpations: Abdomen is soft.      Tenderness: There is no abdominal tenderness. There is no guarding.   Musculoskeletal:         General: No swelling or deformity. Normal range of motion.      Cervical back: No tenderness.      Right lower leg: No edema.      Left lower leg: No edema.   Feet:      Right foot:      Skin integrity: No callus.   Skin:      General: Skin is warm.      Capillary Refill: Capillary refill takes less than 2 seconds.      Findings: No lesion or rash.   Neurological:      Mental Status: He is oriented to person, place, and time.      Cranial Nerves: No cranial nerve deficit.      Coordination: Coordination normal.   Psychiatric:         Mood and Affect: Mood normal.         Thought Content: Thought content normal.          Lines/Drains:  Lines/Drains/Airways       Active Status       Name Placement date Placement time Site Days    Urethral Catheter Non-latex 22 Fr. 09/26/24  1637  Non-latex  less than 1    Ureteral Internal Stent Right ureter 6 Fr. 09/26/24  1633  Right ureter  less than 1                  Urinary Catheter:  Goal for removal: Per primary                 Lab Results: I have reviewed the following results:   Results from last 7 days   Lab Units 09/27/24  0012   WBC Thousand/uL 10.02   HEMOGLOBIN g/dL 7.8*   HEMATOCRIT % 25.9*   PLATELETS Thousands/uL 214     Results from last 7 days   Lab Units 09/27/24  0520   SODIUM mmol/L 131*   POTASSIUM mmol/L 5.8*   CHLORIDE mmol/L 103   CO2 mmol/L 22   BUN mg/dL 38*   CREATININE mg/dL 1.66*   ANION GAP mmol/L 6   CALCIUM mg/dL 7.8*   GLUCOSE RANDOM mg/dL 234*     Results from last 7 days   Lab Units 09/27/24  0210   INR  1.08     Results from last 7 days   Lab Units 09/27/24  0716 09/26/24  1926 09/26/24  1814   POC GLUCOSE mg/dl 210* 178* 162*         Results from last 7 days   Lab Units 09/27/24  0210 09/27/24  0012   LACTIC ACID mmol/L 1.1  --    PROCALCITONIN ng/ml  --  0.46*       Recent Cultures (last 7 days):         Imaging Review:   Other Studies: Primary     Last 24 Hours Medication List:     Current Facility-Administered Medications:     acetaminophen (TYLENOL) tablet 650 mg, Q6H PRN    aluminum-magnesium hydroxide-simethicone (MAALOX) oral suspension 30 mL, Q6H PRN    carbidopa-levodopa (SINEMET)  mg per tablet 1 tablet, TID    cefepime (MAXIPIME) IVPB (premix in  dextrose) 2,000 mg 50 mL, Q12H    docusate sodium (COLACE) capsule 100 mg, Daily    fluconazole (DIFLUCAN) IVPB (premix) 200 mg, Q24H, Last Rate: 200 mg (09/27/24 0327)    [START ON 9/28/2024] furosemide (LASIX) tablet 20 mg, Daily    insulin glargine (LANTUS) subcutaneous injection 14 Units 0.14 mL, HS    insulin lispro (HumALOG/ADMELOG) 100 units/mL subcutaneous injection 1-5 Units, TID AC **AND** Fingerstick Glucose (POCT), TID AC    insulin lispro (HumALOG/ADMELOG) 100 units/mL subcutaneous injection 1-5 Units, HS    lactated ringers bolus 1,000 mL, Once PRN **AND** lactated ringers bolus 1,000 mL, Once PRN    levothyroxine tablet 25 mcg, Early Morning    ondansetron (ZOFRAN) injection 4 mg, Q6H PRN    pantoprazole (PROTONIX) EC tablet 40 mg, Early Morning    saccharomyces boulardii (FLORASTOR) capsule 250 mg, BID    senna-docusate sodium (SENOKOT S) 8.6-50 mg per tablet 1 tablet, HS    sodium chloride 0.9 % bolus 1,000 mL, Once PRN **AND** sodium chloride 0.9 % bolus 1,000 mL, Once PRN    sodium chloride 0.9 % infusion, Continuous, Last Rate: 100 mL/hr (09/27/24 0534)    Administrative Statements   Today, Patient Was Seen By: Natasha Gayle MD      **Please Note: This note may have been constructed using a voice recognition system.**

## 2024-09-27 NOTE — PROGRESS NOTES
Progress Note - Hospitalist   Name: Chris Bojorquez 79 y.o. 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  Urine culture grew low colony Proteus mirabilis. 1/2 blood cultures grew Proteus mirabilis. Patient received cefepime, then Rocephin, then cefpodoxime for 10-day total.  Patient presented with PABLO as well.  Creatinine was 1.95 on admission.  Peaked to 2.15.  Creatinine 1.53 on discharge.  CT showed decreased calculus volume in the right kidney, interval passage of calculi suspected; delayed nephrogram on the right likely due to obstructive uropathy; bladder calculus with diffuse bladder wall thickening compatible with cystitis.  Patient underwent urgent cystoscopy with insertion of right ureteral stent and removal of large 3 cm bladder stone on 8/29.  Patient's hospital course was complicated by fluid overload, required Lasix with albumin.  2D echo showed normal EF, grade 1 diastolic dysfunction.  Baseline creatinine 0.7- 0.8 in past year prior to recent hospitalization.  Creatinine 1.62 on 9/27 and increased to 1.66 on 9/28  Continue gentle hydration  Lasix held on 9/27; resume Lasix on 9/28  Intake and output  Repeat BMP in the morning    Iron deficiency anemia  Baseline hemoglobin appears to be around 9s.  Hemoglobin was 10.3 on 8/26, trended down to 7s on recent hospitalization. Hemoglobin was 7.2 on discharge. Patient received IV Venofer. Iron was less than 10, ferritin 163.  Transfusion consent obtained from son over the phone  9/27 Hb 7.8   Repeat CBC in AM  Start MVI  Transfuse if Hb < 7  History of recurrent UTIs  Secondary to chronic Rivera catheter.  Recent urine culture as  above  Urine culture pending  Patient started on cefepime Diflucan due to pyuric urine in the ureter with yeast debris found in OR.  Follow-up on urine culture    Right nephrolithiasis  As above  Patient underwent elective cystoscopy uteroscopy, stone extraction, right ureteral stent exchange today.  Found to have migrated right stent, pyuric urine in the ureter with yeast debris.  Started on cefepime Diflucan per urology  Follow-up urine culture  Rivera draining bloody urine.  Management per primary.  Monitor I+Os  Uncontrolled type 2 diabetes mellitus with hyperglycemia (HCC)    Lab Results   Component Value Date    HGBA1C 14.6 (H) 08/26/2024   Patient was started on Lantus 14 units at bedtime with SSI ACHS on recent hospitalization.  Will continue.  A1c was 14.6.   Tight glycemic control in view of recurrent UTIs.  Hypothyroidism  Continue Synthroid  Parkinson disease (HCC)  Continue Sinemet  Gastroesophageal reflux disease  Continue PPI  Diastolic dysfunction  2D echo on 8/30 in recent hospitalization showed EF 55%, grade 1 diastolic dysfunction, RV systolic pressure is mildly elevated at 51 mmHg,no  significant valvular disease.  Patient was discharged on Lasix 20 mg p.o. daily due to fluid overload during hospitalization.  Will hold Lasix for 1 day.  Daily weight, intake output  Sepsis (HCC)  Evolved after procedure, as evidenced by fever 102.4, tachycardia, with suspected source of infection complicated UTI as above.  Sepsis workup ordered - WBC 10.02, procalcitonin 0.46, lactic acid 1.1  On IV cefepime and Diflucan  9/27 - Patient afebrile and no longer tachycardic   Follow-up urine culture and blood cultures  Gentle IV hydration  Monitor vitals for fever recurrence  Rash  Erythematous, pruritic rash on patient's groin area and inner thighs  - Keep area clean and dry  - Apply nystatin powder to area BID   Wound care consulted    VTE Pharmacologic Prophylaxis: VTE Score: 6 High Risk (Score >/= 5) -  Pharmacological DVT Prophylaxis Contraindicated. Sequential Compression Devices Ordered.    Mobility:   Basic Mobility Inpatient Raw Score: 6  JH-HLM Goal: 2: Bed activities/Dependent transfer  JH-HLM Achieved: 1: Laying in bed  JH-HLM Goal NOT achieved. Continue with multidisciplinary rounding and encourage appropriate mobility to improve upon JH-HLM goals.    Patient Centered Rounds: I performed bedside rounds with nursing staff today.   Discussions with Specialists or Other Care Team Provider: Patient treated primary team urology    Education and Discussions with Family / Patient:  Primary team to give updates.     Current Length of Stay: 0 day(s)  Current Patient Status: Outpatient Surgery   Certification Statement: Per primary  Discharge Plan: Per primary    Code Status: Prior    Subjective   Patient was seen at bedside this morning. He was lethargic and mildly agitated by history and physical exam. Patient admits to mild lower abdomen pressure, hematuria and some discomfort with urination. Patient has developed a pruritic rash on inner thighs and around testicles. He had an appetite this morning and was able to eat toast and eggs, but denies fever, chills, NV, flank pain, chest pain, palpitations, shortness of breath, lightheadedness and leg swelling.     Objective     Vitals:   Temp (24hrs), Av.3 °F (36.8 °C), Min:96.5 °F (35.8 °C), Max:102.4 °F (39.1 °C)    Temp:  [96.5 °F (35.8 °C)-102.4 °F (39.1 °C)] 98.1 °F (36.7 °C)  HR:  [] 74  Resp:  [16-20] 18  BP: ()/(53-79) 127/60  SpO2:  [89 %-100 %] 94 %  Body mass index is 22.42 kg/m².     Input and Output Summary (last 24 hours):     Intake/Output Summary (Last 24 hours) at 2024 1326  Last data filed at 2024 0900  Gross per 24 hour   Intake 670 ml   Output 450 ml   Net 220 ml       Physical Exam  Constitutional:       General: He is not in acute distress.     Appearance: He is normal weight. He is not ill-appearing.   HENT:      Head:  Normocephalic and atraumatic.   Eyes:      Conjunctiva/sclera: Conjunctivae normal.   Cardiovascular:      Rate and Rhythm: Normal rate and regular rhythm.      Pulses: Normal pulses.      Comments: Distant heart sounds  Pulmonary:      Effort: No respiratory distress.      Breath sounds: Normal breath sounds. No wheezing, rhonchi or rales.   Abdominal:      General: Abdomen is flat. Bowel sounds are normal. There is no distension.      Palpations: Abdomen is soft. There is no mass.      Tenderness: There is no abdominal tenderness. There is no guarding or rebound.   Genitourinary:     Comments: Rivera catheter present draining blood-tinged urine  Musculoskeletal:      Right lower leg: No edema.      Left lower leg: No edema.   Skin:     General: Skin is warm and dry.   Neurological:      Mental Status: He is lethargic.      Comments: Patient is alert to person and place, but not time  Patient was unable to tell me the year   Patient become slightly agitated by history and physical exam   Psychiatric:         Mood and Affect: Mood normal.         Behavior: Behavior normal.       Lines/Drains:  Lines/Drains/Airways       Active Status       Name Placement date Placement time Site Days    Urethral Catheter Non-latex 22 Fr. 09/26/24  1637  Non-latex  less than 1    Ureteral Internal Stent Right ureter 6 Fr. 09/26/24  1633  Right ureter  less than 1                  Urinary Catheter:  Goal for removal: N/A - Chronic Rivera           Lab Results: I have reviewed the following results:   Results from last 7 days   Lab Units 09/27/24  0012   WBC Thousand/uL 10.02   HEMOGLOBIN g/dL 7.8*   HEMATOCRIT % 25.9*   PLATELETS Thousands/uL 214     Results from last 7 days   Lab Units 09/27/24  0520   SODIUM mmol/L 131*   POTASSIUM mmol/L 5.8*   CHLORIDE mmol/L 103   CO2 mmol/L 22   BUN mg/dL 38*   CREATININE mg/dL 1.66*   ANION GAP mmol/L 6   CALCIUM mg/dL 7.8*   GLUCOSE RANDOM mg/dL 234*     Results from last 7 days   Lab Units  09/27/24  0210   INR  1.08     Results from last 7 days   Lab Units 09/27/24  1122 09/27/24  0716 09/26/24  1926 09/26/24  1814   POC GLUCOSE mg/dl 236* 210* 178* 162*         Results from last 7 days   Lab Units 09/27/24  0210 09/27/24  0012   LACTIC ACID mmol/L 1.1  --    PROCALCITONIN ng/ml  --  0.46*       Recent Cultures (last 7 days):   Results from last 7 days   Lab Units 09/27/24  0009   BLOOD CULTURE  Received in Microbiology Lab. Culture in Progress.  Received in Microbiology Lab. Culture in Progress.       Imaging Review: Reviewed radiology reports from this admission including: retrograde pyelogram.  Other Studies: No additional pertinent studies reviewed.    Last 24 Hours Medication List:     Current Facility-Administered Medications:     acetaminophen (TYLENOL) tablet 650 mg, Q6H PRN    aluminum-magnesium hydroxide-simethicone (MAALOX) oral suspension 30 mL, Q6H PRN    carbidopa-levodopa (SINEMET)  mg per tablet 1 tablet, TID    cefepime (MAXIPIME) IVPB (premix in dextrose) 2,000 mg 50 mL, Q12H    docusate sodium (COLACE) capsule 100 mg, Daily    fluconazole (DIFLUCAN) IVPB (premix) 200 mg, Q24H, Last Rate: 200 mg (09/27/24 0327)    [START ON 9/28/2024] furosemide (LASIX) tablet 20 mg, Daily    insulin glargine (LANTUS) subcutaneous injection 14 Units 0.14 mL, HS    insulin lispro (HumALOG/ADMELOG) 100 units/mL subcutaneous injection 1-5 Units, TID AC **AND** Fingerstick Glucose (POCT), TID AC    insulin lispro (HumALOG/ADMELOG) 100 units/mL subcutaneous injection 1-5 Units, HS    lactated ringers bolus 1,000 mL, Once PRN **AND** lactated ringers bolus 1,000 mL, Once PRN    levothyroxine tablet 25 mcg, Early Morning    nystatin (MYCOSTATIN) powder, BID    ondansetron (ZOFRAN) injection 4 mg, Q6H PRN    pantoprazole (PROTONIX) EC tablet 40 mg, Early Morning    saccharomyces boulardii (FLORASTOR) capsule 250 mg, BID    senna-docusate sodium (SENOKOT S) 8.6-50 mg per tablet 1 tablet, HS    sodium  chloride 0.9 % bolus 1,000 mL, Once PRN **AND** sodium chloride 0.9 % bolus 1,000 mL, Once PRN    sodium chloride 0.9 % infusion, Continuous, Last Rate: 100 mL/hr (09/27/24 0534)    Administrative Statements   Today, Patient Was Seen By: Xin Maldonado  I have spent a total time of 30 minutes in caring for this patient on the day of the visit/encounter including Counseling / Coordination of care, Documenting in the medical record, Reviewing / ordering tests, medicine, procedures  , Obtaining or reviewing history  , and Communicating with other healthcare professionals .    **Please Note: This note may have been constructed using a voice recognition system.**

## 2024-09-27 NOTE — ASSESSMENT & PLAN NOTE
Baseline hemoglobin appears to be around 9s.  Hemoglobin was 10.3 on 8/26, trended down to 7s on recent hospitalization. Hemoglobin was 7.2 on discharge. Patient received IV Venofer. Iron was less than 10, ferritin 163.  Transfusion consent obtained from son over the phone  9/27 Hb 7.8   Repeat CBC in AM  Start MVI  Transfuse if Hb < 7

## 2024-09-27 NOTE — CASE MANAGEMENT
Case Management Discharge Planning Note    Patient name Chris Bojorquez  Location [address] [place] [address] [place] [address] MRN [id number]  : 1945 Date 2024       Current Admission Date: 2024  Current Admission Diagnosis:Elevated serum creatinine   Patient Active Problem List    Diagnosis Date Noted Date Diagnosed    Diastolic dysfunction 2024     Rash 2024     Right nephrolithiasis 2024     Elevated serum creatinine 2024     History of recurrent UTIs 2024     Failure to thrive in adult 2024     Volume overload 2024     Iron deficiency anemia 2024     Gastroesophageal reflux disease 2024     Sepsis (HCC) 2024     Moderate protein-calorie malnutrition (HCC) 2024     Uncontrolled type 2 diabetes mellitus with hyperglycemia (HCC) 2024     Hypothyroidism 2024     Parkinson disease 2024       LOS (days): 0  Geometric Mean LOS (GMLOS) (days):   Days to GMLOS:     OBJECTIVE:     Current admission status: Outpatient Surgery   Preferred Pharmacy:   PATIENT/FAMILY REPORTS NO PREFERRED PHARMACY  No address on file      Primary Care Provider: No primary care provider on file.    Primary Insurance: MEDICARE  [facility]    DISCHARGE DETAILS:    CM contacted family/caregiver?: Yes (left message)     Contacts  Patient Contacts: Derek Bojorquez (son)  Relationship to Patient:: Family  Contact Method: Phone  Phone Number: [phone]  Reason/Outcome: Other (Comment) (message left)    Other Referral/Resources/Interventions Provided:  Interventions: Facility Return, SNF  Referral Comments: Notified by Dr. Hughes that discharge is going to be cancelled.  SW notified Darell Sanchez, cancelled transportation and left message for pt's son.    Treatment Team Recommendation: Facility Return, SNF  Discharge Destination Plan:: Facility Return, SNF  Transport at Discharge : Wheelchair van

## 2024-09-27 NOTE — ASSESSMENT & PLAN NOTE
As above  Patient underwent elective cystoscopy uteroscopy, stone extraction, right ureteral stent exchange today.  Found to have migrated right stent, pyuric urine in the ureter with yeast debris.  Will start on cefepime Diflucan  Follow-up urine culture  Rivera draining bloody urine.  Management per primary.

## 2024-09-27 NOTE — ASSESSMENT & PLAN NOTE
Secondary to chronic Rivera catheter.  Recent urine culture as above  Follow-up urine culture from today  Patient started on cefepime Diflucan due to pyuric urine in the ureter with yeast debris found in OR.

## 2024-09-27 NOTE — ASSESSMENT & PLAN NOTE
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  Urine culture grew low colony Proteus mirabilis. 1/2 blood cultures grew Proteus mirabilis. Patient received cefepime, then Rocephin, then cefpodoxime for 10-day total.  Patient presented with PABLO as well.  Creatinine was 1.95 on admission.  Peaked to 2.15.  Creatinine 1.53 on discharge.  CT showed decreased calculus volume in the right kidney, interval passage of calculi suspected; delayed nephrogram on the right likely due to obstructive uropathy; bladder calculus with diffuse bladder wall thickening compatible with cystitis.  Patient underwent urgent cystoscopy with insertion of right ureteral stent and removal of large 3 cm bladder stone on 8/29.  Patient's hospital course was complicated by fluid overload, required Lasix with albumin.  2D echo showed normal EF, grade 1 diastolic dysfunction.  Baseline creatinine 0.7- 0.8 in past year prior to recent hospitalization.  Cr 1.62 >1.75  Continue gentle hydration  Hold Lasix restart when appropriate  Intake and output  Repeat BMP in the morning

## 2024-09-27 NOTE — ASSESSMENT & PLAN NOTE
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  Urine culture grew low colony Proteus mirabilis. 1/2 blood cultures grew Proteus mirabilis. Patient received cefepime, then Rocephin, then cefpodoxime for 10-day total.  Patient presented with PABLO as well.  Creatinine was 1.95 on admission.  Peaked to 2.15.  Creatinine 1.53 on discharge.  CT showed decreased calculus volume in the right kidney, interval passage of calculi suspected; delayed nephrogram on the right likely due to obstructive uropathy; bladder calculus with diffuse bladder wall thickening compatible with cystitis.  Patient underwent urgent cystoscopy with insertion of right ureteral stent and removal of large 3 cm bladder stone on 8/29.  Patient's hospital course was complicated by fluid overload, required Lasix with albumin.  2D echo showed normal EF, grade 1 diastolic dysfunction.  Baseline creatinine 0.7- 0.8 in past year prior to recent hospitalization.  Creatinine 1.62   Continue gentle hydration  Hold Lasix for 1 day  Intake and output  Repeat BMP in the morning

## 2024-09-27 NOTE — ASSESSMENT & PLAN NOTE
Baseline hemoglobin appears to be around 9s.  Hemoglobin was 10.3 on 8/26, trended down to 7s on recent hospitalization.  Hemoglobin was 7.2 on discharge.  Patient received IV Venofer.  Iron was less than 10, ferritin 163.  Repeat CBC stat.  Transfusion consent obtained from son over the phone.  Start MVI

## 2024-09-27 NOTE — PLAN OF CARE
Problem: PAIN - ADULT  Goal: Verbalizes/displays adequate comfort level or baseline comfort level  Description: Interventions:  - Encourage patient to monitor pain and request assistance  - Assess pain using appropriate pain scale  - Administer analgesics based on type and severity of pain and evaluate response  - Implement non-pharmacological measures as appropriate and evaluate response  - Consider cultural and social influences on pain and pain management  - Notify physician/advanced practitioner if interventions unsuccessful or patient reports new pain  Outcome: Progressing     Problem: INFECTION - ADULT  Goal: Absence or prevention of progression during hospitalization  Description: INTERVENTIONS:  - Assess and monitor for signs and symptoms of infection  - Monitor lab/diagnostic results  - Monitor all insertion sites, i.e. indwelling lines, tubes, and drains  - Monitor endotracheal if appropriate and nasal secretions for changes in amount and color  - [place] appropriate cooling/warming therapies per order  - Administer medications as ordered  - Instruct and encourage patient and family to use good hand hygiene technique  - Identify and instruct in appropriate isolation precautions for identified infection/condition  Outcome: Progressing  Goal: Absence of fever/infection during neutropenic period  Description: INTERVENTIONS:  - Monitor WBC    Outcome: Progressing     Problem: SAFETY ADULT  Goal: Patient will remain free of falls  Description: INTERVENTIONS:  - Educate patient/family on patient safety including physical limitations  - Instruct patient to call for assistance with activity   - Consult OT/PT to assist with strengthening/mobility   - Keep Call bell within reach  - Keep bed low and locked with side rails adjusted as appropriate  - Keep care items and personal belongings within reach  - Initiate and maintain comfort rounds  - Make Fall Risk Sign visible to staff  - Offer Toileting every 2 Hours,  in advance of need  - Initiate/Maintain BED alarm  - Apply yellow socks and bracelet for high fall risk patients  - Consider moving patient to room near nurses station  Outcome: Progressing  Goal: Maintain or return to baseline ADL function  Description: INTERVENTIONS:  -  Assess patient's ability to carry out ADLs; assess patient's baseline for ADL function and identify physical deficits which impact ability to perform ADLs (bathing, care of mouth/teeth, toileting, grooming, dressing, etc.)  - Assess/evaluate cause of self-care deficits   - Assess range of motion  - Assess patient's mobility; develop plan if impaired  - Assess patient's need for assistive devices and provide as appropriate  - Encourage maximum independence but intervene and supervise when necessary  - Involve family in performance of ADLs  - Assess for home care needs following discharge   - Consider OT consult to assist with ADL evaluation and planning for discharge  - Provide patient education as appropriate  Outcome: Progressing  Goal: Maintains/Returns to pre admission functional level  Description: INTERVENTIONS:  - Perform AM-PAC 6 Click Basic Mobility/ Daily Activity assessment daily.  - Set and communicate daily mobility goal to care team and patient/family/caregiver.   - Collaborate with rehabilitation services on mobility goals if consulted  - Perform Range of Motion 3 times a day.  - Reposition patient every 2 hours.  - Dangle patient 3 times a day  - Stand patient 3 times a day  - Ambulate patient 3 times a day  - Out of bed to chair 3 times a day   - Out of bed for meals 3 times a day  - Out of bed for toileting  - Record patient progress and toleration of activity level   Outcome: Progressing

## 2024-09-27 NOTE — PROGRESS NOTES
Patient:    MRN:  [id number]    Bg Request ID:  [id number]    Level of care reserved:  Skilled Nursing Facility    Partner Reserved:  [facility], [place] [id number] [phone]    Clinical needs requested:    Geography searched:  10 miles around [id number]    Start of Service:    Request sent:  11:07am EDT on 9/27/2024 by Elissa Mcgregor    Partner reserved:  2:40pm EDT on 9/27/2024 by Elissa Mcgregor    Choice list shared:  1:26pm EDT on 9/27/2024 by Elissa Mcgregor

## 2024-09-27 NOTE — DISCHARGE INSTR - OTHER ORDERS
Skin Care Plan:     1-Cleanse bilateral groin MASD with foaming cleanser & pat dry. Apply light dusting of Nystatin powder 2x/day and gently remove excess to prevent caking. Cleanse in-between applications.  2-Apply Prevent barrier cream or equivalent to bilateral sacrum, buttock and heels 2x/day and as needed.  3-Heel lift boots to be worn to bilateral heels at all times in bed and after transfer to reclining chair if able. If patient unable to tolerate, must float heels on 2 pillows to offload pressure so heels are not in contact with mattress or pillows.  4-Use pressure redistribution cushion in chair when out of bed if able. Avoid prolonged sitting.  5-Moisturize skin daily with skin nourishing cream.  6-Turn/reposition every 2 hrs for pressure re-distribution on skin.

## 2024-09-27 NOTE — ASSESSMENT & PLAN NOTE
2D echo on 8/30 in recent hospitalization showed EF 55%, grade 1 diastolic dysfunction, RV systolic pressure is mildly elevated at 51 mmHg,no  significant valvular disease.  Patient was discharged on Lasix 20 mg p.o. daily due to fluid overload during hospitalization.  Will hold Lasix for 1 day.  Daily weight, intake output

## 2024-09-27 NOTE — ASSESSMENT & PLAN NOTE
As above  Patient underwent elective cystoscopy uteroscopy, stone extraction, right ureteral stent exchange today.  Found to have migrated right stent, pyuric urine in the ureter with yeast debris.  Started on cefepime Diflucan per urology  Follow-up urine culture  Rivera draining bloody urine.  Management per primary.  Monitor I+Os

## 2024-09-27 NOTE — ASSESSMENT & PLAN NOTE
Erythematous, pruritic rash on patient's groin area and inner thighs  - Keep area clean and dry  - Apply nystatin powder to area BID   Wound care consulted

## 2024-09-27 NOTE — PLAN OF CARE
Problem: PAIN - ADULT  Goal: Verbalizes/displays adequate comfort level or baseline comfort level  Description: Interventions:  - Encourage patient to monitor pain and request assistance  - Assess pain using appropriate pain scale  - Administer analgesics based on type and severity of pain and evaluate response  - Implement non-pharmacological measures as appropriate and evaluate response  - Consider cultural and social influences on pain and pain management  - Notify physician/advanced practitioner if interventions unsuccessful or patient reports new pain  Outcome: Progressing     Problem: INFECTION - ADULT  Goal: Absence or prevention of progression during hospitalization  Description: INTERVENTIONS:  - Assess and monitor for signs and symptoms of infection  - Monitor lab/diagnostic results  - Monitor all insertion sites, i.e. indwelling lines, tubes, and drains  - Monitor endotracheal if appropriate and nasal secretions for changes in amount and color  - [place] appropriate cooling/warming therapies per order  - Administer medications as ordered  - Instruct and encourage patient and family to use good hand hygiene technique  - Identify and instruct in appropriate isolation precautions for identified infection/condition  Outcome: Progressing  Goal: Absence of fever/infection during neutropenic period  Description: INTERVENTIONS:  - Monitor WBC    Outcome: Progressing     Problem: SAFETY ADULT  Goal: Patient will remain free of falls  Description: INTERVENTIONS:  - Educate patient/family on patient safety including physical limitations  - Instruct patient to call for assistance with activity   - Consult OT/PT to assist with strengthening/mobility   - Keep Call bell within reach  - Keep bed low and locked with side rails adjusted as appropriate  - Keep care items and personal belongings within reach  - Initiate and maintain comfort rounds  - Make Fall Risk Sign visible to staff  - Offer Toileting every 2 Hours,  in advance of need  - Initiate/Maintain bed alarm  - Obtain necessary fall risk management equipment: Call bell  - Apply yellow socks and bracelet for high fall risk patients  - Consider moving patient to room near nurses station  Outcome: Progressing  Goal: Maintain or return to baseline ADL function  Description: INTERVENTIONS:  -  Assess patient's ability to carry out ADLs; assess patient's baseline for ADL function and identify physical deficits which impact ability to perform ADLs (bathing, care of mouth/teeth, toileting, grooming, dressing, etc.)  - Assess/evaluate cause of self-care deficits   - Assess range of motion  - Assess patient's mobility; develop plan if impaired  - Assess patient's need for assistive devices and provide as appropriate  - Encourage maximum independence but intervene and supervise when necessary  - Involve family in performance of ADLs  - Assess for home care needs following discharge   - Consider OT consult to assist with ADL evaluation and planning for discharge  - Provide patient education as appropriate  Outcome: Progressing  Goal: Maintains/Returns to pre admission functional level  Description: INTERVENTIONS:  - Perform AM-PAC 6 Click Basic Mobility/ Daily Activity assessment daily.  - Set and communicate daily mobility goal to care team and patient/family/caregiver.   - Collaborate with rehabilitation services on mobility goals if consulted  - Perform Range of Motion 2 times a day.  - Reposition patient every 2 hours.  - Dangle patient 2 times a day  - Stand patient 2 times a day  - Ambulate patient 2 times a day  - Out of bed to chair 2 times a day   - Out of bed for meals 2 times a day  - Out of bed for toileting  - Record patient progress and toleration of activity level   Outcome: Progressing     Problem: DISCHARGE PLANNING  Goal: Discharge to home or other facility with appropriate resources  Description: INTERVENTIONS:  - Identify barriers to discharge w/patient and  caregiver  - Arrange for needed discharge resources and transportation as appropriate  - Identify discharge learning needs (meds, wound care, etc.)  - Arrange for interpretive services to assist at discharge as needed  - Refer to Case Management Department for coordinating discharge planning if the patient needs post-hospital services based on physician/advanced practitioner order or complex needs related to functional status, cognitive ability, or social support system  Outcome: Progressing     Problem: Knowledge Deficit  Goal: Patient/family/caregiver demonstrates understanding of disease process, treatment plan, medications, and discharge instructions  Description: Complete learning assessment and assess knowledge base.  Interventions:  - Provide teaching at level of understanding  - Provide teaching via preferred learning methods  Outcome: Progressing

## 2024-09-27 NOTE — PROGRESS NOTES
Patient:    MRN:  [id number]    Seanin Request ID:  [id number]    Level of care reserved:    Partner Reserved:    Clinical needs requested:    Geography searched:  10 miles around [id number]    Start of Service:    Request sent:  11:07am EDT on 9/27/2024 by Elissa Mcgregor    Partner reserved:  by Elissa Mcgregor    Choice list shared:  1:26pm EDT on 9/27/2024 by Elissa Mcgregor

## 2024-09-27 NOTE — WOUND OSTOMY CARE
Progress Note - Wound   Crhis Bojorquez 79 y.o. male MRN: [id number]  Unit/Bed#: 2 [place] [address] Encounter: [id number]        Assessment:   This is a 79 year old male patient admitted today with elevated serum creatinine. Patient was extremely lethargic and non-verbal. He was turned/repositioned with assist of 2 persons.     Assessment Findings:  1-Intact MASD to bilateral groin due to moisture/incontinence - orders in place for skin care. Rivera catheter is leaking - Dr Gayle notified and as per Primary RN, Dr Hughes is aware.   2-Intact heels and sacrobuttocks - orders in place for skin care and for prevention.    Skin Care Plan:   1-Cleanse bilateral groin MASD with foaming cleanser & pat dry. Apply light dusting of Nystatin powder 2x/day and gently remove excess to prevent caking. Cleanse in-between applications.  2-Apply Prevent barrier cream to bilateral sacrum, buttock and heels BID and PRN  3-Heel lift boots to be worn to bilateral heels at all times in bed and after transfer to reclining chair if able. If patient unable to tolerate, must float heels on 2 pillows to offload pressure so heels are not in contact with mattress or pillows.  4-Ehob pressure redistribution cushion in chair when out of bed if able. Avoid prolonged sitting.  5-Moisturize skin daily with skin nourishing cream.  6-Turn/reposition q2h or when medically stable for pressure re-distribution on skin.   7-Per hospital policy, only 1 white pad to be used under patient with positioning system. No double layers, no blue pads, no towels or washcloths to absorb urine. This is best for skin integrity.    Wound 08/26/24 Buttocks (Active)   Wound Image  This is not a wound 09/27/24 1436   Wound Description Intact 09/27/24 1436   Davina-wound Assessment Intact 09/27/24 1436   Wound Length (cm) 0 cm 09/27/24 1436   Wound Width (cm) 0 cm 09/27/24 1436   Wound Depth (cm) 0 cm 09/27/24 1436   Wound Surface Area (cm^2) 0 cm^2 09/27/24 1436   Wound Volume  (cm^3) 0 cm^3 09/27/24 1436   Calculated Wound Volume (cm^3) 0 cm^3 09/27/24 1436   Drainage Amount None 09/27/24 1436   Treatments Cleansed 09/27/24 1436   Dressing Prevent barrier cream 09/27/24 1436   Dressing Changed New 09/27/24 1436   Dressing Status Intact 09/27/24 1436       Wound 09/26/24 Dermatologic/Rash Groin Right (Active)   Wound Image   09/27/24 1434   Wound Description Intact;Fungal Rash 09/27/24 1434   Davina-wound Assessment Intact 09/27/24 1434   Wound Length (cm) 4 cm 09/27/24 1434   Wound Width (cm) 6 cm 09/27/24 1434   Wound Depth (cm) 0 cm 09/27/24 1434   Wound Surface Area (cm^2) 24 cm^2 09/27/24 1434   Wound Volume (cm^3) 0 cm^3 09/27/24 1434   Calculated Wound Volume (cm^3) 0 cm^3 09/27/24 1434   Drainage Amount None 09/27/24 1434   Non-staged Wound Description Not applicable 09/27/24 1434   Treatments Cleansed 09/27/24 1434   Dressing Nystatin Powder 09/27/24 1434   Dressing Changed New 09/27/24 1434   Dressing Status Intact 09/27/24 1434       Wound 09/27/24 Dermatologic/Rash Groin Left (Active)   Wound Image   09/27/24 1436   Wound Description Intact;Fungal Rash 09/27/24 1436   Davina-wound Assessment Intact 09/27/24 1436   Wound Length (cm) 6 cm 09/27/24 1436   Wound Width (cm) 8 cm 09/27/24 1436   Wound Depth (cm) 0 cm 09/27/24 1436   Wound Surface Area (cm^2) 48 cm^2 09/27/24 1436   Wound Volume (cm^3) 0 cm^3 09/27/24 1436   Calculated Wound Volume (cm^3) 0 cm^3 09/27/24 1436   Drainage Amount None 09/27/24 1436   Non-staged Wound Description Not applicable 09/27/24 1436   Treatments Cleansed 09/27/24 1436   Dressing Nystatin Powder 09/27/24 1436   Dressing Changed New 09/27/24 1436   Dressing Status Intact 09/27/24 1436     Discussed assessment findings, and plan of care/recommendations with Gilda JANSEN and Juliet WHEELER.    Wound care signing off, please call or text with questions and concerns.    Recommendations written as orders.  Justina Cannon MSN, RN, CWON

## 2024-09-27 NOTE — CASE MANAGEMENT
Case Management Assessment & Discharge Planning Note    Patient name Chris Bojorquez  Location 2 [facility] 208/2 [facility] 208 MRN [id number]  : 1945 Date 2024       Current Admission Date: 2024  Current Admission Diagnosis:Elevated serum creatinine   Patient Active Problem List    Diagnosis Date Noted Date Diagnosed    Diastolic dysfunction 2024     Right nephrolithiasis 2024     Elevated serum creatinine 2024     History of recurrent UTIs 2024     Failure to thrive in adult 2024     Volume overload 2024     Iron deficiency anemia 2024     Gastroesophageal reflux disease 2024     Sepsis (HCC) 2024     Moderate protein-calorie malnutrition (HCC) 2024     Uncontrolled type 2 diabetes mellitus with hyperglycemia (HCC) 2024     Hypothyroidism 2024     Parkinson disease 2024       LOS (days): 0  Geometric Mean LOS (GMLOS) (days):   Days to GMLOS:     OBJECTIVE:     Current admission status: Outpatient Surgery    Preferred Pharmacy:   PATIENT/FAMILY REPORTS NO PREFERRED PHARMACY  No address on file      Primary Care Provider: No primary care provider on file.    Primary Insurance: MEDICARE  Secondary Insurance: [employer]    ASSESSMENT:  Active Health Care Proxies       Derek Bojorquez [facility] Agent - Son   Primary Phone: [phone] (Mobile)                 Advance Directives  Does patient have a Health Care POA?: Yes  Does patient have Advance Directives?: Yes  Primary Contact: Derek Bojorquez    Readmission Root Cause  30 Day Readmission: No    Patient Information  Admitted from:: Facility ([facility])  Mental Status: Alert  During Assessment patient was accompanied by: Not accompanied during assessment  Assessment information provided by:: Patient  Primary Caregiver: Other (Comment)  Caregiver's Name:: [place] Sanchez  Caregiver's Relationship to Patient:: Other (Specify) (facility staff)  Caregiver's  Telephone Number:: [phone]  Support Systems: Son  County [place] Residence: [place]  What city do you live in?: [place]  Type of Current Residence: Facility  Living Arrangements: Other (Comment) (lives in long term care facility)    Activities of Daily Living Prior to Admission  Functional Status: Assistance  Completes ADLs independently?: No  Level of ADL dependence: Assistance  Ambulates independently?: No  Level of ambulatory dependence: Assistance  Does patient use assisted devices?: Yes  Assisted Devices (DME) used: Wheelchair  Does patient currently own DME?: Yes  What DME does the patient currently own?: Wheelchair  Does patient currently have HHC?: No    Patient Information Continued  Income Source: Pension/custodial  Does patient have prescription coverage?: Yes    Means of Transportation  Means of Transport to Appts:: Other (Comment) ([facility])      Social Determinants of Health (SDOH)      Flowsheet Row Most Recent Value   Housing Stability    In the last 12 months, was there a time when you were not able to pay the mortgage or rent on time? Pt Unable  [lives in long term care facility]   In the past 12 months, how many times have you moved where you were living? 0   At any time in the past 12 months, were you homeless or living in a shelter (including now)? Pt Unable   Transportation Needs    In the past 12 months, has lack of transportation kept you from medical appointments or from getting medications? Pt Unable   In the past 12 months, has lack of transportation kept you from meetings, work, or from getting things needed for daily living? Pt Unable   Food Insecurity    Within the past 12 months, you worried that your food would run out before you got the money to buy more. Pt Unable   Within the past 12 months, the food you bought just didn't last and you didn't have money to get more. Pt Unable   Utilities    In the past 12 months has the electric, gas, oil, or water company threatened to shut off  services in your home? Pt Unable            DISCHARGE DETAILS:    Discharge planning discussed with:: Patient  Freedom of Choice: Yes  Comments - Freedom of Choice: SW following to assist with planning. Pt was brought to hospital for outpatient procedure from [facility].  Pt had cystoscopy with right stent exchange day yesterday.  Dr. Hughes has written discharge order.  SW placed call to pt's son and left voice mail requesting call back to discuss plans.  MIKHAIL met with pt who confirmed he lives at [facility] and plan is to return.  Referral has been made to [facility] to prepare for return.  SW will follow to assist with transportation planning.  Pt has a wheelchair in his room and confirmed it is his.  Wheelchair van transport will be arranged.  CM contacted family/caregiver?: Yes (message left for son, Derek)  Were Treatment Team discharge recommendations reviewed with patient/caregiver?: Yes  Did patient/caregiver verbalize understanding of patient care needs?: N/A- going to facility    Contacts  Patient Contacts: Derek Bojorquez (son)  Relationship to Patient:: Family  Contact Method: Phone  Phone Number: [phone]  Reason/Outcome: Other (Comment) (left message)    Requested Home Health Care         Is the patient interested in HHC at discharge?: No    DME Referral Provided  Referral made for DME?: No    Other Referral/Resources/Interventions Provided:  Interventions: Facility Return, SNF  Referral Comments: Referral made to [facility] to prepare for return    Treatment Team Recommendation: Facility Return, SNF  Discharge Destination Plan:: Facility Return, SNF  Transport at Discharge : Wheelchair van     Number/Name of Dispatcher: Roundtrip  Transported by (Company and Unit #):  (pending)  ETA of Transport (Date): 09/27/24  ETA of Transport (Time): 1330 (requested)

## 2024-09-27 NOTE — ASSESSMENT & PLAN NOTE
Evolved after procedure, as evidenced by fever 102.4, tachycardia, with suspected source of infection complicated UTI as above.  Sepsis workup ordered - WBC 10.02, procalcitonin 0.46, lactic acid 1.1  On IV cefepime and Diflucan  9/27 - Patient afebrile and no longer tachycardic   Follow-up urine culture and blood cultures  Gentle IV hydration  Monitor vitals for fever recurrence

## 2024-09-27 NOTE — ASSESSMENT & PLAN NOTE
Evolving after procedure, as evidenced by fever 102.4, tachycardia, with suspected source of infection complicated UTI as above.  BCx-no growth 48H  On IV cefepime and Diflucan  Follow-up urine culture   Gentle IV hydration  Monitor fever curve

## 2024-09-27 NOTE — CONSULTS
Consultation - Hospitalist   Name: Chris Bojorquez 79 y.o. male I MRN: [id number]  Unit/Bed#: [address] [place] [address] Date of Admission: 9/26/2024   Date of Service: 9/27/2024 I Hospital Day: 0   Inpatient consult to Internal Medicine  Consult performed by: FRANCES Madden  Consult ordered by: Ciro Hughes MD        Physician Requesting Evaluation: Ciro Hughes MD   Reason for Evaluation / Principal Problem: Elevated creatinine, uncontrolled type 2 diabetes, anemia, recurrent UTIs    Assessment & Plan  Elevated serum creatinine  Patient was hospitalized between 8/26 to 9/10 for septic shock due to bacteremia likely due to urinary source due to chronic indwelling Rivera catheter, 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  Urine culture grew low colony Proteus mirabilis. 1/2 blood cultures grew Proteus mirabilis. Patient received cefepime, then Rocephin, then cefpodoxime for 10-day total.  Patient presented with PABLO as well.  Creatinine was 1.95 on admission.  Peaked to 2.15.  Creatinine 1.53 on discharge.  CT showed decreased calculus volume in the right kidney, interval passage of calculi suspected; delayed nephrogram on the right likely due to obstructive uropathy; bladder calculus with diffuse bladder wall thickening compatible with cystitis.  Patient underwent urgent cystoscopy with insertion of right ureteral stent and removal of large 3 cm bladder stone on 8/29.  Patient's hospital course was complicated by fluid overload, required Lasix with albumin.  2D echo showed normal EF, grade 1 diastolic dysfunction.  Baseline creatinine 0.7- 0.8 in past year prior to recent hospitalization.  Creatinine 1.62   Continue gentle hydration  Hold Lasix for 1 day  Intake and output  Repeat BMP in the morning      Iron deficiency anemia  Baseline hemoglobin appears to be around 9s.  Hemoglobin was 10.3 on 8/26, trended down to 7s on recent hospitalization.  Hemoglobin was 7.2 on discharge.  Patient received IV Venofer.  Iron  was less than 10, ferritin 163.  Repeat CBC stat.  Transfusion consent obtained from son over the phone.  Start MVI    History of recurrent UTIs  Secondary to chronic Rivera catheter.  Recent urine culture as above  Follow-up urine culture from today  Patient started on cefepime Diflucan due to pyuric urine in the ureter with yeast debris found in OR.    Right nephrolithiasis  As above  Patient underwent elective cystoscopy uteroscopy, stone extraction, right ureteral stent exchange today.  Found to have migrated right stent, pyuric urine in the ureter with yeast debris.  Will start on cefepime Diflucan  Follow-up urine culture  Rivera draining bloody urine.  Management per primary.  Uncontrolled type 2 diabetes mellitus with hyperglycemia (HCC)    Lab Results   Component Value Date    HGBA1C 14.6 (H) 08/26/2024   Patient was started on Lantus 14 units at bedtime with SSI ACHS on recent hospitalization.  Will continue.  A1c was 14.6.   Tight glycemic control in view of  recurrent UTIs.      Hypothyroidism  Continue Synthroid  Parkinson disease (HCC)  Continue Sinemet  Gastroesophageal reflux disease  Continue PPI  Diastolic dysfunction  2D echo on 8/30 in recent hospitalization showed EF 55%, grade 1 diastolic dysfunction, RV systolic pressure is mildly elevated at 51 mmHg,no  significant valvular disease.  Patient was discharged on Lasix 20 mg p.o. daily due to fluid overload during hospitalization.  Will hold Lasix for 1 day.  Daily weight, intake output      Sepsis (HCC)   Evolving after procedure, as evidenced by fever 102.4, tachycardia, with suspected source of infection complicated UTI as above.  Sepsis workup ordered.  On IV cefepime and Diflucan  Follow-up urine culture and blood cultures  Gentle IV hydration  Monitor fever curve        Hospitalist service will follow.    VTE Pharmacologic Prophylaxis: VTE Score: 6 High Risk (Score >/= 5) - Pharmacological DVT Prophylaxis Contraindicated. Sequential  "Compression Devices Ordered.  Code Status: Full code  Discussion with family: Updated  (son) at bedside.    Anticipated Length of Stay: Per primary    History of Present Illness   Chief Complaint: Elective cystoscopy    Chris Bojorquez is a 79 y.o. male with a PMH of right nephrolithiasis, recurrent UTIs, recent PABLO, anemia, type 2 diabetes, Parkinson's, hypothyroid, GERD who presents with elective cystoscopy right ureteral stent exchange stone extraction today.  We are consulted for medical management.  Patient denies abdominal pain flank pain or back pain.  Denies chest pain, headache, dizziness, SOB, nausea vomiting fever chills.  Patient offers no complaints.    Review of Systems   Unable to perform ROS: Other (Reviewed note from primary)   Genitourinary:         For elective cystoscopy   All other systems reviewed and are negative.      I have reviewed the patient's PMH, PSH, Social History, Family History, Meds, and Allergies  Social History:  Marital Status:    Occupation: Nursing home resident  Patient Pre-hospital Living Situation: Long Term Care Facility  Patient Pre-hospital Level of Mobility: manual wheelchair  Patient Pre-hospital Diet Restrictions: Diabetic diet    Objective     Vitals:   Blood Pressure: 148/76 (09/26/24 2230)  Pulse: 82 (09/27/24 0216)  Temperature: 98.7 °F (37.1 °C) (09/27/24 0216)  Temp Source: Oral (09/26/24 2230)  Respirations: 18 (09/26/24 2100)  Height: 5' 9\" (175.3 cm) (09/26/24 1804)  Weight - Scale: 68.9 kg (151 lb 12.8 oz) (09/26/24 1804)  SpO2: (!) 89 % (09/27/24 0216)    Physical Exam  Vitals and nursing note reviewed.   Constitutional:       Appearance: He is well-developed.   HENT:      Head: Normocephalic and atraumatic.   Neck:      Thyroid: No thyromegaly.      Vascular: No JVD.      Trachea: No tracheal deviation.   Cardiovascular:      Rate and Rhythm: Normal rate and regular rhythm.      Heart sounds: Murmur heard.      Comments: Heart sound " distant, slight murmur  Pulmonary:      Effort: Pulmonary effort is normal. No respiratory distress.      Breath sounds: Normal breath sounds. No wheezing or rales.   Abdominal:      General: Bowel sounds are normal. There is no distension.      Palpations: Abdomen is soft.      Tenderness: There is no abdominal tenderness. There is no guarding.   Genitourinary:     Comments: Rivera draining bloody urine, leaking around urethra.  Musculoskeletal:      Cervical back: Neck supple.      Right lower leg: No edema.      Left lower leg: No edema.   Skin:     General: Skin is warm and dry.   Neurological:      General: No focal deficit present.      Mental Status: He is alert.      Comments: Patient awake alert oriented to place and person, follows commands.  However impatient on exam.  Refuses to answer questions at times   Psychiatric:         Mood and Affect: Mood and affect normal.         Lines/Drains:  Lines/Drains/Airways       Active Status       Name Placement date Placement time Site Days    Urethral Catheter Non-latex 22 Fr. 09/26/24  1637  Non-latex  less than 1    Ureteral Internal Stent Right ureter 6 Fr. 09/26/24  1633  Right ureter  less than 1                  Urinary Catheter:  Goal for removal: No longer needed. Will place order to discontinue             Additional Data:   Lab Results: I have reviewed the following results: CBC/BMP:   .     09/27/24  0012   WBC 10.02   HGB 7.8*   HCT 25.9*      SODIUM 131*   K 5.1   CL 99   CO2 23   BUN 41*   CREATININE 1.62*   GLUC 287*   MG 2.0    , Creatinine Clearance: Estimated Creatinine Clearance: 36.6 mL/min (A) (by C-G formula based on SCr of 1.62 mg/dL (H)).  Results from last 7 days   Lab Units 09/27/24  0012   WBC Thousand/uL 10.02   HEMOGLOBIN g/dL 7.8*   HEMATOCRIT % 25.9*   PLATELETS Thousands/uL 214     Results from last 7 days   Lab Units 09/27/24  0012   SODIUM mmol/L 131*   POTASSIUM mmol/L 5.1   CHLORIDE mmol/L 99   CO2 mmol/L 23   BUN mg/dL  41*   CREATININE mg/dL 1.62*   ANION GAP mmol/L 9   CALCIUM mg/dL 8.1*   GLUCOSE RANDOM mg/dL 287*     Results from last 7 days   Lab Units 09/27/24  0210   INR  1.08     Results from last 7 days   Lab Units 09/26/24  1926 09/26/24  1814   POC GLUCOSE mg/dl 178* 162*     Lab Results   Component Value Date    HGBA1C 14.6 (H) 08/26/2024     Results from last 7 days   Lab Units 09/27/24  0012   PROCALCITONIN ng/ml 0.46*       Imaging Review: Reviewed radiology reports from this admission including: CT abdomen/pelvis.  Other Studies: No additional pertinent studies reviewed.    Administrative Statements   I have spent a total time of 50 minutes in caring for this patient on the day of the visit/encounter including Diagnostic results, Counseling / Coordination of care, Documenting in the medical record, Reviewing / ordering tests, medicine, procedures  , Obtaining or reviewing history  , and Communicating with other healthcare professionals .    ** Please Note: This note has been constructed using a voice recognition system. **

## 2024-09-27 NOTE — PROGRESS NOTES
"Progress Note - Urology      Patient: Chris Bojorquez   : 1945 Sex: male   MRN: [id number]     CSN: [id number]  Unit/Bed#: [address] [place] [address]     SUBJECTIVE:   Patient seen on morning rounds  Status post cystoscopy right stent exchange day #1 complicated UTI  Confused /spiked temperature to 102 earlier this morning as expected      Objective   Vitals: /60   Pulse 73   Temp 98.3 °F (36.8 °C)   Resp 18   Ht 5' 9\" (1.753 m)   Wt 68.9 kg (151 lb 12.8 oz)   SpO2 95%   BMI 22.42 kg/m²     I/O last 24 hours:  In: 450 [I.V.:450]  Out: 450 [Urine:450]      Physical Exam:   General confused  Normocephalic atraumatic PERRLA  Lungs clear bilaterally  Cardiac normal S1 normal S2  Abdomen soft, flank pain  Extremities no edema      Lab Results: CBC:   Lab Results   Component Value Date    WBC 10.02 2024    HGB 7.8 (L) 2024    HCT 25.9 (L) 2024    MCV 86 2024     2024    RBC 3.03 (L) 2024    MCH 25.7 (L) 2024    MCHC 30.1 (L) 2024    RDW 17.2 (H) 2024    MPV 8.8 (L) 2024    NRBC 0 09/10/2024     CMP:   Lab Results   Component Value Date     2024    CO2 22 2024    BUN 38 (H) 2024    CREATININE 1.66 (H) 2024    CALCIUM 7.8 (L) 2024    AST 20 09/10/2024    ALT 7 09/10/2024    ALKPHOS 111 (H) 09/10/2024    EGFR 38 2024     Urinalysis:   Lab Results   Component Value Date    COLORU Yellow 2024    CLARITYU Slightly Cloudy 2024    SPECGRAV 1.015 2024    PHUR 7.0 2024    LEUKOCYTESUR Large (A) 2024    NITRITE Negative 2024    GLUCOSEU 1000 (1%) (A) 2024    KETONESU 10 (1+) (A) 2024    BILIRUBINUR Negative 2024    BLOODU Large (A) 2024     Urine Culture:   Lab Results   Component Value Date    URINECX 10,000-19,000 cfu/ml Proteus mirabilis (A) 2024    URINECX <10,000 cfu/ml 2024     PSA: No results found for: \"PSA\"      Assessment/ " Plan:  Complicated UTI  Status post cystoscopy stent exchange ureteroscopy stone extraction day #1  Urine cultures pending  Continue broad-spectrum antibiotics as per medical team        Ciro Hughes MD

## 2024-09-28 LAB
ANION GAP SERPL CALCULATED.3IONS-SCNC: 7 MMOL/L (ref 4–13)
BNP SERPL-MCNC: 214 PG/ML (ref 0–100)
BUN SERPL-MCNC: 35 MG/DL (ref 5–25)
CALCIUM SERPL-MCNC: 7.9 MG/DL (ref 8.4–10.2)
CHLORIDE SERPL-SCNC: 105 MMOL/L (ref 96–108)
CO2 SERPL-SCNC: 25 MMOL/L (ref 21–32)
CREAT SERPL-MCNC: 1.75 MG/DL (ref 0.6–1.3)
GFR SERPL CREATININE-BSD FRML MDRD: 36 ML/MIN/1.73SQ M
GLUCOSE SERPL-MCNC: 125 MG/DL (ref 65–140)
GLUCOSE SERPL-MCNC: 130 MG/DL (ref 65–140)
GLUCOSE SERPL-MCNC: 212 MG/DL (ref 65–140)
GLUCOSE SERPL-MCNC: 215 MG/DL (ref 65–140)
GLUCOSE SERPL-MCNC: 238 MG/DL (ref 65–140)
MRSA NOSE QL CULT: NORMAL
POTASSIUM SERPL-SCNC: 4.7 MMOL/L (ref 3.5–5.3)
SODIUM SERPL-SCNC: 137 MMOL/L (ref 135–147)

## 2024-09-28 PROCEDURE — 82948 REAGENT STRIP/BLOOD GLUCOSE: CPT

## 2024-09-28 PROCEDURE — 99232 SBSQ HOSP IP/OBS MODERATE 35: CPT | Performed by: STUDENT IN AN ORGANIZED HEALTH CARE EDUCATION/TRAINING PROGRAM

## 2024-09-28 PROCEDURE — 80048 BASIC METABOLIC PNL TOTAL CA: CPT | Performed by: STUDENT IN AN ORGANIZED HEALTH CARE EDUCATION/TRAINING PROGRAM

## 2024-09-28 PROCEDURE — 99223 1ST HOSP IP/OBS HIGH 75: CPT | Performed by: INTERNAL MEDICINE

## 2024-09-28 RX ADMIN — CARBIDOPA AND LEVODOPA 1 TABLET: 25; 100 TABLET ORAL at 09:16

## 2024-09-28 RX ADMIN — DOCUSATE SODIUM 100 MG: 100 CAPSULE, LIQUID FILLED ORAL at 09:16

## 2024-09-28 RX ADMIN — INSULIN GLARGINE 14 UNITS: 100 INJECTION, SOLUTION SUBCUTANEOUS at 22:34

## 2024-09-28 RX ADMIN — Medication 250 MG: at 09:16

## 2024-09-28 RX ADMIN — NYSTATIN: 100000 POWDER TOPICAL at 18:06

## 2024-09-28 RX ADMIN — Medication 250 MG: at 18:06

## 2024-09-28 RX ADMIN — CEFEPIME HYDROCHLORIDE 2000 MG: 2 INJECTION, SOLUTION INTRAVENOUS at 12:37

## 2024-09-28 RX ADMIN — INSULIN LISPRO 1 UNITS: 100 INJECTION, SOLUTION INTRAVENOUS; SUBCUTANEOUS at 18:06

## 2024-09-28 RX ADMIN — PANTOPRAZOLE SODIUM 40 MG: 40 TABLET, DELAYED RELEASE ORAL at 06:40

## 2024-09-28 RX ADMIN — SENNOSIDES AND DOCUSATE SODIUM 1 TABLET: 50; 8.6 TABLET ORAL at 22:36

## 2024-09-28 RX ADMIN — FLUCONAZOLE 200 MG: 2 INJECTION, SOLUTION INTRAVENOUS at 03:02

## 2024-09-28 RX ADMIN — CARBIDOPA AND LEVODOPA 1 TABLET: 25; 100 TABLET ORAL at 20:51

## 2024-09-28 RX ADMIN — NYSTATIN: 100000 POWDER TOPICAL at 09:14

## 2024-09-28 RX ADMIN — INSULIN LISPRO 2 UNITS: 100 INJECTION, SOLUTION INTRAVENOUS; SUBCUTANEOUS at 22:35

## 2024-09-28 RX ADMIN — CARBIDOPA AND LEVODOPA 1 TABLET: 25; 100 TABLET ORAL at 18:06

## 2024-09-28 RX ADMIN — INSULIN LISPRO 1 UNITS: 100 INJECTION, SOLUTION INTRAVENOUS; SUBCUTANEOUS at 12:38

## 2024-09-28 RX ADMIN — LEVOTHYROXINE SODIUM 25 MCG: 25 TABLET ORAL at 06:40

## 2024-09-28 NOTE — PROGRESS NOTES
Progress Note - Hospitalist   Name: Chris Bojorquez 79 y.o. 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  Urine culture grew low colony Proteus mirabilis. 1/2 blood cultures grew Proteus mirabilis. Patient received cefepime, then Rocephin, then cefpodoxime for 10-day total.  Patient presented with PABLO as well.  Creatinine was 1.95 on admission.  Peaked to 2.15.  Creatinine 1.53 on discharge.  CT showed decreased calculus volume in the right kidney, interval passage of calculi suspected; delayed nephrogram on the right likely due to obstructive uropathy; bladder calculus with diffuse bladder wall thickening compatible with cystitis.  Patient underwent urgent cystoscopy with insertion of right ureteral stent and removal of large 3 cm bladder stone on 8/29.  Patient's hospital course was complicated by fluid overload, required Lasix with albumin.  2D echo showed normal EF, grade 1 diastolic dysfunction.  Baseline creatinine 0.7- 0.8 in past year prior to recent hospitalization.  Creatinine 1.62 on 9/27 and increased to 1.66 on 9/28  Continue gentle hydration  Lasix held on 9/27;  Nephro recs:  Stop IVF, hold Lasix    Iron deficiency anemia  Baseline hemoglobin appears to be around 9s.  Hemoglobin was 10.3 on 8/26, trended down to 7s on recent hospitalization. Hemoglobin was 7.2 on discharge. Patient received IV Venofer. Iron was less than 10, ferritin 163.  Transfusion consent obtained from son over the phone  9/27 Hb 7.8   Repeat CBC in AM  Start MVI  Transfuse if Hb < 7  History of recurrent UTIs  Secondary to chronic Rivera catheter.  Recent urine culture as above  Urine culture pending  Patient  started on cefepime Diflucan due to pyuric urine in the ureter with yeast debris found in OR.  UCX-mixed contaminants    Right nephrolithiasis  As above  Patient underwent elective cystoscopy uteroscopy, stone extraction, right ureteral stent exchange today.  Found to have migrated right stent, pyuric urine in the ureter with yeast debris.  Started on cefepime Diflucan per urology  Follow-up urine culture  Rivera draining bloody urine.  Management per primary.  Monitor I+Os  Uncontrolled type 2 diabetes mellitus with hyperglycemia (HCC)    Lab Results   Component Value Date    HGBA1C 14.6 (H) 08/26/2024   Lantus 14 units at bedtime with SSI ACHS   A1c was 14.6 percent  Carb controlled    Hypothyroidism  Continue Synthroid  Parkinson disease (HCC)  Continue Sinemet  Gastroesophageal reflux disease  Continue PPI  Diastolic dysfunction  2D echo on 8/30 in recent hospitalization showed EF 55%, grade 1 diastolic dysfunction, RV systolic pressure is mildly elevated at 51 mmHg,no  significant valvular disease.  Patient was discharged on Lasix 20 mg p.o. daily due to fluid overload during hospitalization.    Lasix for 1 day.  Daily weight, intake output  Sepsis (HCC)  Evolved after procedure, as evidenced by fever 102.4, tachycardia, with suspected source of infection complicated UTI as above.  Sepsis workup ordered - WBC 10.02, procalcitonin 0.46, lactic acid 1.1  On IV cefepime and Diflucan  9/27 - Patient afebrile and no longer tachycardic   BCx-NG 24H/UCX-mixed contaminants  Gentle IV hydration  Monitor vitals for fever recurrence  Rash  Erythematous, pruritic rash on patient's groin area and inner thighs  Keep area clean and dry  Apply nystatin powder to area BID   Wound care consulted    VTE Pharmacologic Prophylaxis: VTE Score: 6 High Risk (Score >/= 5) - Pharmacological DVT Prophylaxis Contraindicated. Sequential Compression Devices Ordered.    Mobility:   Basic Mobility Inpatient Raw Score: 6  -[facility] Goal: 2: Bed  activities/Dependent transfer  JH-HLM Achieved: 1: Laying in bed  JH-HLM Goal NOT achieved. Continue with multidisciplinary rounding and encourage appropriate mobility to improve upon JH-HLM goals.    Patient Centered Rounds: I performed bedside rounds with nursing staff today.   Discussions with Specialists or Other Care Team Provider: Patient treated primary team urology    Education and Discussions with Family / Patient:  Primary team to give updates.     Current Length of Stay: 1 day(s)  Current Patient Status: Inpatient   Certification Statement: Per primary  Discharge Plan: Per primary    Code Status: Prior    Subjective   Patient was seen at bedside this morning.  While resting, reported that he wanted to eat breakfast, then stated he was hungry for lunch, RN reported patient tolerated breakfast without problems.  No other concerns denied any fever, chills headache or blurry vision.    Objective     Vitals:   Temp (24hrs), Av.6 °F (37 °C), Min:98.5 °F (36.9 °C), Max:98.7 °F (37.1 °C)    Temp:  [98.5 °F (36.9 °C)-98.7 °F (37.1 °C)] 98.5 °F (36.9 °C)  HR:  [67-79] 69  Resp:  [18] 18  BP: (117-159)/(58-71) 159/59  SpO2:  [95 %-96 %] 96 %  Body mass index is 23.95 kg/m².     Input and Output Summary (last 24 hours):     Intake/Output Summary (Last 24 hours) at 2024 1532  Last data filed at 2024 0601  Gross per 24 hour   Intake 360 ml   Output 1500 ml   Net -1140 ml       Physical Exam  Vitals reviewed.   Constitutional:       General: He is not in acute distress.     Appearance: Normal appearance.   HENT:      Head: Atraumatic.      Nose: No congestion.   Eyes:      General: No scleral icterus.     Pupils: Pupils are equal, round, and reactive to light.   Cardiovascular:      Rate and Rhythm: Normal rate.      Heart sounds: No murmur heard.  Pulmonary:      Effort: No respiratory distress.      Breath sounds: No wheezing, rhonchi or rales.   Abdominal:      Palpations: Abdomen is soft.       Comments: Taunt and protuberant   Musculoskeletal:         General: No swelling or deformity. Normal range of motion.      Cervical back: No tenderness.      Right lower leg: No edema.      Left lower leg: No edema.   Feet:      Right foot:      Skin integrity: No callus.   Skin:     General: Skin is warm.      Capillary Refill: Capillary refill takes less than 2 seconds.      Findings: No lesion or rash.   Neurological:      Mental Status: He is oriented to person, place, and time.      Cranial Nerves: No cranial nerve deficit.      Coordination: Coordination normal.      Comments: Upper extremities tremors slight   Psychiatric:         Mood and Affect: Mood normal.         Thought Content: Thought content normal.       Lines/Drains:  Lines/Drains/Airways       Active Status       Name Placement date Placement time Site Days    Urethral Catheter Non-latex 22 Fr. 09/26/24  1637  Non-latex  1    Ureteral Internal Stent Right ureter 6 Fr. 09/26/24  1633  Right ureter  1                  Urinary Catheter:  Goal for removal: N/A - Chronic Rivera           Lab Results: I have reviewed the following results:   Results from last 7 days   Lab Units 09/27/24  0012   WBC Thousand/uL 10.02   HEMOGLOBIN g/dL 7.8*   HEMATOCRIT % 25.9*   PLATELETS Thousands/uL 214     Results from last 7 days   Lab Units 09/28/24  0606   SODIUM mmol/L 137   POTASSIUM mmol/L 4.7   CHLORIDE mmol/L 105   CO2 mmol/L 25   BUN mg/dL 35*   CREATININE mg/dL 1.75*   ANION GAP mmol/L 7   CALCIUM mg/dL 7.9*   GLUCOSE RANDOM mg/dL 130     Results from last 7 days   Lab Units 09/27/24  0210   INR  1.08     Results from last 7 days   Lab Units 09/28/24  1115 09/28/24  0703 09/27/24  2107 09/27/24  1702 09/27/24  1122 09/27/24  0716 09/26/24  1926 09/26/24  1814   POC GLUCOSE mg/dl 215* 125 159* 191* 236* 210* 178* 162*         Results from last 7 days   Lab Units 09/27/24  0210 09/27/24  0012   LACTIC ACID mmol/L 1.1  --    PROCALCITONIN ng/ml  --  0.46*        Recent Cultures (last 7 days):   Results from last 7 days   Lab Units 09/27/24  0009 09/26/24  1730   BLOOD CULTURE  No Growth at 24 hrs.  No Growth at 24 hrs.  --    URINE CULTURE   --  20,000-29,000 cfu/ml       Imaging Review: Reviewed radiology reports from this admission including: retrograde pyelogram.  Other Studies: No additional pertinent studies reviewed.    Last 24 Hours Medication List:     Current Facility-Administered Medications:     acetaminophen (TYLENOL) tablet 650 mg, Q6H PRN    aluminum-magnesium hydroxide-simethicone (MAALOX) oral suspension 30 mL, Q6H PRN    carbidopa-levodopa (SINEMET)  mg per tablet 1 tablet, TID    cefepime (MAXIPIME) IVPB (premix in dextrose) 2,000 mg 50 mL, Q12H, Last Rate: 2,000 mg (09/28/24 1237)    docusate sodium (COLACE) capsule 100 mg, Daily    fluconazole (DIFLUCAN) IVPB (premix) 200 mg, Q24H, Last Rate: 200 mg (09/28/24 0302)    insulin glargine (LANTUS) subcutaneous injection 14 Units 0.14 mL, HS    insulin lispro (HumALOG/ADMELOG) 100 units/mL subcutaneous injection 1-5 Units, TID AC **AND** Fingerstick Glucose (POCT), TID AC    insulin lispro (HumALOG/ADMELOG) 100 units/mL subcutaneous injection 1-5 Units, HS    levothyroxine tablet 25 mcg, Early Morning    nystatin (MYCOSTATIN) powder, BID    ondansetron (ZOFRAN) injection 4 mg, Q6H PRN    pantoprazole (PROTONIX) EC tablet 40 mg, Early Morning    saccharomyces boulardii (FLORASTOR) capsule 250 mg, BID    senna-docusate sodium (SENOKOT S) 8.6-50 mg per tablet 1 tablet, HS    Administrative Statements   Today, Patient Was Seen By: Natasha Gayle MD  I have spent a total time of 30 minutes in caring for this patient on the day of the visit/encounter including Counseling / Coordination of care, Documenting in the medical record, Reviewing / ordering tests, medicine, procedures  , Obtaining or reviewing history  , and Communicating with other healthcare professionals .    **Please Note: This note may  have been constructed using a voice recognition system.**

## 2024-09-28 NOTE — CONSULTS
NEPHROLOGY HOSPITAL CONSULTATION   Chris Bojorquez 79 y.o. male MRN: [id number]  Unit/Bed#: [address] [place] [address] Encounter: [id number]    ASSESSMENT and PLAN:  Acute kidney injury  Baseline creatinine was around 0.7-0.8 based on care everywhere records from December 2023.  He had PABLO during an admission to [facility] (formerly [facility]3] from August to early September 2024 for sepsis due to Proteus bacteremia of urinary source.  Peak creatinine during that time was 2.15 and creatinine had settled at around 1.3-1.5 towards the end of the hospitalization.  It is possible that he may have had ATN during that time with incomplete recovery of his renal function.  He is now presenting for elective cystoscopy and initial creatinine was 1.62 on 9/27/24.   SCr had risen to 1.83 last night - suspecting this to be hemodynamic in origin + residual ATN.   No response to IVF points against prerenal azotemia.  In any case, creatinine is stable at 1.75 today.  Would stop IVF but also hold off on furosemide.  Monitor creatinine with above interventions.     Hyperkalemia  K 5.8 yesterday.   K is improved to 4.7 today after Kayexalate administration.  Continue low K diet.    Nephrolithiasis  S/p cystoscopy, ureteroscopy, stent exchange, and stone extraction on 9/26/24  Urology following.    Fluid overload, diastolic dysfunction  This was noted during a recent hospitalization and was felt to be iatrogenic from IVF administration in the setting of sepsis.  He was discharged on furosemide 20 mg daily.  Hold Lasix for now.    Diabetes mellitus  Anemia    PLAN SUMMARY:  Hold IVF.   Hold Furosemide.   Trend creatinine.   Continue low K diet.     Previous records were personally reviewed by me to obtain a baseline creatinine.     HISTORY OF PRESENT ILLNESS:  Requesting Physician: Ciro Hughes MD  Reason for Consult: PABLO Bojorquez is a 79 y.o. male who was admitted to [facility] on 9/26/24 after presenting for elective cystoscopy. A renal consultation is  requested today for assistance in the management of PABLO.     Chris has a history of diabetes mellitus, recurrent UTIs, nephrolithiasis, anemia, hypothyroidism, GERD Parkinson's disease.  He was recently admitted to [facility] [place] in late August to early September 2024 with sepsis due to Proteus bacteremia of urinary source.  During that admission, he was seen by urology and had a cystoscopy as well as an insertion of a right ureteral stent..  His creatinine during that time was 1.94 on presentation on 8/26/24 and peaked at 2.15 on August 29, 2024.  Thereafter, it had improved and his creatinine was down to around 1.3-1.5 over the last week of his hospitalization.  His creatinine was 1.53 on discharge on September 10.     He now presents for elective cystoscopy for ongoing management of his urologic issues.  He had a cystoscopy, ureteroscopy, stent exchange, and stone extraction done on September 26, 2024.  His creatinine on September 27, 2024 was 1.62.  Last night, it had risen to 1.83 prompting renal consultation.  He denied any chest pain, shortness of breath, nausea, vomiting, abdominal pain, diarrhea.  He currently has a Rivera catheter.  He had a fever with a Tmax of 102.4 on September 26, 2024..    Of note, his creatinine back in December 2023 based on care everywhere records was 0.7-0.8.    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    Parkinson disease      PAST SURGICAL HISTORY:  Past Surgical History:   Procedure Laterality Date    MN CYSTO BLADDER W/URETERAL CATHETERIZATION Right 8/29/2024    Procedure: CYSTOSCOPY WITH INSERTION RIGHT STENT URETERAL removal large 3cm bladder stone;  Surgeon: Ciro Hughes MD;  Location: [facility];  Service: Urology    MN CYSTO/URETERO W/LITHOTRIPSY &INDWELL STENT INSRT Right 9/26/2024    Procedure:  CYSTOSCOPY URETEROSCOPY, INSERTION STENT URETERAL, STONE EXTRACTION, patient at [facility];  Surgeon: Ciro Hughes MD;  Location: [facility];  Service: Urology     ALLERGIES:  Allergies   Allergen Reactions    Lactose - Food Allergy Other (See Comments)    Sulfa Antibiotics Other (See Comments)     SOCIAL HISTORY:  Social History     Substance and Sexual Activity   Alcohol Use Not Currently     Social History     Substance and Sexual Activity   Drug Use Not Currently     Social History     Tobacco Use   Smoking Status Unknown   Smokeless Tobacco Not on file     FAMILY HISTORY:  History reviewed. No pertinent family history.    MEDICATIONS:    Current Facility-Administered Medications:     acetaminophen (TYLENOL) tablet 650 mg, 650 mg, Oral, Q6H PRN, FRANCES Madden    aluminum-magnesium hydroxide-simethicone (MAALOX) oral suspension 30 mL, 30 mL, Oral, Q6H PRN, Ciro Hughes MD    carbidopa-levodopa (SINEMET)  mg per tablet 1 tablet, 1 tablet, Oral, TID, Ciro Hughes MD, 1 tablet at 09/27/24 2205    cefepime (MAXIPIME) IVPB (premix in dextrose) 2,000 mg 50 mL, 2,000 mg, Intravenous, Q12H, FRANCES Madden, Last Rate: 100 mL/hr at 09/27/24 2334, 2,000 mg at 09/27/24 2334    docusate sodium (COLACE) capsule 100 mg, 100 mg, Oral, Daily, Ciro Hughes MD, 100 mg at 09/27/24 0933    fluconazole (DIFLUCAN) IVPB (premix) 200 mg, 200 mg, Intravenous, Q24H, FRANCES Madden, Last Rate: 100 mL/hr at 09/28/24 0302, 200 mg at 09/28/24 0302    furosemide (LASIX) tablet 20 mg, 20 mg, Oral, Daily, FRANCES Madden    insulin glargine (LANTUS) subcutaneous injection 14 Units 0.14 mL, 14 Units, Subcutaneous, HS, FRANCES Madden, 14 Units at 09/27/24 2205    insulin lispro (HumALOG/ADMELOG) 100 units/mL subcutaneous injection 1-5 Units, 1-5 Units, Subcutaneous, TID AC, 1 Units at 09/27/24 1703 **AND** Fingerstick Glucose (POCT), , , TID AC, FRANCES Madden    insulin lispro (HumALOG/ADMELOG)  100 units/mL subcutaneous injection 1-5 Units, 1-5 Units, Subcutaneous, HS, Cuiyin Yurik, CRNP, 1 Units at 09/27/24 2205    levothyroxine tablet 25 mcg, 25 mcg, Oral, Early Morning, Cuiyin Yurik, CRNP, 25 mcg at 09/28/24 0640    nystatin (MYCOSTATIN) powder, , Topical, BID, Natasha Gayle MD, 1 Application at 09/27/24 1706    ondansetron (ZOFRAN) injection 4 mg, 4 mg, Intravenous, Q6H PRN, Ciro Hughes MD    pantoprazole (PROTONIX) EC tablet 40 mg, 40 mg, Oral, Early Morning, Cuiyin Yurik, CRNP, 40 mg at 09/28/24 0640    saccharomyces boulardii (FLORASTOR) capsule 250 mg, 250 mg, Oral, BID, Cuiyin Yurik, CRNP, 250 mg at 09/27/24 1704    senna-docusate sodium (SENOKOT S) 8.6-50 mg per tablet 1 tablet, 1 tablet, Oral, HS, Cuiyin Yurik, CRNP, 1 tablet at 09/27/24 2205    sodium chloride 0.9 % infusion, 75 mL/hr, Intravenous, Continuous, Cuiyin Yurik, CRNP, Last Rate: 75 mL/hr at 09/27/24 2333, 75 mL/hr at 09/27/24 2333    REVIEW OF SYSTEMS:  All the systems were reviewed and were negative except as documented on the HPI.    PHYSICAL EXAM:  Current Weight: Weight - Scale: 73.6 kg (162 lb 3.2 oz)  First Weight: Weight - Scale: 68.9 kg (151 lb 12.8 oz)  Vitals:    09/27/24 1523 09/27/24 1941 09/27/24 2238 09/28/24 0600   BP: 118/58 128/71 117/58    BP Location:       Pulse: 73 79 67    Resp:   18    Temp: 98.8 °F (37.1 °C)  98.7 °F (37.1 °C)    TempSrc:   Oral    SpO2: 97% 96% 95%    Weight:    73.6 kg (162 lb 3.2 oz)   Height:           Intake/Output Summary (Last 24 hours) at 9/28/2024 0808  Last data filed at 9/28/2024 0601  Gross per 24 hour   Intake 800 ml   Output 1850 ml   Net -1050 ml     Physical Exam  General: conscious, not in distress.   Skin: warm, dry, good turgor.   Eyes: pink conjunctivae, no scleral icterus.   ENT: dry lips and mucous membranes.   Respiratory: equal chest expansion, clear breath sounds.   Cardiovascular: distinct heart sounds, normal rate, regular rhythm, no rub  Abdomen: soft,  "non-tender, non-distended, normoactive bowel sounds  Extremities: no edema.  Genitourinary: (+) yang catheter. Clear urine noted.   Neuro: awake, alert  Psych: appropriate affect.      Invasive Devices:   Urethral Catheter Non-latex 22 Fr. (Active)   Reasons to continue Urinary Catheter  Chronic urinary catheter 09/28/24 0201   Goal for Removal N/A- chronic yang 09/28/24 0201   Site Assessment Clean;Skin intact 09/28/24 0201   Yang Care Done 09/28/24 0201   Collection Container Standard drainage bag 09/28/24 0201   Securement Method Other (Comment) 09/28/24 0201   Output (mL) 1500 mL 09/28/24 0601     Lab Results:   Results from last 7 days   Lab Units 09/28/24  0606 09/27/24  2242 09/27/24  0520 09/27/24  0012   WBC Thousand/uL  --   --   --  10.02   HEMOGLOBIN g/dL  --   --   --  7.8*   HEMATOCRIT %  --   --   --  25.9*   PLATELETS Thousands/uL  --   --   --  214   POTASSIUM mmol/L 4.7 5.1 5.8* 5.1   CHLORIDE mmol/L 105 103 103 99   CO2 mmol/L 25 24 22 23   BUN mg/dL 35* 37* 38* 41*   CREATININE mg/dL 1.75* 1.83* 1.66* 1.62*   CALCIUM mg/dL 7.9* 7.8* 7.8* 8.1*   MAGNESIUM mg/dL  --   --  2.4 2.0     Other Studies: none.     Portions of the record may have been created with voice recognition software. Occasional wrong word or \"sound a like\" substitutions may have occurred due to the inherent limitations of voice recognition software. Read the chart carefully and recognize, using context, where substitutions have occurred.If you have any questions, please contact the dictating provider.    "

## 2024-09-28 NOTE — ASSESSMENT & PLAN NOTE
Secondary to chronic Rivera catheter.  Recent urine culture as above  Urine culture pending  Patient started on cefepime Diflucan due to pyuric urine in the ureter with yeast debris found in OR.  UCX-mixed contaminants

## 2024-09-28 NOTE — PLAN OF CARE
Problem: PAIN - ADULT  Goal: Verbalizes/displays adequate comfort level or baseline comfort level  Description: Interventions:  - Encourage patient to monitor pain and request assistance  - Assess pain using appropriate pain scale  - Administer analgesics based on type and severity of pain and evaluate response  - Implement non-pharmacological measures as appropriate and evaluate response  - Consider cultural and social influences on pain and pain management  - Notify physician/advanced practitioner if interventions unsuccessful or patient reports new pain  Outcome: Progressing     Problem: INFECTION - ADULT  Goal: Absence or prevention of progression during hospitalization  Description: INTERVENTIONS:  - Assess and monitor for signs and symptoms of infection  - Monitor lab/diagnostic results  - Monitor all insertion sites, i.e. indwelling lines, tubes, and drains  - Monitor endotracheal if appropriate and nasal secretions for changes in amount and color  - Liguori appropriate cooling/warming therapies per order  - Administer medications as ordered  - Instruct and encourage patient and family to use good hand hygiene technique  - Identify and instruct in appropriate isolation precautions for identified infection/condition  Outcome: Progressing  Goal: Absence of fever/infection during neutropenic period  Description: INTERVENTIONS:  - Monitor WBC    Outcome: Progressing     Problem: SAFETY ADULT  Goal: Patient will remain free of falls  Description: INTERVENTIONS:  - Educate patient/family on patient safety including physical limitations  - Instruct patient to call for assistance with activity   - Consult OT/PT to assist with strengthening/mobility   - Keep Call bell within reach  - Keep bed low and locked with side rails adjusted as appropriate  - Keep care items and personal belongings within reach  - Initiate and maintain comfort rounds  - Make Fall Risk Sign visible to staff  - Offer Toileting every 2 Hours,  in advance of need  - Initiate/Maintain BED alarm  - Obtain necessary fall risk management equipment: YES  - Apply yellow socks and bracelet for high fall risk patients  - Consider moving patient to room near nurses station  Outcome: Progressing  Goal: Maintain or return to baseline ADL function  Description: INTERVENTIONS:  -  Assess patient's ability to carry out ADLs; assess patient's baseline for ADL function and identify physical deficits which impact ability to perform ADLs (bathing, care of mouth/teeth, toileting, grooming, dressing, etc.)  - Assess/evaluate cause of self-care deficits   - Assess range of motion  - Assess patient's mobility; develop plan if impaired  - Assess patient's need for assistive devices and provide as appropriate  - Encourage maximum independence but intervene and supervise when necessary  - Involve family in performance of ADLs  - Assess for home care needs following discharge   - Consider OT consult to assist with ADL evaluation and planning for discharge  - Provide patient education as appropriate  Outcome: Progressing  Goal: Maintains/Returns to pre admission functional level  Description: INTERVENTIONS:  - Perform AM-PAC 6 Click Basic Mobility/ Daily Activity assessment daily.  - Set and communicate daily mobility goal to care team and patient/family/caregiver.   - Collaborate with rehabilitation services on mobility goals if consulted  - Perform Range of Motion 3 times a day.  - Reposition patient every 2 hours.  - Dangle patient 3 times a day  - Stand patient 3 times a day  - Ambulate patient 3 times a day  - Out of bed to chair 3 times a day   - Out of bed for meals 3 times a day  - Out of bed for toileting  - Record patient progress and toleration of activity level   Outcome: Progressing     Problem: DISCHARGE PLANNING  Goal: Discharge to home or other facility with appropriate resources  Description: INTERVENTIONS:  - Identify barriers to discharge w/patient and  caregiver  - Arrange for needed discharge resources and transportation as appropriate  - Identify discharge learning needs (meds, wound care, etc.)  - Arrange for interpretive services to assist at discharge as needed  - Refer to Case Management Department for coordinating discharge planning if the patient needs post-hospital services based on physician/advanced practitioner order or complex needs related to functional status, cognitive ability, or social support system  Outcome: Progressing     Problem: Knowledge Deficit  Goal: Patient/family/caregiver demonstrates understanding of disease process, treatment plan, medications, and discharge instructions  Description: Complete learning assessment and assess knowledge base.  Interventions:  - Provide teaching at level of understanding  - Provide teaching via preferred learning methods  Outcome: Progressing

## 2024-09-28 NOTE — PROGRESS NOTES
"Progress Note - Urology      Patient: Chris Bojorquez   : 1945 Sex: male   MRN: [id number]     CSN: [id number]  Unit/Bed#: [address] [place] [address]     SUBJECTIVE:   Patient seen on afternoon rounds  Somnolent Rivera catheter draining clear urine  Long discussion with family members about complicated UTI/large right renal stones/urinary retention and renal failure  Urine culture positive but may not confirm sensitivity in light of preop antibiotics      Objective   Vitals: /58   Pulse 67   Temp 98.7 °F (37.1 °C) (Oral)   Resp 18   Ht 5' 9\" (1.753 m)   Wt 73.6 kg (162 lb 3.2 oz)   SpO2 95%   BMI 23.95 kg/m²     I/O last 24 hours:  In: 810 [P.O.:600; I.V.:10; IV Piggyback:200]  Out: 1850 [Urine:1850]      Physical Exam:   General Alert awake   Normocephalic atraumatic PERRLA  Lungs clear bilaterally  Cardiac normal S1 normal S2  Abdomen soft, flank pain  Extremities no edema      Lab Results: CBC:   Lab Results   Component Value Date    WBC 10.02 2024    HGB 7.8 (L) 2024    HCT 25.9 (L) 2024    MCV 86 2024     2024    RBC 3.03 (L) 2024    MCH 25.7 (L) 2024    MCHC 30.1 (L) 2024    RDW 17.2 (H) 2024    MPV 8.8 (L) 2024    NRBC 0 09/10/2024     CMP:   Lab Results   Component Value Date     2024    CO2 25 2024    BUN 35 (H) 2024    CREATININE 1.75 (H) 2024    CALCIUM 7.9 (L) 2024    AST 20 09/10/2024    ALT 7 09/10/2024    ALKPHOS 111 (H) 09/10/2024    EGFR 36 2024     Urinalysis:   Lab Results   Component Value Date    COLORU Yellow 2024    CLARITYU Slightly Cloudy 2024    SPECGRAV 1.015 2024    PHUR 7.0 2024    LEUKOCYTESUR Large (A) 2024    NITRITE Negative 2024    GLUCOSEU 1000 (1%) (A) 2024    KETONESU 10 (1+) (A) 2024    BILIRUBINUR Negative 2024    BLOODU Large (A) 2024     Urine Culture:   Lab Results   Component Value Date    URINECX " "20,000-29,000 cfu/ml 09/26/2024     PSA: No results found for: \"PSA\"      Assessment/ Plan:  Complicated UTI  Antibiotics as per medical team cefepime  Used apparently in urine awaiting official cultures  Acute renal failure  Nephrology noted holding Lasix gentle hydration  Hyperkalemia  Patient refusing medication drifting down  Urinary tension  Continue Miguel Hughes MD  " Alert and oriented to person, place and time/Patient baseline mental status/Awake

## 2024-09-28 NOTE — ASSESSMENT & PLAN NOTE
Lab Results   Component Value Date    HGBA1C 14.6 (H) 08/26/2024   Lantus 14 units at bedtime with SSI ACHS   A1c was 14.6 percent  Carb controlled

## 2024-09-28 NOTE — ASSESSMENT & PLAN NOTE
Erythematous, pruritic rash on patient's groin area and inner thighs  Keep area clean and dry  Apply nystatin powder to area BID   Wound care consulted

## 2024-09-28 NOTE — ASSESSMENT & PLAN NOTE
2D echo on 8/30 in recent hospitalization showed EF 55%, grade 1 diastolic dysfunction, RV systolic pressure is mildly elevated at 51 mmHg,no  significant valvular disease.  Patient was discharged on Lasix 20 mg p.o. daily due to fluid overload during hospitalization.    Lasix for 1 day.  Daily weight, intake output

## 2024-09-28 NOTE — ASSESSMENT & PLAN NOTE
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  Urine culture grew low colony Proteus mirabilis. 1/2 blood cultures grew Proteus mirabilis. Patient received cefepime, then Rocephin, then cefpodoxime for 10-day total.  Patient presented with PABLO as well.  Creatinine was 1.95 on admission.  Peaked to 2.15.  Creatinine 1.53 on discharge.  CT showed decreased calculus volume in the right kidney, interval passage of calculi suspected; delayed nephrogram on the right likely due to obstructive uropathy; bladder calculus with diffuse bladder wall thickening compatible with cystitis.  Patient underwent urgent cystoscopy with insertion of right ureteral stent and removal of large 3 cm bladder stone on 8/29.  Patient's hospital course was complicated by fluid overload, required Lasix with albumin.  2D echo showed normal EF, grade 1 diastolic dysfunction.  Baseline creatinine 0.7- 0.8 in past year prior to recent hospitalization.  Creatinine 1.62 on 9/27 and increased to 1.66 on 9/28  Continue gentle hydration  Lasix held on 9/27;  Nephro recs:  Stop IVF, hold Lasix

## 2024-09-28 NOTE — ASSESSMENT & PLAN NOTE
Evolved after procedure, as evidenced by fever 102.4, tachycardia, with suspected source of infection complicated UTI as above.  Sepsis workup ordered - WBC 10.02, procalcitonin 0.46, lactic acid 1.1  On IV cefepime and Diflucan  9/27 - Patient afebrile and no longer tachycardic   BCx-NG 24H/UCX-mixed contaminants  Gentle IV hydration  Monitor vitals for fever recurrence

## 2024-09-28 NOTE — QUICK NOTE
Potassium 5.8 this morning, creatinine 1.66.  Patient refused Kayexalate.  Repeat BMP tonight showed potassium 5.1, creatinine 1.83.  Creatinine is worsening despite receiving IV fluids.  Will consult nephrology for PABLO.  Decrease IV fluids to 75 cc/hr. check BNP.

## 2024-09-29 VITALS
SYSTOLIC BLOOD PRESSURE: 162 MMHG | WEIGHT: 162.2 LBS | HEART RATE: 70 BPM | RESPIRATION RATE: 12 BRPM | DIASTOLIC BLOOD PRESSURE: 72 MMHG | TEMPERATURE: 97.8 F | HEIGHT: 69 IN | OXYGEN SATURATION: 96 % | BODY MASS INDEX: 24.02 KG/M2

## 2024-09-29 LAB
BACTERIA BLD CULT: NORMAL
BACTERIA BLD CULT: NORMAL
GLUCOSE SERPL-MCNC: 150 MG/DL (ref 65–140)
GLUCOSE SERPL-MCNC: 172 MG/DL (ref 65–140)
GLUCOSE SERPL-MCNC: 174 MG/DL (ref 65–140)
GLUCOSE SERPL-MCNC: 185 MG/DL (ref 65–140)

## 2024-09-29 PROCEDURE — 82948 REAGENT STRIP/BLOOD GLUCOSE: CPT

## 2024-09-29 PROCEDURE — 99232 SBSQ HOSP IP/OBS MODERATE 35: CPT | Performed by: STUDENT IN AN ORGANIZED HEALTH CARE EDUCATION/TRAINING PROGRAM

## 2024-09-29 PROCEDURE — 99232 SBSQ HOSP IP/OBS MODERATE 35: CPT | Performed by: INTERNAL MEDICINE

## 2024-09-29 RX ORDER — INSULIN LISPRO 100 [IU]/ML
1-5 INJECTION, SOLUTION INTRAVENOUS; SUBCUTANEOUS
Status: ACTIVE | OUTPATIENT
Start: 2024-09-30

## 2024-09-29 RX ADMIN — INSULIN LISPRO 1 UNITS: 100 INJECTION, SOLUTION INTRAVENOUS; SUBCUTANEOUS at 22:17

## 2024-09-29 RX ADMIN — LEVOTHYROXINE SODIUM 25 MCG: 25 TABLET ORAL at 05:28

## 2024-09-29 RX ADMIN — INSULIN LISPRO 1 UNITS: 100 INJECTION, SOLUTION INTRAVENOUS; SUBCUTANEOUS at 07:44

## 2024-09-29 RX ADMIN — Medication 250 MG: at 17:58

## 2024-09-29 RX ADMIN — INSULIN LISPRO 1 UNITS: 100 INJECTION, SOLUTION INTRAVENOUS; SUBCUTANEOUS at 16:42

## 2024-09-29 RX ADMIN — PANTOPRAZOLE SODIUM 40 MG: 40 TABLET, DELAYED RELEASE ORAL at 05:28

## 2024-09-29 RX ADMIN — Medication 250 MG: at 11:43

## 2024-09-29 RX ADMIN — NYSTATIN: 100000 POWDER TOPICAL at 11:43

## 2024-09-29 RX ADMIN — FLUCONAZOLE 200 MG: 2 INJECTION, SOLUTION INTRAVENOUS at 02:16

## 2024-09-29 RX ADMIN — INSULIN GLARGINE 14 UNITS: 100 INJECTION, SOLUTION SUBCUTANEOUS at 22:17

## 2024-09-29 RX ADMIN — INSULIN LISPRO 1 UNITS: 100 INJECTION, SOLUTION INTRAVENOUS; SUBCUTANEOUS at 11:48

## 2024-09-29 RX ADMIN — SENNOSIDES AND DOCUSATE SODIUM 1 TABLET: 50; 8.6 TABLET ORAL at 22:17

## 2024-09-29 RX ADMIN — CARBIDOPA AND LEVODOPA 1 TABLET: 25; 100 TABLET ORAL at 22:17

## 2024-09-29 RX ADMIN — CEFEPIME HYDROCHLORIDE 2000 MG: 2 INJECTION, SOLUTION INTRAVENOUS at 11:45

## 2024-09-29 RX ADMIN — CEFEPIME HYDROCHLORIDE 2000 MG: 2 INJECTION, SOLUTION INTRAVENOUS at 00:44

## 2024-09-29 RX ADMIN — NYSTATIN: 100000 POWDER TOPICAL at 17:59

## 2024-09-29 NOTE — PROGRESS NOTES
NEPHROLOGY HOSPITAL PROGRESS NOTE   Chris Bojorquez 79 y.o. male MRN: [id number]  Unit/Bed#: [address] [place] [address] Encounter: [id number]  Reason for Consult: PABLO    ASSESSMENT and PLAN:  Acute kidney injury  Baseline creatinine was around 0.7-0.8 based on care everywhere records from December 2023.  He had PABLO during an admission to [facility] from August to early September 2024 for sepsis due to Proteus bacteremia of urinary source.  Peak creatinine during that time was 2.15 and creatinine had settled at around 1.3-1.5 towards the end of the hospitalization.  It is possible that he may have had ATN during that time with incomplete recovery of his renal function.  He now presents for elective cystoscopy and initial creatinine was 1.62 on 9/27/24.   Peak SCr 1.83 on 9/27/24 - felt to be hemodynamic in origin + residual ATN. No response to IVF points against prerenal azotemia.  SCr was 1.75 yesterday - Furosemide stopped.   Labs today are pending. Follow up labs.      Hyperkalemia  Resolved. Peak K 5.8.   K down to 4.7 yesterday after Kayexelate.   Continue low K diet.     Nephrolithiasis  S/p cystoscopy, ureteroscopy, stent exchange, and stone extraction on 9/26/24  Urology following.     Fluid overload, diastolic dysfunction  This was noted during a recent hospitalization and was felt to be iatrogenic from IVF administration in the setting of sepsis.  He was discharged on furosemide 20 mg daily.  Furosemide stopped this admission.      Diabetes mellitus  Anemia     PLAN SUMMARY:  Follow up today's labs.   Continue to hold off on IVF.   No compelling need to restart Furosemide - will hold off.     SUBJECTIVE / 24H INTERVAL HISTORY:  No CP or SOB.   Unclear oral intake.     OBJECTIVE:  Current Weight: Weight - Scale: 73.6 kg (162 lb 3.2 oz)  Vitals:    09/28/24 1532 09/28/24 1923 09/29/24 0050 09/29/24 0750   BP: 159/59 141/65 133/72 106/75   BP Location:    Left arm   Pulse: 69 71 71 65   Resp: 18 16     Temp: 98.5 °F  (36.9 °C) 98.8 °F (37.1 °C)  (!) 97 °F (36.1 °C)   TempSrc:    Tympanic   SpO2: 96% 96% 96% 96%   Weight:       Height:           Intake/Output Summary (Last 24 hours) at 9/29/2024 0903  Last data filed at 9/29/2024 0216  Gross per 24 hour   Intake --   Output 800 ml   Net -800 ml     General: no distress  Skin: dry  Eyes: pink conjunctivae  ENT: dry mucous membranes  Respiratory: equal chest expansion, decreased in bases.   Cardiovascular: distinct heart sounds, normal rate, regular rhythm, no rub  Abdomen: soft, non tender, non distended, normal bowel sounds  Extremities: no edema.   Genitourinary: + yang catheter. Clear urine noted.   Neuro: awake, alert.     Medications:    Current Facility-Administered Medications:     acetaminophen (TYLENOL) tablet 650 mg, 650 mg, Oral, Q6H PRN, FRANCES Madden    aluminum-magnesium hydroxide-simethicone (MAALOX) oral suspension 30 mL, 30 mL, Oral, Q6H PRN, Ciro Hughes MD    carbidopa-levodopa (SINEMET)  mg per tablet 1 tablet, 1 tablet, Oral, TID, Ciro Hughes MD, 1 tablet at 09/28/24 2051    cefepime (MAXIPIME) IVPB (premix in dextrose) 2,000 mg 50 mL, 2,000 mg, Intravenous, Q12H, FRANCES Madden, Last Rate: 100 mL/hr at 09/29/24 0044, 2,000 mg at 09/29/24 0044    docusate sodium (COLACE) capsule 100 mg, 100 mg, Oral, Daily, Ciro Hughes MD, 100 mg at 09/28/24 0916    fluconazole (DIFLUCAN) IVPB (premix) 200 mg, 200 mg, Intravenous, Q24H, FRANCES Madden, Last Rate: 100 mL/hr at 09/29/24 0216, 200 mg at 09/29/24 0216    insulin glargine (LANTUS) subcutaneous injection 14 Units 0.14 mL, 14 Units, Subcutaneous, HS, FRANCES Madden, 14 Units at 09/28/24 2234    insulin lispro (HumALOG/ADMELOG) 100 units/mL subcutaneous injection 1-5 Units, 1-5 Units, Subcutaneous, TID AC, 1 Units at 09/29/24 0744 **AND** Fingerstick Glucose (POCT), , , TID AC, FRANCES Madden    insulin lispro (HumALOG/ADMELOG) 100 units/mL subcutaneous injection 1-5 Units, 1-5  "Units, Subcutaneous, HS, Cuigeorgetten Michellerik, CRNP, 2 Units at 09/28/24 2235    levothyroxine tablet 25 mcg, 25 mcg, Oral, Early Morning, Cuiyin Yurik, CRNP, 25 mcg at 09/29/24 0528    nystatin (MYCOSTATIN) powder, , Topical, BID, Natasha Gayle MD, Given at 09/28/24 1806    ondansetron (ZOFRAN) injection 4 mg, 4 mg, Intravenous, Q6H PRN, Ciro Hughes MD    pantoprazole (PROTONIX) EC tablet 40 mg, 40 mg, Oral, Early Morning, Cuiyin Yurik, CRNP, 40 mg at 09/29/24 0528    saccharomyces boulardii (FLORASTOR) capsule 250 mg, 250 mg, Oral, BID, Cuiyin Yurik, CRNP, 250 mg at 09/28/24 1806    senna-docusate sodium (SENOKOT S) 8.6-50 mg per tablet 1 tablet, 1 tablet, Oral, HS, Cuiluis Martinezrik, CRNP, 1 tablet at 09/28/24 2236    Laboratory Results:  Results from last 7 days   Lab Units 09/28/24  0606 09/27/24  2242 09/27/24  0520 09/27/24  0012   WBC Thousand/uL  --   --   --  10.02   HEMOGLOBIN g/dL  --   --   --  7.8*   HEMATOCRIT %  --   --   --  25.9*   PLATELETS Thousands/uL  --   --   --  214   POTASSIUM mmol/L 4.7 5.1 5.8* 5.1   CHLORIDE mmol/L 105 103 103 99   CO2 mmol/L 25 24 22 23   BUN mg/dL 35* 37* 38* 41*   CREATININE mg/dL 1.75* 1.83* 1.66* 1.62*   CALCIUM mg/dL 7.9* 7.8* 7.8* 8.1*   MAGNESIUM mg/dL  --   --  2.4 2.0     Portions of the record may have been created with voice recognition software. Occasional wrong word or \"sound a like\" substitutions may have occurred due to the inherent limitations of voice recognition software. Read the chart carefully and recognize, using context, where substitutions have occurred.If you have any questions, please contact the dictating provider.    "

## 2024-09-29 NOTE — PLAN OF CARE
Problem: PAIN - ADULT  Goal: Verbalizes/displays adequate comfort level or baseline comfort level  Description: Interventions:  - Encourage patient to monitor pain and request assistance  - Assess pain using appropriate pain scale  - Administer analgesics based on type and severity of pain and evaluate response  - Implement non-pharmacological measures as appropriate and evaluate response  - Consider cultural and social influences on pain and pain management  - Notify physician/advanced practitioner if interventions unsuccessful or patient reports new pain  Outcome: Progressing     Problem: INFECTION - ADULT  Goal: Absence or prevention of progression during hospitalization  Description: INTERVENTIONS:  - Assess and monitor for signs and symptoms of infection  - Monitor lab/diagnostic results  - Monitor all insertion sites, i.e. indwelling lines, tubes, and drains  - Monitor endotracheal if appropriate and nasal secretions for changes in amount and color  - [place] appropriate cooling/warming therapies per order  - Administer medications as ordered  - Instruct and encourage patient and family to use good hand hygiene technique  - Identify and instruct in appropriate isolation precautions for identified infection/condition  Outcome: Progressing  Goal: Absence of fever/infection during neutropenic period  Description: INTERVENTIONS:  - Monitor WBC    Outcome: Progressing     Problem: SAFETY ADULT  Goal: Patient will remain free of falls  Description: INTERVENTIONS:  - Educate patient/family on patient safety including physical limitations  - Instruct patient to call for assistance with activity   - Consult OT/PT to assist with strengthening/mobility   - Keep Call bell within reach  - Keep bed low and locked with side rails adjusted as appropriate  - Keep care items and personal belongings within reach  - Initiate and maintain comfort rounds  - Make Fall Risk Sign visible to staff  - Offer Toileting every 2 Hours,  in advance of need  - Initiate/Maintain bed alarm  - Obtain necessary fall risk management equipment:   - Apply yellow socks and bracelet for high fall risk patients  - Consider moving patient to room near nurses station  Outcome: Progressing  Goal: Maintain or return to baseline ADL function  Description: INTERVENTIONS:  -  Assess patient's ability to carry out ADLs; assess patient's baseline for ADL function and identify physical deficits which impact ability to perform ADLs (bathing, care of mouth/teeth, toileting, grooming, dressing, etc.)  - Assess/evaluate cause of self-care deficits   - Assess range of motion  - Assess patient's mobility; develop plan if impaired  - Assess patient's need for assistive devices and provide as appropriate  - Encourage maximum independence but intervene and supervise when necessary  - Involve family in performance of ADLs  - Assess for home care needs following discharge   - Consider OT consult to assist with ADL evaluation and planning for discharge  - Provide patient education as appropriate  Outcome: Progressing     Problem: DISCHARGE PLANNING  Goal: Discharge to home or other facility with appropriate resources  Description: INTERVENTIONS:  - Identify barriers to discharge w/patient and caregiver  - Arrange for needed discharge resources and transportation as appropriate  - Identify discharge learning needs (meds, wound care, etc.)  - Arrange for interpretive services to assist at discharge as needed  - Refer to Case Management Department for coordinating discharge planning if the patient needs post-hospital services based on physician/advanced practitioner order or complex needs related to functional status, cognitive ability, or social support system  Outcome: Progressing     Problem: Knowledge Deficit  Goal: Patient/family/caregiver demonstrates understanding of disease process, treatment plan, medications, and discharge instructions  Description: Complete learning  assessment and assess knowledge base.  Interventions:  - Provide teaching at level of understanding  - Provide teaching via preferred learning methods  Outcome: Progressing     Problem: Prexisting or High Potential for Compromised Skin Integrity  Goal: Skin integrity is maintained or improved  Description: INTERVENTIONS:  - Identify patients at risk for skin breakdown  - Assess and monitor skin integrity  - Assess and monitor nutrition and hydration status  - Monitor labs   - Assess for incontinence   - Turn and reposition patient  - Assist with mobility/ambulation  - Relieve pressure over bony prominences  - Avoid friction and shearing  - Provide appropriate hygiene as needed including keeping skin clean and dry  - Evaluate need for skin moisturizer/barrier cream  - Collaborate with interdisciplinary team   - Patient/family teaching  - Consider wound care consult   Outcome: Progressing

## 2024-09-29 NOTE — PLAN OF CARE
Problem: PAIN - ADULT  Goal: Verbalizes/displays adequate comfort level or baseline comfort level  Description: Interventions:  - Encourage patient to monitor pain and request assistance  - Assess pain using appropriate pain scale  - Administer analgesics based on type and severity of pain and evaluate response  - Implement non-pharmacological measures as appropriate and evaluate response  - Consider cultural and social influences on pain and pain management  - Notify physician/advanced practitioner if interventions unsuccessful or patient reports new pain  Outcome: Progressing     Problem: INFECTION - ADULT  Goal: Absence or prevention of progression during hospitalization  Description: INTERVENTIONS:  - Assess and monitor for signs and symptoms of infection  - Monitor lab/diagnostic results  - Monitor all insertion sites, i.e. indwelling lines, tubes, and drains  - Monitor endotracheal if appropriate and nasal secretions for changes in amount and color  - [place] appropriate cooling/warming therapies per order  - Administer medications as ordered  - Instruct and encourage patient and family to use good hand hygiene technique  - Identify and instruct in appropriate isolation precautions for identified infection/condition  Outcome: Progressing     Problem: Prexisting or High Potential for Compromised Skin Integrity  Goal: Skin integrity is maintained or improved  Description: INTERVENTIONS:  - Identify patients at risk for skin breakdown  - Assess and monitor skin integrity  - Assess and monitor nutrition and hydration status  - Monitor labs   - Assess for incontinence   - Turn and reposition patient  - Assist with mobility/ambulation  - Relieve pressure over bony prominences  - Avoid friction and shearing  - Provide appropriate hygiene as needed including keeping skin clean and dry  - Evaluate need for skin moisturizer/barrier cream  - Collaborate with interdisciplinary team   - Patient/family teaching  -  Consider wound care consult   Outcome: Progressing

## 2024-09-29 NOTE — PROGRESS NOTES
"Progress Note - Urology      Patient: Chris Bojorquez   : 1945 Sex: male   MRN: [id number]     CSN: [id number]  Unit/Bed#: [address] [place] [address]     SUBJECTIVE:   Patient seen on afternoon rounds  Urine clear  Urine cultures contaminated  Blood cultures negative  Patient appears to have no issues      Objective   Vitals: /75 (BP Location: Left arm)   Pulse 65   Temp (!) 97 °F (36.1 °C) (Tympanic)   Resp 16   Ht 5' 9\" (1.753 m)   Wt 73.6 kg (162 lb 3.2 oz)   SpO2 96%   BMI 23.95 kg/m²     I/O last 24 hours:  In: 120 [P.O.:120]  Out: 800 [Urine:800]      Physical Exam:   General Alert awake   Normocephalic atraumatic PERRLA  Lungs clear bilaterally  Cardiac normal S1 normal S2  Abdomen soft, flank pain  Extremities no edema      Lab Results: CBC:   Lab Results   Component Value Date    WBC 10.02 2024    HGB 7.8 (L) 2024    HCT 25.9 (L) 2024    MCV 86 2024     2024    RBC 3.03 (L) 2024    MCH 25.7 (L) 2024    MCHC 30.1 (L) 2024    RDW 17.2 (H) 2024    MPV 8.8 (L) 2024    NRBC 0 09/10/2024     CMP:   Lab Results   Component Value Date     2024    CO2 25 2024    BUN 35 (H) 2024    CREATININE 1.75 (H) 2024    CALCIUM 7.9 (L) 2024    AST 20 09/10/2024    ALT 7 09/10/2024    ALKPHOS 111 (H) 09/10/2024    EGFR 36 2024     Urinalysis:   Lab Results   Component Value Date    COLORU Yellow 2024    CLARITYU Slightly Cloudy 2024    SPECGRAV 1.015 2024    PHUR 7.0 2024    LEUKOCYTESUR Large (A) 2024    NITRITE Negative 2024    GLUCOSEU 1000 (1%) (A) 2024    KETONESU 10 (1+) (A) 2024    BILIRUBINUR Negative 2024    BLOODU Large (A) 2024     Urine Culture:   Lab Results   Component Value Date    URINECX 20,000-29,000 cfu/ml 2024     PSA: No results found for: \"PSA\"      Assessment/ Plan:  Acute renal failure  ATN creatinine slowly trending " down  Complicated UTI  Urine cultures contaminated patient did receive preop antibiotics for ureteroscopy  Would continue cefepime/Diflucan in light of yeast contaminating stent  Would consider discharge tomorrow if nephrology cleared          Ciro Hughes MD

## 2024-09-30 LAB
ANION GAP SERPL CALCULATED.3IONS-SCNC: 5 MMOL/L (ref 4–13)
BACTERIA UR QL AUTO: ABNORMAL /HPF
BILIRUB UR QL STRIP: NEGATIVE
BUN SERPL-MCNC: 40 MG/DL (ref 5–25)
CALCIUM SERPL-MCNC: 8.4 MG/DL (ref 8.4–10.2)
CHLORIDE SERPL-SCNC: 103 MMOL/L (ref 96–108)
CLARITY UR: ABNORMAL
CO2 SERPL-SCNC: 21 MMOL/L (ref 21–32)
COLOR UR: YELLOW
CREAT SERPL-MCNC: 1.74 MG/DL (ref 0.6–1.3)
GFR SERPL CREATININE-BSD FRML MDRD: 36 ML/MIN/1.73SQ M
GLUCOSE SERPL-MCNC: 142 MG/DL (ref 65–140)
GLUCOSE SERPL-MCNC: 155 MG/DL (ref 65–140)
GLUCOSE SERPL-MCNC: 185 MG/DL (ref 65–140)
GLUCOSE SERPL-MCNC: 185 MG/DL (ref 65–140)
GLUCOSE SERPL-MCNC: 193 MG/DL (ref 65–140)
GLUCOSE SERPL-MCNC: 204 MG/DL (ref 65–140)
GLUCOSE UR STRIP-MCNC: ABNORMAL MG/DL
HGB UR QL STRIP.AUTO: ABNORMAL
KETONES UR STRIP-MCNC: NEGATIVE MG/DL
LEUKOCYTE ESTERASE UR QL STRIP: ABNORMAL
NITRITE UR QL STRIP: NEGATIVE
NON-SQ EPI CELLS URNS QL MICRO: ABNORMAL /HPF
OSMOLALITY UR: 596 MMOL/KG
PH UR STRIP.AUTO: 5.5 [PH]
POTASSIUM SERPL-SCNC: 4.2 MMOL/L (ref 3.5–5.3)
PROT UR STRIP-MCNC: ABNORMAL MG/DL
RBC #/AREA URNS AUTO: ABNORMAL /HPF
SODIUM 24H UR-SCNC: 109 MOL/L
SODIUM SERPL-SCNC: 129 MMOL/L (ref 135–147)
SP GR UR STRIP.AUTO: 1.02 (ref 1–1.03)
UROBILINOGEN UR STRIP-ACNC: <2 MG/DL
WBC #/AREA URNS AUTO: ABNORMAL /HPF

## 2024-09-30 PROCEDURE — 82948 REAGENT STRIP/BLOOD GLUCOSE: CPT

## 2024-09-30 PROCEDURE — 87205 SMEAR GRAM STAIN: CPT | Performed by: INTERNAL MEDICINE

## 2024-09-30 PROCEDURE — 80048 BASIC METABOLIC PNL TOTAL CA: CPT | Performed by: STUDENT IN AN ORGANIZED HEALTH CARE EDUCATION/TRAINING PROGRAM

## 2024-09-30 PROCEDURE — 99232 SBSQ HOSP IP/OBS MODERATE 35: CPT | Performed by: STUDENT IN AN ORGANIZED HEALTH CARE EDUCATION/TRAINING PROGRAM

## 2024-09-30 PROCEDURE — 99232 SBSQ HOSP IP/OBS MODERATE 35: CPT | Performed by: INTERNAL MEDICINE

## 2024-09-30 PROCEDURE — 84300 ASSAY OF URINE SODIUM: CPT | Performed by: INTERNAL MEDICINE

## 2024-09-30 PROCEDURE — 83935 ASSAY OF URINE OSMOLALITY: CPT | Performed by: INTERNAL MEDICINE

## 2024-09-30 PROCEDURE — 81001 URINALYSIS AUTO W/SCOPE: CPT | Performed by: INTERNAL MEDICINE

## 2024-09-30 RX ORDER — SODIUM CHLORIDE 1 G/1
2 TABLET ORAL ONCE
Status: COMPLETED | OUTPATIENT
Start: 2024-09-30 | End: 2024-09-30

## 2024-09-30 RX ADMIN — CEFEPIME HYDROCHLORIDE 2000 MG: 2 INJECTION, SOLUTION INTRAVENOUS at 12:49

## 2024-09-30 RX ADMIN — INSULIN GLARGINE 14 UNITS: 100 INJECTION, SOLUTION SUBCUTANEOUS at 22:20

## 2024-09-30 RX ADMIN — NYSTATIN: 100000 POWDER TOPICAL at 17:37

## 2024-09-30 RX ADMIN — FLUCONAZOLE 200 MG: 2 INJECTION, SOLUTION INTRAVENOUS at 02:34

## 2024-09-30 RX ADMIN — SODIUM CHLORIDE 2 G: 1 TABLET ORAL at 12:49

## 2024-09-30 RX ADMIN — INSULIN LISPRO 1 UNITS: 100 INJECTION, SOLUTION INTRAVENOUS; SUBCUTANEOUS at 07:57

## 2024-09-30 RX ADMIN — CARBIDOPA AND LEVODOPA 1 TABLET: 25; 100 TABLET ORAL at 17:24

## 2024-09-30 RX ADMIN — Medication 250 MG: at 17:24

## 2024-09-30 RX ADMIN — LEVOTHYROXINE SODIUM 25 MCG: 25 TABLET ORAL at 05:28

## 2024-09-30 RX ADMIN — INSULIN LISPRO 1 UNITS: 100 INJECTION, SOLUTION INTRAVENOUS; SUBCUTANEOUS at 12:50

## 2024-09-30 RX ADMIN — CARBIDOPA AND LEVODOPA 1 TABLET: 25; 100 TABLET ORAL at 08:35

## 2024-09-30 RX ADMIN — INSULIN LISPRO 1 UNITS: 100 INJECTION, SOLUTION INTRAVENOUS; SUBCUTANEOUS at 22:21

## 2024-09-30 RX ADMIN — PANTOPRAZOLE SODIUM 40 MG: 40 TABLET, DELAYED RELEASE ORAL at 05:28

## 2024-09-30 RX ADMIN — CEFEPIME HYDROCHLORIDE 2000 MG: 2 INJECTION, SOLUTION INTRAVENOUS at 00:22

## 2024-09-30 RX ADMIN — Medication 250 MG: at 08:35

## 2024-09-30 RX ADMIN — NYSTATIN: 100000 POWDER TOPICAL at 12:50

## 2024-09-30 RX ADMIN — DOCUSATE SODIUM 100 MG: 100 CAPSULE, LIQUID FILLED ORAL at 08:35

## 2024-09-30 RX ADMIN — INSULIN LISPRO 1 UNITS: 100 INJECTION, SOLUTION INTRAVENOUS; SUBCUTANEOUS at 17:24

## 2024-09-30 NOTE — PROGRESS NOTES
Consult    Progress Note - Hospitalist   Name: Chris Bojorquez 79 y.o. 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  Urine culture grew low colony Proteus mirabilis. 1/2 blood cultures grew Proteus mirabilis. Patient received cefepime, then Rocephin, then cefpodoxime for 10-day total.  Patient presented with PABLO as well.  Creatinine was 1.95 on admission.  Peaked to 2.15.  Creatinine 1.53 on discharge.  CT showed decreased calculus volume in the right kidney, interval passage of calculi suspected; delayed nephrogram on the right likely due to obstructive uropathy; bladder calculus with diffuse bladder wall thickening compatible with cystitis.  Patient underwent urgent cystoscopy with insertion of right ureteral stent and removal of large 3 cm bladder stone on 8/29.  Patient's hospital course was complicated by fluid overload, required Lasix with albumin.  2D echo showed normal EF, grade 1 diastolic dysfunction.  Baseline creatinine 0.7- 0.8 in past year prior to recent hospitalization.  Creatinine 1.62 >1.74  Continue gentle hydration  Lasix held on 9/27;  Nephro recs:  Likely ATN  Stop IVF, hold Lasix    Iron deficiency anemia  Patient refusing labs  Baseline hemoglobin appears to be around 9s.  Hemoglobin was 10.3 on 8/26, trended down to 7s on recent hospitalization. Hemoglobin was 7.2 on discharge. Patient received IV Venofer. Iron was less than 10, ferritin 163.  Transfusion consent obtained from son over the phone  9/27 Hb 7.8-patient refusing labs    History of recurrent UTIs  Secondary to chronic Rivera catheter.  Recent urine culture as above  Urine culture pending  Patient started on  cefepime Diflucan due to pyuric urine in the ureter with yeast debris found in OR.  UCX-mixed contaminants    Right nephrolithiasis  As above  Patient underwent elective cystoscopy uteroscopy, stone extraction, right ureteral stent exchange today.  Found to have migrated right stent, pyuric urine in the ureter with yeast debris.  Started on cefepime Diflucan per urology  Management per primary.  Monitor I+Os  Uncontrolled type 2 diabetes mellitus with hyperglycemia (HCC)    Lab Results   Component Value Date    HGBA1C 14.6 (H) 08/26/2024   Lantus 14 units at bedtime with SSI ACHS   A1c was 14.6 percent  Carb controlled    Hypothyroidism  Continue Synthroid  Parkinson disease (HCC)  Continue Sinemet  Gastroesophageal reflux disease  Continue PPI  Diastolic dysfunction  2D echo on 8/30 in recent hospitalization showed EF 55%, grade 1 diastolic dysfunction, RV systolic pressure is mildly elevated at 51 mmHg,no  significant valvular disease.  Patient was discharged on Lasix 20 mg p.o. daily due to fluid overload during hospitalization.    Lasix for 1 day.  Daily weight, intake output  Sepsis (HCC)  Evolved after procedure, as evidenced by fever 102.4, tachycardia, with suspected source of infection complicated UTI as above.  Sepsis workup ordered - WBC 10.02, procalcitonin 0.46, lactic acid 1.1  On IV cefepime and Diflucan  9/27 - Patient afebrile and no longer tachycardic   BCx-NG 72H/UCX-mixed contaminants  Gentle IV hydration  Monitor vitals for fever recurrence  Rash  Erythematous, pruritic rash on patient's groin area and inner thighs  Keep area clean and dry  Apply nystatin powder to area BID   Wound care consulted  Hyponatremia  Mild,  Na-129, BL-132  D/W nephro recs:  workup pending  ordered for salt tablets 2 g x 1 dose    VTE Pharmacologic Prophylaxis: VTE Score: 6 High Risk (Score >/= 5) - Pharmacological DVT Prophylaxis Contraindicated. Sequential Compression Devices Ordered.    Mobility:   Basic Mobility  Inpatient Raw Score: 6  JH-HLM Goal: 2: Bed activities/Dependent transfer  JH-HLM Achieved: 1: Laying in bed  JH-HLM Goal NOT achieved. Continue with multidisciplinary rounding and encourage appropriate mobility to improve upon JH-HLM goals.    Patient Centered Rounds: I performed bedside rounds with nursing staff today.   Discussions with Specialists or Other Care Team Provider: Patient treated primary team urology    Education and Discussions with Family / Patient:  Primary team to give updates.     Current Length of Stay: 3 day(s)  Current Patient Status: Inpatient   Certification Statement: Per primary  Discharge Plan: Per primary    Code Status: Prior    Subjective   Patient was seen at bedside this morning.  Reported no headache, blurry vision, chest pain, shortness of breath.  Nursing reported no events.  Patient gave update regarding plan of care and specialist recs    Objective     Vitals:   Temp (24hrs), Av.8 °F (37.1 °C), Min:97.8 °F (36.6 °C), Max:99.5 °F (37.5 °C)    Temp:  [97.8 °F (36.6 °C)-99.5 °F (37.5 °C)] 98.6 °F (37 °C)  HR:  [66-70] 68  Resp:  [12-18] 18  BP: (138-162)/(56-79) 153/79  SpO2:  [94 %-96 %] 94 %  Body mass index is 24 kg/m².     Input and Output Summary (last 24 hours):     Intake/Output Summary (Last 24 hours) at 2024 0924  Last data filed at 2024 0533  Gross per 24 hour   Intake 120 ml   Output 1700 ml   Net -1580 ml       Physical Exam  Vitals reviewed.   Constitutional:       General: He is not in acute distress.     Appearance: Normal appearance.   HENT:      Head: Atraumatic.      Nose: No congestion.   Eyes:      General: No scleral icterus.     Pupils: Pupils are equal, round, and reactive to light.   Cardiovascular:      Rate and Rhythm: Normal rate.      Heart sounds: No murmur heard.  Pulmonary:      Effort: No respiratory distress.      Breath sounds: No wheezing, rhonchi or rales.   Abdominal:      Palpations: Abdomen is soft.      Tenderness: There is no  abdominal tenderness. There is no guarding.   Musculoskeletal:         General: No swelling or deformity. Normal range of motion.      Cervical back: No tenderness.      Right lower leg: No edema.      Left lower leg: No edema.   Feet:      Right foot:      Skin integrity: No callus.   Skin:     General: Skin is warm.      Capillary Refill: Capillary refill takes more than 3 seconds.      Findings: No lesion or rash.   Neurological:      Mental Status: He is oriented to person, place, and time.      Cranial Nerves: No cranial nerve deficit.      Coordination: Coordination normal.      Comments: Fine upper extremity tremors   Psychiatric:         Mood and Affect: Mood normal.         Thought Content: Thought content normal.       Lines/Drains:  Lines/Drains/Airways       Active Status       Name Placement date Placement time Site Days    Urethral Catheter Non-latex 22 Fr. 09/26/24  1637  Non-latex  3    Ureteral Internal Stent Right ureter 6 Fr. 09/26/24  1633  Right ureter  3                  Urinary Catheter:  Goal for removal: N/A - Chronic Rivera           Lab Results: I have reviewed the following results:   Results from last 7 days   Lab Units 09/27/24  0012   WBC Thousand/uL 10.02   HEMOGLOBIN g/dL 7.8*   HEMATOCRIT % 25.9*   PLATELETS Thousands/uL 214     Results from last 7 days   Lab Units 09/30/24  0622   SODIUM mmol/L 129*   POTASSIUM mmol/L 4.2   CHLORIDE mmol/L 103   CO2 mmol/L 21   BUN mg/dL 40*   CREATININE mg/dL 1.74*   ANION GAP mmol/L 5   CALCIUM mg/dL 8.4   GLUCOSE RANDOM mg/dL 185*     Results from last 7 days   Lab Units 09/27/24  0210   INR  1.08     Results from last 7 days   Lab Units 09/30/24  0724 09/29/24  2109 09/29/24  1545 09/29/24  1105 09/29/24  0731 09/28/24  2231 09/28/24  1609 09/28/24  1115 09/28/24  0703 09/27/24  2107 09/27/24  1702 09/27/24  1122   POC GLUCOSE mg/dl 185* 174* 185* 172* 150* 238* 212* 215* 125 159* 191* 236*         Results from last 7 days   Lab Units  09/27/24  0210 09/27/24  0012   LACTIC ACID mmol/L 1.1  --    PROCALCITONIN ng/ml  --  0.46*       Recent Cultures (last 7 days):   Results from last 7 days   Lab Units 09/27/24  0009 09/26/24  1730   BLOOD CULTURE  No Growth at 48 hrs.  No Growth at 48 hrs.  --    URINE CULTURE   --  20,000-29,000 cfu/ml       Imaging Review: Reviewed radiology reports from this admission including: retrograde pyelogram.  Other Studies: No additional pertinent studies reviewed.    Last 24 Hours Medication List:     Current Facility-Administered Medications:     acetaminophen (TYLENOL) tablet 650 mg, Q6H PRN    aluminum-magnesium hydroxide-simethicone (MAALOX) oral suspension 30 mL, Q6H PRN    carbidopa-levodopa (SINEMET)  mg per tablet 1 tablet, TID    cefepime (MAXIPIME) IVPB (premix in dextrose) 2,000 mg 50 mL, Q12H, Last Rate: 2,000 mg (09/30/24 0022)    docusate sodium (COLACE) capsule 100 mg, Daily    fluconazole (DIFLUCAN) IVPB (premix) 200 mg, Q24H, Last Rate: 200 mg (09/30/24 0234)    insulin glargine (LANTUS) subcutaneous injection 14 Units 0.14 mL, HS    insulin lispro (HumALOG/ADMELOG) 100 units/mL subcutaneous injection 1-5 Units, HS    insulin lispro (HumALOG/ADMELOG) 100 units/mL subcutaneous injection 1-5 Units, TID AC **AND** Fingerstick Glucose (POCT), 4x Daily AC and at bedtime    levothyroxine tablet 25 mcg, Early Morning    nystatin (MYCOSTATIN) powder, BID    ondansetron (ZOFRAN) injection 4 mg, Q6H PRN    pantoprazole (PROTONIX) EC tablet 40 mg, Early Morning    saccharomyces boulardii (FLORASTOR) capsule 250 mg, BID    senna-docusate sodium (SENOKOT S) 8.6-50 mg per tablet 1 tablet, HS    sodium chloride tablet 2 g, Once    Administrative Statements   Today, Patient Was Seen By: Natasha Gayle MD  I have spent a total time of 30 minutes in caring for this patient on the day of the visit/encounter including Counseling / Coordination of care, Documenting in the medical record, Reviewing / ordering  tests, medicine, procedures  , Obtaining or reviewing history  , and Communicating with other healthcare professionals .    **Please Note: This note may have been constructed using a voice recognition system.**

## 2024-09-30 NOTE — ASSESSMENT & PLAN NOTE
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  Urine culture grew low colony Proteus mirabilis. 1/2 blood cultures grew Proteus mirabilis. Patient received cefepime, then Rocephin, then cefpodoxime for 10-day total.  Patient presented with PABLO as well.  Creatinine was 1.95 on admission.  Peaked to 2.15.  Creatinine 1.53 on discharge.  CT showed decreased calculus volume in the right kidney, interval passage of calculi suspected; delayed nephrogram on the right likely due to obstructive uropathy; bladder calculus with diffuse bladder wall thickening compatible with cystitis.  Patient underwent urgent cystoscopy with insertion of right ureteral stent and removal of large 3 cm bladder stone on 8/29.  Patient's hospital course was complicated by fluid overload, required Lasix with albumin.  2D echo showed normal EF, grade 1 diastolic dysfunction.  Baseline creatinine 0.7- 0.8 in past year prior to recent hospitalization.  Creatinine 1.62 >1.74  Continue gentle hydration  Lasix held on 9/27;  Nephro recs:  Likely ATN  Stop IVF, hold Lasix

## 2024-09-30 NOTE — PLAN OF CARE
Problem: PAIN - ADULT  Goal: Verbalizes/displays adequate comfort level or baseline comfort level  Description: Interventions:  - Encourage patient to monitor pain and request assistance  - Assess pain using appropriate pain scale  - Administer analgesics based on type and severity of pain and evaluate response  - Implement non-pharmacological measures as appropriate and evaluate response  - Consider cultural and social influences on pain and pain management  - Notify physician/advanced practitioner if interventions unsuccessful or patient reports new pain  Outcome: Progressing     Problem: INFECTION - ADULT  Goal: Absence or prevention of progression during hospitalization  Description: INTERVENTIONS:  - Assess and monitor for signs and symptoms of infection  - Monitor lab/diagnostic results  - Monitor all insertion sites, i.e. indwelling lines, tubes, and drains  - Monitor endotracheal if appropriate and nasal secretions for changes in amount and color  - [place] appropriate cooling/warming therapies per order  - Administer medications as ordered  - Instruct and encourage patient and family to use good hand hygiene technique  - Identify and instruct in appropriate isolation precautions for identified infection/condition  Outcome: Progressing        Problem: DISCHARGE PLANNING  Goal: Discharge to home or other facility with appropriate resources  Description: INTERVENTIONS:  - Identify barriers to discharge w/patient and caregiver  - Arrange for needed discharge resources and transportation as appropriate  - Identify discharge learning needs (meds, wound care, etc.)  - Arrange for interpretive services to assist at discharge as needed  - Refer to Case Management Department for coordinating discharge planning if the patient needs post-hospital services based on physician/advanced practitioner order or complex needs related to functional status, cognitive ability, or social support system  Outcome: Progressing      Problem: Prexisting or High Potential for Compromised Skin Integrity  Goal: Skin integrity is maintained or improved  Description: INTERVENTIONS:  - Identify patients at risk for skin breakdown  - Assess and monitor skin integrity  - Assess and monitor nutrition and hydration status  - Monitor labs   - Assess for incontinence   - Turn and reposition patient  - Assist with mobility/ambulation  - Relieve pressure over bony prominences  - Avoid friction and shearing  - Provide appropriate hygiene as needed including keeping skin clean and dry  - Evaluate need for skin moisturizer/barrier cream  - Collaborate with interdisciplinary team   - Patient/family teaching  - Consider wound care consult   Outcome: Progressing

## 2024-09-30 NOTE — PROGRESS NOTES
NEPHROLOGY PROGRESS NOTE   Chris Bojorquez 79 y.o. male MRN: [id number]  Unit/Bed#: [address] [place] [address] Encounter: [id number]    ASSESSMENT & PLAN:  79-year-old male initially admitted for elective cystoscopy.recently admitted to [facility] in late August to early September 2024 with sepsis due to Proteus bacteremia of urinary source.  During that admission, he was seen by urology and had a cystoscopy as well as an insertion of a right ureteral stent..  His creatinine during that time was 1.94 on presentation on 8/26/24 and peaked at 2.15 on August 29, 2024.  Thereafter, it had improved and his creatinine was down to around 1.3-1.5 over the last week of his hospitalization.  His creatinine was 1.53 on discharge on September 10. He had a cystoscopy, ureteroscopy, stent exchange, and stone extraction done on September 26, 2024. His creatinine on September 27, 2024 was 1.62.  During the hospital stay it trended up to 1.8 and so nephrology was consulted  Acute kidney injury, POA  -Baseline creatinine: 0.7-0.8  -History of acute kidney injury during hospital admission recently from August to early September for sepsis due to Proteus bacteremia of urinary source.  Peak creatinine during that time was 2.15 and improved to 1.3-1.5.  Most likely had ATN with incomplete renal recovery at that time.  -Admission creatinine: 1.62 mg/dl on 9/27/2024  - Peak serum creatinine 1.83 on 9/27 felt to be due to hemodynamic in origin on top of residual ATN.  Did receive IV fluid but renal function improved slightly.  Was taken off diuretics  - Work up:   UA with microscopy: Ordered for UA urine microscopy  Imaging: Prior CT from August was suggestive of calculi in the right renal pelvis.  No left-sided obstruction or calculi  -Etiology: Acute kidney injury likely due to worsening of ATN from hemodynamic changes on top of residual ATN from prior admission  -Hospital Course: Renal function improved to creatinine  -Plan:   Renal function  improved to creatinine 1.74 mg/dL, clinically appears euvolemic, to hold diuretics.  Ordered for salt tablet due to drop in sodium to 129 likely from for solute intake, continue to monitor recommend inpatient stay due to drop in sodium  Check urine microscopy and urine eosinophils as patient is on antibiotic - for possibility of AIN as well  Avoid nephrotoxins and dose all medications per EGFR.    Avoid hypotension.                                            Hyponatremia  -Sodium has been around 131-133 meq/L likely due to free water intake and impaired excretion from renal dysfunction  -Sodium dropped to 129 meq/L today.  -CT abdomen and pelvis with contrast did not show any malignancy  Order for hyponatremia workup, ordered for salt tablets 2 g x 1 dose    Anemia, iron deficiency: Hemoglobin 7.8 g/dL continue to monitor.    -Recommend oral ferrous sulfate at the time of discharge    Right nephrolithiasis:  - Status post cystoscopy, ureteroscopy, stent exchange and stone extraction on 9/26.  Continue to follow-up urology recommendations.  Noted plan to continue antibiotic and antifungal in the light of yeast infection contaminating stent as per urology note    History of recurrent UTIs, secondary to chronic Rivera catheter  -Blood culture negative urine culture 20-29,000 mixed contaminants.  Found to have yeast debris's in OR and receiving Diflucan.  Continue antibiotic antifungal per urology and primary team    Chronic diastolic CHF: Echo with ejection fraction 55% and grade 1 diastolic dysfunction.  During previous hospital admission was discharged on Lasix 20 mg daily due to fluid overload which was likely iatrogenic from IV fluid.  Currently off diuretics and appears euvolemic continue to monitor.  Weight stable at 162 pound    Diabetes mellitus type 2 with hyperglycemia-management per primary team on insulin    Discussed with primary team that renal function slightly improved to creatinine 1.7 mg/dL, continue  to hold further diuretics and primary team agreed with the plan, also discussed about giving salt tablet for Hyponatremia and plan for hyponatremia workup to which primary team agreed    SUBJECTIVE:  No new complaints.  No chest pain or shortness of breath, no nausea vomiting    OBJECTIVE:  Current Weight: Weight - Scale: 73.7 kg (162 lb 8 oz)  Vitals:    09/30/24 0756   BP: 153/79   Pulse: 68   Resp: 18   Temp: 98.6 °F (37 °C)   SpO2: 94%       Intake/Output Summary (Last 24 hours) at 9/30/2024 0933  Last data filed at 9/30/2024 0533  Gross per 24 hour   Intake 120 ml   Output 1700 ml   Net -1580 ml       Physical Exam  General:  Ill looking, awake.  Eyes: Conjunctivae pink,  Sclera anicteric  ENT: lips and mucous membranes moist  Neck: supple   Chest: Clear to Auscultation both lungs,  no crackles, ronchus or wheezing.  CVS: S1 & S2 present, normal rate, regular rhythm, no murmur.  Abdomen: soft, non-tender, non-distended, Bowel sounds normoactive  Extremities: no edema of  legs  Skin: no rash  Neuro: awake, alert, oriented x 3   Psych: Mood and affect appropriate      Medications:    Current Facility-Administered Medications:     acetaminophen (TYLENOL) tablet 650 mg, 650 mg, Oral, Q6H PRN, FRANCES Madden    aluminum-magnesium hydroxide-simethicone (MAALOX) oral suspension 30 mL, 30 mL, Oral, Q6H PRN, Ciro Hughes MD    carbidopa-levodopa (SINEMET)  mg per tablet 1 tablet, 1 tablet, Oral, TID, Ciro Hughes MD, 1 tablet at 09/30/24 0835    cefepime (MAXIPIME) IVPB (premix in dextrose) 2,000 mg 50 mL, 2,000 mg, Intravenous, Q12H, FRANCES Madden, Last Rate: 100 mL/hr at 09/30/24 0022, 2,000 mg at 09/30/24 0022    docusate sodium (COLACE) capsule 100 mg, 100 mg, Oral, Daily, Ciro Hughes MD, 100 mg at 09/30/24 0835    fluconazole (DIFLUCAN) IVPB (premix) 200 mg, 200 mg, Intravenous, Q24H, FRANCES Madden, Last Rate: 100 mL/hr at 09/30/24 0234, 200 mg at 09/30/24 0234    insulin glargine  (LANTUS) subcutaneous injection 14 Units 0.14 mL, 14 Units, Subcutaneous, HS, Cuiyin Yurik, CRNP, 14 Units at 09/29/24 2217    insulin lispro (HumALOG/ADMELOG) 100 units/mL subcutaneous injection 1-5 Units, 1-5 Units, Subcutaneous, HS, Cuiyin Yurik, CRNP, 1 Units at 09/29/24 2217    insulin lispro (HumALOG/ADMELOG) 100 units/mL subcutaneous injection 1-5 Units, 1-5 Units, Subcutaneous, TID AC, 1 Units at 09/30/24 0757 **AND** Fingerstick Glucose (POCT), , , 4x Daily AC and at bedtime, Silverio Penn MD    levothyroxine tablet 25 mcg, 25 mcg, Oral, Early Morning, Cuiyin Yurik, CRNP, 25 mcg at 09/30/24 0528    nystatin (MYCOSTATIN) powder, , Topical, BID, Natasha Gayle MD, Given at 09/29/24 1759    ondansetron (ZOFRAN) injection 4 mg, 4 mg, Intravenous, Q6H PRN, Ciro Hughes MD    pantoprazole (PROTONIX) EC tablet 40 mg, 40 mg, Oral, Early Morning, Cuiyin Yurik, CRNP, 40 mg at 09/30/24 0528    saccharomyces boulardii (FLORASTOR) capsule 250 mg, 250 mg, Oral, BID, Cuiyin Yurik, CRNP, 250 mg at 09/30/24 0835    senna-docusate sodium (SENOKOT S) 8.6-50 mg per tablet 1 tablet, 1 tablet, Oral, HS, Cuiyin Yurik, CRNP, 1 tablet at 09/29/24 2217    Invasive Devices:   Urethral Catheter Non-latex 22 Fr. (Active)   Reasons to continue Urinary Catheter  Chronic urinary catheter 09/30/24 0058   Goal for Removal N/A- chronic yang 09/30/24 0058   Site Assessment Clean;Skin intact 09/30/24 0058   Yang Care Done 09/30/24 0058   Collection Container Standard drainage bag 09/30/24 0058   Securement Method Leg strap 09/30/24 0058   Output (mL) 550 mL 09/30/24 0533       Lab Results:   Results from last 7 days   Lab Units 09/30/24  0622 09/28/24  0606 09/27/24  2242 09/27/24  0520 09/27/24  0012   WBC Thousand/uL  --   --   --   --  10.02   HEMOGLOBIN g/dL  --   --   --   --  7.8*   HEMATOCRIT %  --   --   --   --  25.9*   PLATELETS Thousands/uL  --   --   --   --  214   POTASSIUM mmol/L 4.2 4.7 5.1 5.8* 5.1   CHLORIDE mmol/L  "103 105 103 103 99   CO2 mmol/L 21 25 24 22 23   BUN mg/dL 40* 35* 37* 38* 41*   CREATININE mg/dL 1.74* 1.75* 1.83* 1.66* 1.62*   CALCIUM mg/dL 8.4 7.9* 7.8* 7.8* 8.1*   MAGNESIUM mg/dL  --   --   --  2.4 2.0       Previous work up:         Portions of the record may have been created with voice recognition software. Occasional wrong word or \"sound a like\" substitutions may have occurred due to the inherent limitations of voice recognition software. Read the chart carefully and recognize, using context, where substitutions have occurred.If you have any questions, please contact the dictating provider.    "

## 2024-09-30 NOTE — ASSESSMENT & PLAN NOTE
Patient refusing labs  Baseline hemoglobin appears to be around 9s.  Hemoglobin was 10.3 on 8/26, trended down to 7s on recent hospitalization. Hemoglobin was 7.2 on discharge. Patient received IV Venofer. Iron was less than 10, ferritin 163.  Transfusion consent obtained from son over the phone  9/27 Hb 7.8-patient refusing labs

## 2024-09-30 NOTE — PLAN OF CARE
Problem: PAIN - ADULT  Goal: Verbalizes/displays adequate comfort level or baseline comfort level  Description: Interventions:  - Encourage patient to monitor pain and request assistance  - Assess pain using appropriate pain scale  - Administer analgesics based on type and severity of pain and evaluate response  - Implement non-pharmacological measures as appropriate and evaluate response  - Consider cultural and social influences on pain and pain management  - Notify physician/advanced practitioner if interventions unsuccessful or patient reports new pain  Outcome: Progressing     Problem: INFECTION - ADULT  Goal: Absence or prevention of progression during hospitalization  Description: INTERVENTIONS:  - Assess and monitor for signs and symptoms of infection  - Monitor lab/diagnostic results  - Monitor all insertion sites, i.e. indwelling lines, tubes, and drains  - Monitor endotracheal if appropriate and nasal secretions for changes in amount and color  - Macy appropriate cooling/warming therapies per order  - Administer medications as ordered  - Instruct and encourage patient and family to use good hand hygiene technique  - Identify and instruct in appropriate isolation precautions for identified infection/condition  Outcome: Progressing  Goal: Absence of fever/infection during neutropenic period  Description: INTERVENTIONS:  - Monitor WBC    Outcome: Progressing     Problem: SAFETY ADULT  Goal: Patient will remain free of falls  Description: INTERVENTIONS:  - Educate patient/family on patient safety including physical limitations  - Instruct patient to call for assistance with activity   - Consult OT/PT to assist with strengthening/mobility   - Keep Call bell within reach  - Keep bed low and locked with side rails adjusted as appropriate  - Keep care items and personal belongings within reach  - Initiate and maintain comfort rounds  - Make Fall Risk Sign visible to staff  - Offer Toileting every 2 Hours,  in advance of need  - Initiate/Maintain bed alarm  - Obtain necessary fall risk management equipment: call bell   - Apply yellow socks and bracelet for high fall risk patients  - Consider moving patient to room near nurses station  Outcome: Progressing  Goal: Maintain or return to baseline ADL function  Description: INTERVENTIONS:  -  Assess patient's ability to carry out ADLs; assess patient's baseline for ADL function and identify physical deficits which impact ability to perform ADLs (bathing, care of mouth/teeth, toileting, grooming, dressing, etc.)  - Assess/evaluate cause of self-care deficits   - Assess range of motion  - Assess patient's mobility; develop plan if impaired  - Assess patient's need for assistive devices and provide as appropriate  - Encourage maximum independence but intervene and supervise when necessary  - Involve family in performance of ADLs  - Assess for home care needs following discharge   - Consider OT consult to assist with ADL evaluation and planning for discharge  - Provide patient education as appropriate  Outcome: Progressing  Goal: Maintains/Returns to pre admission functional level  Description: INTERVENTIONS:  - Perform AM-PAC 6 Click Basic Mobility/ Daily Activity assessment daily.  - Set and communicate daily mobility goal to care team and patient/family/caregiver.   - Collaborate with rehabilitation services on mobility goals if consulted  - Reposition patient every 2 hours.  - Out of bed to chair 3 times a day   - Out of bed for meals 3 times a day  - Out of bed for toileting  - Record patient progress and toleration of activity level   Outcome: Progressing     Problem: DISCHARGE PLANNING  Goal: Discharge to home or other facility with appropriate resources  Description: INTERVENTIONS:  - Identify barriers to discharge w/patient and caregiver  - Arrange for needed discharge resources and transportation as appropriate  - Identify discharge learning needs (meds, wound care,  etc.)  - Arrange for interpretive services to assist at discharge as needed  - Refer to Case Management Department for coordinating discharge planning if the patient needs post-hospital services based on physician/advanced practitioner order or complex needs related to functional status, cognitive ability, or social support system  Outcome: Progressing     Problem: Knowledge Deficit  Goal: Patient/family/caregiver demonstrates understanding of disease process, treatment plan, medications, and discharge instructions  Description: Complete learning assessment and assess knowledge base.  Interventions:  - Provide teaching at level of understanding  - Provide teaching via preferred learning methods  Outcome: Progressing     Problem: Prexisting or High Potential for Compromised Skin Integrity  Goal: Skin integrity is maintained or improved  Description: INTERVENTIONS:  - Identify patients at risk for skin breakdown  - Assess and monitor skin integrity  - Assess and monitor nutrition and hydration status  - Monitor labs   - Assess for incontinence   - Turn and reposition patient  - Assist with mobility/ambulation  - Relieve pressure over bony prominences  - Avoid friction and shearing  - Provide appropriate hygiene as needed including keeping skin clean and dry  - Evaluate need for skin moisturizer/barrier cream  - Collaborate with interdisciplinary team   - Patient/family teaching  - Consider wound care consult   Outcome: Progressing

## 2024-09-30 NOTE — PROGRESS NOTES
Progress Note - Hospitalist   Name: Chris Bojorquez 79 y.o. 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  Urine culture grew low colony Proteus mirabilis. 1/2 blood cultures grew Proteus mirabilis. Patient received cefepime, then Rocephin, then cefpodoxime for 10-day total.  Patient presented with PABLO as well.  Creatinine was 1.95 on admission.  Peaked to 2.15.  Creatinine 1.53 on discharge.  CT showed decreased calculus volume in the right kidney, interval passage of calculi suspected; delayed nephrogram on the right likely due to obstructive uropathy; bladder calculus with diffuse bladder wall thickening compatible with cystitis.  Patient underwent urgent cystoscopy with insertion of right ureteral stent and removal of large 3 cm bladder stone on 8/29.  Patient's hospital course was complicated by fluid overload, required Lasix with albumin.  2D echo showed normal EF, grade 1 diastolic dysfunction.  Baseline creatinine 0.7- 0.8 in past year prior to recent hospitalization.  Creatinine 1.62 >1.75  Continue gentle hydration  Lasix held on 9/27;  Nephro recs:  Stop IVF, hold Lasix    Iron deficiency anemia  Patient refusing labs  Baseline hemoglobin appears to be around 9s.  Hemoglobin was 10.3 on 8/26, trended down to 7s on recent hospitalization. Hemoglobin was 7.2 on discharge. Patient received IV Venofer. Iron was less than 10, ferritin 163.  Transfusion consent obtained from son over the phone  9/27 Hb 7.8-patient refusing labs    History of recurrent UTIs  Secondary to chronic Rivera catheter.  Recent urine culture as above  Urine culture pending  Patient started on cefepime Diflucan due to  pyuric urine in the ureter with yeast debris found in OR.  UCX-mixed contaminants    Right nephrolithiasis  As above  Patient underwent elective cystoscopy uteroscopy, stone extraction, right ureteral stent exchange today.  Found to have migrated right stent, pyuric urine in the ureter with yeast debris.  Started on cefepime Diflucan per urology  Management per primary.  Monitor I+Os  Uncontrolled type 2 diabetes mellitus with hyperglycemia (HCC)    Lab Results   Component Value Date    HGBA1C 14.6 (H) 08/26/2024   Lantus 14 units at bedtime with SSI ACHS   A1c was 14.6 percent  Carb controlled    Hypothyroidism  Continue Synthroid  Parkinson disease (HCC)  Continue Sinemet  Gastroesophageal reflux disease  Continue PPI  Diastolic dysfunction  2D echo on 8/30 in recent hospitalization showed EF 55%, grade 1 diastolic dysfunction, RV systolic pressure is mildly elevated at 51 mmHg,no  significant valvular disease.  Patient was discharged on Lasix 20 mg p.o. daily due to fluid overload during hospitalization.    Lasix for 1 day.  Daily weight, intake output  Sepsis (HCC)  Evolved after procedure, as evidenced by fever 102.4, tachycardia, with suspected source of infection complicated UTI as above.  Sepsis workup ordered - WBC 10.02, procalcitonin 0.46, lactic acid 1.1  On IV cefepime and Diflucan  9/27 - Patient afebrile and no longer tachycardic   BCx-NG 24H/UCX-mixed contaminants  Gentle IV hydration  Monitor vitals for fever recurrence  Rash  Erythematous, pruritic rash on patient's groin area and inner thighs  Keep area clean and dry  Apply nystatin powder to area BID   Wound care consulted    VTE Pharmacologic Prophylaxis: VTE Score: 6 High Risk (Score >/= 5) - Pharmacological DVT Prophylaxis Contraindicated. Sequential Compression Devices Ordered.    Mobility:   Basic Mobility Inpatient Raw Score: 6  JH-HLM Goal: 2: Bed activities/Dependent transfer  JH-HLM Achieved: 1: Laying in bed  JH-HLM Goal NOT achieved.  Continue with multidisciplinary rounding and encourage appropriate mobility to improve upon -[facility] goals.    Patient Centered Rounds: I performed bedside rounds with nursing staff today.   Discussions with Specialists or Other Care Team Provider: Patient treated primary team urology    Education and Discussions with Family / Patient:  Primary team to give updates.     Current Length of Stay: 3 day(s)  Current Patient Status: Inpatient   Certification Statement: Per primary  Discharge Plan: Per primary    Code Status: Prior    Subjective   Patient was seen at bedside this morning.  Nursing reported no events, patient stated that he wanted meatloaf and soda roast beef, denied any fever, chills, chest pain, palpitations, shortness of breath, headache or blurry vision.    Objective     Vitals:   Temp (24hrs), Av.8 °F (37.1 °C), Min:97.8 °F (36.6 °C), Max:99.5 °F (37.5 °C)    Temp:  [97.8 °F (36.6 °C)-99.5 °F (37.5 °C)] 98.6 °F (37 °C)  HR:  [66-70] 68  Resp:  [12-18] 18  BP: (138-162)/(56-79) 153/79  SpO2:  [94 %-96 %] 94 %  Body mass index is 24 kg/m².     Input and Output Summary (last 24 hours):     Intake/Output Summary (Last 24 hours) at 2024 0948  Last data filed at 2024 0533  Gross per 24 hour   Intake 120 ml   Output 1700 ml   Net -1580 ml       Physical Exam  Vitals reviewed.   Constitutional:       General: He is not in acute distress.     Appearance: Normal appearance.   HENT:      Head: Atraumatic.      Nose: No congestion.   Eyes:      General: No scleral icterus.     Pupils: Pupils are equal, round, and reactive to light.   Cardiovascular:      Rate and Rhythm: Normal rate.      Heart sounds: No murmur heard.  Pulmonary:      Effort: No respiratory distress.      Breath sounds: No wheezing, rhonchi or rales.   Abdominal:      Palpations: Abdomen is soft.      Comments: Taunt and protuberant   Musculoskeletal:         General: No swelling or deformity. Normal range of motion.      Cervical  back: No tenderness.      Right lower leg: No edema.      Left lower leg: No edema.   Feet:      Right foot:      Skin integrity: No callus.   Skin:     General: Skin is warm.      Capillary Refill: Capillary refill takes less than 2 seconds.      Findings: No lesion or rash.   Neurological:      Mental Status: He is oriented to person, place, and time.      Cranial Nerves: No cranial nerve deficit.      Coordination: Coordination normal.      Comments: Upper extremities tremors slight   Psychiatric:         Mood and Affect: Mood normal.         Thought Content: Thought content normal.       Lines/Drains:  Lines/Drains/Airways       Active Status       Name Placement date Placement time Site Days    Urethral Catheter Non-latex 22 Fr. 09/26/24  1637  Non-latex  3    Ureteral Internal Stent Right ureter 6 Fr. 09/26/24  1633  Right ureter  3                  Urinary Catheter:  Goal for removal: N/A - Chronic Rivera           Lab Results: I have reviewed the following results:   Results from last 7 days   Lab Units 09/27/24  0012   WBC Thousand/uL 10.02   HEMOGLOBIN g/dL 7.8*   HEMATOCRIT % 25.9*   PLATELETS Thousands/uL 214     Results from last 7 days   Lab Units 09/30/24  0622   SODIUM mmol/L 129*   POTASSIUM mmol/L 4.2   CHLORIDE mmol/L 103   CO2 mmol/L 21   BUN mg/dL 40*   CREATININE mg/dL 1.74*   ANION GAP mmol/L 5   CALCIUM mg/dL 8.4   GLUCOSE RANDOM mg/dL 185*     Results from last 7 days   Lab Units 09/27/24  0210   INR  1.08     Results from last 7 days   Lab Units 09/30/24  0724 09/29/24  2109 09/29/24  1545 09/29/24  1105 09/29/24  0731 09/28/24  2231 09/28/24  1609 09/28/24  1115 09/28/24  0703 09/27/24  2107 09/27/24  1702 09/27/24  1122   POC GLUCOSE mg/dl 185* 174* 185* 172* 150* 238* 212* 215* 125 159* 191* 236*         Results from last 7 days   Lab Units 09/27/24  0210 09/27/24  0012   LACTIC ACID mmol/L 1.1  --    PROCALCITONIN ng/ml  --  0.46*       Recent Cultures (last 7 days):   Results from last  7 days   Lab Units 09/27/24  0009 09/26/24  1730   BLOOD CULTURE  No Growth at 48 hrs.  No Growth at 48 hrs.  --    URINE CULTURE   --  20,000-29,000 cfu/ml       Imaging Review: Reviewed radiology reports from this admission including: retrograde pyelogram.  Other Studies: No additional pertinent studies reviewed.    Last 24 Hours Medication List:     Current Facility-Administered Medications:     acetaminophen (TYLENOL) tablet 650 mg, Q6H PRN    aluminum-magnesium hydroxide-simethicone (MAALOX) oral suspension 30 mL, Q6H PRN    carbidopa-levodopa (SINEMET)  mg per tablet 1 tablet, TID    cefepime (MAXIPIME) IVPB (premix in dextrose) 2,000 mg 50 mL, Q12H, Last Rate: 2,000 mg (09/30/24 0022)    docusate sodium (COLACE) capsule 100 mg, Daily    fluconazole (DIFLUCAN) IVPB (premix) 200 mg, Q24H, Last Rate: 200 mg (09/30/24 0234)    insulin glargine (LANTUS) subcutaneous injection 14 Units 0.14 mL, HS    insulin lispro (HumALOG/ADMELOG) 100 units/mL subcutaneous injection 1-5 Units, HS    insulin lispro (HumALOG/ADMELOG) 100 units/mL subcutaneous injection 1-5 Units, TID AC **AND** Fingerstick Glucose (POCT), 4x Daily AC and at bedtime    levothyroxine tablet 25 mcg, Early Morning    nystatin (MYCOSTATIN) powder, BID    ondansetron (ZOFRAN) injection 4 mg, Q6H PRN    pantoprazole (PROTONIX) EC tablet 40 mg, Early Morning    saccharomyces boulardii (FLORASTOR) capsule 250 mg, BID    senna-docusate sodium (SENOKOT S) 8.6-50 mg per tablet 1 tablet, HS    sodium chloride tablet 2 g, Once    Administrative Statements   Today, Patient Was Seen By: Natasha Gayle MD  I have spent a total time of 30 minutes in caring for this patient on the day of the visit/encounter including Counseling / Coordination of care, Documenting in the medical record, Reviewing / ordering tests, medicine, procedures  , Obtaining or reviewing history  , and Communicating with other healthcare professionals .    **Please Note: This note may  have been constructed using a voice recognition system.**

## 2024-09-30 NOTE — ASSESSMENT & PLAN NOTE
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  Urine culture grew low colony Proteus mirabilis. 1/2 blood cultures grew Proteus mirabilis. Patient received cefepime, then Rocephin, then cefpodoxime for 10-day total.  Patient presented with PABLO as well.  Creatinine was 1.95 on admission.  Peaked to 2.15.  Creatinine 1.53 on discharge.  CT showed decreased calculus volume in the right kidney, interval passage of calculi suspected; delayed nephrogram on the right likely due to obstructive uropathy; bladder calculus with diffuse bladder wall thickening compatible with cystitis.  Patient underwent urgent cystoscopy with insertion of right ureteral stent and removal of large 3 cm bladder stone on 8/29.  Patient's hospital course was complicated by fluid overload, required Lasix with albumin.  2D echo showed normal EF, grade 1 diastolic dysfunction.  Baseline creatinine 0.7- 0.8 in past year prior to recent hospitalization.  Creatinine 1.62 >1.75  Continue gentle hydration  Lasix held on 9/27;  Nephro recs:  Stop IVF, hold Lasix

## 2024-09-30 NOTE — ASSESSMENT & PLAN NOTE
As above  Patient underwent elective cystoscopy uteroscopy, stone extraction, right ureteral stent exchange today.  Found to have migrated right stent, pyuric urine in the ureter with yeast debris.  Started on cefepime Diflucan per urology  Management per primary.  Monitor I+Os

## 2024-09-30 NOTE — PROGRESS NOTES
"Progress Note - Urology      Patient: Chris Bojorquez   : 1945 Sex: male   MRN: [id number]     CSN: [id number]  Unit/Bed#: [address] [place] [address]     SUBJECTIVE:   Patient seen on afternoon rounds  Somnolent  Rivera urine draining clear  Acute renal failure creatinine trending down  1.74    Objective   Vitals: /79   Pulse 68   Temp 98.6 °F (37 °C)   Resp 18   Ht 5' 9\" (1.753 m)   Wt 73.7 kg (162 lb 8 oz)   SpO2 95%   BMI 24.00 kg/m²     I/O last 24 hours:  In: 120 [P.O.:120]  Out: 1700 [Urine:1700]      Physical Exam:   General Alert awake   Normocephalic atraumatic PERRLA  Lungs clear bilaterally  Cardiac normal S1 normal S2  Abdomen soft, flank pain  Extremities no edema      Lab Results: CBC:   Lab Results   Component Value Date    WBC 10.02 2024    HGB 7.8 (L) 2024    HCT 25.9 (L) 2024    MCV 86 2024     2024    RBC 3.03 (L) 2024    MCH 25.7 (L) 2024    MCHC 30.1 (L) 2024    RDW 17.2 (H) 2024    MPV 8.8 (L) 2024    NRBC 0 09/10/2024     CMP:   Lab Results   Component Value Date     2024    CO2 21 2024    BUN 40 (H) 2024    CREATININE 1.74 (H) 2024    CALCIUM 8.4 2024    AST 20 09/10/2024    ALT 7 09/10/2024    ALKPHOS 111 (H) 09/10/2024    EGFR 36 2024     Urinalysis:   Lab Results   Component Value Date    COLORU Yellow 2024    CLARITYU Turbid 2024    SPECGRAV 1.025 2024    PHUR 5.5 2024    LEUKOCYTESUR Large (A) 2024    NITRITE Negative 2024    GLUCOSEU 30 (3/100%) (A) 2024    KETONESU Negative 2024    BILIRUBINUR Negative 2024    BLOODU Large (A) 2024     Urine Culture:   Lab Results   Component Value Date    URINECX 20,000-29,000 cfu/ml 2024     PSA: No results found for: \"PSA\"      Plan  Complicated UTI  Continue antibiotics as per medical team  Gross hematuria cleared  Can attempt voiding trial prior to discharge  Renal " failure  Creatinine trending down      Ciro Hughes MD

## 2024-10-01 PROBLEM — N17.9 AKI (ACUTE KIDNEY INJURY) (HCC): Status: ACTIVE | Noted: 2024-09-26

## 2024-10-01 LAB
ANION GAP SERPL CALCULATED.3IONS-SCNC: 6 MMOL/L (ref 4–13)
BUN SERPL-MCNC: 38 MG/DL (ref 5–25)
CALCIUM SERPL-MCNC: 8.6 MG/DL (ref 8.4–10.2)
CHLORIDE SERPL-SCNC: 104 MMOL/L (ref 96–108)
CO2 SERPL-SCNC: 24 MMOL/L (ref 21–32)
CORTIS AM PEAK SERPL-MCNC: 13.2 UG/DL (ref 6.7–22.6)
CREAT SERPL-MCNC: 1.46 MG/DL (ref 0.6–1.3)
EOSINOPHIL NFR URNS MANUAL: 1 %
ERYTHROCYTE [DISTWIDTH] IN BLOOD BY AUTOMATED COUNT: 16.6 % (ref 11.6–15.1)
GFR SERPL CREATININE-BSD FRML MDRD: 45 ML/MIN/1.73SQ M
GLUCOSE SERPL-MCNC: 142 MG/DL (ref 65–140)
GLUCOSE SERPL-MCNC: 150 MG/DL (ref 65–140)
GLUCOSE SERPL-MCNC: 214 MG/DL (ref 65–140)
GLUCOSE SERPL-MCNC: 227 MG/DL (ref 65–140)
GLUCOSE SERPL-MCNC: 231 MG/DL (ref 65–140)
HCT VFR BLD AUTO: 26.8 % (ref 36.5–49.3)
HGB BLD-MCNC: 8.2 G/DL (ref 12–17)
MCH RBC QN AUTO: 26.4 PG (ref 26.8–34.3)
MCHC RBC AUTO-ENTMCNC: 30.6 G/DL (ref 31.4–37.4)
MCV RBC AUTO: 86 FL (ref 82–98)
PLATELET # BLD AUTO: 201 THOUSANDS/UL (ref 149–390)
PMV BLD AUTO: 9.3 FL (ref 8.9–12.7)
POTASSIUM SERPL-SCNC: 4.4 MMOL/L (ref 3.5–5.3)
RBC # BLD AUTO: 3.11 MILLION/UL (ref 3.88–5.62)
SODIUM SERPL-SCNC: 134 MMOL/L (ref 135–147)
T4 FREE SERPL-MCNC: 0.71 NG/DL (ref 0.61–1.12)
TSH SERPL DL<=0.05 MIU/L-ACNC: 6.03 UIU/ML (ref 0.45–4.5)
URATE SERPL-MCNC: 5.1 MG/DL (ref 3.5–8.5)
WBC # BLD AUTO: 5.33 THOUSAND/UL (ref 4.31–10.16)

## 2024-10-01 PROCEDURE — 82533 TOTAL CORTISOL: CPT | Performed by: INTERNAL MEDICINE

## 2024-10-01 PROCEDURE — 82948 REAGENT STRIP/BLOOD GLUCOSE: CPT

## 2024-10-01 PROCEDURE — 99232 SBSQ HOSP IP/OBS MODERATE 35: CPT | Performed by: INTERNAL MEDICINE

## 2024-10-01 PROCEDURE — 84550 ASSAY OF BLOOD/URIC ACID: CPT | Performed by: INTERNAL MEDICINE

## 2024-10-01 PROCEDURE — 84443 ASSAY THYROID STIM HORMONE: CPT | Performed by: INTERNAL MEDICINE

## 2024-10-01 PROCEDURE — 84439 ASSAY OF FREE THYROXINE: CPT | Performed by: INTERNAL MEDICINE

## 2024-10-01 PROCEDURE — 85027 COMPLETE CBC AUTOMATED: CPT | Performed by: NURSE PRACTITIONER

## 2024-10-01 PROCEDURE — 99232 SBSQ HOSP IP/OBS MODERATE 35: CPT | Performed by: FAMILY MEDICINE

## 2024-10-01 PROCEDURE — 80048 BASIC METABOLIC PNL TOTAL CA: CPT | Performed by: STUDENT IN AN ORGANIZED HEALTH CARE EDUCATION/TRAINING PROGRAM

## 2024-10-01 RX ADMIN — Medication 250 MG: at 17:52

## 2024-10-01 RX ADMIN — Medication 250 MG: at 08:23

## 2024-10-01 RX ADMIN — CARBIDOPA AND LEVODOPA 1 TABLET: 25; 100 TABLET ORAL at 16:32

## 2024-10-01 RX ADMIN — INSULIN LISPRO 2 UNITS: 100 INJECTION, SOLUTION INTRAVENOUS; SUBCUTANEOUS at 16:32

## 2024-10-01 RX ADMIN — CEFEPIME HYDROCHLORIDE 2000 MG: 2 INJECTION, SOLUTION INTRAVENOUS at 00:08

## 2024-10-01 RX ADMIN — NYSTATIN: 100000 POWDER TOPICAL at 08:31

## 2024-10-01 RX ADMIN — NYSTATIN: 100000 POWDER TOPICAL at 17:54

## 2024-10-01 RX ADMIN — CARBIDOPA AND LEVODOPA 1 TABLET: 25; 100 TABLET ORAL at 21:59

## 2024-10-01 RX ADMIN — FLUCONAZOLE 200 MG: 2 INJECTION, SOLUTION INTRAVENOUS at 03:28

## 2024-10-01 RX ADMIN — CARBIDOPA AND LEVODOPA 1 TABLET: 25; 100 TABLET ORAL at 08:23

## 2024-10-01 RX ADMIN — INSULIN GLARGINE 14 UNITS: 100 INJECTION, SOLUTION SUBCUTANEOUS at 22:00

## 2024-10-01 RX ADMIN — INSULIN LISPRO 1 UNITS: 100 INJECTION, SOLUTION INTRAVENOUS; SUBCUTANEOUS at 22:00

## 2024-10-01 RX ADMIN — CEFEPIME HYDROCHLORIDE 2000 MG: 2 INJECTION, SOLUTION INTRAVENOUS at 12:08

## 2024-10-01 RX ADMIN — DOCUSATE SODIUM 100 MG: 100 CAPSULE, LIQUID FILLED ORAL at 08:23

## 2024-10-01 RX ADMIN — SENNOSIDES AND DOCUSATE SODIUM 1 TABLET: 50; 8.6 TABLET ORAL at 21:59

## 2024-10-01 RX ADMIN — INSULIN LISPRO 2 UNITS: 100 INJECTION, SOLUTION INTRAVENOUS; SUBCUTANEOUS at 11:35

## 2024-10-01 NOTE — PLAN OF CARE
Problem: PAIN - ADULT  Goal: Verbalizes/displays adequate comfort level or baseline comfort level  Description: Interventions:  - Encourage patient to monitor pain and request assistance  - Assess pain using appropriate pain scale  - Administer analgesics based on type and severity of pain and evaluate response  - Implement non-pharmacological measures as appropriate and evaluate response  - Consider cultural and social influences on pain and pain management  - Notify physician/advanced practitioner if interventions unsuccessful or patient reports new pain  Outcome: Progressing     Problem: INFECTION - ADULT  Goal: Absence or prevention of progression during hospitalization  Description: INTERVENTIONS:  - Assess and monitor for signs and symptoms of infection  - Monitor lab/diagnostic results  - Monitor all insertion sites, i.e. indwelling lines, tubes, and drains  - Monitor endotracheal if appropriate and nasal secretions for changes in amount and color  - [place] appropriate cooling/warming therapies per order  - Administer medications as ordered  - Instruct and encourage patient and family to use good hand hygiene technique  - Identify and instruct in appropriate isolation precautions for identified infection/condition  Outcome: Progressing  Goal: Absence of fever/infection during neutropenic period  Description: INTERVENTIONS:  - Monitor WBC    Outcome: Progressing     Problem: SAFETY ADULT  Goal: Patient will remain free of falls  Description: INTERVENTIONS:  - Educate patient/family on patient safety including physical limitations  - Instruct patient to call for assistance with activity   - Consult OT/PT to assist with strengthening/mobility   - Keep Call bell within reach  - Keep bed low and locked with side rails adjusted as appropriate  - Keep care items and personal belongings within reach  - Initiate and maintain comfort rounds  - Make Fall Risk Sign visible to staff  - Offer Toileting every 2 Hours,  in advance of need  - Initiate/Maintain bed alarm  Problem: DISCHARGE PLANNING  Goal: Discharge to home or other facility with appropriate resources  Description: INTERVENTIONS:  - Identify barriers to discharge w/patient and caregiver  - Arrange for needed discharge resources and transportation as appropriate  - Identify discharge learning needs (meds, wound care, etc.)  - Arrange for interpretive services to assist at discharge as needed  - Refer to Case Management Department for coordinating discharge planning if the patient needs post-hospital services based on physician/advanced practitioner order or complex needs related to functional status, cognitive ability, or social support system  Outcome: Progressing     Problem: Knowledge Deficit  Goal: Patient/family/caregiver demonstrates understanding of disease process, treatment plan, medications, and discharge instructions  Description: Complete learning assessment and assess knowledge base.  Interventions:  - Provide teaching at level of understanding  - Provide teaching via preferred learning methods  Outcome: Progressing     Problem: Prexisting or High Potential for Compromised Skin Integrity  Goal: Skin integrity is maintained or improved  Description: INTERVENTIONS:  - Identify patients at risk for skin breakdown  - Assess and monitor skin integrity  - Assess and monitor nutrition and hydration status  - Monitor labs   - Assess for incontinence   - Turn and reposition patient  - Assist with mobility/ambulation  - Relieve pressure over bony prominences  - Avoid friction and shearing  - Provide appropriate hygiene as needed including keeping skin clean and dry  - Evaluate need for skin moisturizer/barrier cream  - Collaborate with interdisciplinary team   - Patient/family teaching  - Consider wound care consult   Outcome: Progressing     - Apply yellow socks and bracelet for high fall risk patients  - Consider moving patient to room near nurses  station  Outcome: Progressing  Goal: Maintain or return to baseline ADL function  Description: INTERVENTIONS:  -  Assess patient's ability to carry out ADLs; assess patient's baseline for ADL function and identify physical deficits which impact ability to perform ADLs (bathing, care of mouth/teeth, toileting, grooming, dressing, etc.)  - Assess/evaluate cause of self-care deficits   - Assess range of motion  - Assess patient's mobility; develop plan if impaired  - Assess patient's need for assistive devices and provide as appropriate  - Encourage maximum independence but intervene and supervise when necessary  - Involve family in performance of ADLs  - Assess for home care needs following discharge   - Consider OT consult to assist with ADL evaluation and planning for discharge  - Provide patient education as appropriate  Outcome: Progressing  Goal: Maintains/Returns to pre admission functional level  Description: INTERVENTIONS:  - Perform AM-PAC 6 Click Basic Mobility/ Daily Activity assessment daily.  - Set and communicate daily mobility goal to care team and patient/family/caregiver.   - Collaborate with rehabilitation services on mobility goals if consulted  - Perform Range of Motion 2 times a day.  - Reposition patient every 2 hours.  - Dangle patient 2 times a day  - Stand patient 2 times a day  - Ambulate patient 2 times a day  - Out of bed to chair 2 times a day   - Out of bed for meals 2 times a day  - Out of bed for toileting  - Record patient progress and toleration of activity level   Outcome: Progressing

## 2024-10-01 NOTE — NURSING NOTE
Patient refused blood work and meds this morning. Patient gets combative and defensive when both were attempted.

## 2024-10-01 NOTE — PROGRESS NOTES
NEPHROLOGY PROGRESS NOTE   Chris Bojorquez 79 y.o. male MRN: [id number]  Unit/Bed#: [address] [place] [address] Encounter: [id number]    ASSESSMENT & PLAN:  79-year-old male initially admitted for elective cystoscopy.recently admitted to [facility] in late August to early September 2024 with sepsis due to Proteus bacteremia of urinary source.  During that admission, he was seen by urology and had a cystoscopy as well as an insertion of a right ureteral stent..  His creatinine during that time was 1.94 on presentation on 8/26/24 and peaked at 2.15 on August 29, 2024.  Thereafter, it had improved and his creatinine was down to around 1.3-1.5 over the last week of his hospitalization.  His creatinine was 1.53 on discharge on September 10. He had a cystoscopy, ureteroscopy, stent exchange, and stone extraction done on September 26, 2024. His creatinine on September 27, 2024 was 1.62.  During the hospital stay it trended up to 1.8 and so nephrology was consulted  Acute kidney injury, POA  -Baseline creatinine: 0.7-0.8  -History of acute kidney injury during hospital admission recently from August to early September for sepsis due to Proteus bacteremia of urinary source.  Peak creatinine during that time was 2.15 and improved to 1.3-1.5.  Most likely had ATN with incomplete renal recovery at that time.  -Admission creatinine: 1.62 mg/dl on 9/27/2024  - Peak serum creatinine 1.83 on 9/27 felt to be due to hemodynamic in origin on top of residual ATN.  Would also consider possibility of AIN with patient being on antibiotic and urine eosinophil 1% eosinophil.  Did receive IV fluid but renal function improved slightly.  Was taken off diuretics  - Work up:   UA with microscopy: UA with numerous RBCs and WBCs plus 2+ protein  Imaging: Prior CT from August was suggestive of calculi in the right renal pelvis.  No left-sided obstruction or calculi  Urine eosinophil 1%  -Etiology: Acute kidney injury likely due to worsening of ATN from  hemodynamic changes on top of residual ATN from prior admission  -Hospital Course: Renal function improved to creatinine  -Plan:   No new labs from today, patient refusing lab draw today despite explaining the necessity of lab draw to monitor sodium level and renal function.  I hope he agrees for lab draw tomorrow.  On last blood work from yesterday renal function has improved to creatinine 1.74 mg/dL.  Clinically appears euvolemic.  Continue to hold diuretics.  He did receive salt tablets 2 g yesterday as sodium level had dropped to 129, follow-up sodium level on blood work tomorrow    Avoid nephrotoxins and dose all medications per EGFR.    Avoid hypotension.                                            Hyponatremia  -Sodium has been around 131-133 meq/L likely due to free water intake and impaired excretion from renal dysfunction  -Sodium dropped to 129 meq/L on blood work from 9/30  -CT abdomen and pelvis with contrast did not show any malignancy  -He did receive salt tablets 2 g on 9/30  -Workup showed urine sodium 109 urine osmolality 596.  Urine studies suggestive of SIADH as urine sodium was high.  He refused labs today, will monitor on labs tomorrow    Anemia, iron deficiency: Hemoglobin 7.8 g/dL continue to monitor.    -Recommend oral ferrous sulfate at the time of discharge    Right nephrolithiasis:  - Status post cystoscopy, ureteroscopy, stent exchange and stone extraction on 9/26.  Continue to follow-up urology recommendations.  Noted plan to continue antibiotic and antifungal in the light of yeast infection contaminating stent as per urology note    History of recurrent UTIs, secondary to chronic Rivera catheter  -Blood culture negative urine culture 20-29,000 mixed contaminants.  Found to have yeast debris's in OR and receiving Diflucan.  Continue antibiotic antifungal per urology and primary team    Chronic diastolic CHF: Echo with ejection fraction 55% and grade 1 diastolic dysfunction.  During  previous hospital admission was discharged on Lasix 20 mg daily due to fluid overload which was likely iatrogenic from IV fluid.  Currently off diuretics and appears euvolemic continue to monitor.  Weight stable at 162 pound    Diabetes mellitus type 2 with hyperglycemia-management per primary team on insulin    Discussed with primary team that patient refused for labs today, continue current treatment.    SUBJECTIVE:  No new complaints.  No chest pain or shortness of breath, no nausea vomiting    OBJECTIVE:  Current Weight: Weight - Scale: 66.9 kg (147 lb 7.8 oz)  Vitals:    10/01/24 0740   BP: 161/86   Pulse: 75   Resp: 18   Temp: (!) 96.9 °F (36.1 °C)   SpO2: 96%       Intake/Output Summary (Last 24 hours) at 10/1/2024 1015  Last data filed at 10/1/2024 0457  Gross per 24 hour   Intake 150 ml   Output 1600 ml   Net -1450 ml       Physical Exam  General:  Ill looking, awake.  Eyes: Conjunctivae pink,  Sclera anicteric  ENT: lips and mucous membranes moist  Neck: supple   Chest: Clear to Auscultation both lungs,  no crackles, ronchus or wheezing.  CVS: S1 & S2 present, normal rate, regular rhythm, no murmur.  Abdomen: soft, non-tender, non-distended, Bowel sounds normoactive  Extremities: no edema of  legs  Skin: no rash  Neuro: awake, alert, oriented x 3   Psych: Mood and affect appropriate      Medications:    Current Facility-Administered Medications:     acetaminophen (TYLENOL) tablet 650 mg, 650 mg, Oral, Q6H PRN, FRANCES Madden    aluminum-magnesium hydroxide-simethicone (MAALOX) oral suspension 30 mL, 30 mL, Oral, Q6H PRN, Ciro Hughes MD    carbidopa-levodopa (SINEMET)  mg per tablet 1 tablet, 1 tablet, Oral, TID, Ciro Hughes MD, 1 tablet at 10/01/24 0823    cefepime (MAXIPIME) IVPB (premix in dextrose) 2,000 mg 50 mL, 2,000 mg, Intravenous, Q12H, FRANCES Madden, Stopped at 10/01/24 0038    docusate sodium (COLACE) capsule 100 mg, 100 mg, Oral, Daily, Ciro Hughes MD, 100 mg at  10/01/24 0823    insulin glargine (LANTUS) subcutaneous injection 14 Units 0.14 mL, 14 Units, Subcutaneous, HS, Cuiyin Yurik, CRNP, 14 Units at 09/30/24 2220    insulin lispro (HumALOG/ADMELOG) 100 units/mL subcutaneous injection 1-5 Units, 1-5 Units, Subcutaneous, HS, Cuiyin Yurik, CRNP, 1 Units at 09/30/24 2221    insulin lispro (HumALOG/ADMELOG) 100 units/mL subcutaneous injection 1-5 Units, 1-5 Units, Subcutaneous, TID AC, 1 Units at 09/30/24 1724 **AND** Fingerstick Glucose (POCT), , , 4x Daily AC and at bedtime, Silverio Penn MD    levothyroxine tablet 25 mcg, 25 mcg, Oral, Early Morning, Cuiyin Yurik, CRNP, 25 mcg at 09/30/24 0528    nystatin (MYCOSTATIN) powder, , Topical, BID, Natasha Gayle MD, Given at 10/01/24 0831    ondansetron (ZOFRAN) injection 4 mg, 4 mg, Intravenous, Q6H PRN, Ciro Hughes MD    pantoprazole (PROTONIX) EC tablet 40 mg, 40 mg, Oral, Early Morning, Cuiyin Yurik, CRNP, 40 mg at 09/30/24 0528    saccharomyces boulardii (FLORASTOR) capsule 250 mg, 250 mg, Oral, BID, Cuiyin Yurik, CRNP, 250 mg at 10/01/24 0823    senna-docusate sodium (SENOKOT S) 8.6-50 mg per tablet 1 tablet, 1 tablet, Oral, HS, Cuiyin Yurik, CRNP, 1 tablet at 09/29/24 2217    Invasive Devices:   Urethral Catheter Non-latex 22 Fr. (Active)   Reasons to continue Urinary Catheter  Chronic urinary catheter 09/30/24 0058   Goal for Removal N/A- chronic yang 09/30/24 0058   Site Assessment Clean;Skin intact 09/30/24 0058   Yang Care Done 09/30/24 0058   Collection Container Standard drainage bag 09/30/24 0058   Securement Method Leg strap 09/30/24 0058   Output (mL) 550 mL 09/30/24 0533       Lab Results:   Results from last 7 days   Lab Units 09/30/24  0622 09/28/24  0606 09/27/24  2242 09/27/24  0520 09/27/24  0012   WBC Thousand/uL  --   --   --   --  10.02   HEMOGLOBIN g/dL  --   --   --   --  7.8*   HEMATOCRIT %  --   --   --   --  25.9*   PLATELETS Thousands/uL  --   --   --   --  214   POTASSIUM mmol/L 4.2  "4.7 5.1 5.8* 5.1   CHLORIDE mmol/L 103 105 103 103 99   CO2 mmol/L 21 25 24 22 23   BUN mg/dL 40* 35* 37* 38* 41*   CREATININE mg/dL 1.74* 1.75* 1.83* 1.66* 1.62*   CALCIUM mg/dL 8.4 7.9* 7.8* 7.8* 8.1*   MAGNESIUM mg/dL  --   --   --  2.4 2.0       Previous work up:         Portions of the record may have been created with voice recognition software. Occasional wrong word or \"sound a like\" substitutions may have occurred due to the inherent limitations of voice recognition software. Read the chart carefully and recognize, using context, where substitutions have occurred.If you have any questions, please contact the dictating provider.    "

## 2024-10-01 NOTE — PROGRESS NOTES
Progress Note - Hospitalist   Name: Chris Bojorquez 79 y.o. 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  Urine culture grew low colony Proteus mirabilis. 1/2 blood cultures grew Proteus mirabilis. Patient received cefepime, then Rocephin, then cefpodoxime for 10-day total.  Patient presented with PABLO as well.  Creatinine was 1.95 on admission.  Peaked to 2.15.  Creatinine 1.53 on discharge.  Patient underwent urgent cystoscopy with insertion of right ureteral stent and removal of large 3 cm bladder stone on 8/29.  Patient's hospital course was complicated by fluid overload, required Lasix with albumin.   Baseline creatinine 0.7- 0.8 in past year prior to recent hospitalization.  Creatinine 1.62 on admission, peaked at 1.83  Off fluids  Likely ATN per nephrology  Iron deficiency anemia    Baseline hemoglobin appears to be around 9s.  Hemoglobin was 10.3 on 8/26, trended down to 7s on recent hospitalization. Hemoglobin was 7.2 on discharge. Patient received IV Venofer. Iron was less than 10, ferritin 163.  Hemoglobin 8.2 today    History of recurrent UTIs  Secondary to chronic Rivera catheter.  Urine culture with mixed contaminants  Patient started on cefepime Diflucan due to pyuric urine in the ureter with yeast debris found in OR.    Right nephrolithiasis  Patient underwent elective cystoscopy uteroscopy, stone extraction, right ureteral stent exchange.  Found to have migrated right stent, pyuric urine in the ureter with yeast debris.  Started on cefepime.  Completed course of Diflucan  Management per primary.  Monitor I+Os  Uncontrolled type 2 diabetes mellitus with hyperglycemia  (Formerly [facility])    Lab Results   Component Value Date    HGBA1C 14.6 (H) 08/26/2024     Lantus 14 units at bedtime with SSI ACHS   Patient needs better control of his diabetes  Carb controlled    Hypothyroidism  Continue Synthroid.  TSH elevated with normal free T4.  Outpatient lab work  Parkinson disease (Formerly [facility])  Continue Sinemet.  Gastroesophageal reflux disease  Continue PPI.  Diastolic dysfunction  2D echo on 8/30 in recent hospitalization showed EF 55%, grade 1 diastolic dysfunction, RV systolic pressure is mildly elevated at 51 mmHg,no  significant valvular disease.  Patient was discharged on Lasix 20 mg p.o. daily due to fluid overload during hospitalization.    Per nephrology, can restart Lasix on discharge  Daily weight, intake output  Sepsis (Formerly [facility])  Evolved after procedure, as evidenced by fever 102.4, tachycardia, with suspected source of infection complicated UTI as above.  procalcitonin mildly elevated, lactic acid 1.1  On IV cefepime, completed course of Diflucan  BCx-NG/UCX-mixed contaminants  Rash  Erythematous, pruritic rash on patient's groin area and inner thighs  Keep area clean and dry  Apply nystatin powder to area BID   Wound care consulted.  Hyponatremia  Mild,  Sodium has improved to 134 today  Received salt tablets 2 g x 1 dose on 9/30  Urine sodium 109, urine osmolality 596, suggestive of SIADH per nephrology as urine sodium was high    VTE Pharmacologic Prophylaxis: VTE Score: 6 High Risk (Score >/= 5) - Pharmacological DVT Prophylaxis Contraindicated. Sequential Compression Devices Ordered.    Mobility:   Basic Mobility Inpatient Raw Score: 6  JH-HLM Goal: 2: Bed activities/Dependent transfer  JH-HLM Achieved: 2: Bed activities/Dependent transfer  JH-HLM Goal achieved. Continue to encourage appropriate mobility.    Patient Centered Rounds: I performed bedside rounds with nursing staff today.   Discussions with Specialists or Other Care Team Provider: yes - urology, nephrology    Education and  Discussions with Family / Patient: yes    Current Length of Stay: 4 day(s)  Current Patient Status: Inpatient   Discharge Plan: [facility] is following this patient on consult. They are medically stable for discharge when deemed appropriate by primary service.    Code Status: Prior    Subjective     Patient sleeping but does answer some questions.  States he is doing okay.  Per staff patient is more awake at other times    Objective     Vitals:   Temp (24hrs), Av.9 °F (36.6 °C), Min:96.9 °F (36.1 °C), Max:98.9 °F (37.2 °C)    Temp:  [96.9 °F (36.1 °C)-98.9 °F (37.2 °C)] 96.9 °F (36.1 °C)  HR:  [64-75] 75  Resp:  [17-18] 18  BP: (148-161)/(67-86) 161/86  SpO2:  [96 %-97 %] 96 %  Body mass index is 21.78 kg/m².     Input and Output Summary (last 24 hours):     Intake/Output Summary (Last 24 hours) at 10/1/2024 1005  Last data filed at 10/1/2024 0457  Gross per 24 hour   Intake 150 ml   Output 1600 ml   Net -1450 ml       Physical Exam  Vitals reviewed.   Constitutional:       General: He is not in acute distress.     Appearance: He is ill-appearing (chronically). He is not toxic-appearing.      Comments: Sleeping but does awaken to answer questions   HENT:      Head: Normocephalic and atraumatic.   Cardiovascular:      Rate and Rhythm: Normal rate and regular rhythm.   Pulmonary:      Effort: No respiratory distress.      Breath sounds: No wheezing or rales.      Comments: Decreased breath sounds bilaterally although patient with decreased inspiratory effort  Abdominal:      General: Bowel sounds are normal. There is no distension.      Palpations: Abdomen is soft.      Tenderness: There is no abdominal tenderness.   Genitourinary:     Comments: Rivera catheter in place         Lines/Drains:  Lines/Drains/Airways       Active Status       Name Placement date Placement time Site Days    Urethral Catheter Non-latex 22 Fr. 24  1637  Non-latex  4    Ureteral Internal Stent Right ureter 6 Fr. 24  1633  Right  ureter  4                  Urinary Catheter:  Goal for removal: N/A - Chronic Rivera           Lab Results: I have reviewed the following results:   Results from last 7 days   Lab Units 09/27/24  0012   WBC Thousand/uL 10.02   HEMOGLOBIN g/dL 7.8*   HEMATOCRIT % 25.9*   PLATELETS Thousands/uL 214     Results from last 7 days   Lab Units 09/30/24  0622   SODIUM mmol/L 129*   POTASSIUM mmol/L 4.2   CHLORIDE mmol/L 103   CO2 mmol/L 21   BUN mg/dL 40*   CREATININE mg/dL 1.74*   ANION GAP mmol/L 5   CALCIUM mg/dL 8.4   GLUCOSE RANDOM mg/dL 185*     Results from last 7 days   Lab Units 09/27/24  0210   INR  1.08     Results from last 7 days   Lab Units 10/01/24  0733 09/30/24  2303 09/30/24  2218 09/30/24  1538 09/30/24  1128 09/30/24  0724 09/29/24  2109 09/29/24  1545 09/29/24  1105 09/29/24  0731 09/28/24  2231 09/28/24  1609   POC GLUCOSE mg/dl 142* 142* 155* 204* 193* 185* 174* 185* 172* 150* 238* 212*         Results from last 7 days   Lab Units 09/27/24  0210 09/27/24  0012   LACTIC ACID mmol/L 1.1  --    PROCALCITONIN ng/ml  --  0.46*       Recent Cultures (last 7 days):   Results from last 7 days   Lab Units 09/27/24  0009 09/26/24  1730   BLOOD CULTURE  No Growth After 4 Days.  No Growth at 72 hrs.  --    URINE CULTURE   --  20,000-29,000 cfu/ml       Imaging Review: Imaging Results Review: No pertinent imaging studies reviewed.  Other Studies: Other Study Results Review: No additional pertinent studies reviewed.    Last 24 Hours Medication List:     Current Facility-Administered Medications:     acetaminophen (TYLENOL) tablet 650 mg, Q6H PRN    aluminum-magnesium hydroxide-simethicone (MAALOX) oral suspension 30 mL, Q6H PRN    carbidopa-levodopa (SINEMET)  mg per tablet 1 tablet, TID    cefepime (MAXIPIME) IVPB (premix in dextrose) 2,000 mg 50 mL, Q12H, Last Rate: Stopped (10/01/24 0038)    docusate sodium (COLACE) capsule 100 mg, Daily    insulin glargine (LANTUS) subcutaneous injection 14 Units 0.14 mL,  HS    insulin lispro (HumALOG/ADMELOG) 100 units/mL subcutaneous injection 1-5 Units, HS    insulin lispro (HumALOG/ADMELOG) 100 units/mL subcutaneous injection 1-5 Units, TID AC **AND** Fingerstick Glucose (POCT), 4x Daily AC and at bedtime    levothyroxine tablet 25 mcg, Early Morning    nystatin (MYCOSTATIN) powder, BID    ondansetron (ZOFRAN) injection 4 mg, Q6H PRN    pantoprazole (PROTONIX) EC tablet 40 mg, Early Morning    saccharomyces boulardii (FLORASTOR) capsule 250 mg, BID    senna-docusate sodium (SENOKOT S) 8.6-50 mg per tablet 1 tablet, HS    Administrative Statements   Today, Patient Was Seen By: Jacqueline Williamson DO    **Please Note: This note may have been constructed using a voice recognition system.**

## 2024-10-01 NOTE — ASSESSMENT & PLAN NOTE
Mild,  Sodium has improved to 134 today  Received salt tablets 2 g x 1 dose on 9/30  Urine sodium 109, urine osmolality 596, suggestive of SIADH per nephrology as urine sodium was high

## 2024-10-01 NOTE — ASSESSMENT & PLAN NOTE
2D echo on 8/30 in recent hospitalization showed EF 55%, grade 1 diastolic dysfunction, RV systolic pressure is mildly elevated at 51 mmHg,no  significant valvular disease.  Patient was discharged on Lasix 20 mg p.o. daily due to fluid overload during hospitalization.    Per nephrology, can restart Lasix on discharge  Daily weight, intake output

## 2024-10-01 NOTE — ASSESSMENT & PLAN NOTE
Evolved after procedure, as evidenced by fever 102.4, tachycardia, with suspected source of infection complicated UTI as above.  procalcitonin mildly elevated, lactic acid 1.1  On IV cefepime, completed course of Diflucan  BCx-NG/UCX-mixed contaminants

## 2024-10-01 NOTE — PROGRESS NOTES
"Progress Note - Urology      Patient: Chris Bojorquez   : 1945 Sex: male   MRN: [id number]     CSN: [id number]  Unit/Bed#: [address] [place] [address]     SUBJECTIVE:   Patient seen on afternoon rounds  Somnolent  Rivera urine draining clear  Acute renal failure creatinine trending down  1.74    Objective   Vitals: /86   Pulse 75   Temp (!) 96.9 °F (36.1 °C)   Resp 18   Ht 5' 9\" (1.753 m)   Wt 66.9 kg (147 lb 7.8 oz)   SpO2 96%   BMI 21.78 kg/m²     I/O last 24 hours:  In: 150 [IV Piggyback:150]  Out: 1600 [Urine:1600]      Physical Exam:   General Alert awake   Normocephalic atraumatic PERRLA  Lungs clear bilaterally  Cardiac normal S1 normal S2  Abdomen soft, flank pain  Extremities no edema      Lab Results: CBC:   Lab Results   Component Value Date    WBC 10.02 2024    HGB 7.8 (L) 2024    HCT 25.9 (L) 2024    MCV 86 2024     2024    RBC 3.03 (L) 2024    MCH 25.7 (L) 2024    MCHC 30.1 (L) 2024    RDW 17.2 (H) 2024    MPV 8.8 (L) 2024    NRBC 0 09/10/2024     CMP:   Lab Results   Component Value Date     2024    CO2 21 2024    BUN 40 (H) 2024    CREATININE 1.74 (H) 2024    CALCIUM 8.4 2024    AST 20 09/10/2024    ALT 7 09/10/2024    ALKPHOS 111 (H) 09/10/2024    EGFR 36 2024     Urinalysis:   Lab Results   Component Value Date    COLORU Yellow 2024    CLARITYU Turbid 2024    SPECGRAV 1.025 2024    PHUR 5.5 2024    LEUKOCYTESUR Large (A) 2024    NITRITE Negative 2024    GLUCOSEU 30 (3/100%) (A) 2024    KETONESU Negative 2024    BILIRUBINUR Negative 2024    BLOODU Large (A) 2024     Urine Culture:   Lab Results   Component Value Date    URINECX 20,000-29,000 cfu/ml 2024     PSA: No results found for: \"PSA\"      Plan  Complicated UTI  Continue antibiotics as per medical team  Gross hematuria cleared  Can attempt voiding trial prior to " discharge  Renal failure  Creatinine trending down      Ciro Hughes MD

## 2024-10-01 NOTE — ASSESSMENT & PLAN NOTE
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  Urine culture grew low colony Proteus mirabilis. 1/2 blood cultures grew Proteus mirabilis. Patient received cefepime, then Rocephin, then cefpodoxime for 10-day total.  Patient presented with PABLO as well.  Creatinine was 1.95 on admission.  Peaked to 2.15.  Creatinine 1.53 on discharge.  Patient underwent urgent cystoscopy with insertion of right ureteral stent and removal of large 3 cm bladder stone on 8/29.  Patient's hospital course was complicated by fluid overload, required Lasix with albumin.   Baseline creatinine 0.7- 0.8 in past year prior to recent hospitalization.  Creatinine 1.62 on admission, peaked at 1.83  Off fluids  Likely ATN per nephrology

## 2024-10-01 NOTE — CASE MANAGEMENT
Case Management Discharge Planning Note    Patient name Chris Bojorquez  Location [address] [address] [place] [address] MRN [id number]  : 1945 Date 10/1/2024       Current Admission Date: 2024  Current Admission Diagnosis:Elevated serum creatinine   Patient Active Problem List    Diagnosis Date Noted Date Diagnosed    Diastolic dysfunction 2024     Rash 2024     Right nephrolithiasis 2024     Elevated serum creatinine 2024     History of recurrent UTIs 2024     Failure to thrive in adult 2024     Volume overload 2024     Iron deficiency anemia 2024     Gastroesophageal reflux disease 2024     Sepsis (HCC) 2024     Hyponatremia 2024     Moderate protein-calorie malnutrition (HCC) 2024     Uncontrolled type 2 diabetes mellitus with hyperglycemia (HCC) 2024     Hypothyroidism 2024     Parkinson disease (HCC) 2024       LOS (days): 4  Geometric Mean LOS (GMLOS) (days): 5.9  Days to GMLOS:2     OBJECTIVE:  Risk of Unplanned Readmission Score: 16.15     Current admission status: Inpatient   Preferred Pharmacy:   PATIENT/FAMILY REPORTS NO PREFERRED PHARMACY  No address on file      Primary Care Provider: No primary care provider on file.    Primary Insurance: MEDICARE  Secondary Insurance: [employer] [facility]    DISCHARGE DETAILS:    CM contacted family/caregiver?: Yes (left message)    Contacts  Patient Contacts: Derek Bojorquez (son)  Relationship to Patient:: Family  Contact Method: Phone  Phone Number: [phone]  Reason/Outcome: Other (Comment) (left message)    Other Referral/Resources/Interventions Provided:  Interventions: Facility Return, SNF  Referral Comments: Notified by Dr. Williamson that pt has been cleared for discharge by Dr. Hughes and consultants so discharge is being considered.  SW provided clinical update for [facility] and spoke with admission director.  Admission director said facility can accept  pt back if pt can be transported and in building by 7:00pm.  Wheelchair van transport has been requested since pt has his own wheelchair in his room.  SW placed call to pt's son to discuss plans to discharge as well.  Message left for son requesting call back to discuss plan.  SW will follow.    Treatment Team Recommendation: Facility Return, SNF  Discharge Destination Plan:: Facility Return, SNF  Transport at Discharge : Wheelchair van     Number/Name of Dispatcher: Roundtrip  Transported by (Company and Unit #):  (pending)  ETA of Transport (Date): 10/01/24  ETA of Transport (Time): 1700 (requested)

## 2024-10-01 NOTE — ASSESSMENT & PLAN NOTE
Lab Results   Component Value Date    HGBA1C 14.6 (H) 08/26/2024     Lantus 14 units at bedtime with SSI ACHS   Patient needs better control of his diabetes  Carb controlled

## 2024-10-01 NOTE — ASSESSMENT & PLAN NOTE
Secondary to chronic Rivera catheter.  Urine culture with mixed contaminants  Patient started on cefepime Diflucan due to pyuric urine in the ureter with yeast debris found in OR.

## 2024-10-01 NOTE — ASSESSMENT & PLAN NOTE
Baseline hemoglobin appears to be around 9s.  Hemoglobin was 10.3 on 8/26, trended down to 7s on recent hospitalization. Hemoglobin was 7.2 on discharge. Patient received IV Venofer. Iron was less than 10, ferritin 163.  Hemoglobin 8.2 today

## 2024-10-01 NOTE — CASE MANAGEMENT
Case Management Discharge Planning Note    Patient name Chris Bojorquez  Location [address] [place] [address] [place] [address] MRN [id number]  : 1945 Date 10/1/2024       Current Admission Date: 2024  Current Admission Diagnosis:Elevated serum creatinine   Patient Active Problem List    Diagnosis Date Noted Date Diagnosed    Diastolic dysfunction 2024     Rash 2024     Right nephrolithiasis 2024     Elevated serum creatinine 2024     History of recurrent UTIs 2024     Failure to thrive in adult 2024     Volume overload 2024     Iron deficiency anemia 2024     Gastroesophageal reflux disease 2024     Sepsis (HCC) 2024     Hyponatremia 2024     Moderate protein-calorie malnutrition (HCC) 2024     Uncontrolled type 2 diabetes mellitus with hyperglycemia (HCC) 2024     Hypothyroidism 2024     Parkinson disease (HCC) 2024       LOS (days): 4  Geometric Mean LOS (GMLOS) (days): 5.9  Days to GMLOS:2     OBJECTIVE:  Risk of Unplanned Readmission Score: 17.54     Current admission status: Inpatient   Preferred Pharmacy:   PATIENT/FAMILY REPORTS NO PREFERRED PHARMACY  No address on file      Primary Care Provider: No primary care provider on file.    Primary Insurance: MEDICARE  Secondary Insurance: [employer] [place] [employer] [facility]    DISCHARGE DETAILS:    Other Referral/Resources/Interventions Provided:  Interventions: Facility Return, SNF  Referral Comments: Per [facility] claimed transport with 6:30 pm .  [facility] Sanchez is requesting pt be in facility by 7:00 pm.  Due to probability of pt not getting to facility by time requested discharge is not possible today. MIKHAIL discussed with Dr. Williamson.  Transport will be arranged for tomorrow.    Treatment Team Recommendation: Facility Return, SNF  Discharge Destination Plan:: Facility Return, SNF  Transport at Discharge : Wheelchair van     Number/Name of Dispatcher: Roundtrip  Transported  by (Company and Unit #):  (pending)  ETA of Transport (Date): 10/02/24  ETA of Transport (Time): 1200 (requested)

## 2024-10-01 NOTE — ASSESSMENT & PLAN NOTE
Erythematous, pruritic rash on patient's groin area and inner thighs  Keep area clean and dry  Apply nystatin powder to area BID   Wound care consulted.

## 2024-10-01 NOTE — ASSESSMENT & PLAN NOTE
Patient underwent elective cystoscopy uteroscopy, stone extraction, right ureteral stent exchange.  Found to have migrated right stent, pyuric urine in the ureter with yeast debris.  Started on cefepime.  Completed course of Diflucan  Management per primary.  Monitor I+Os

## 2024-10-01 NOTE — CASE MANAGEMENT
Case Management Discharge Planning Note    Patient name Chris Bojorquez  Location [address] [place] [address]/2 [facility] MRN [id number]  : 1945 Date 10/1/2024       Current Admission Date: 2024  Current Admission Diagnosis:Elevated serum creatinine   Patient Active Problem List    Diagnosis Date Noted Date Diagnosed    Diastolic dysfunction 2024     Rash 2024     Right nephrolithiasis 2024     Elevated serum creatinine 2024     History of recurrent UTIs 2024     Failure to thrive in adult 2024     Volume overload 2024     Iron deficiency anemia 2024     Gastroesophageal reflux disease 2024     Sepsis (HCC) 2024     Hyponatremia 2024     Moderate protein-calorie malnutrition (HCC) 2024     Uncontrolled type 2 diabetes mellitus with hyperglycemia (HCC) 2024     Hypothyroidism 2024     Parkinson disease (HCC) 2024       LOS (days): 4  Geometric Mean LOS (GMLOS) (days): 5.8  Days to GMLOS:1.9     OBJECTIVE:  Risk of Unplanned Readmission Score: 17.54     Current admission status: Inpatient   Preferred Pharmacy:   PATIENT/FAMILY REPORTS NO PREFERRED PHARMACY  No address on file      Primary Care Provider: No primary care provider on file.    Primary Insurance: MEDICARE  Secondary Insurance: [employer] [place] [employer] [facility]    DISCHARGE DETAILS:    Discharge planning discussed with:: nurys Martínez  Freedom of Choice: Yes  Comments - Freedom of Choice: SW following to assist with planning.  Call back received from pt's son, Derek.  SW discussed anticipated discharge of pt tomorrow and reviewed IMM.  Son verbalized understanding and was agreeable with plan.  Son aware that wheelchair van transportation has been arranged for tomorrow afternoon.  Pt did request medical update from physician.  Message sent to Dr. Hughes and Dr. Williamson regarding son's request.  CM contacted family/caregiver?: Yes    Contacts  Patient Contacts:  Derek Mikekrystal (son)  Relationship to Patient:: Family  Contact Method: Phone  Phone Number: [phone]  Reason/Outcome: Discharge Planning    Other Referral/Resources/Interventions Provided:  Interventions: Facility Return, SNF    Treatment Team Recommendation: Facility Return, SNF  Discharge Destination Plan:: Facility Return, SNF  Transport at Discharge : Wheelchair van    IMM Given (Date):: 10/01/24  IMM Given to:: Family (IMM #2 reviewed with pt's son over phone.  Son verbalized understanding and stated he has copy of IMM from initial review.  Declined need for second copy.  Copy also placed in scan bin for chart.)

## 2024-10-02 VITALS
OXYGEN SATURATION: 96 % | SYSTOLIC BLOOD PRESSURE: 141 MMHG | HEIGHT: 69 IN | DIASTOLIC BLOOD PRESSURE: 78 MMHG | HEART RATE: 62 BPM | TEMPERATURE: 97.5 F | RESPIRATION RATE: 18 BRPM | BODY MASS INDEX: 21.68 KG/M2 | WEIGHT: 146.4 LBS

## 2024-10-02 LAB
BACTERIA BLD CULT: NORMAL
BACTERIA BLD CULT: NORMAL
GLUCOSE SERPL-MCNC: 120 MG/DL (ref 65–140)
GLUCOSE SERPL-MCNC: 163 MG/DL (ref 65–140)

## 2024-10-02 PROCEDURE — 99232 SBSQ HOSP IP/OBS MODERATE 35: CPT | Performed by: FAMILY MEDICINE

## 2024-10-02 PROCEDURE — 82948 REAGENT STRIP/BLOOD GLUCOSE: CPT

## 2024-10-02 PROCEDURE — 99232 SBSQ HOSP IP/OBS MODERATE 35: CPT | Performed by: INTERNAL MEDICINE

## 2024-10-02 RX ORDER — ACETAMINOPHEN 325 MG/1
650 TABLET ORAL EVERY 6 HOURS PRN
Start: 2024-10-02

## 2024-10-02 RX ORDER — FERROUS SULFATE 324(65)MG
324 TABLET, DELAYED RELEASE (ENTERIC COATED) ORAL
Start: 2024-10-02

## 2024-10-02 RX ORDER — CEFPODOXIME PROXETIL 200 MG/1
200 TABLET, FILM COATED ORAL 2 TIMES DAILY
Start: 2024-10-02 | End: 2024-10-07

## 2024-10-02 RX ORDER — NYSTATIN 100000 [USP'U]/G
POWDER TOPICAL 2 TIMES DAILY
Start: 2024-10-02

## 2024-10-02 RX ORDER — SACCHAROMYCES BOULARDII 250 MG
250 CAPSULE ORAL 2 TIMES DAILY
Start: 2024-10-02

## 2024-10-02 RX ADMIN — PANTOPRAZOLE SODIUM 40 MG: 40 TABLET, DELAYED RELEASE ORAL at 09:04

## 2024-10-02 RX ADMIN — CEFEPIME HYDROCHLORIDE 2000 MG: 2 INJECTION, SOLUTION INTRAVENOUS at 00:03

## 2024-10-02 RX ADMIN — DOCUSATE SODIUM 100 MG: 100 CAPSULE, LIQUID FILLED ORAL at 09:04

## 2024-10-02 RX ADMIN — CARBIDOPA AND LEVODOPA 1 TABLET: 25; 100 TABLET ORAL at 09:04

## 2024-10-02 RX ADMIN — NYSTATIN: 100000 POWDER TOPICAL at 09:34

## 2024-10-02 RX ADMIN — Medication 250 MG: at 09:04

## 2024-10-02 RX ADMIN — LEVOTHYROXINE SODIUM 25 MCG: 25 TABLET ORAL at 08:03

## 2024-10-02 RX ADMIN — INSULIN LISPRO 1 UNITS: 100 INJECTION, SOLUTION INTRAVENOUS; SUBCUTANEOUS at 11:25

## 2024-10-02 NOTE — ASSESSMENT & PLAN NOTE
Secondary to chronic Rivera catheter.  Urine culture with mixed contaminants.  Patient started on cefepime, Diflucan due to pyuric urine in the ureter with yeast debris found in OR.  Completed Diflucan  Given fever postprocedure will transition to cefpodoxime on discharge to complete course     4 = No assist / stand by assistance

## 2024-10-02 NOTE — PROGRESS NOTES
NEPHROLOGY PROGRESS NOTE   Chris Bojorquez 79 y.o. male MRN: [id number]  Unit/Bed#: [address] [place] [address] Encounter: [id number]    ASSESSMENT & PLAN:  79-year-old male initially admitted for elective cystoscopy.recently admitted to [facility] in late August to early September 2024 with sepsis due to Proteus bacteremia of urinary source.  During that admission, he was seen by urology and had a cystoscopy as well as an insertion of a right ureteral stent..  His creatinine during that time was 1.94 on presentation on 8/26/24 and peaked at 2.15 on August 29, 2024.  Thereafter, it had improved and his creatinine was down to around 1.3-1.5 over the last week of his hospitalization.  His creatinine was 1.53 on discharge on September 10. He had a cystoscopy, ureteroscopy, stent exchange, and stone extraction done on September 26, 2024. His creatinine on September 27, 2024 was 1.62.  During the hospital stay it trended up to 1.8 and so nephrology was consulted  Acute kidney injury, POA  -Baseline creatinine: 0.7-0.8  -History of acute kidney injury during hospital admission recently from August to early September for sepsis due to Proteus bacteremia of urinary source.  Peak creatinine during that time was 2.15 and improved to 1.3-1.5.  Most likely had ATN with incomplete renal recovery at that time.  -Admission creatinine: 1.62 mg/dl on 9/27/2024  - Peak serum creatinine 1.83 on 9/27 felt to be due to hemodynamic in origin on top of residual ATN.  Would also consider possibility of AIN with patient being on antibiotic and urine eosinophil 1% eosinophil.  Did receive IV fluid but renal function improved slightly.  Was taken off diuretics  - Work up:   UA with microscopy: UA with numerous RBCs and WBCs plus 2+ protein  Imaging: Prior CT from August was suggestive of calculi in the right renal pelvis.  No left-sided obstruction or calculi  Urine eosinophil 1%  -Etiology: Acute kidney injury likely due to worsening of ATN from  hemodynamic changes on top of residual ATN from prior admission  -Hospital Course: Renal function improved to creatinine  -Plan:   Renal function improved to creatinine 1.46 mg/dL on blood work from 10/1.  No labs from today.  Sodium level improved to 134 meq/L.  Continue to monitor renal function.  Clinically appears euvolemic.  Recheck labs tomorrow a.m.  Overall stable for discharge from nephrology side.  Will arrange for outpatient follow-up and repeat BMP in a week    Avoid nephrotoxins and dose all medications per EGFR.    Avoid hypotension.                                            Hyponatremia  -Sodium has been around 131-133 meq/L likely due to free water intake and impaired excretion from renal dysfunction  -Sodium dropped to 129 meq/L on blood work from 9/30  -CT abdomen and pelvis with contrast did not show any malignancy  -He did receive salt tablets 2 g on 9/30  -Workup showed urine sodium 109 urine osmolality 596.  Urine studies suggestive of SIADH as urine sodium was high.  Sodium level improved to 134 on blood work from 10/1, continue to monitor recommend fluid restriction 1.8 L/day    Anemia, iron deficiency: Hemoglobin stable at 8.2 g/dL continue to monitor.    -Recommend oral ferrous sulfate at the time of discharge.  Iron level was less than 10 in August 2024    Right nephrolithiasis:  - Status post cystoscopy, ureteroscopy, stent exchange and stone extraction on 9/26.  Continue to follow-up urology recommendations.  Noted plan to continue antibiotic and antifungal in the light of yeast infection contaminating stent as per urology note    History of recurrent UTIs, secondary to chronic Rivera catheter  -Blood culture negative urine culture 20-29,000 mixed contaminants.  Found to have yeast debris's in OR and receiving Diflucan.  Continue antibiotic.  Status post treatment with antifungal.       Chronic diastolic CHF: Echo with ejection fraction 55% and grade 1 diastolic dysfunction.  During  previous hospital admission was discharged on Lasix 20 mg daily due to fluid overload which was likely iatrogenic from IV fluid.  Currently off diuretics and appears euvolemic continue to monitor.  Weight stable at 146 pound, continue to monitor    Diabetes mellitus type 2 with hyperglycemia-management per primary team on insulin    Discussed with primary team that renal function improved and sodium level improved we will continue same treatment.  Okay for discharge from nephrology side.  Primary team agreed with the plan.  As renal function is stable, will sign off.  Please call nephrology if any issues    SUBJECTIVE:  No new complaints.  No chest pain or shortness of breath    OBJECTIVE:  Current Weight: Weight - Scale: 66.4 kg (146 lb 6.4 oz)  Vitals:    10/02/24 0807   BP: 141/78   Pulse: 62   Resp:    Temp: 97.5 °F (36.4 °C)   SpO2: 98%       Intake/Output Summary (Last 24 hours) at 10/2/2024 0902  Last data filed at 10/2/2024 0701  Gross per 24 hour   Intake 350 ml   Output 1450 ml   Net -1100 ml       Physical Exam  General:  Ill looking, awake.  Eyes: Conjunctivae pink,  Sclera anicteric  ENT: lips and mucous membranes moist  Neck: supple   Chest: Clear to Auscultation both lungs,  no crackles, ronchus or wheezing.  CVS: S1 & S2 present, normal rate, regular rhythm, no murmur.  Abdomen: soft, non-tender, non-distended, Bowel sounds normoactive  Extremities: no edema of  legs  Skin: no rash  Neuro: awake, alert, oriented x 3   Psych: Mood and affect appropriate      Medications:    Current Facility-Administered Medications:     acetaminophen (TYLENOL) tablet 650 mg, 650 mg, Oral, Q6H PRN, FRANCES Madden    aluminum-magnesium hydroxide-simethicone (MAALOX) oral suspension 30 mL, 30 mL, Oral, Q6H PRN, Ciro Hughes MD    carbidopa-levodopa (SINEMET)  mg per tablet 1 tablet, 1 tablet, Oral, TID, Ciro Hughes MD, 1 tablet at 10/01/24 9259    cefepime (MAXIPIME) IVPB (premix in dextrose) 2,000 mg 50  mL, 2,000 mg, Intravenous, Q12H, Iona Martinezrik, CRNP, Stopped at 10/02/24 0033    docusate sodium (COLACE) capsule 100 mg, 100 mg, Oral, Daily, Ciro Hughes MD, 100 mg at 10/01/24 0823    insulin glargine (LANTUS) subcutaneous injection 14 Units 0.14 mL, 14 Units, Subcutaneous, HS, Iona Hurt, CRNP, 14 Units at 10/01/24 2200    insulin lispro (HumALOG/ADMELOG) 100 units/mL subcutaneous injection 1-5 Units, 1-5 Units, Subcutaneous, HS, Cuiyin Yurik, CRNP, 1 Units at 10/01/24 2200    insulin lispro (HumALOG/ADMELOG) 100 units/mL subcutaneous injection 1-5 Units, 1-5 Units, Subcutaneous, TID AC, 2 Units at 10/01/24 1632 **AND** Fingerstick Glucose (POCT), , , 4x Daily AC and at bedtime, Silverio Penn MD    levothyroxine tablet 25 mcg, 25 mcg, Oral, Early Morning, Cuigeorgetten Michellerik, CRNP, 25 mcg at 10/02/24 0803    nystatin (MYCOSTATIN) powder, , Topical, BID, Natasha Gayle MD, Given at 10/01/24 1754    ondansetron (ZOFRAN) injection 4 mg, 4 mg, Intravenous, Q6H PRN, Ciro Hughes MD    pantoprazole (PROTONIX) EC tablet 40 mg, 40 mg, Oral, Early Morning, Loriiluis Martinezrik, CRNP, 40 mg at 09/30/24 0528    saccharomyces boulardii (FLORASTOR) capsule 250 mg, 250 mg, Oral, BID, Cuiyin Yurik, CRNP, 250 mg at 10/01/24 1752    senna-docusate sodium (SENOKOT S) 8.6-50 mg per tablet 1 tablet, 1 tablet, Oral, HS, Cuigeorgetten Yurik, CRNP, 1 tablet at 10/01/24 2159    Invasive Devices:   Urethral Catheter Non-latex 22 Fr. (Active)   Reasons to continue Urinary Catheter  Chronic urinary catheter 09/30/24 0058   Goal for Removal N/A- chronic yang 09/30/24 0058   Site Assessment Clean;Skin intact 09/30/24 0058   Yang Care Done 09/30/24 0058   Collection Container Standard drainage bag 09/30/24 0058   Securement Method Leg strap 09/30/24 0058   Output (mL) 550 mL 09/30/24 0533       Lab Results:   Results from last 7 days   Lab Units 10/01/24  1206 10/01/24  1158 09/30/24  0622 09/28/24  0606 09/27/24  2242 09/27/24  0520  "09/27/24  0012   WBC Thousand/uL 5.33  --   --   --   --   --  10.02   HEMOGLOBIN g/dL 8.2*  --   --   --   --   --  7.8*   HEMATOCRIT % 26.8*  --   --   --   --   --  25.9*   PLATELETS Thousands/uL 201  --   --   --   --   --  214   POTASSIUM mmol/L  --  4.4 4.2 4.7   < > 5.8* 5.1   CHLORIDE mmol/L  --  104 103 105   < > 103 99   CO2 mmol/L  --  24 21 25   < > 22 23   BUN mg/dL  --  38* 40* 35*   < > 38* 41*   CREATININE mg/dL  --  1.46* 1.74* 1.75*   < > 1.66* 1.62*   CALCIUM mg/dL  --  8.6 8.4 7.9*   < > 7.8* 8.1*   MAGNESIUM mg/dL  --   --   --   --   --  2.4 2.0    < > = values in this interval not displayed.       Previous work up:         Portions of the record may have been created with voice recognition software. Occasional wrong word or \"sound a like\" substitutions may have occurred due to the inherent limitations of voice recognition software. Read the chart carefully and recognize, using context, where substitutions have occurred.If you have any questions, please contact the dictating provider.    "

## 2024-10-02 NOTE — PLAN OF CARE
Problem: PAIN - ADULT  Goal: Verbalizes/displays adequate comfort level or baseline comfort level  Description: Interventions:  - Encourage patient to monitor pain and request assistance  - Assess pain using appropriate pain scale  - Administer analgesics based on type and severity of pain and evaluate response  - Implement non-pharmacological measures as appropriate and evaluate response  - Consider cultural and social influences on pain and pain management  - Notify physician/advanced practitioner if interventions unsuccessful or patient reports new pain  Outcome: Progressing     Problem: INFECTION - ADULT  Goal: Absence or prevention of progression during hospitalization  Description: INTERVENTIONS:  - Assess and monitor for signs and symptoms of infection  - Monitor lab/diagnostic results  - Monitor all insertion sites, i.e. indwelling lines, tubes, and drains  - Monitor endotracheal if appropriate and nasal secretions for changes in amount and color  - [place] appropriate cooling/warming therapies per order  - Administer medications as ordered  - Instruct and encourage patient and family to use good hand hygiene technique  - Identify and instruct in appropriate isolation precautions for identified infection/condition  Outcome: Progressing  Goal: Absence of fever/infection during neutropenic period  Description: INTERVENTIONS:  - Monitor WBC    Outcome: Progressing     Problem: SAFETY ADULT  Goal: Patient will remain free of falls  Description: INTERVENTIONS:  - Educate patient/family on patient safety including physical limitations  - Instruct patient to call for assistance with activity   - Consult OT/PT to assist with strengthening/mobility   - Keep Call bell within reach  - Keep bed low and locked with side rails adjusted as appropriate  - Keep care items and personal belongings within reach  - Initiate and maintain comfort rounds  - Make Fall Risk Sign visible to staff  - Offer Toileting every 2 Hours,  in advance of need  - Initiate/Maintain bed alarm  Problem: DISCHARGE PLANNING  Goal: Discharge to home or other facility with appropriate resources  Description: INTERVENTIONS:  - Identify barriers to discharge w/patient and caregiver  - Arrange for needed discharge resources and transportation as appropriate  - Identify discharge learning needs (meds, wound care, etc.)  - Arrange for interpretive services to assist at discharge as needed  - Refer to Case Management Department for coordinating discharge planning if the patient needs post-hospital services based on physician/advanced practitioner order or complex needs related to functional status, cognitive ability, or social support system  Outcome: Progressing     Problem: Knowledge Deficit  Goal: Patient/family/caregiver demonstrates understanding of disease process, treatment plan, medications, and discharge instructions  Description: Complete learning assessment and assess knowledge base.  Interventions:  - Provide teaching at level of understanding  - Provide teaching via preferred learning methods  Outcome: Progressing     Problem: Prexisting or High Potential for Compromised Skin Integrity  Goal: Skin integrity is maintained or improved  Description: INTERVENTIONS:  - Identify patients at risk for skin breakdown  - Assess and monitor skin integrity  - Assess and monitor nutrition and hydration status  - Monitor labs   - Assess for incontinence   - Turn and reposition patient  - Assist with mobility/ambulation  - Relieve pressure over bony prominences  - Avoid friction and shearing  - Provide appropriate hygiene as needed including keeping skin clean and dry  - Evaluate need for skin moisturizer/barrier cream  - Collaborate with interdisciplinary team   - Patient/family teaching  - Consider wound care consult   Outcome: Progressing     - Apply yellow socks and bracelet for high fall risk patients  - Consider moving patient to room near nurses  station  Outcome: Progressing  Goal: Maintain or return to baseline ADL function  Description: INTERVENTIONS:  -  Assess patient's ability to carry out ADLs; assess patient's baseline for ADL function and identify physical deficits which impact ability to perform ADLs (bathing, care of mouth/teeth, toileting, grooming, dressing, etc.)  - Assess/evaluate cause of self-care deficits   - Assess range of motion  - Assess patient's mobility; develop plan if impaired  - Assess patient's need for assistive devices and provide as appropriate  - Encourage maximum independence but intervene and supervise when necessary  - Involve family in performance of ADLs  - Assess for home care needs following discharge   - Consider OT consult to assist with ADL evaluation and planning for discharge  - Provide patient education as appropriate  Outcome: Progressing  Goal: Maintains/Returns to pre admission functional level  Description: INTERVENTIONS:  - Perform AM-PAC 6 Click Basic Mobility/ Daily Activity assessment daily.  - Set and communicate daily mobility goal to care team and patient/family/caregiver.   - Collaborate with rehabilitation services on mobility goals if consulted  - Perform Range of Motion 2 times a day.  - Reposition patient every 2 hours.  - Dangle patient 2 times a day  - Stand patient 2 times a day  - Ambulate patient 2 times a day  - Out of bed to chair 2 times a day   - Out of bed for meals 2 times a day  - Out of bed for toileting  - Record patient progress and toleration of activity level   Outcome: Progressing

## 2024-10-02 NOTE — ASSESSMENT & PLAN NOTE
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  Urine culture grew low colony Proteus mirabilis. 1/2 blood cultures grew Proteus mirabilis. Patient received cefepime, then Rocephin, then cefpodoxime for 10-day total.  Patient presented with PABLO as well.  Creatinine was 1.95 on admission.  Peaked to 2.15.  Creatinine 1.53 on discharge.  Patient underwent urgent cystoscopy with insertion of right ureteral stent and removal of large 3 cm bladder stone on 8/29.  Patient's hospital course was complicated by fluid overload, required Lasix with albumin.   Baseline creatinine 0.7- 0.8 in past year prior to recent hospitalization.  Creatinine 1.62 on admission, peaked at 1.83, trended down to 1.46 on labwork yest.  Off fluids  Likely ATN per nephrology

## 2024-10-02 NOTE — ASSESSMENT & PLAN NOTE
Baseline hemoglobin appears to be around 9s.  Hemoglobin was 10.3 on 8/26, trended down to 7s on recent hospitalization. Hemoglobin was 7.2 on discharge. Patient received IV Venofer. Iron was less than 10, ferritin 163.  Hemoglobin 8.2 on lab work yesterday  Started on ferrous sulfate

## 2024-10-02 NOTE — ASSESSMENT & PLAN NOTE
2D echo on 8/30 in recent hospitalization showed EF 55%, grade 1 diastolic dysfunction, RV systolic pressure is mildly elevated at 51 mmHg,no  significant valvular disease.  Per nephrology, can restart Lasix on discharge  Daily weight, intake output.

## 2024-10-02 NOTE — ASSESSMENT & PLAN NOTE
Erythematous, pruritic rash on patient's groin area and inner thighs  Keep area clean and dry  Apply nystatin powder to area BID

## 2024-10-02 NOTE — [place] UNIVERSAL TRANSFER FORM
"[place] UNIVERSAL TRANSFER FORM  (ALL ITEMS MUST BE COMPLETED)    1. TRANSFER FROM: [facility]      TRANSFER TO: [facility]    2. DATE OF TRANSFER: 10/2/2024                        TIME OF TRANSFER: 1200    3. PATIENT NAME: Chris Bojorquez,        YOB: 1945                             GENDER: male    4. LANGUAGE:   English    5. PHYSICIAN NAME:  Ciro Hughes MD                   PHONE: [phone]    6. CODE STATUS: Prior        Out of Hospital DNR Attached: No    7. :                                      :  Extended Emergency Contact Information  Primary Emergency Contact: Derek Bojorquez  Mobile Phone: [phone]  Relation: Son           Health Care Representative/Proxy:  Yes           Legal Guardian:  Yes             NAME OF:           HEALTH CARE REPRESENTATIVE/PROXY:                                         OR           LEGAL GUARDIAN, IF NOT :                                               PHONE:  (Day)           (Night)                        (Cell)    8. REASON FOR TRANSFER: (Must include brief medical history and recent changes in physical function or cognition.) Acute Kidney Injury. Ready for discharge            V/S: /78   Pulse 62   Temp 97.5 °F (36.4 °C) (Axillary)   Resp 18   Ht 5' 9\" (1.753 m)   Wt 66.4 kg (146 lb 6.4 oz)   SpO2 96%   BMI 21.62 kg/m²           PAIN: None    9. PRIMARY DIAGNOSIS: PABLO (acute kidney injury) (HCC)      Secondary Diagnosis:         Pacemaker: No      Internal Defib: No          Mental Health Diagnosis (if Applicable):    10. RESTRAINTS: No     11. RESPIRATORY NEEDS: None    12. ISOLATION/PRECAUTION: None    13. ALLERGY: Lactose - food allergy and Sulfa antibiotics    14. SENSORY:       Vision Poor, Hearing Poor, and Speech Clear    15. SKIN CONDITION: MASD on the groin, scrotum edema  16. DIET: Special (describe) carb diet    17. IV ACCESS: " None    18. PERSONAL ITEMS SENT WITH PATIENT: None    19. ATTACHED DOCUMENTS: MUST ATTACH CURRENT MEDICATION INFORMATION Face Sheet, MAR, Medication Reconciliation, Diagnostic Studies, Labs, Code Status, and Discharge Summary    20. AT RISK ALERTS:Falls        HARM TO: N/A    21. WEIGHT BEARING STATUS:         Left Leg: Limited        Right Leg: Limited    22. MENTAL STATUS:Alert and Forgetful    23. FUNCTION:        Walk: With Help        Transfer: With Help        Toilet: Not Able        Feed: With Help    24. IMMUNIZATIONS/SCREENING:     Immunization History   Administered Date(s) Administered    COVID-19 PFIZER VACCINE 0.3 ML IM 05/12/2021, 10/06/2021       25. BOWEL: Incontinent     26. BLADDER: Incontinent    27. SENDING FACILITY CONTACT: [facility]                  Title: RN        Unit: 2S        Phone: [phone]          REC'G FACILITY CONTACT (if known):        Title:        Unit:         Phone:         FORM PREFILLED BY (if applicable)       Title:       Unit:        Phone:         FORM COMPLETED BY Anna Marie Jackson RN      Title: JUSTYNA      Phone: [phone]

## 2024-10-02 NOTE — PROGRESS NOTES
"Progress Note - Urology      Patient: Chris Bojorquez   : 1945 Sex: male   MRN: [id number]     CSN: [id number]  Unit/Bed#: [address] [place] [address]     SUBJECTIVE:   Patient seen on morning rounds  Somnolent  Rivera urine draining clear  Acute renal failure creatinine trending down  1.74    Objective   Vitals: /78   Pulse 62   Temp 97.5 °F (36.4 °C)   Resp 18   Ht 5' 9\" (1.753 m)   Wt 66.4 kg (146 lb 6.4 oz)   SpO2 98%   BMI 21.62 kg/m²     I/O last 24 hours:  In: 530 [P.O.:360; I.V.:120; IV Piggyback:50]  Out: 1450 [Urine:1450]      Physical Exam:   General Alert awake   Normocephalic atraumatic PERRLA  Lungs clear bilaterally  Cardiac normal S1 normal S2  Abdomen soft, flank pain  Extremities no edema      Lab Results: CBC:   Lab Results   Component Value Date    WBC 5.33 10/01/2024    HGB 8.2 (L) 10/01/2024    HCT 26.8 (L) 10/01/2024    MCV 86 10/01/2024     10/01/2024    RBC 3.11 (L) 10/01/2024    MCH 26.4 (L) 10/01/2024    MCHC 30.6 (L) 10/01/2024    RDW 16.6 (H) 10/01/2024    MPV 9.3 10/01/2024    NRBC 0 09/10/2024     CMP:   Lab Results   Component Value Date     10/01/2024    CO2 24 10/01/2024    BUN 38 (H) 10/01/2024    CREATININE 1.46 (H) 10/01/2024    CALCIUM 8.6 10/01/2024    AST 20 09/10/2024    ALT 7 09/10/2024    ALKPHOS 111 (H) 09/10/2024    EGFR 45 10/01/2024     Urinalysis:   Lab Results   Component Value Date    COLORU Yellow 2024    CLARITYU Turbid 2024    SPECGRAV 1.025 2024    PHUR 5.5 2024    LEUKOCYTESUR Large (A) 2024    NITRITE Negative 2024    GLUCOSEU 30 (3/100%) (A) 2024    KETONESU Negative 2024    BILIRUBINUR Negative 2024    BLOODU Large (A) 2024     Urine Culture:   Lab Results   Component Value Date    URINECX 20,000-29,000 cfu/ml 2024     PSA: No results found for: \"PSA\"      Plan  Complicated UTI  Continue antibiotics as per medical team  Gross hematuria cleared  Can attempt voiding trial prior " to discharge  Renal failure  Creatinine trending down  DC Rivera voiding trial      Crio Hughes MD

## 2024-10-02 NOTE — ASSESSMENT & PLAN NOTE
Evolved after procedure, as evidenced by fever 102.4, tachycardia, with suspected source of infection complicated UTI as above.  procalcitonin mildly elevated, lactic acid 1.1  Currently on IV cefepime, completed course of Diflucan  BCx-NG/UCX-mixed contaminants

## 2024-10-02 NOTE — ASSESSMENT & PLAN NOTE
Patient underwent elective cystoscopy uteroscopy, stone extraction, right ureteral stent exchange.  Found to have migrated right stent, pyuric urine in the ureter with yeast debris.  Management per primary.

## 2024-10-02 NOTE — PROGRESS NOTES
Progress Note - Hospitalist   Name: Chris Bojorquez 79 y.o. 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  Urine culture grew low colony Proteus mirabilis. 1/2 blood cultures grew Proteus mirabilis. Patient received cefepime, then Rocephin, then cefpodoxime for 10-day total.  Patient presented with PABLO as well.  Creatinine was 1.95 on admission.  Peaked to 2.15.  Creatinine 1.53 on discharge.  Patient underwent urgent cystoscopy with insertion of right ureteral stent and removal of large 3 cm bladder stone on 8/29.  Patient's hospital course was complicated by fluid overload, required Lasix with albumin.   Baseline creatinine 0.7- 0.8 in past year prior to recent hospitalization.  Creatinine 1.62 on admission, peaked at 1.83, trended down to 1.46 on labwork yest.  Off fluids  Likely ATN per nephrology  Iron deficiency anemia    Baseline hemoglobin appears to be around 9s.  Hemoglobin was 10.3 on 8/26, trended down to 7s on recent hospitalization. Hemoglobin was 7.2 on discharge. Patient received IV Venofer. Iron was less than 10, ferritin 163.  Hemoglobin 8.2 on lab work yesterday  Started on ferrous sulfate    History of recurrent UTIs  Secondary to chronic Rivera catheter.  Urine culture with mixed contaminants.  Patient started on cefepime, Diflucan due to pyuric urine in the ureter with yeast debris found in OR.  Completed Diflucan  Given fever postprocedure will transition to cefpodoxime on discharge to complete course    Right nephrolithiasis  Patient underwent elective cystoscopy uteroscopy, stone extraction, right ureteral stent exchange.  Found to have migrated right stent, pyuric  urine in the ureter with yeast debris.  Management per primary.  Uncontrolled type 2 diabetes mellitus with hyperglycemia (HCC)    Lab Results   Component Value Date    HGBA1C 14.6 (H) 08/26/2024     Lantus 14 units at bedtime with SSI ACHS   Patient needs better control of his diabetes.    Hypothyroidism  Continue Synthroid.  TSH elevated with normal free T4.  Outpatient lab work.  Parkinson disease (HCC)  Continue Sinemet  Gastroesophageal reflux disease  Continue PPI  Diastolic dysfunction  2D echo on 8/30 in recent hospitalization showed EF 55%, grade 1 diastolic dysfunction, RV systolic pressure is mildly elevated at 51 mmHg,no  significant valvular disease.  Per nephrology, can restart Lasix on discharge  Daily weight, intake output.  Sepsis (HCC) (Resolved: 10/3/2024)  Evolved after procedure, as evidenced by fever 102.4, tachycardia, with suspected source of infection complicated UTI as above.  procalcitonin mildly elevated, lactic acid 1.1  Currently on IV cefepime, completed course of Diflucan  BCx-NG/UCX-mixed contaminants  Rash  Erythematous, pruritic rash on patient's groin area and inner thighs  Keep area clean and dry  Apply nystatin powder to area BID   Hyponatremia  Mild,  Sodium has improved to 134 lab work yesterday  Received salt tablets 2 g x 1 dose on 9/30  Urine sodium 109, urine osmolality 596, suggestive of SIADH per nephrology as urine sodium was high  1.8 L fluid restriction per nephrology    VTE Pharmacologic Prophylaxis: VTE Score: 6 High Risk (Score >/= 5) - Pharmacological DVT Prophylaxis Contraindicated. Sequential Compression Devices Ordered.    Mobility:   Basic Mobility Inpatient Raw Score: 6  JH-HLM Goal: 2: Bed activities/Dependent transfer  JH-HLM Achieved: 2: Bed activities/Dependent transfer  JH-HLM Goal achieved. Continue to encourage appropriate mobility.    Patient Centered Rounds: I performed bedside rounds with nursing staff today.   Discussions with Specialists or Other  Care Team Provider: yes - urology, nephrolgy    Education and Discussions with Family / Patient: Attempted to update  (son) via phone. Left voicemail.     Current Length of Stay: 5 day(s)  Current Patient Status: Inpatient   Discharge Plan: [facility] is following this patient on consult. They are medically stable for discharge when deemed appropriate by primary service.    Code Status: Prior    Subjective   Patient awake today in bed. States he's doing okay, denies pain    Objective :  Temp:  [97.3 °F (36.3 °C)-98.1 °F (36.7 °C)] 97.5 °F (36.4 °C)  HR:  [62-68] 62  BP: (141-153)/(73-81) 141/78  Resp:  [16-18] 18  SpO2:  [97 %-99 %] 98 %  O2 Device: None (Room air)    Body mass index is 21.62 kg/m².     Input and Output Summary (last 24 hours):     Intake/Output Summary (Last 24 hours) at 10/2/2024 1050  Last data filed at 10/2/2024 0701  Gross per 24 hour   Intake 350 ml   Output 1450 ml   Net -1100 ml       Physical Exam  Vitals reviewed.   Constitutional:       General: He is not in acute distress.     Appearance: He is ill-appearing (chronically). He is not toxic-appearing.   HENT:      Head: Normocephalic and atraumatic.   Eyes:      General:         Right eye: No discharge.         Left eye: No discharge.      Extraocular Movements: Extraocular movements intact.   Cardiovascular:      Rate and Rhythm: Normal rate and regular rhythm.   Pulmonary:      Effort: Pulmonary effort is normal. No respiratory distress.      Breath sounds: Normal breath sounds. No wheezing or rales.   Abdominal:      General: Bowel sounds are normal. There is no distension.      Palpations: Abdomen is soft.      Tenderness: There is no abdominal tenderness.   Genitourinary:     Comments: Rivera in place  Neurological:      Mental Status: He is alert.           Lines/Drains:  Lines/Drains/Airways       Active Status       Name Placement date Placement time Site Days    Urethral Catheter Non-latex 22 Fr. 09/26/24  2377  Non-latex   5    Ureteral Internal Stent Right ureter 6 Fr. 09/26/24  1633  Right ureter  5                  Urinary Catheter:  Goal for removal: N/A - Chronic Rivera                 Lab Results: I have reviewed the following results:   Results from last 7 days   Lab Units 10/01/24  1206   WBC Thousand/uL 5.33   HEMOGLOBIN g/dL 8.2*   HEMATOCRIT % 26.8*   PLATELETS Thousands/uL 201     Results from last 7 days   Lab Units 10/01/24  1158   SODIUM mmol/L 134*   POTASSIUM mmol/L 4.4   CHLORIDE mmol/L 104   CO2 mmol/L 24   BUN mg/dL 38*   CREATININE mg/dL 1.46*   ANION GAP mmol/L 6   CALCIUM mg/dL 8.6   GLUCOSE RANDOM mg/dL 214*     Results from last 7 days   Lab Units 09/27/24  0210   INR  1.08     Results from last 7 days   Lab Units 10/02/24  0725 10/01/24  2108 10/01/24  1603 10/01/24  1104 10/01/24  0733 09/30/24  2303 09/30/24  2218 09/30/24  1538 09/30/24  1128 09/30/24  0724 09/29/24  2109 09/29/24  1545   POC GLUCOSE mg/dl 120 150* 227* 231* 142* 142* 155* 204* 193* 185* 174* 185*         Results from last 7 days   Lab Units 09/27/24  0210 09/27/24  0012   LACTIC ACID mmol/L 1.1  --    PROCALCITONIN ng/ml  --  0.46*       Recent Cultures (last 7 days):   Results from last 7 days   Lab Units 09/27/24  0009 09/26/24  1730   BLOOD CULTURE  No Growth After 5 Days.  No Growth After 4 Days.  --    URINE CULTURE   --  20,000-29,000 cfu/ml       Imaging Results Review: No pertinent imaging studies reviewed.  Other Study Results Review: No additional pertinent studies reviewed.    Last 24 Hours Medication List:     Current Facility-Administered Medications:     acetaminophen (TYLENOL) tablet 650 mg, Q6H PRN    aluminum-magnesium hydroxide-simethicone (MAALOX) oral suspension 30 mL, Q6H PRN    carbidopa-levodopa (SINEMET)  mg per tablet 1 tablet, TID    cefepime (MAXIPIME) IVPB (premix in dextrose) 2,000 mg 50 mL, Q12H, Last Rate: Stopped (10/02/24 0033)    docusate sodium (COLACE) capsule 100 mg, Daily    insulin glargine  (LANTUS) subcutaneous injection 14 Units 0.14 mL, HS    insulin lispro (HumALOG/ADMELOG) 100 units/mL subcutaneous injection 1-5 Units, HS    insulin lispro (HumALOG/ADMELOG) 100 units/mL subcutaneous injection 1-5 Units, TID AC **AND** Fingerstick Glucose (POCT), 4x Daily AC and at bedtime    levothyroxine tablet 25 mcg, Early Morning    nystatin (MYCOSTATIN) powder, BID    ondansetron (ZOFRAN) injection 4 mg, Q6H PRN    pantoprazole (PROTONIX) EC tablet 40 mg, Early Morning    saccharomyces boulardii (FLORASTOR) capsule 250 mg, BID    senna-docusate sodium (SENOKOT S) 8.6-50 mg per tablet 1 tablet, HS    Administrative Statements   Today, Patient Was Seen By: Jacqueline Williamson DO    **Please Note: This note may have been constructed using a voice recognition system.**

## 2024-10-02 NOTE — ASSESSMENT & PLAN NOTE
Lab Results   Component Value Date    HGBA1C 14.6 (H) 08/26/2024     Lantus 14 units at bedtime with SSI ACHS   Patient needs better control of his diabetes.

## 2024-10-02 NOTE — CASE MANAGEMENT
Case Management Discharge Planning Note    Patient name Chris Bojorquez  Location [address] [address] [address] MRN [id number]  : 1945 Date 10/2/2024       Current Admission Date: 2024  Current Admission Diagnosis:PABLO (acute kidney injury) ([facility])   Patient Active Problem List    Diagnosis Date Noted Date Diagnosed    Diastolic dysfunction 2024     Rash 2024     Right nephrolithiasis 2024     PABLO (acute kidney injury) ([facility]) 2024     History of recurrent UTIs 2024     Failure to thrive in adult 2024     Volume overload 2024     Iron deficiency anemia 2024     Gastroesophageal reflux disease 2024     Sepsis ([facility]) 2024     Hyponatremia 2024     Moderate protein-calorie malnutrition ([facility]) 2024     Uncontrolled type 2 diabetes mellitus with hyperglycemia ([facility]) 2024     Hypothyroidism 2024     Parkinson disease ([facility]) 2024       LOS (days): 5  Geometric Mean LOS (GMLOS) (days): 5.8  Days to GMLOS:1.1     OBJECTIVE:  Risk of Unplanned Readmission Score: 18.5     Current admission status: Inpatient   Preferred Pharmacy:   PATIENT/FAMILY REPORTS NO PREFERRED PHARMACY  No address on file      Primary Care Provider: No primary care provider on file.    Primary Insurance: MEDICARE  Secondary Insurance: [facility]    DISCHARGE DETAILS:    CM contacted family/caregiver?: Yes (message left)    Contacts  Patient Contacts: Derek Bojorquez (son)  Relationship to Patient:: Family  Contact Method: Phone  Phone Number: [phone]  Reason/Outcome: Other (Comment) (message left)    Other Referral/Resources/Interventions Provided:  Interventions: Facility Return, SNF  Referral Comments: Discharge ordered.  Pt will be returning to [facility].  Wheelchair van transport arranged yesterday.  Message left for son confirming pt's discharge.    Treatment Team Recommendation: Facility Return, SNF  Discharge Destination Plan::  Facility Return, SNF  Transport at Discharge : Wheelchair van    Transported by (Company and Unit #): [employer]  ETA of Transport (Date): 10/02/24  ETA of Transport (Time): 1200            [facility] Name, [facility] & [facility] [place]  Receiving Facility/Agency Phone Number: [phone]

## 2024-10-02 NOTE — ASSESSMENT & PLAN NOTE
Mild,  Sodium has improved to 134 lab work yesterday  Received salt tablets 2 g x 1 dose on 9/30  Urine sodium 109, urine osmolality 596, suggestive of SIADH per nephrology as urine sodium was high  1.8 L fluid restriction per nephrology

## 2024-10-03 ENCOUNTER — TELEPHONE (OUTPATIENT)
Dept: NEPHROLOGY | Facility: CLINIC | Age: 79
End: 2024-10-03

## 2024-10-03 DIAGNOSIS — E87.1 HYPONATREMIA: Primary | ICD-10-CM

## 2024-10-03 DIAGNOSIS — N17.9 AKI (ACUTE KIDNEY INJURY) (HCC): ICD-10-CM

## 2024-10-03 PROBLEM — A41.9 SEPSIS (HCC): Status: RESOLVED | Noted: 2024-08-27 | Resolved: 2024-10-03

## 2024-10-03 NOTE — DISCHARGE SUMMARY
Discharge Summary - Urology   Name: Chris Bojorquez 79 y.o. male I MRN: [id number]  Unit/Bed#: 2 [address] Date of Admission: 9/26/2024   Date of Service: 10/3/2024 I Hospital Day: 5    This 79-year-old male known to me history of complicated UTI right stent right renal stone status post emergency cystoscopy right stent placement a few weeks ago for complicated UTI discharged back to [facility] not seen in the office was scheduled to return to undergo cystoscopy right ureteroscopy stone extraction admitted preoperatively last Thursday, September 29 undergoing cystoscopy right stent exchange ureteroscopy stone extraction found to have complicated UTI admitted on broad-spectrum antibiotics for few days and finally discharged home on Wednesday, October 1 finishing course of antibiotics with indwelling Rivera catheter patient will need to be admitted in a few weeks after urine analysis for definitive ureteroscopy stone extraction discussed with son final diagnosis complicated UTI infected right stent right renal stones

## 2024-10-03 NOTE — TELEPHONE ENCOUNTER
----- Message from Hal Leon MD sent at 10/2/2024  5:09 PM EDT -----  Patient was discharged today from [facility] was seen for acute kidney injury and hyponatremia.  Please order for repeat BMP to be done in 1 week.  Please arrange for follow-up with any provider within next 1 to 4 weeks and please place on cancellation list if needed

## 2024-10-11 ENCOUNTER — OFFICE VISIT (OUTPATIENT)
Dept: NEPHROLOGY | Facility: CLINIC | Age: 79
End: 2024-10-11
Payer: MEDICARE

## 2024-10-11 DIAGNOSIS — Z09 HOSPITAL DISCHARGE FOLLOW-UP: ICD-10-CM

## 2024-10-11 DIAGNOSIS — N20.0 RIGHT NEPHROLITHIASIS: ICD-10-CM

## 2024-10-11 DIAGNOSIS — N18.31 CKD STAGE G3A/A3, GFR 45-59 AND ALBUMIN CREATININE RATIO >300 MG/G (HCC): ICD-10-CM

## 2024-10-11 DIAGNOSIS — E87.1 HYPONATREMIA: ICD-10-CM

## 2024-10-11 DIAGNOSIS — N17.9 AKI (ACUTE KIDNEY INJURY) (HCC): Primary | ICD-10-CM

## 2024-10-11 DIAGNOSIS — N18.30 TYPE 2 DIABETES MELLITUS WITH STAGE 3 CHRONIC KIDNEY DISEASE AND HYPERTENSION (HCC): ICD-10-CM

## 2024-10-11 DIAGNOSIS — I12.9 TYPE 2 DIABETES MELLITUS WITH STAGE 3 CHRONIC KIDNEY DISEASE AND HYPERTENSION (HCC): ICD-10-CM

## 2024-10-11 DIAGNOSIS — Z87.440 HISTORY OF RECURRENT UTIS: ICD-10-CM

## 2024-10-11 DIAGNOSIS — E11.22 TYPE 2 DIABETES MELLITUS WITH STAGE 3 CHRONIC KIDNEY DISEASE AND HYPERTENSION (HCC): ICD-10-CM

## 2024-10-11 PROCEDURE — G2211 COMPLEX E/M VISIT ADD ON: HCPCS | Performed by: INTERNAL MEDICINE

## 2024-10-11 PROCEDURE — 99214 OFFICE O/P EST MOD 30 MIN: CPT | Performed by: INTERNAL MEDICINE

## 2024-10-11 RX ORDER — MULTIVITAMIN
1 TABLET ORAL DAILY
COMMUNITY

## 2024-10-11 RX ORDER — GLUCOSAMINE HCL 500 MG
400 TABLET ORAL DAILY
COMMUNITY

## 2024-10-11 RX ORDER — BACLOFEN 5 MG/1
5 TABLET ORAL 2 TIMES DAILY
COMMUNITY

## 2024-10-11 RX ORDER — AMLODIPINE BESYLATE 5 MG/1
5 TABLET ORAL DAILY
COMMUNITY

## 2024-10-11 NOTE — ASSESSMENT & PLAN NOTE
-- Resolved on discharge creatinine returned back to baseline at 1.4 mg/dL  --Blood pressure seems to be better controlled.  Rivera removed.  --Bladder scan from October 7 reviewed.  Bladder volume was 100 cc.  Bladder not fully distended.  Prostate enlarged at 7 cm.  Continue follow-up with urology.  --Repeat BMP in 2 weeks  --Continue furosemide 20 mg daily  Orders:    Albumin / creatinine urine ratio; Future    Comprehensive metabolic panel; Future    Hemoglobin A1C; Future    PTH, intact; Future    CBC and Platelet; Future    Phosphorus; Future

## 2024-10-11 NOTE — ASSESSMENT & PLAN NOTE
-- Management as per urology  Orders:    Albumin / creatinine urine ratio; Future    Comprehensive metabolic panel; Future    Hemoglobin A1C; Future    PTH, intact; Future    CBC and Platelet; Future    Phosphorus; Future

## 2024-10-11 NOTE — ASSESSMENT & PLAN NOTE
Lab Results   Component Value Date    EGFR 45 10/01/2024    EGFR 36 09/30/2024    EGFR 36 09/28/2024    CREATININE 1.46 (H) 10/01/2024    CREATININE 1.74 (H) 09/30/2024    CREATININE 1.75 (H) 09/28/2024   -- Baseline creatinine 1.3 to 1.5 mg/dL  -- Screen for microalbuminuria  -- Needs better diabetic control  -- Continue blood pressure target less than 130/80    Orders:    Albumin / creatinine urine ratio; Future    Comprehensive metabolic panel; Future    Hemoglobin A1C; Future    PTH, intact; Future    CBC and Platelet; Future    Phosphorus; Future

## 2024-10-11 NOTE — ASSESSMENT & PLAN NOTE
Lab Results   Component Value Date    HGBA1C 14.6 (H) 08/26/2024   -- Presumably secondary to diabetic kidney disease, hypertensive nephrosclerosis, age-related nephron loss, prior episode of acute kidney injury  -- Baseline creatinine mid 1's  -- Renal function currently stable at 1.4 mg/dL  -- Rivera catheter removed  -- Continue furosemide 20 mg daily  -- Needs better diabetic control as hemoglobin A1c was 14.6  -- Blood pressure better controlled agree with starting amlodipine for better blood pressure  -- Target blood pressure less than 130/80  -- Avoid NSAIDs    Orders:    Albumin / creatinine urine ratio; Future    Comprehensive metabolic panel; Future    Hemoglobin A1C; Future    PTH, intact; Future    CBC and Platelet; Future    Phosphorus; Future

## 2024-10-11 NOTE — ASSESSMENT & PLAN NOTE
-- Status post cystoscopy with right stent placement along for complicated UTI being managed by urology  Orders:    Albumin / creatinine urine ratio; Future    Comprehensive metabolic panel; Future    Hemoglobin A1C; Future    PTH, intact; Future    CBC and Platelet; Future    Phosphorus; Future

## 2024-10-11 NOTE — ASSESSMENT & PLAN NOTE
-- Discharged from hospital on October 2 after presenting with complicated UTI secondary to obstructing renal calculi managed by urology had mild PABLO which resolved on discharge  Orders:    Albumin / creatinine urine ratio; Future    Comprehensive metabolic panel; Future    Hemoglobin A1C; Future    PTH, intact; Future    CBC and Platelet; Future    Phosphorus; Future

## 2024-10-11 NOTE — ASSESSMENT & PLAN NOTE
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

## 2024-10-11 NOTE — PROGRESS NOTES
Ambulatory Visit  Name: Chris Bojorquez      : 1945      MRN: [id number]  Encounter Provider: Tc Gipson MD  Encounter Date: 10/11/2024   Encounter department: [facility]OLOG[facility]    Assessment & Plan  PABLO (acute kidney injury) (HCC)  -- Resolved on discharge creatinine returned back to baseline at 1.4 mg/dL  --Blood pressure seems to be better controlled.  Rivera removed.  --Bladder scan from  reviewed.  Bladder volume was 100 cc.  Bladder not fully distended.  Prostate enlarged at 7 cm.  Continue follow-up with urology.  --Repeat BMP in 2 weeks  --Continue furosemide 20 mg daily  Orders:    Albumin / creatinine urine ratio; Future    Comprehensive metabolic panel; Future    Hemoglobin A1C; Future    PTH, intact; Future    CBC and Platelet; Future    Phosphorus; Future    Type 2 diabetes mellitus with stage 3 chronic kidney disease and hypertension (HCC)    Lab Results   Component Value Date    HGBA1C 14.6 (H) 2024   -- Presumably secondary to diabetic kidney disease, hypertensive nephrosclerosis, age-related nephron loss, prior episode of acute kidney injury  -- Baseline creatinine mid 1's  -- Renal function currently stable at 1.4 mg/dL  -- Rivera catheter removed  -- Continue furosemide 20 mg daily  -- Needs better diabetic control as hemoglobin A1c was 14.6  -- Blood pressure better controlled agree with starting amlodipine for better blood pressure  -- Target blood pressure less than 130/80  -- Avoid NSAIDs    Orders:    Albumin / creatinine urine ratio; Future    Comprehensive metabolic panel; Future    Hemoglobin A1C; Future    PTH, intact; Future    CBC and Platelet; Future    Phosphorus; Future    Hyponatremia  -- 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  --CT scan showed no evidence of malignancy  --Aim to keep sodium greater than 130  --Currently on a 1.8 L/day fluid restriction and furosemide 20 mg daily  --Currently examines euvolemic  --Will liberalize  fluid restriction to 2 L/day repeat BMP in 2 weeks  Orders:    Albumin / creatinine urine ratio; Future    Comprehensive metabolic panel; Future    Hemoglobin A1C; Future    PTH, intact; Future    CBC and Platelet; Future    Phosphorus; Future    Basic metabolic panel; Future    History of recurrent UTIs  -- Management as per urology  Orders:    Albumin / creatinine urine ratio; Future    Comprehensive metabolic panel; Future    Hemoglobin A1C; Future    PTH, intact; Future    CBC and Platelet; Future    Phosphorus; Future    CKD stage G3a/A3, GFR 45-59 and albumin creatinine ratio >300 mg/g ([facility])  Lab Results   Component Value Date    EGFR 45 10/01/2024    EGFR 36 09/30/2024    EGFR 36 09/28/2024    CREATININE 1.46 (H) 10/01/2024    CREATININE 1.74 (H) 09/30/2024    CREATININE 1.75 (H) 09/28/2024   -- Baseline creatinine 1.3 to 1.5 mg/dL  -- Screen for microalbuminuria  -- Needs better diabetic control  -- Continue blood pressure target less than 130/80    Orders:    Albumin / creatinine urine ratio; Future    Comprehensive metabolic panel; Future    Hemoglobin A1C; Future    PTH, intact; Future    CBC and Platelet; Future    Phosphorus; Future    Right nephrolithiasis  -- Status post cystoscopy with right stent placement along for complicated UTI being managed by urology  Orders:    Albumin / creatinine urine ratio; Future    Comprehensive metabolic panel; Future    Hemoglobin A1C; Future    PTH, intact; Future    CBC and Platelet; Future    Phosphorus; Future    Hospital discharge follow-up  -- Discharged from hospital on October 2 after presenting with complicated UTI secondary to obstructing renal calculi managed by urology had mild PABLO which resolved on discharge  Orders:    Albumin / creatinine urine ratio; Future    Comprehensive metabolic panel; Future    Hemoglobin A1C; Future    PTH, intact; Future    CBC and Platelet; Future    Phosphorus; Future      History of Present Illness     Chris Bojorquez is a  79 y.o. male who presents follow-up from the hospital for PABLO with underlying chronic kidney disease stage III.  This is my first visit with him.  He was seen initially in the hospital by nephrology for acute kidney injury.  His PABLO resolved on discharge and returning to baseline of 1.4.  Sodium is chronic low and stable at 134 on discharge.  His volume status has improved currently on furosemide 20 mg daily.  Reviewed his blood work from October 2.  Sodium 134 creatinine 1.4 mg/dL.  Potassium was normal.  Reviewed all of his medications from the rehab facility.  No complaints at this time.    History obtained from : patient and daughter  Review of Systems   Constitutional:  Negative for activity change and fever.   Respiratory:  Negative for cough, chest tightness, shortness of breath and wheezing.    Cardiovascular:  Negative for chest pain and leg swelling.   Gastrointestinal:  Negative for abdominal pain, diarrhea, nausea and vomiting.   Endocrine: Negative for polyuria.   Genitourinary:  Negative for difficulty urinating, dysuria, flank pain, frequency and urgency.   Skin:  Negative for rash.   Neurological:  Negative for dizziness, syncope, light-headedness and headaches.     Current Outpatient Medications on File Prior to Visit   Medication Sig Dispense Refill    acetaminophen (TYLENOL) 325 mg tablet Take 2 tablets (650 mg total) by mouth every 6 (six) hours as needed for mild pain, headaches or fever      amLODIPine (NORVASC) 5 mg tablet Take 5 mg by mouth daily      Baclofen 5 MG TABS Take 5 mg by mouth 2 (two) times a day      carbidopa-levodopa (SINEMET)  mg per tablet Take 1 tablet by mouth 3 (three) times a day      Cranberry 400 MG TABS Take 400 mg by mouth daily      docusate sodium (COLACE) 100 mg capsule Take 100 mg by mouth daily      ferrous sulfate 324 (65 Fe) mg Take 1 tablet (324 mg total) by mouth daily before breakfast      furosemide (LASIX) 20 mg tablet Take 1 tablet (20 mg total) by  mouth daily      insulin glargine (LANTUS) 100 units/mL subcutaneous injection Inject 14 Units under the skin daily at bedtime      insulin lispro (HumALOG/ADMELOG) 100 units/mL injection Inject 2-12 Units under the skin 3 (three) times a day before meals      insulin lispro (HumALOG/ADMELOG) 100 units/mL injection Inject 2-12 Units under the skin daily at bedtime      levothyroxine 25 mcg tablet Take 25 mcg by mouth daily      magnesium hydroxide (MILK OF MAGNESIA) 400 mg/5 mL oral suspension Take by mouth      Multiple Vitamin (multivitamin) tablet Take 1 tablet by mouth daily      nystatin (MYCOSTATIN) powder Apply topically 2 (two) times a day Apply to groin area      pantoprazole (PROTONIX) 40 mg tablet 40 mg daily      senna-docusate sodium (SENOKOT S) 8.6-50 mg per tablet 1 (one) time each day at the same time.      SSD 1 % cream       saccharomyces boulardii (FLORASTOR) 250 mg capsule Take 1 capsule (250 mg total) by mouth 2 (two) times a day (Patient not taking: Reported on 10/11/2024)       No current facility-administered medications on file prior to visit.          Objective     There were no vitals taken for this visit.    Physical Exam  Vitals and nursing note reviewed.   Constitutional:       General: He is not in acute distress.     Appearance: He is well-developed. He is not diaphoretic.   HENT:      Head: Normocephalic and atraumatic.   Eyes:      General: No scleral icterus.     Pupils: Pupils are equal, round, and reactive to light.   Cardiovascular:      Rate and Rhythm: Normal rate and regular rhythm.      Heart sounds: Normal heart sounds. No murmur heard.     No friction rub. No gallop.   Pulmonary:      Effort: Pulmonary effort is normal. No respiratory distress.      Breath sounds: Normal breath sounds. No wheezing or rales.   Chest:      Chest wall: No tenderness.   Abdominal:      General: Bowel sounds are normal. There is no distension.      Palpations: Abdomen is soft.      Tenderness:  There is no abdominal tenderness. There is no rebound.   Musculoskeletal:         General: Normal range of motion.      Cervical back: Normal range of motion and neck supple.   Skin:     Findings: No rash.   Neurological:      Mental Status: He is alert and oriented to person, place, and time.       Administrative Statements   I have spent a total time of 30 minutes in caring for this patient on the day of the visit/encounter including Importance of tx compliance, Risk factor reductions, Impressions, Counseling / Coordination of care, Documenting in the medical record, Reviewing / ordering tests, medicine, procedures  , Obtaining or reviewing history  , and Communicating with other healthcare professionals .

## 2024-12-31 RX ORDER — BISACODYL 10 MG
10 SUPPOSITORY, RECTAL RECTAL AS NEEDED
COMMUNITY

## 2024-12-31 RX ORDER — ROPINIROLE 1 MG/1
1 TABLET, FILM COATED ORAL AS NEEDED
COMMUNITY

## 2024-12-31 NOTE — PRE-PROCEDURE INSTRUCTIONS
Pre-Surgery Instructions:   Medication Instructions    acetaminophen (TYLENOL) 325 mg tablet Uses PRN- OK to take day of surgery    amLODIPine (NORVASC) 5 mg tablet Take day of surgery.    bisacodyl (Dulcolax) 10 mg suppository Hold day of surgery.    carbidopa-levodopa (SINEMET)  mg per tablet Take day of surgery.    Cranberry 400 MG TABS Stop taking 7 days prior to surgery.    docusate sodium (COLACE) 100 mg capsule Hold day of surgery.    ferrous sulfate 324 (65 Fe) mg Stop taking 7 days prior to surgery.    furosemide (LASIX) 20 mg tablet Hold day of surgery.    insulin glargine (LANTUS) 100 units/mL subcutaneous injection Take night before surgery    levothyroxine 25 mcg tablet Take day of surgery.    magnesium hydroxide (MILK OF MAGNESIA) 400 mg/5 mL oral suspension Hold day of surgery.    Multiple Vitamin (multivitamin) tablet Stop taking 7 days prior to surgery.    pantoprazole (PROTONIX) 40 mg tablet Take day of surgery.    rOPINIRole (REQUIP) 1 mg tablet Uses PRN- OK to take day of surgery    senna-docusate sodium (SENOKOT S) 8.6-50 mg per tablet Hold day of surgery.    SSD 1 % cream Hold day of surgery.   Medication instructions for day surgery reviewed. Please use only a sip of water to take your instructed medications. Avoid all over the counter vitamins, supplements and NSAIDS for one week prior to surgery per anesthesia guidelines. Tylenol is ok to take as needed.     You will receive a call one business day prior to surgery with an arrival time and hospital directions. If your surgery is scheduled on a Monday, the hospital will be calling you on the Friday prior to your surgery. If you have not heard from anyone by 8pm, please call the hospital supervisor through the hospital  at [phone]. ([place] [phone] or [place] [phone]).    Do not eat or drink anything after midnight the night before your surgery, including candy, mints, lifesavers, or chewing gum. Do not drink  alcohol 24hrs before your surgery. Try not to smoke at least 24hrs before your surgery.       Follow the pre surgery showering instructions as listed in the “My Surgical Experience Booklet” or otherwise provided by your surgeon's office. Do not use a blade to shave the surgical area 1 week before surgery. It is okay to use a clean electric clippers up to 24 hours before surgery. Do not apply any lotions, creams, including makeup, cologne, deodorant, or perfumes after showering on the day of your surgery. Do not use dry shampoo, hair spray, hair gel, or any type of hair products.     No contact lenses, eye make-up, or artificial eyelashes. Remove nail polish, including gel polish, and any artificial, gel, or acrylic nails if possible. Remove all jewelry including rings and body piercing jewelry.     Wear causal clothing that is easy to take on and off. Consider your type of surgery.    Keep any valuables, jewelry, piercings at home. Please bring any specially ordered equipment (sling, braces) if indicated.    Arrange for a responsible person to drive you to and from the hospital on the day of your surgery. Please confirm the visitor policy for the day of your procedure when you receive your phone call with an arrival time.     Call the surgeon's office with any new illnesses, exposures, or additional questions prior to surgery.    Please reference your “My Surgical Experience Booklet” for additional information to prepare for your upcoming surgery.    FAXED TO [facility]  #[phone]    CALL [place] surgery department 2 business days prior to surgery for time of arrival, to arrange transportation @ [phone].

## 2025-01-06 ENCOUNTER — ANESTHESIA EVENT (OUTPATIENT)
Dept: PERIOP | Facility: HOSPITAL | Age: 80
End: 2025-01-06
Payer: MEDICARE

## 2025-01-08 NOTE — ANESTHESIA PREPROCEDURE EVALUATION
Procedure:  CYSTOSCOPY URETEROSCOPY WITH LITHOTRIPSY HOLMIUM LASER, RETROGRADE PYELOGRAM AND INSERTION STENT URETERAL (Right: Bladder)    Relevant Problems   CARDIO   (+) CHF (congestive heart failure) ([facility]) (Resolved)   (+) HTN (hypertension)   (+) PVD (peripheral vascular disease) ([facility])      ENDO   (+) Hypothyroidism   (+) Type 2 diabetes mellitus with stage 3 chronic kidney disease and hypertension ([facility])      GI/HEPATIC   (+) Gastroesophageal reflux disease      /RENAL   (+) PABLO (acute kidney injury) ([facility])   (+) CKD stage G3a/A3, GFR 45-59 and albumin creatinine ratio >300 mg/g ([facility])   (+) Right nephrolithiasis      HEMATOLOGY   (+) Iron deficiency anemia      Neurology/Sleep   (+) Parkinson disease ([facility])      Orthopedic/Musculoskeletal   (+) Spinal stenosis        Physical Exam    Airway    Mallampati score: III  TM Distance: >3 FB  Neck ROM: full     Dental       Cardiovascular  Rhythm: regular, Rate: normal    Pulmonary   Breath sounds clear to auscultation    Other Findings        Anesthesia Plan  ASA Score- 3     Anesthesia Type- general with ASA Monitors.         Additional Monitors:     Airway Plan: LMA.    Comment: Ate breakfast at 7:30, ok for OR at 3:30 pm.       Plan Factors-Exercise tolerance (METS): <4 METS.    Chart reviewed.        Patient is not a current smoker.              Induction- intravenous.    Postoperative Plan- Plan for postoperative opioid use.     Perioperative Resuscitation Plan - Level 1 - Full Code.       Informed Consent- Anesthetic plan and risks discussed with patient, healthcare power of  and son.  I personally reviewed this patient with the CRNA. Discussed and agreed on the Anesthesia Plan with the CRNA..

## 2025-01-09 ENCOUNTER — HOSPITAL ENCOUNTER (OUTPATIENT)
Facility: HOSPITAL | Age: 80
Setting detail: OUTPATIENT SURGERY
Discharge: HOME | End: 2025-01-09
Attending: SPECIALIST | Admitting: SPECIALIST
Payer: MEDICARE

## 2025-01-09 ENCOUNTER — ANESTHESIA (OUTPATIENT)
Dept: PERIOP | Facility: HOSPITAL | Age: 80
End: 2025-01-09
Payer: MEDICARE

## 2025-01-09 ENCOUNTER — APPOINTMENT (OUTPATIENT)
Dept: RADIOLOGY | Facility: HOSPITAL | Age: 80
End: 2025-01-09
Payer: MEDICARE

## 2025-01-09 VITALS
TEMPERATURE: 97.6 F | RESPIRATION RATE: 12 BRPM | HEIGHT: 67 IN | BODY MASS INDEX: 23.23 KG/M2 | OXYGEN SATURATION: 97 % | DIASTOLIC BLOOD PRESSURE: 67 MMHG | WEIGHT: 148 LBS | HEART RATE: 70 BPM | SYSTOLIC BLOOD PRESSURE: 147 MMHG

## 2025-01-09 DIAGNOSIS — N20.0 CALCULUS OF KIDNEY: Primary | ICD-10-CM

## 2025-01-09 LAB — GLUCOSE SERPL-MCNC: 304 MG/DL (ref 65–140)

## 2025-01-09 PROCEDURE — 82948 REAGENT STRIP/BLOOD GLUCOSE: CPT

## 2025-01-09 PROCEDURE — C1758 CATHETER, URETERAL: HCPCS | Performed by: SPECIALIST

## 2025-01-09 PROCEDURE — 74018 RADEX ABDOMEN 1 VIEW: CPT

## 2025-01-09 PROCEDURE — C1769 GUIDE WIRE: HCPCS | Performed by: SPECIALIST

## 2025-01-09 PROCEDURE — C2617 STENT, NON-COR, TEM W/O DEL: HCPCS | Performed by: SPECIALIST

## 2025-01-09 DEVICE — INLAY URETERAL STENT W/O GUIDEWIRE
Type: IMPLANTABLE DEVICE | Site: URETER | Status: FUNCTIONAL
Brand: BARD® INLAY® URETERAL STENT

## 2025-01-09 RX ORDER — CEFAZOLIN SODIUM 2 G/50ML
2000 SOLUTION INTRAVENOUS ONCE
Status: COMPLETED | OUTPATIENT
Start: 2025-01-09 | End: 2025-01-09

## 2025-01-09 RX ORDER — AMOXICILLIN 500 MG/1
500 CAPSULE ORAL EVERY 8 HOURS SCHEDULED
COMMUNITY

## 2025-01-09 RX ORDER — SODIUM CHLORIDE, SODIUM LACTATE, POTASSIUM CHLORIDE, CALCIUM CHLORIDE 600; 310; 30; 20 MG/100ML; MG/100ML; MG/100ML; MG/100ML
20 INJECTION, SOLUTION INTRAVENOUS CONTINUOUS
Status: CANCELLED | OUTPATIENT
Start: 2025-01-09

## 2025-01-09 RX ORDER — LIDOCAINE HYDROCHLORIDE 10 MG/ML
INJECTION, SOLUTION EPIDURAL; INFILTRATION; INTRACAUDAL; PERINEURAL AS NEEDED
Status: DISCONTINUED | OUTPATIENT
Start: 2025-01-09 | End: 2025-01-09

## 2025-01-09 RX ORDER — PROPOFOL 10 MG/ML
INJECTION, EMULSION INTRAVENOUS AS NEEDED
Status: DISCONTINUED | OUTPATIENT
Start: 2025-01-09 | End: 2025-01-09

## 2025-01-09 RX ORDER — MAGNESIUM HYDROXIDE 1200 MG/15ML
LIQUID ORAL AS NEEDED
Status: DISCONTINUED | OUTPATIENT
Start: 2025-01-09 | End: 2025-01-09 | Stop reason: HOSPADM

## 2025-01-09 RX ORDER — SODIUM CHLORIDE, SODIUM LACTATE, POTASSIUM CHLORIDE, CALCIUM CHLORIDE 600; 310; 30; 20 MG/100ML; MG/100ML; MG/100ML; MG/100ML
75 INJECTION, SOLUTION INTRAVENOUS CONTINUOUS
Status: DISCONTINUED | OUTPATIENT
Start: 2025-01-09 | End: 2025-01-09 | Stop reason: HOSPADM

## 2025-01-09 RX ORDER — FENTANYL CITRATE 50 UG/ML
INJECTION, SOLUTION INTRAMUSCULAR; INTRAVENOUS AS NEEDED
Status: DISCONTINUED | OUTPATIENT
Start: 2025-01-09 | End: 2025-01-09

## 2025-01-09 RX ORDER — ONDANSETRON 2 MG/ML
INJECTION INTRAMUSCULAR; INTRAVENOUS AS NEEDED
Status: DISCONTINUED | OUTPATIENT
Start: 2025-01-09 | End: 2025-01-09

## 2025-01-09 RX ORDER — FENTANYL CITRATE/PF 50 MCG/ML
25 SYRINGE (ML) INJECTION
Status: DISCONTINUED | OUTPATIENT
Start: 2025-01-09 | End: 2025-01-09 | Stop reason: HOSPADM

## 2025-01-09 RX ORDER — ONDANSETRON 2 MG/ML
4 INJECTION INTRAMUSCULAR; INTRAVENOUS ONCE AS NEEDED
Status: DISCONTINUED | OUTPATIENT
Start: 2025-01-09 | End: 2025-01-09 | Stop reason: HOSPADM

## 2025-01-09 RX ORDER — DEXAMETHASONE SODIUM PHOSPHATE 10 MG/ML
INJECTION, SOLUTION INTRAMUSCULAR; INTRAVENOUS AS NEEDED
Status: DISCONTINUED | OUTPATIENT
Start: 2025-01-09 | End: 2025-01-09

## 2025-01-09 RX ORDER — METOCLOPRAMIDE HYDROCHLORIDE 5 MG/ML
5 INJECTION INTRAMUSCULAR; INTRAVENOUS ONCE AS NEEDED
Status: DISCONTINUED | OUTPATIENT
Start: 2025-01-09 | End: 2025-01-09 | Stop reason: HOSPADM

## 2025-01-09 RX ADMIN — FENTANYL CITRATE 25 MCG: 50 INJECTION, SOLUTION INTRAMUSCULAR; INTRAVENOUS at 18:08

## 2025-01-09 RX ADMIN — ONDANSETRON 4 MG: 2 INJECTION INTRAMUSCULAR; INTRAVENOUS at 17:41

## 2025-01-09 RX ADMIN — DEXAMETHASONE SODIUM PHOSPHATE 10 MG: 10 INJECTION, SOLUTION INTRAMUSCULAR; INTRAVENOUS at 17:41

## 2025-01-09 RX ADMIN — FENTANYL CITRATE 25 MCG: 50 INJECTION, SOLUTION INTRAMUSCULAR; INTRAVENOUS at 18:12

## 2025-01-09 RX ADMIN — FENTANYL CITRATE 25 MCG: 50 INJECTION, SOLUTION INTRAMUSCULAR; INTRAVENOUS at 17:51

## 2025-01-09 RX ADMIN — CEFAZOLIN SODIUM 2000 MG: 2 SOLUTION INTRAVENOUS at 17:36

## 2025-01-09 RX ADMIN — SODIUM CHLORIDE, SODIUM LACTATE, POTASSIUM CHLORIDE, AND CALCIUM CHLORIDE: .6; .31; .03; .02 INJECTION, SOLUTION INTRAVENOUS at 17:20

## 2025-01-09 RX ADMIN — PROPOFOL 150 MG: 10 INJECTION, EMULSION INTRAVENOUS at 17:34

## 2025-01-09 RX ADMIN — LIDOCAINE HYDROCHLORIDE 50 MG: 10 INJECTION, SOLUTION EPIDURAL; INFILTRATION; INTRACAUDAL; PERINEURAL at 17:34

## 2025-01-09 RX ADMIN — FENTANYL CITRATE 25 MCG: 50 INJECTION, SOLUTION INTRAMUSCULAR; INTRAVENOUS at 17:43

## 2025-01-09 NOTE — DISCHARGE INSTR - AVS FIRST PAGE
#1 no heavy straining or lifting above 10 pounds for 2 weeks    #2 call office fevers, chills, or worsening blood in the urine.    #3  Patient to be scheduled for next cystoscopy right ureteroscopy stent exchange for February 6      Ciro Hughes M.D. [facility] office  [address] [place] [address].  [place] [address]  [phone]  8:30 AM to 4:30 PM  Monday through Friday    [facility]  [address]  [address]  [place] [address] [phone]  1:00 to 5:00 PM  Wednesday

## 2025-01-09 NOTE — H&P
H&P Exam - Urology       Patient: Chris Bojorquez   : 1945 Sex: male   MRN: [id number]     CSN: [id number]      History of Present Illness   HPI:  Chris Bojorquez is a 79 y.o. male who presents with multiple right renal stones right stent to undergo cystoscopy right ureteroscopy stone extraction laser where risk of anesthesia infection bleeding additional procedures        Review of Systems:   Constitutional:  Negative for activity change, fever, chills and diaphoresis.   HENT: Negative for hearing loss and trouble swallowing.   Eyes: Negative for itching and visual disturbance.   Respiratory: Negative for chest tightness and shortness of breath.   Cardiovascular: Negative for chest pain, edema.   Gastrointestinal: Negative for abdominal distention, na abdominal pain, constipation, diarrhea, Nausea and vomiting.   Genitourinary: Negative for decreased urine volume, difficulty urinating, dysuria, enuresis, frequency, hematuria and urgency.   Musculoskeletal: Negative for gait problem and myalgias.   Neurological: Negative for dizziness and headaches.   Hematological: Does not bruise/bleed easily.       Historical Information   Past Medical History:   Diagnosis Date    Acute metabolic encephalopathy 2024    Anemia     Anxiety     Bacteremia due to Proteus species 2024    CHF (congestive heart failure) (HCC)     Chronic kidney disease     stage 3    Diabetes (HCC)     Diabetes mellitus (HCC)     Disease of thyroid gland     Dysphagia     Emphysematous pyelitis 2024    Hypertension     Kidney stone     Parkinson disease (HCC)     PVD (peripheral vascular disease) (HCC)     Spinal stenosis     Urinary retention      Past Surgical History:   Procedure Laterality Date    FL RETROGRADE PYELOGRAM  2024    [place] CYSTO BLADDER W/URETERAL CATHETERIZATION Right 2024    Procedure: CYSTOSCOPY WITH INSERTION RIGHT STENT URETERAL removal large 3cm bladder stone;  Surgeon: Ciro Hughes MD;   "Location: [facility];  Service: Urology    [place] CYSTO/URETERO W/LITHOTRIPSY &INDWELL STENT INSRT Right 9/26/2024    Procedure: CYSTOSCOPY URETEROSCOPY, INSERTION STENT URETERAL, STONE EXTRACTION, patient at [facility];  Surgeon: Ciro Hughes MD;  Location: [facility];  Service: Urology     Social History   Social History     Substance and Sexual Activity   Alcohol Use Not Currently     Social History     Substance and Sexual Activity   Drug Use Not Currently     Social History     Tobacco Use   Smoking Status Unknown   Smokeless Tobacco Not on file     Family History: No family history on file.    Meds/Allergies   No medications prior to admission.  Allergies   Allergen Reactions    Lactose - Food Allergy Other (See Comments)    Sulfa Antibiotics Other (See Comments)       Objective   Vitals: Ht 5' 7\" (1.702 m)   Wt 67.1 kg (148 lb)   BMI 23.18 kg/m²     Physical Exam:  General Alert awake   Normocephalic atraumatic PERRLA  Lungs clear bilaterally  Cardiac normal S1 normal S2  Abdomen soft, flank pain  Extremities no edema    No intake/output data recorded.    Invasive Devices       Peripheral Intravenous Line  Duration             Peripheral IV 10/01/24 Distal;Dorsal (posterior);Left Forearm 98 days              Drain  Duration             Ureteral Internal Stent Right ureter 6 Fr. 104 days                        Lab Results: CBC:   Lab Results   Component Value Date    WBC 5.33 10/01/2024    HGB 8.2 (L) 10/01/2024    HCT 26.8 (L) 10/01/2024    MCV 86 10/01/2024     10/01/2024    RBC 3.11 (L) 10/01/2024    MCH 26.4 (L) 10/01/2024    MCHC 30.6 (L) 10/01/2024    RDW 16.6 (H) 10/01/2024    MPV 9.3 10/01/2024    NRBC 0 09/10/2024     CMP:   Lab Results   Component Value Date     10/01/2024    CO2 24 10/01/2024    BUN 38 (H) 10/01/2024    CREATININE 1.46 (H) 10/01/2024    CALCIUM 8.6 10/01/2024    AST 20 09/10/2024    ALT 7 09/10/2024    ALKPHOS 111 (H) 09/10/2024    EGFR 45 10/01/2024 " "    Urinalysis:   Lab Results   Component Value Date    COLORU Yellow 09/30/2024    CLARITYU Turbid 09/30/2024    SPECGRAV 1.025 09/30/2024    PHUR 5.5 09/30/2024    LEUKOCYTESUR Large (A) 09/30/2024    NITRITE Negative 09/30/2024    GLUCOSEU 30 (3/100%) (A) 09/30/2024    KETONESU Negative 09/30/2024    BILIRUBINUR Negative 09/30/2024    BLOODU Large (A) 09/30/2024     Urine Culture:   Lab Results   Component Value Date    URINECX 20,000-29,000 cfu/ml 09/26/2024     PSA: No results found for: \"PSA\"        Assessment/ Plan:  Cystoscopy right ureteroscopy laser stent exchange      Ciro Hughes MD  "

## 2025-01-09 NOTE — OP NOTE
OPERATIVE REPORT  PATIENT NAME: Chris Bojorquez    :  1945  MRN: [id number]  Pt Location: [facility] OR ROOM 04    SURGERY DATE: 2025    Surgeons and Role:     * Ciro Hughes MD - Primary    Preop Diagnosis:  Kidney stone [N20.0]    Post-Op Diagnosis Codes:     * Kidney stone [N20.0]  Encrusted right stent bladder stones  Complicated UTI    Procedure(s):  Right - CYSTOSCOPY.  LITHOTRIPSY HOLMIUM LASER LARGE BLADDER STONE. EXCHANGE ENCRUSTED STENT URETERAL    Specimen(s):  * No specimens in log *    Estimated Blood Loss:   Minimal    Drains:  Urethral Catheter Latex 18 Fr. (Active)   Reasons to continue Urinary Catheter  Chronic urinary catheter 25   Goal for Removal N/A- chronic yang 25   Site Assessment Clean;Skin intact 25   Yang Care Done 25   Collection Container Standard drainage bag 25   Securement Method Securing device (Describe) 25   Number of days: 0       Ureteral Internal Stent Right ureter 6 Fr. (Active)   Number of days: 105       Anesthesia Type:   General/LMA    Operative Indications:  Kidney stone [N20.0]  This 79-year-old male    Operative Findings:  This 79-year-old male known to me undergoing cystoscopy stent exchange complicated UTI large kidney stones 3-1/2 months ago inpatient at [facility] now presents back for definitive cystoscopy right ureteroscopy laser gypsy of multiple stones at [facility] apparently undergoing urine culture which confirmed contaminated specimen family aware risk of multiple staged ureteroscopy's in light of large stone burden additional urologic procedures      Complications:   None    Procedure and Technique:  Patient identified in the holding area son family member present wish to proceed with procedure consent signed by son placed in the OR suite after anesthesia was induced placed in lithotomy position draped and prepped in sterile fashion timeout performed extra Betadine was used and  placed into the glans under the moderately phimotic foreskin 22 Mauritian cystoscope with 30 lens was passed through the anterior the posterior third anterior this showed no abnormalities post urethra confirmed 3.0 cm by lobar prostate scope passed into bladder lumen immediate purulent urine noted as well as stent running across the bladder fully encrusted with large bladder stone noted at least 3 to 4 cm laser placed encrustation removed stones fragmented removed with Ellik a 0.038 wire was passed up the right orifice to the right renal pelvis under fluoroscopic control the right stent was removed with proximal coil open as the stent was removed there was E flux of purulent urine indicative of infection a 24 cm 6 Mauritian stent was passed up the wire and the wire removed E flux of purulent urine noted in the Ellik was used and the bladder stone fragments were removed scope removed 18 Mauritian Rivera catheter inserted left the bed drainage time of dictation mild hematuria noted patient had procedural awakened taken to recovery room stable condition   I was present for the entire procedure.    Patient Disposition:  PACU              SIGNATURE: Ciro Hughes MD  DATE: January 9, 2025  TIME: 6:44 PM

## 2025-01-09 NOTE — PROGRESS NOTES
"Progress Note - Urology      Patient: Chris Bojorquez   : 1945 Sex: male   MRN: [id number]     CSN: [id number]  Unit/Bed#: OR POOL     SUBJECTIVE:   Patient seen in surgical preop unit  Initially scheduled for 11 AM today unfortunately staff felt patient still brought to the hospital  Has been made n.p.o.  Now 10 hours since breakfast  Family members present and wish to proceed with right ureteroscopy stone extraction aware risk of anesthesia infection bleeding additional Yannes procedures      Objective   Vitals: /61   Pulse 67   Temp (!) 97.1 °F (36.2 °C) (Skin)   Resp 18   Ht 5' 7\" (1.702 m)   Wt 67.1 kg (148 lb)   SpO2 99%   BMI 23.18 kg/m²     No intake/output data recorded.      Physical Exam:   General Alert awake   Normocephalic atraumatic PERRLA  Lungs clear bilaterally  Cardiac normal S1 normal S2  Abdomen soft, flank pain  Extremities no edema      Lab Results: CBC:   Lab Results   Component Value Date    WBC 5.33 10/01/2024    HGB 8.2 (L) 10/01/2024    HCT 26.8 (L) 10/01/2024    MCV 86 10/01/2024     10/01/2024    RBC 3.11 (L) 10/01/2024    MCH 26.4 (L) 10/01/2024    MCHC 30.6 (L) 10/01/2024    RDW 16.6 (H) 10/01/2024    MPV 9.3 10/01/2024    NRBC 0 09/10/2024     CMP:   Lab Results   Component Value Date     10/01/2024    CO2 24 10/01/2024    BUN 38 (H) 10/01/2024    CREATININE 1.46 (H) 10/01/2024    CALCIUM 8.6 10/01/2024    AST 20 09/10/2024    ALT 7 09/10/2024    ALKPHOS 111 (H) 09/10/2024    EGFR 45 10/01/2024     Urinalysis:   Lab Results   Component Value Date    COLORU Yellow 2024    CLARITYU Turbid 2024    SPECGRAV 1.025 2024    PHUR 5.5 2024    LEUKOCYTESUR Large (A) 2024    NITRITE Negative 2024    GLUCOSEU 30 (3/100%) (A) 2024    KETONESU Negative 2024    BILIRUBINUR Negative 2024    BLOODU Large (A) 2024     Urine Culture:   Lab Results   Component Value Date    URINECX 20,000-29,000 cfu/ml " "09/26/2024     PSA: No results found for: \"PSA\"      Assessment/ Plan:  N.p.o. since 730 this morning  For right flexible ureteroscopy lithotripsy stent exchange  Family aware that due to the large burden of stones may need staged procedure        Ciro Hughes MD  "

## 2025-01-10 NOTE — ANESTHESIA POSTPROCEDURE EVALUATION
Post-Op Assessment Note    CV Status:  Stable  Pain Score: 1    Pain management: adequate       Mental Status:  Awake   Hydration Status:  Stable   PONV Controlled:  None   Airway Patency:  Patent     Post Op Vitals Reviewed: Yes    No anethesia notable event occurred.    Staff: Anesthesiologist           Last Filed PACU Vitals:  Vitals Value Taken Time   Temp 97.6 °F (36.4 °C) 01/09/25 1830   Pulse 70 01/09/25 1900   /58 01/09/25 1900   Resp 12 01/09/25 1900   SpO2 100 % 01/09/25 1900       Modified Lacho:     Vitals Value Taken Time   Activity 2 01/09/25 1830   Respiration 2 01/09/25 1830   Circulation 2 01/09/25 1830   Consciousness 2 01/09/25 1830   Oxygen Saturation 2 01/09/25 1830     Modified Lacho Score: 10

## 2025-01-19 NOTE — ASSESSMENT & PLAN NOTE
Lab Results   Component Value Date    HGBA1C 14.6 (H) 08/26/2024   Patient was started on Lantus 14 units at bedtime with SSI ACHS on recent hospitalization.  Will continue.  A1c was 14.6.   Tight glycemic control in view of  recurrent UTIs.       80

## 2025-02-13 NOTE — PRE-PROCEDURE INSTRUCTIONS
Pre-Surgery Instructions:   Medication Instructions    acetaminophen (TYLENOL) 325 mg tablet Uses PRN- OK to take day of surgery    amLODIPine (NORVASC) 5 mg tablet Take day of surgery.    bisacodyl (Dulcolax) 10 mg suppository Hold day of surgery.    carbidopa-levodopa (SINEMET)  mg per tablet Take day of surgery.    Cranberry 400 MG TABS Stop taking 7 days prior to surgery.    docusate sodium (COLACE) 100 mg capsule Hold day of surgery.    Emollient (EUCERIN ADVANCED REPAIR EX) Hold day of surgery.    ferrous sulfate 324 (65 Fe) mg Stop taking 7 days prior to surgery.    furosemide (LASIX) 20 mg tablet Hold day of surgery.    insulin glargine (LANTUS) 100 units/mL subcutaneous injection Take night before surgery    levothyroxine 25 mcg tablet Take day of surgery.    magnesium hydroxide (MILK OF MAGNESIA) 400 mg/5 mL oral suspension Hold day of surgery.    Multiple Vitamin (multivitamin) tablet Stop taking 7 days prior to surgery.    nystatin (MYCOSTATIN) powder Hold day of surgery.    pantoprazole (PROTONIX) 40 mg tablet Take day of surgery.    rOPINIRole (REQUIP) 1 mg tablet Take day of surgery.    senna-docusate sodium (SENOKOT S) 8.6-50 mg per tablet Hold day of surgery.    SSD 1 % cream Hold day of surgery.    Pre-op instructions faxed to facility. Medication instructions for day surgery reviewed. Please use only a sip of water to take your instructed medications. Avoid all over the counter vitamins, supplements and NSAIDS for one week prior to surgery per anesthesia guidelines. Tylenol is ok to take as needed.     You will receive a call one business day prior to surgery with an arrival time and hospital directions. If your surgery is scheduled on a Monday, the hospital will be calling you on the Friday prior to your surgery. If you have not heard from anyone by 8pm, please call the hospital supervisor through the hospital  at [phone]. ([place] [phone] or [place] [phone]).    Do  not eat or drink anything after midnight the night before your surgery, including candy, mints, lifesavers, or chewing gum. Do not drink alcohol 24hrs before your surgery. Try not to smoke at least 24hrs before your surgery.       Follow the pre surgery showering instructions as listed in the “My Surgical Experience Booklet” or otherwise provided by your surgeon's office. Do not use a blade to shave the surgical area 1 week before surgery. It is okay to use a clean electric clippers up to 24 hours before surgery. Do not apply any lotions, creams, including makeup, cologne, deodorant, or perfumes after showering on the day of your surgery. Do not use dry shampoo, hair spray, hair gel, or any type of hair products.     No contact lenses, eye make-up, or artificial eyelashes. Remove nail polish, including gel polish, and any artificial, gel, or acrylic nails if possible. Remove all jewelry including rings and body piercing jewelry.     Wear causal clothing that is easy to take on and off. Consider your type of surgery.    Keep any valuables, jewelry, piercings at home. Please bring any specially ordered equipment (sling, braces) if indicated.    Arrange for a responsible person to drive you to and from the hospital on the day of your surgery. Please confirm the visitor policy for the day of your procedure when you receive your phone call with an arrival time.     Call the surgeon's office with any new illnesses, exposures, or additional questions prior to surgery.    Please reference your “My Surgical Experience Booklet” for additional information to prepare for your upcoming surgery.

## 2025-02-19 ENCOUNTER — ANESTHESIA EVENT (OUTPATIENT)
Dept: PERIOP | Facility: HOSPITAL | Age: 80
DRG: 660 | End: 2025-02-19
Payer: MEDICARE

## 2025-02-19 NOTE — H&P
H&P Exam - Urology       Patient: Chris Bojorquez   : 1945 Sex: male   MRN: [id number]     CSN: [id number]      History of Present Illness   HPI:  Chris Bojorquez is a 79 y.o. male who presents with right stent stones  for rt ureteroscopy/laser stent        Review of Systems:   Constitutional:  Negative for activity change, fever, chills and diaphoresis.   HENT: Negative for hearing loss and trouble swallowing.   Eyes: Negative for itching and visual disturbance.   Respiratory: Negative for chest tightness and shortness of breath.   Cardiovascular: Negative for chest pain, edema.   Gastrointestinal: Negative for abdominal distention, na abdominal pain, constipation, diarrhea, Nausea and vomiting.   Genitourinary: Negative for decreased urine volume, difficulty urinating, dysuria, enuresis, frequency, hematuria and urgency.   Musculoskeletal: Negative for gait problem and myalgias.   Neurological: Negative for dizziness and headaches.   Hematological: Does not bruise/bleed easily.       Historical Information   Past Medical History:   Diagnosis Date    Acute metabolic encephalopathy 2024    Anemia     Anxiety     Bacteremia due to Proteus species 2024    CHF (congestive heart failure) ([facility])     Chronic kidney disease     stage 3    Diabetes (HCC)     Diabetes mellitus (HCC)     Disease of thyroid gland     Dysphagia     Emphysematous pyelitis 2024    GERD (gastroesophageal reflux disease)     Heart failure (HCC)     Hypertension     Kidney stone     Parkinson disease (HCC)     PVD (peripheral vascular disease) (HCC)     Spinal stenosis     Urinary retention      Past Surgical History:   Procedure Laterality Date    FL RETROGRADE PYELOGRAM  2024    [place] CYSTO/URETERO W/LITHOTRIPSY &INDWELL STENT INSRT Right 2024    Procedure: CYSTOSCOPY URETEROSCOPY, INSERTION STENT URETERAL, STONE EXTRACTION, patient at [facility];  Surgeon: Ciro Hughes MD;  Location: [facility] OR;   Service: Urology    [place] CYSTO/URETERO W/LITHOTRIPSY &INDWELL STENT INSRT Right 1/9/2025    Procedure: CYSTOSCOPY,  LITHOTRIPSY HOLMIUM LASER LARGE BLADDER STONE, EXCHANGE ENCRUSTED STENT URETERAL;  Surgeon: Ciro Hughes MD;  Location: [facility];  Service: Urology    NM CYSTOURETHROSCOPY W/URETERAL CATHETERIZATION Right 8/29/2024    Procedure: CYSTOSCOPY WITH INSERTION RIGHT STENT URETERAL removal large 3cm bladder stone;  Surgeon: Ciro Hughes MD;  Location: [facility];  Service: Urology     Social History   Social History     Substance and Sexual Activity   Alcohol Use Not Currently     Social History     Substance and Sexual Activity   Drug Use Not Currently     Social History     Tobacco Use   Smoking Status Unknown   Smokeless Tobacco Not on file     Family History: No family history on file.    Meds/Allergies   No medications prior to admission.  Allergies   Allergen Reactions    Lactose - Food Allergy GI Intolerance    Sulfa Antibiotics Other (See Comments)     Unknown reaction       Objective   Vitals: Wt 72.1 kg (159 lb)   BMI 24.90 kg/m²     Physical Exam:  General Alert awake   Normocephalic atraumatic PERRLA  Lungs clear bilaterally  Cardiac normal S1 normal S2  Abdomen soft, flank pain  Extremities no edema    No intake/output data recorded.    Invasive Devices       Drain  Duration             Urethral Catheter Latex 18 Fr. 40 days                        Lab Results: CBC:   Lab Results   Component Value Date    WBC 5.33 10/01/2024    HGB 8.2 (L) 10/01/2024    HCT 26.8 (L) 10/01/2024    MCV 86 10/01/2024     10/01/2024    RBC 3.11 (L) 10/01/2024    MCH 26.4 (L) 10/01/2024    MCHC 30.6 (L) 10/01/2024    RDW 16.6 (H) 10/01/2024    MPV 9.3 10/01/2024    NRBC 0 09/10/2024     CMP:   Lab Results   Component Value Date     10/01/2024    CO2 24 10/01/2024    BUN 38 (H) 10/01/2024    CREATININE 1.46 (H) 10/01/2024    CALCIUM 8.6 10/01/2024    AST 20 09/10/2024    ALT 7 09/10/2024    ALKPHOS  "111 (H) 09/10/2024    EGFR 45 10/01/2024     Urinalysis:   Lab Results   Component Value Date    COLORU Yellow 09/30/2024    CLARITYU Turbid 09/30/2024    SPECGRAV 1.025 09/30/2024    PHUR 5.5 09/30/2024    LEUKOCYTESUR Large (A) 09/30/2024    NITRITE Negative 09/30/2024    GLUCOSEU 30 (3/100%) (A) 09/30/2024    KETONESU Negative 09/30/2024    BILIRUBINUR Negative 09/30/2024    BLOODU Large (A) 09/30/2024     Urine Culture:   Lab Results   Component Value Date    URINECX 20,000-29,000 cfu/ml 09/26/2024     PSA: No results found for: \"PSA\"        Assessment/ Plan:  Cysto/right ureteroscopy mike Hughes MD  "

## 2025-02-20 ENCOUNTER — ANESTHESIA (OUTPATIENT)
Dept: PERIOP | Facility: HOSPITAL | Age: 80
DRG: 660 | End: 2025-02-20
Payer: MEDICARE

## 2025-02-20 ENCOUNTER — APPOINTMENT (OUTPATIENT)
Dept: RADIOLOGY | Facility: HOSPITAL | Age: 80
DRG: 660 | End: 2025-02-20
Payer: MEDICARE

## 2025-02-20 ENCOUNTER — HOSPITAL ENCOUNTER (INPATIENT)
Facility: HOSPITAL | Age: 80
DRG: 660 | End: 2025-02-20
Attending: SPECIALIST | Admitting: SPECIALIST
Payer: MEDICARE

## 2025-02-20 DIAGNOSIS — E11.65 UNCONTROLLED TYPE 2 DIABETES MELLITUS WITH HYPERGLYCEMIA (HCC): ICD-10-CM

## 2025-02-20 DIAGNOSIS — N18.31 CKD STAGE G3A/A3, GFR 45-59 AND ALBUMIN CREATININE RATIO >300 MG/G (HCC): ICD-10-CM

## 2025-02-20 DIAGNOSIS — N39.0 UTI (URINARY TRACT INFECTION): ICD-10-CM

## 2025-02-20 DIAGNOSIS — E44.0 MODERATE PROTEIN-CALORIE MALNUTRITION (HCC): ICD-10-CM

## 2025-02-20 DIAGNOSIS — N20.1 CALCULUS OF URETER: Primary | ICD-10-CM

## 2025-02-20 PROBLEM — R31.0 GROSS HEMATURIA: Status: ACTIVE | Noted: 2025-02-20

## 2025-02-20 PROBLEM — B37.2 CANDIDAL DERMATITIS: Status: ACTIVE | Noted: 2025-02-20

## 2025-02-20 LAB
ALBUMIN SERPL BCG-MCNC: 3.1 G/DL (ref 3.5–5)
ALP SERPL-CCNC: 132 U/L (ref 34–104)
ALT SERPL W P-5'-P-CCNC: <3 U/L (ref 7–52)
ANION GAP SERPL CALCULATED.3IONS-SCNC: 8 MMOL/L (ref 4–13)
ANISOCYTOSIS BLD QL SMEAR: PRESENT
AST SERPL W P-5'-P-CCNC: 11 U/L (ref 13–39)
BASOPHILS # BLD MANUAL: 0 THOUSAND/UL (ref 0–0.1)
BASOPHILS NFR MAR MANUAL: 0 % (ref 0–1)
BILIRUB SERPL-MCNC: 0.58 MG/DL (ref 0.2–1)
BUN SERPL-MCNC: 45 MG/DL (ref 5–25)
CALCIUM ALBUM COR SERPL-MCNC: 9.2 MG/DL (ref 8.3–10.1)
CALCIUM SERPL-MCNC: 8.5 MG/DL (ref 8.4–10.2)
CHLORIDE SERPL-SCNC: 107 MMOL/L (ref 96–108)
CO2 SERPL-SCNC: 20 MMOL/L (ref 21–32)
CREAT SERPL-MCNC: 1.95 MG/DL (ref 0.6–1.3)
EOSINOPHIL # BLD MANUAL: 0 THOUSAND/UL (ref 0–0.4)
EOSINOPHIL NFR BLD MANUAL: 0 % (ref 0–6)
ERYTHROCYTE [DISTWIDTH] IN BLOOD BY AUTOMATED COUNT: 13.2 % (ref 11.6–15.1)
GFR SERPL CREATININE-BSD FRML MDRD: 32 ML/MIN/1.73SQ M
GLUCOSE P FAST SERPL-MCNC: 197 MG/DL (ref 65–99)
GLUCOSE SERPL-MCNC: 197 MG/DL (ref 65–140)
GLUCOSE SERPL-MCNC: 218 MG/DL (ref 65–140)
GLUCOSE SERPL-MCNC: 220 MG/DL (ref 65–140)
HCT VFR BLD AUTO: 38.2 % (ref 36.5–49.3)
HGB BLD-MCNC: 11.3 G/DL (ref 12–17)
LYMPHOCYTES # BLD AUTO: 0.47 THOUSAND/UL (ref 0.6–4.47)
LYMPHOCYTES # BLD AUTO: 5 % (ref 14–44)
MACROCYTES BLD QL AUTO: PRESENT
MCH RBC QN AUTO: 27.6 PG (ref 26.8–34.3)
MCHC RBC AUTO-ENTMCNC: 29.6 G/DL (ref 31.4–37.4)
MCV RBC AUTO: 93 FL (ref 82–98)
MONOCYTES # BLD AUTO: 0.09 THOUSAND/UL (ref 0–1.22)
MONOCYTES NFR BLD: 1 % (ref 4–12)
NEUTROPHILS # BLD MANUAL: 8.83 THOUSAND/UL (ref 1.85–7.62)
NEUTS BAND NFR BLD MANUAL: 16 % (ref 0–8)
NEUTS SEG NFR BLD AUTO: 78 % (ref 43–75)
PLATELET # BLD AUTO: 303 THOUSANDS/UL (ref 149–390)
PLATELET BLD QL SMEAR: ADEQUATE
PMV BLD AUTO: 9.1 FL (ref 8.9–12.7)
POTASSIUM SERPL-SCNC: 4.2 MMOL/L (ref 3.5–5.3)
PROT SERPL-MCNC: 7.4 G/DL (ref 6.4–8.4)
RBC # BLD AUTO: 4.1 MILLION/UL (ref 3.88–5.62)
RBC MORPH BLD: PRESENT
SODIUM SERPL-SCNC: 135 MMOL/L (ref 135–147)
TOXIC GRANULES BLD QL SMEAR: PRESENT
WBC # BLD AUTO: 9.39 THOUSAND/UL (ref 4.31–10.16)
WBC TOXIC VACUOLES BLD QL SMEAR: PRESENT

## 2025-02-20 PROCEDURE — 87086 URINE CULTURE/COLONY COUNT: CPT | Performed by: SPECIALIST

## 2025-02-20 PROCEDURE — C1894 INTRO/SHEATH, NON-LASER: HCPCS | Performed by: SPECIALIST

## 2025-02-20 PROCEDURE — C1769 GUIDE WIRE: HCPCS | Performed by: SPECIALIST

## 2025-02-20 PROCEDURE — 74420 UROGRAPHY RTRGR +-KUB: CPT

## 2025-02-20 PROCEDURE — 0T768DZ DILATION OF RIGHT URETER WITH INTRALUMINAL DEVICE, VIA NATURAL OR ARTIFICIAL OPENING ENDOSCOPIC: ICD-10-PCS | Performed by: SPECIALIST

## 2025-02-20 PROCEDURE — 85027 COMPLETE CBC AUTOMATED: CPT

## 2025-02-20 PROCEDURE — 0WFR8ZZ FRAGMENTATION IN GENITOURINARY TRACT, VIA NATURAL OR ARTIFICIAL OPENING ENDOSCOPIC: ICD-10-PCS | Performed by: SPECIALIST

## 2025-02-20 PROCEDURE — 82607 VITAMIN B-12: CPT

## 2025-02-20 PROCEDURE — C2617 STENT, NON-COR, TEM W/O DEL: HCPCS | Performed by: SPECIALIST

## 2025-02-20 PROCEDURE — C1747 URETEROSCOPE DIGITAL FLEX SNGL USE STD DEFLECTION APTRA: HCPCS | Performed by: SPECIALIST

## 2025-02-20 PROCEDURE — 99222 1ST HOSP IP/OBS MODERATE 55: CPT

## 2025-02-20 PROCEDURE — 87077 CULTURE AEROBIC IDENTIFY: CPT | Performed by: SPECIALIST

## 2025-02-20 PROCEDURE — 85007 BL SMEAR W/DIFF WBC COUNT: CPT

## 2025-02-20 PROCEDURE — 87186 SC STD MICRODIL/AGAR DIL: CPT | Performed by: SPECIALIST

## 2025-02-20 PROCEDURE — 82948 REAGENT STRIP/BLOOD GLUCOSE: CPT

## 2025-02-20 PROCEDURE — 80053 COMPREHEN METABOLIC PANEL: CPT

## 2025-02-20 PROCEDURE — 82746 ASSAY OF FOLIC ACID SERUM: CPT

## 2025-02-20 DEVICE — INLAY URETERAL STENT W/O GUIDEWIRE
Type: IMPLANTABLE DEVICE | Site: URETER | Status: FUNCTIONAL
Brand: BARD® INLAY® URETERAL STENT

## 2025-02-20 RX ORDER — FUROSEMIDE 20 MG/1
20 TABLET ORAL DAILY
Status: DISPENSED | OUTPATIENT
Start: 2025-02-20

## 2025-02-20 RX ORDER — AMLODIPINE BESYLATE 5 MG/1
5 TABLET ORAL DAILY
Status: DISCONTINUED | OUTPATIENT
Start: 2025-02-21 | End: 2025-02-21

## 2025-02-20 RX ORDER — CEFUROXIME AXETIL 500 MG/1
500 TABLET ORAL EVERY 12 HOURS SCHEDULED
Status: ON HOLD | COMMUNITY

## 2025-02-20 RX ORDER — ONDANSETRON 2 MG/ML
INJECTION INTRAMUSCULAR; INTRAVENOUS AS NEEDED
Status: DISCONTINUED | OUTPATIENT
Start: 2025-02-20 | End: 2025-02-20

## 2025-02-20 RX ORDER — SODIUM CHLORIDE, SODIUM LACTATE, POTASSIUM CHLORIDE, CALCIUM CHLORIDE 600; 310; 30; 20 MG/100ML; MG/100ML; MG/100ML; MG/100ML
100 INJECTION, SOLUTION INTRAVENOUS CONTINUOUS
Status: DISCONTINUED | OUTPATIENT
Start: 2025-02-20 | End: 2025-02-20

## 2025-02-20 RX ORDER — LEVOFLOXACIN 5 MG/ML
500 INJECTION, SOLUTION INTRAVENOUS EVERY 24 HOURS
Status: COMPLETED | OUTPATIENT
Start: 2025-02-20 | End: 2025-02-21

## 2025-02-20 RX ORDER — SODIUM CHLORIDE, SODIUM GLUCONATE, SODIUM ACETATE, POTASSIUM CHLORIDE, MAGNESIUM CHLORIDE, SODIUM PHOSPHATE, DIBASIC, AND POTASSIUM PHOSPHATE .53; .5; .37; .037; .03; .012; .00082 G/100ML; G/100ML; G/100ML; G/100ML; G/100ML; G/100ML; G/100ML
75 INJECTION, SOLUTION INTRAVENOUS CONTINUOUS
Status: DISCONTINUED | OUTPATIENT
Start: 2025-02-20 | End: 2025-02-21

## 2025-02-20 RX ORDER — LIDOCAINE HYDROCHLORIDE 10 MG/ML
INJECTION, SOLUTION EPIDURAL; INFILTRATION; INTRACAUDAL; PERINEURAL AS NEEDED
Status: DISCONTINUED | OUTPATIENT
Start: 2025-02-20 | End: 2025-02-20

## 2025-02-20 RX ORDER — FENTANYL CITRATE 50 UG/ML
INJECTION, SOLUTION INTRAMUSCULAR; INTRAVENOUS AS NEEDED
Status: DISCONTINUED | OUTPATIENT
Start: 2025-02-20 | End: 2025-02-20

## 2025-02-20 RX ORDER — INSULIN GLARGINE 100 [IU]/ML
18 INJECTION, SOLUTION SUBCUTANEOUS
Status: DISPENSED | OUTPATIENT
Start: 2025-02-20

## 2025-02-20 RX ORDER — CARBIDOPA AND LEVODOPA 25; 100 MG/1; MG/1
1 TABLET ORAL 3 TIMES DAILY
Status: DISPENSED | OUTPATIENT
Start: 2025-02-20

## 2025-02-20 RX ORDER — NYSTATIN 100000 [USP'U]/G
1 POWDER TOPICAL 2 TIMES DAILY
Status: DISPENSED | OUTPATIENT
Start: 2025-02-20

## 2025-02-20 RX ORDER — LEVOTHYROXINE SODIUM 25 UG/1
25 TABLET ORAL DAILY
Status: DISPENSED | OUTPATIENT
Start: 2025-02-21

## 2025-02-20 RX ORDER — MAGNESIUM HYDROXIDE 1200 MG/15ML
LIQUID ORAL AS NEEDED
Status: DISCONTINUED | OUTPATIENT
Start: 2025-02-20 | End: 2025-02-20 | Stop reason: HOSPADM

## 2025-02-20 RX ORDER — SODIUM CHLORIDE, SODIUM LACTATE, POTASSIUM CHLORIDE, CALCIUM CHLORIDE 600; 310; 30; 20 MG/100ML; MG/100ML; MG/100ML; MG/100ML
125 INJECTION, SOLUTION INTRAVENOUS CONTINUOUS
Status: DISCONTINUED | OUTPATIENT
Start: 2025-02-20 | End: 2025-02-20

## 2025-02-20 RX ORDER — ACETAMINOPHEN 325 MG/1
650 TABLET ORAL EVERY 6 HOURS PRN
Status: DISPENSED | OUTPATIENT
Start: 2025-02-20

## 2025-02-20 RX ORDER — INSULIN LISPRO 100 [IU]/ML
1-5 INJECTION, SOLUTION INTRAVENOUS; SUBCUTANEOUS
Status: DISPENSED | OUTPATIENT
Start: 2025-02-20

## 2025-02-20 RX ORDER — SODIUM CHLORIDE, SODIUM LACTATE, POTASSIUM CHLORIDE, CALCIUM CHLORIDE 600; 310; 30; 20 MG/100ML; MG/100ML; MG/100ML; MG/100ML
75 INJECTION, SOLUTION INTRAVENOUS CONTINUOUS
Status: DISCONTINUED | OUTPATIENT
Start: 2025-02-20 | End: 2025-02-20

## 2025-02-20 RX ORDER — CEFAZOLIN SODIUM 2 G/50ML
2000 SOLUTION INTRAVENOUS ONCE
Status: COMPLETED | OUTPATIENT
Start: 2025-02-20 | End: 2025-02-20

## 2025-02-20 RX ORDER — PANTOPRAZOLE SODIUM 40 MG/1
40 TABLET, DELAYED RELEASE ORAL DAILY
Status: DISPENSED | OUTPATIENT
Start: 2025-02-21

## 2025-02-20 RX ORDER — ONDANSETRON 2 MG/ML
4 INJECTION INTRAMUSCULAR; INTRAVENOUS EVERY 6 HOURS PRN
Status: ACTIVE | OUTPATIENT
Start: 2025-02-20

## 2025-02-20 RX ORDER — FENTANYL CITRATE/PF 50 MCG/ML
50 SYRINGE (ML) INJECTION
Status: ACTIVE | OUTPATIENT
Start: 2025-02-20

## 2025-02-20 RX ORDER — MAGNESIUM HYDROXIDE/ALUMINUM HYDROXICE/SIMETHICONE 120; 1200; 1200 MG/30ML; MG/30ML; MG/30ML
30 SUSPENSION ORAL EVERY 6 HOURS PRN
Status: ACTIVE | OUTPATIENT
Start: 2025-02-20

## 2025-02-20 RX ORDER — GLUCOSAMINE HCL 500 MG
400 TABLET ORAL DAILY
Status: DISCONTINUED | OUTPATIENT
Start: 2025-02-20 | End: 2025-02-20

## 2025-02-20 RX ORDER — HYDROMORPHONE HCL IN WATER/PF 6 MG/30 ML
0.2 PATIENT CONTROLLED ANALGESIA SYRINGE INTRAVENOUS EVERY 6 HOURS PRN
Status: ACTIVE | OUTPATIENT
Start: 2025-02-20

## 2025-02-20 RX ORDER — FLUCONAZOLE 2 MG/ML
400 INJECTION, SOLUTION INTRAVENOUS EVERY 24 HOURS
Status: DISPENSED | OUTPATIENT
Start: 2025-02-20

## 2025-02-20 RX ORDER — ONDANSETRON 2 MG/ML
4 INJECTION INTRAMUSCULAR; INTRAVENOUS ONCE AS NEEDED
Status: DISCONTINUED | OUTPATIENT
Start: 2025-02-20 | End: 2025-02-21

## 2025-02-20 RX ORDER — DOCUSATE SODIUM 100 MG/1
100 CAPSULE, LIQUID FILLED ORAL DAILY
Status: DISPENSED | OUTPATIENT
Start: 2025-02-20

## 2025-02-20 RX ORDER — PROPOFOL 10 MG/ML
INJECTION, EMULSION INTRAVENOUS AS NEEDED
Status: DISCONTINUED | OUTPATIENT
Start: 2025-02-20 | End: 2025-02-20

## 2025-02-20 RX ADMIN — SODIUM CHLORIDE, SODIUM LACTATE, POTASSIUM CHLORIDE, AND CALCIUM CHLORIDE: .6; .31; .03; .02 INJECTION, SOLUTION INTRAVENOUS at 10:37

## 2025-02-20 RX ADMIN — PROPOFOL 120 MG: 10 INJECTION, EMULSION INTRAVENOUS at 11:25

## 2025-02-20 RX ADMIN — CEFAZOLIN SODIUM 2000 MG: 2 SOLUTION INTRAVENOUS at 11:29

## 2025-02-20 RX ADMIN — FUROSEMIDE 20 MG: 20 TABLET ORAL at 15:22

## 2025-02-20 RX ADMIN — LIDOCAINE HYDROCHLORIDE 50 MG: 10 INJECTION, SOLUTION EPIDURAL; INFILTRATION; INTRACAUDAL; PERINEURAL at 11:25

## 2025-02-20 RX ADMIN — ACETAMINOPHEN 650 MG: 325 TABLET ORAL at 15:21

## 2025-02-20 RX ADMIN — DOCUSATE SODIUM 100 MG: 100 CAPSULE, LIQUID FILLED ORAL at 15:21

## 2025-02-20 RX ADMIN — LEVOFLOXACIN 500 MG: 500 INJECTION, SOLUTION INTRAVENOUS at 22:33

## 2025-02-20 RX ADMIN — FENTANYL CITRATE 25 MCG: 50 INJECTION, SOLUTION INTRAMUSCULAR; INTRAVENOUS at 11:34

## 2025-02-20 RX ADMIN — INSULIN LISPRO 1 UNITS: 100 INJECTION, SOLUTION INTRAVENOUS; SUBCUTANEOUS at 18:04

## 2025-02-20 RX ADMIN — FLUCONAZOLE 400 MG: 2 INJECTION, SOLUTION INTRAVENOUS at 18:59

## 2025-02-20 RX ADMIN — INSULIN GLARGINE 18 UNITS: 100 INJECTION, SOLUTION SUBCUTANEOUS at 22:33

## 2025-02-20 RX ADMIN — CARBIDOPA AND LEVODOPA 1 TABLET: 25; 100 TABLET ORAL at 15:22

## 2025-02-20 RX ADMIN — ONDANSETRON 4 MG: 2 INJECTION INTRAMUSCULAR; INTRAVENOUS at 11:43

## 2025-02-20 RX ADMIN — NYSTATIN 1 APPLICATION: 100000 POWDER TOPICAL at 18:06

## 2025-02-20 RX ADMIN — CARBIDOPA AND LEVODOPA 1 TABLET: 25; 100 TABLET ORAL at 22:33

## 2025-02-20 RX ADMIN — SODIUM CHLORIDE, SODIUM GLUCONATE, SODIUM ACETATE, POTASSIUM CHLORIDE, MAGNESIUM CHLORIDE, SODIUM PHOSPHATE, DIBASIC, AND POTASSIUM PHOSPHATE 75 ML/HR: .53; .5; .37; .037; .03; .012; .00082 INJECTION, SOLUTION INTRAVENOUS at 18:59

## 2025-02-20 NOTE — CONSULTS
Consultation - Hospitalist   Name: Chris Bojorquez 79 y.o. male I MRN: [id number]  Unit/Bed#: 2 [facility] 219 A  Date of Admission: 2/20/2025   Date of Service: 2/20/2025 I Hospital Day: 0   Inpatient consult to Internal Medicine  Consult performed by: Ward Cross MD  Consult ordered by: Ciro Hughes MD        Physician Requesting Evaluation: Ciro Hughes MD   Reason for Evaluation / Principal Problem: medical management of diabetes, HTN      Assessment & Plan  Right nephrolithiasis  S/p right ureteroscopy stone extraction per urology  2/20/25       Urology as primary team  Pain management per primary team   Bowel regimen  Monitor I/Os     HTN (hypertension)  On norvasc will continue   Moderate protein-calorie malnutrition (HCC)  Malnutrition Findings:      Diet modifications                            BMI Findings:           Body mass index is 24.9 kg/m².     Type 2 diabetes mellitus with stage 3 chronic kidney disease and hypertension (HCC)    Lab Results   Component Value Date    HGBA1C 14.6 (H) 08/26/2024   SSI + accu checks   Continue lantus 18 U Qhs   Hypothyroidism  Continue levothyroxine  Parkinson disease (HCC)  On sinnamet   Gastroesophageal reflux disease  Continue protonix  Diastolic dysfunction  Euvolemic   On furosemide 20 mg OD   Monitor I/Os  Candidal dermatitis  Continue nystatin   Gross hematuria  Noted at bedside.   S/p Ureteroscopy 2/20/25 per urology     -DVT PPE on hold per primary team   -SCDs only    -check CBC   I have discussed the above management plan in detail with the primary service.       VTE Pharmacologic Prophylaxis:   High Risk (Score >/= 5) - Pharmacological DVT Prophylaxis Contraindicated. Sequential Compression Devices Ordered. Per primary team   Code Status: Prior per primary team   Discussion with family: Updated  (son) at bedside.        History of Present Illness   Chief Complaint: p/w Rt renal calculi , obstructive for  right ureteroscopy stone  extraction     Chris Bojorquez is a 79 y.o. male with a PMH of HTN , CHF , Renal calculi  who presents with obstructive calculi, for cystoscopy / ureterescopy per urology. Internal medicine being consulted for medical management of medical disorder.   Patient seen . Comfortable. In mild distress secondary to RT flank pain / back pain . Reports mild/moderate pain , unchanged with tylenol , no N/V , constipation or diarrhea . Reports history of chronic back pain .         Review of Systems   Constitutional:  Negative for activity change and appetite change.   Eyes:  Negative for visual disturbance.   Respiratory:  Negative for chest tightness and shortness of breath.    Cardiovascular:  Negative for chest pain and palpitations.   Neurological:  Negative for headaches.       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    GERD (gastroesophageal reflux disease)     Heart failure (HCC)     Hypertension     Kidney stone     Parkinson disease (HCC)     PVD (peripheral vascular disease) (HCC)     Spinal stenosis     Urinary retention      Past Surgical History:   Procedure Laterality Date    FL RETROGRADE PYELOGRAM  9/26/2024    FL RETROGRADE PYELOGRAM  2/20/2025    MI CYSTO/URETERO W/LITHOTRIPSY &INDWELL STENT INSRT Right 9/26/2024    Procedure: CYSTOSCOPY URETEROSCOPY, INSERTION STENT URETERAL, STONE EXTRACTION, patient at [facility];  Surgeon: Ciro Hughes MD;  Location: [facility];  Service: Urology    MI CYSTO/URETERO W/LITHOTRIPSY &INDWELL STENT INSRT Right 1/9/2025    Procedure: CYSTOSCOPY,  LITHOTRIPSY HOLMIUM LASER LARGE BLADDER STONE, EXCHANGE ENCRUSTED STENT  URETERAL;  Surgeon: Ciro Hughes MD;  Location: [facility];  Service: Urology    MO CYSTOURETHROSCOPY W/URETERAL CATHETERIZATION Right 8/29/2024    Procedure: CYSTOSCOPY WITH INSERTION RIGHT STENT URETERAL removal large 3cm bladder stone;  Surgeon: Ciro Hughes MD;  Location: [facility];  Service: Urology     Social History     Tobacco Use    Smoking status: Unknown    Smokeless tobacco: Not on file   Substance and Sexual Activity    Alcohol use: Not Currently    Drug use: Not Currently    Sexual activity: Not on file     E-Cigarette/Vaping     E-Cigarette/Vaping Substances     Family history non-contributory  Social History:  Marital Status:    Occupation: retired   Patient Pre-hospital Living Situation: stable   Patient Pre-hospital Level of Mobility: pending evaluation per PTOT   Patient Pre-hospital Diet Restrictions: diabetic     Meds/Allergies   I have reviewed home medications with patient personally.  Prior to Admission medications    Medication Sig Start Date End Date Taking? Authorizing Provider   acetaminophen (TYLENOL) 325 mg tablet Take 2 tablets (650 mg total) by mouth every 6 (six) hours as needed for mild pain, headaches or fever 10/2/24  Yes Jacqueline Williamson,    amLODIPine (NORVASC) 5 mg tablet Take 5 mg by mouth daily   Yes Historical Provider, MD   bisacodyl (Dulcolax) 10 mg suppository Insert 10 mg into the rectum as needed for constipation   Yes Historical Provider, MD   carbidopa-levodopa (SINEMET)  mg per tablet Take 1 tablet by mouth 3 (three) times a day 7/27/24  Yes Historical Provider, MD   cefuroxime (CEFTIN) 500 mg tablet Take 500 mg by mouth every 12 (twelve) hours   Yes Historical Provider, MD   Cranberry 400 MG TABS Take 400 mg by mouth daily   Yes Historical Provider, MD   docusate sodium (COLACE) 100 mg capsule Take 100 mg by mouth daily   Yes Historical Provider, MD   Emollient (EUCERIN ADVANCED REPAIR EX) Apply topically   Yes Historical Provider, MD   ferrous  sulfate 324 (65 Fe) mg Take 1 tablet (324 mg total) by mouth daily before breakfast 10/2/24  Yes Jacqueline Williamson DO   furosemide (LASIX) 20 mg tablet Take 1 tablet (20 mg total) by mouth daily 9/11/24  Yes Silverio Penn MD   insulin glargine (LANTUS) 100 units/mL subcutaneous injection Inject 14 Units under the skin daily at bedtime  Patient taking differently: Inject 18 Units under the skin daily at bedtime 9/10/24  Yes Silverio Penn MD   levothyroxine 25 mcg tablet Take 25 mcg by mouth daily 7/26/24  Yes Historical Provider, MD   magnesium hydroxide (MILK OF MAGNESIA) 400 mg/5 mL oral suspension Take by mouth   Yes Historical Provider, MD   Multiple Vitamin (multivitamin) tablet Take 1 tablet by mouth daily   Yes Historical Provider, MD   nystatin (MYCOSTATIN) powder Apply topically 2 (two) times a day Apply to groin area 10/2/24  Yes Jacqueline Williamson DO   pantoprazole (PROTONIX) 40 mg tablet 40 mg daily 7/27/24  Yes Historical Provider, MD   rOPINIRole (REQUIP) 1 mg tablet Take 1 mg by mouth if needed (2x per day for contractures)   Yes Historical Provider, MD   senna-docusate sodium (SENOKOT S) 8.6-50 mg per tablet 1 (one) time each day at the same time.   Yes Historical Provider, MD   SSD 1 % cream  7/29/24  Yes Historical Provider, MD     Allergies   Allergen Reactions    Lactose - Food Allergy GI Intolerance    Sulfa Antibiotics Other (See Comments)     Unknown reaction       Objective :  Temp:  [97.1 °F (36.2 °C)-97.6 °F (36.4 °C)] 97.6 °F (36.4 °C)  HR:  [65-89] 80  BP: (114-142)/(61-89) 117/61  Resp:  [18-22] 18  SpO2:  [93 %-100 %] 93 %  O2 Device: None (Room air)    Physical Exam  Vitals reviewed.   Constitutional:       General: He is not in acute distress.     Appearance: Normal appearance.   HENT:      Head: Normocephalic and atraumatic.      Mouth/Throat:      Mouth: Mucous membranes are moist.   Eyes:      Conjunctiva/sclera: Conjunctivae normal.      Pupils: Pupils are equal, round, and  reactive to light.   Cardiovascular:      Rate and Rhythm: Normal rate and regular rhythm.      Pulses: Normal pulses.           Radial pulses are 2+ on the right side and 2+ on the left side.      Heart sounds: Normal heart sounds, S1 normal and S2 normal. No murmur heard.  Pulmonary:      Effort: No tachypnea, bradypnea or accessory muscle usage.      Breath sounds: Normal breath sounds and air entry. No decreased breath sounds, wheezing, rhonchi or rales.   Abdominal:      General: Abdomen is flat. Bowel sounds are normal. There is no distension.      Palpations: Abdomen is soft.      Tenderness: There is abdominal tenderness. There is no guarding or rebound.   Musculoskeletal:      Cervical back: Normal range of motion.      Right lower leg: No edema.      Left lower leg: No edema.   Neurological:      General: No focal deficit present.      Mental Status: He is alert. Mental status is at baseline.   Psychiatric:         Mood and Affect: Mood normal.         Behavior: Behavior normal.         Thought Content: Thought content normal.          Lines/Drains:  Lines/Drains/Airways       Active Status       Name Placement date Placement time Site Days    Urethral Catheter Coude 18 Fr. 02/20/25  1201  Coude  less than 1    Ureteral Internal Stent Right ureter 6 Fr. 02/20/25  1206  Right ureter  less than 1                  Urinary Catheter:  Goal for removal: per urology                Lab Results: I have reviewed the following results:              Results from last 7 days   Lab Units 02/20/25  0845   POC GLUCOSE mg/dl 220*     Lab Results   Component Value Date    HGBA1C 14.6 (H) 08/26/2024           Imaging Results Review: No pertinent imaging studies reviewed.  Other Study Results Review: No additional pertinent studies reviewed.    Administrative Statements   Spent 35 minutes , discussing management plan , medical conditions , revieing lab tests / imaging , examining the patient and treating the patient.     **  Please Note: This note has been constructed using a voice recognition system. **

## 2025-02-20 NOTE — ASSESSMENT & PLAN NOTE
Malnutrition Findings:      Diet modifications                            BMI Findings:           Body mass index is 24.9 kg/m².

## 2025-02-20 NOTE — PROGRESS NOTES
"Progress Note - Urology      Patient: Chris Bojorquez   : 1945 Sex: male   MRN: [id number]     CSN: [id number]  Unit/Bed#: OR POOL     SUBJECTIVE:   Patient surgical preop unit  No change to history physical  Aware risk of anesthesia infection bleeding ureteroscopy postop stenting additional urologic procedures staged ureteroscopy in light of stone burden      Objective   Vitals: Wt 72.1 kg (159 lb)   BMI 24.90 kg/m²     No intake/output data recorded.      Physical Exam:   General Alert awake   Normocephalic atraumatic PERRLA  Lungs clear bilaterally  Cardiac normal S1 normal S2  Abdomen soft, flank pain  Extremities no edema      Lab Results: CBC:   Lab Results   Component Value Date    WBC 5.33 10/01/2024    HGB 8.2 (L) 10/01/2024    HCT 26.8 (L) 10/01/2024    MCV 86 10/01/2024     10/01/2024    RBC 3.11 (L) 10/01/2024    MCH 26.4 (L) 10/01/2024    MCHC 30.6 (L) 10/01/2024    RDW 16.6 (H) 10/01/2024    MPV 9.3 10/01/2024    NRBC 0 09/10/2024     CMP:   Lab Results   Component Value Date     10/01/2024    CO2 24 10/01/2024    BUN 38 (H) 10/01/2024    CREATININE 1.46 (H) 10/01/2024    CALCIUM 8.6 10/01/2024    AST 20 09/10/2024    ALT 7 09/10/2024    ALKPHOS 111 (H) 09/10/2024    EGFR 45 10/01/2024     Urinalysis:   Lab Results   Component Value Date    COLORU Yellow 2024    CLARITYU Turbid 2024    SPECGRAV 1.025 2024    PHUR 5.5 2024    LEUKOCYTESUR Large (A) 2024    NITRITE Negative 2024    GLUCOSEU 30 (3/100%) (A) 2024    KETONESU Negative 2024    BILIRUBINUR Negative 2024    BLOODU Large (A) 2024     Urine Culture:   Lab Results   Component Value Date    URINECX 20,000-29,000 cfu/ml 2024     PSA: No results found for: \"PSA\"      Assessment/ Plan:  Cystoscopy right ureteroscopy laser stent          Ciro Hughes MD  "

## 2025-02-20 NOTE — ANESTHESIA POSTPROCEDURE EVALUATION
Post-Op Assessment Note    CV Status:  Stable  Pain Score: 0    Pain management: adequate       Mental Status:  Awake, sleepy and arousable   Hydration Status:  Stable   PONV Controlled:  Controlled   Airway Patency:  Patent and adequate     Post Op Vitals Reviewed: Yes    No anethesia notable event occurred.    Staff: CRNA           Last Filed PACU Vitals:  Vitals Value Taken Time   Temp     Pulse     BP     Resp     SpO2

## 2025-02-20 NOTE — ANESTHESIA PREPROCEDURE EVALUATION
Procedure:  LITHOTRISPY HOLMIUM LASER (Right: Ureter)  INSERTION STENT URETERAL (Right: Ureter)    Relevant Problems   ANESTHESIA (within normal limits)      CARDIO   (+) HTN (hypertension)   (+) PVD (peripheral vascular disease) (HCC)      ENDO   (+) Hypothyroidism   (+) Type 2 diabetes mellitus with stage 3 chronic kidney disease and hypertension (HCC)      GI/HEPATIC   (+) Gastroesophageal reflux disease      /RENAL   (+) PABLO (acute kidney injury) ([facility])   (+) CKD stage G3a/A3, GFR 45-59 and albumin creatinine ratio >300 mg/g ([facility])   (+) Right nephrolithiasis      HEMATOLOGY   (+) Iron deficiency anemia      PULMONARY (within normal limits)        Physical Exam    Airway    Mallampati score: III  TM Distance: >3 FB  Neck ROM: limited     Dental       Cardiovascular  Rhythm: regular, Rate: normal    Pulmonary   Breath sounds clear to auscultation    Other Findings        Anesthesia Plan  ASA Score- 3     Anesthesia Type- general with ASA Monitors.         Additional Monitors:     Airway Plan: LMA.    Comment: Ate breakfast at 7:30, ok for OR at 3:30 pm.       Plan Factors-Exercise tolerance (METS): <4 METS.    Chart reviewed. EKG reviewed.  Existing labs reviewed. Patient summary reviewed.    Patient is not a current smoker.              Induction- intravenous.    Postoperative Plan- Plan for postoperative opioid use.     Perioperative Resuscitation Plan - Level 1 - Full Code.       Informed Consent- Anesthetic plan and risks discussed with patient, healthcare power of  and son.  I personally reviewed this patient with the CRNA. Discussed and agreed on the Anesthesia Plan with the CRNA..

## 2025-02-20 NOTE — ASSESSMENT & PLAN NOTE
Lab Results   Component Value Date    HGBA1C 14.6 (H) 08/26/2024   SSI + accu checks   Continue lantus 18 U Qhs

## 2025-02-20 NOTE — ASSESSMENT & PLAN NOTE
Noted at bedside.   S/p Ureteroscopy 2/20/25 per urology     -DVT PPE on hold per primary team   -SCDs only    -check CBC

## 2025-02-20 NOTE — OP NOTE
OPERATIVE REPORT  PATIENT NAME: Chris Bojorquez    :  1945  MRN: [id number]  Pt Location: [facility] OR ROOM 04    SURGERY DATE: 2025    Surgeons and Role:     * Ciro Hughes MD - Primary    Preop Diagnosis:  Calculus of ureter [N20.1]    Post-Op Diagnosis Codes:     * Calculus of ureter [N20.1]    Procedure(s):  Right - CYSTOSCOPY. URETEROSCOPY. RETROGRADE PYLEOGRAM. LITHOTRISPY HOLMIUM LASER  Right - INSERTION URETERAL STENT    Specimen(s):  ID Type Source Tests Collected by Time Destination   A : right renal pelvis yeast Urine Urine, Cystoscopic URINE CULTURE Ciro Hughes MD 2025 1207        Estimated Blood Loss:   Minimal    Drains:  Urethral Catheter Coude 18 Fr. (Active)   Number of days: 0       Ureteral Internal Stent Right ureter 6 Fr. (Active)   Number of days: 0       Anesthesia Type:   General/LMA    Operative Indications:  Calculus of ureter [N20.1]  79-year-old male known to me undergoing emergent cystoscopy stent for complicated UTI large renal pelvic stone burden presenting back few weeks ago with chronic UTI undergoing cystoscopy stent exchange patient now admitted to undergo cystoscopy right ureteroscopy with gypsy stent exchange for large stone burden of 1.9 cm consent obtained from son may need staged procedures    Operative Findings:  #1 severe phimosis mildly opened  Cystoscopy by lobar 3.0 cm obstructing prostate right ureteroscopy confirming large stone burden laser lithotripsy white debris like lesion exposed behind fragmented stone possible lesion/yeast  Urine sent for culture  24 cm 6 Italian stent      Complications:   None    Procedure and Technique:  Patient identified in the holding area right side marked consent obtained from son placed in the OR suite after anesthesia induced placed in thigh position draped and prepped standard fashion timeout performed 22 Italian cystoscope 30 g passed through a severely phimotic foreskin with the meatus noted anterior to normalities  posterior to confirmed 3.0 cm by lobar obstructing prostatic urethra scope inserted the bladder lumen 2.038 wire was passed up the right orifice to the right renal pelvis stent removed first wire of the safety the second cannulated with the 45 cm sheath under fluoroscopic control placed up into the pelvis wire removed retrograde confirmed the sheath to be just proximal to the UPJ the obturator removed the flexible scope placed and large stone burden encountered fragmentation started as the stone was fragmented exposing the upper pole white debris like plaque noted questionable yeast versus lesion.  In light of that laser gypsy was stopped for culture sent debris sent scope removed sheath removed cystoscope replaced and over the wire a 24 cm 6 Martiniquais stent passed wire removed scope removed 18 Martiniquais coudé cath inserted left bag drainage dilatation minimal hematuria noted patient have procedure awakened to admitted overnight for observation started on antibiotics Diflucan.   I was present for the entire procedure.    Patient Disposition:  PACU              SIGNATURE: Ciro Hughes MD  DATE: February 20, 2025  TIME: 12:22 PM

## 2025-02-20 NOTE — ANESTHESIA POSTPROCEDURE EVALUATION
Post-Op Assessment Note    CV Status:  Stable  Pain Score: 1    Pain management: adequate       Mental Status:  Alert and awake   Hydration Status:  Euvolemic   PONV Controlled:  Controlled   Airway Patency:  Patent     Post Op Vitals Reviewed: Yes    No anethesia notable event occurred.    Staff: Anesthesiologist           Last Filed PACU Vitals:  Vitals Value Taken Time   Temp 97.1 °F (36.2 °C) 02/20/25 1230   Pulse 75 02/20/25 1340   /72 02/20/25 1340   Resp 20 02/20/25 1340   SpO2 100 % 02/20/25 1327       Modified Lacho:     Vitals Value Taken Time   Activity 2 02/20/25 1230   Respiration 2 02/20/25 1230   Circulation 2 02/20/25 1230   Consciousness 1 02/20/25 1230   Oxygen Saturation 2 02/20/25 1230     Modified Lacho Score: 9

## 2025-02-20 NOTE — ASSESSMENT & PLAN NOTE
S/p right ureteroscopy stone extraction per urology  2/20/25       Urology as primary team  Pain management per primary team   Bowel regimen  Monitor I/Os

## 2025-02-21 PROBLEM — N39.0 UTI (URINARY TRACT INFECTION): Status: ACTIVE | Noted: 2025-02-21

## 2025-02-21 LAB
ANION GAP SERPL CALCULATED.3IONS-SCNC: 9 MMOL/L (ref 4–13)
BACTERIA UR QL AUTO: ABNORMAL /HPF
BASOPHILS # BLD AUTO: 0.02 THOUSANDS/ΜL (ref 0–0.1)
BASOPHILS NFR BLD AUTO: 0 % (ref 0–1)
BILIRUB UR QL STRIP: NEGATIVE
BUN SERPL-MCNC: 48 MG/DL (ref 5–25)
CALCIUM SERPL-MCNC: 7.6 MG/DL (ref 8.4–10.2)
CHLORIDE SERPL-SCNC: 101 MMOL/L (ref 96–108)
CLARITY UR: ABNORMAL
CO2 SERPL-SCNC: 22 MMOL/L (ref 21–32)
COLOR UR: ABNORMAL
CREAT SERPL-MCNC: 2.36 MG/DL (ref 0.6–1.3)
EOSINOPHIL # BLD AUTO: 0.07 THOUSAND/ΜL (ref 0–0.61)
EOSINOPHIL NFR BLD AUTO: 1 % (ref 0–6)
ERYTHROCYTE [DISTWIDTH] IN BLOOD BY AUTOMATED COUNT: 13.7 % (ref 11.6–15.1)
EST. AVERAGE GLUCOSE BLD GHB EST-MCNC: 275 MG/DL
FOLATE SERPL-MCNC: >22.3 NG/ML
GFR SERPL CREATININE-BSD FRML MDRD: 25 ML/MIN/1.73SQ M
GLUCOSE P FAST SERPL-MCNC: 152 MG/DL (ref 65–99)
GLUCOSE SERPL-MCNC: 152 MG/DL (ref 65–140)
GLUCOSE SERPL-MCNC: 182 MG/DL (ref 65–140)
GLUCOSE SERPL-MCNC: 217 MG/DL (ref 65–140)
GLUCOSE SERPL-MCNC: 305 MG/DL (ref 65–140)
GLUCOSE UR STRIP-MCNC: NEGATIVE MG/DL
HBA1C MFR BLD: 11.2 %
HCT VFR BLD AUTO: 32.9 % (ref 36.5–49.3)
HGB BLD-MCNC: 9.7 G/DL (ref 12–17)
HGB UR QL STRIP.AUTO: ABNORMAL
IMM GRANULOCYTES # BLD AUTO: 0.03 THOUSAND/UL (ref 0–0.2)
IMM GRANULOCYTES NFR BLD AUTO: 0 % (ref 0–2)
KETONES UR STRIP-MCNC: NEGATIVE MG/DL
LACTATE SERPL-SCNC: 0.8 MMOL/L (ref 0.5–2)
LEUKOCYTE ESTERASE UR QL STRIP: ABNORMAL
LYMPHOCYTES # BLD AUTO: 0.52 THOUSANDS/ΜL (ref 0.6–4.47)
LYMPHOCYTES NFR BLD AUTO: 6 % (ref 14–44)
MCH RBC QN AUTO: 28.3 PG (ref 26.8–34.3)
MCHC RBC AUTO-ENTMCNC: 29.5 G/DL (ref 31.4–37.4)
MCV RBC AUTO: 96 FL (ref 82–98)
MONOCYTES # BLD AUTO: 0.7 THOUSAND/ΜL (ref 0.17–1.22)
MONOCYTES NFR BLD AUTO: 9 % (ref 4–12)
NEUTROPHILS # BLD AUTO: 6.76 THOUSANDS/ΜL (ref 1.85–7.62)
NEUTS SEG NFR BLD AUTO: 84 % (ref 43–75)
NITRITE UR QL STRIP: NEGATIVE
NON-SQ EPI CELLS URNS QL MICRO: ABNORMAL /HPF
NRBC BLD AUTO-RTO: 0 /100 WBCS
PH UR STRIP.AUTO: 6.5 [PH]
PLATELET # BLD AUTO: 184 THOUSANDS/UL (ref 149–390)
PMV BLD AUTO: 9.4 FL (ref 8.9–12.7)
POTASSIUM SERPL-SCNC: 4.7 MMOL/L (ref 3.5–5.3)
PROCALCITONIN SERPL-MCNC: 86.47 NG/ML
PROT UR STRIP-MCNC: ABNORMAL MG/DL
RBC # BLD AUTO: 3.43 MILLION/UL (ref 3.88–5.62)
RBC #/AREA URNS AUTO: ABNORMAL /HPF
SODIUM SERPL-SCNC: 132 MMOL/L (ref 135–147)
SP GR UR STRIP.AUTO: 1.02 (ref 1–1.03)
UROBILINOGEN UR STRIP-ACNC: <2 MG/DL
VIT B12 SERPL-MCNC: 669 PG/ML (ref 180–914)
WBC # BLD AUTO: 8.1 THOUSAND/UL (ref 4.31–10.16)
WBC #/AREA URNS AUTO: ABNORMAL /HPF

## 2025-02-21 PROCEDURE — 87081 CULTURE SCREEN ONLY: CPT | Performed by: SPECIALIST

## 2025-02-21 PROCEDURE — 83036 HEMOGLOBIN GLYCOSYLATED A1C: CPT

## 2025-02-21 PROCEDURE — 81001 URINALYSIS AUTO W/SCOPE: CPT | Performed by: NURSE PRACTITIONER

## 2025-02-21 PROCEDURE — 82948 REAGENT STRIP/BLOOD GLUCOSE: CPT

## 2025-02-21 PROCEDURE — 87147 CULTURE TYPE IMMUNOLOGIC: CPT | Performed by: SPECIALIST

## 2025-02-21 PROCEDURE — 87040 BLOOD CULTURE FOR BACTERIA: CPT | Performed by: NURSE PRACTITIONER

## 2025-02-21 PROCEDURE — 97161 PT EVAL LOW COMPLEX 20 MIN: CPT

## 2025-02-21 PROCEDURE — 83605 ASSAY OF LACTIC ACID: CPT | Performed by: NURSE PRACTITIONER

## 2025-02-21 PROCEDURE — 85025 COMPLETE CBC W/AUTO DIFF WBC: CPT

## 2025-02-21 PROCEDURE — 84145 PROCALCITONIN (PCT): CPT | Performed by: NURSE PRACTITIONER

## 2025-02-21 PROCEDURE — 80048 BASIC METABOLIC PNL TOTAL CA: CPT

## 2025-02-21 PROCEDURE — 99232 SBSQ HOSP IP/OBS MODERATE 35: CPT

## 2025-02-21 RX ORDER — SODIUM CHLORIDE, SODIUM GLUCONATE, SODIUM ACETATE, POTASSIUM CHLORIDE, MAGNESIUM CHLORIDE, SODIUM PHOSPHATE, DIBASIC, AND POTASSIUM PHOSPHATE .53; .5; .37; .037; .03; .012; .00082 G/100ML; G/100ML; G/100ML; G/100ML; G/100ML; G/100ML; G/100ML
75 INJECTION, SOLUTION INTRAVENOUS CONTINUOUS
Status: DISPENSED | OUTPATIENT
Start: 2025-02-21 | End: 2025-02-22

## 2025-02-21 RX ORDER — CEFTRIAXONE 1 G/50ML
1000 INJECTION, SOLUTION INTRAVENOUS EVERY 24 HOURS
Status: DISPENSED | OUTPATIENT
Start: 2025-02-21

## 2025-02-21 RX ORDER — AMLODIPINE BESYLATE 5 MG/1
5 TABLET ORAL EVERY EVENING
Status: DISPENSED | OUTPATIENT
Start: 2025-02-21

## 2025-02-21 RX ORDER — ACETAMINOPHEN 10 MG/ML
1000 INJECTION, SOLUTION INTRAVENOUS ONCE
Status: COMPLETED | OUTPATIENT
Start: 2025-02-21 | End: 2025-02-21

## 2025-02-21 RX ADMIN — ACETAMINOPHEN 650 MG: 325 TABLET ORAL at 21:21

## 2025-02-21 RX ADMIN — CEFTRIAXONE 1000 MG: 1 INJECTION, SOLUTION INTRAVENOUS at 08:52

## 2025-02-21 RX ADMIN — ACETAMINOPHEN 650 MG: 325 TABLET ORAL at 03:39

## 2025-02-21 RX ADMIN — INSULIN LISPRO 1 UNITS: 100 INJECTION, SOLUTION INTRAVENOUS; SUBCUTANEOUS at 17:58

## 2025-02-21 RX ADMIN — NYSTATIN 1 APPLICATION: 100000 POWDER TOPICAL at 17:59

## 2025-02-21 RX ADMIN — INSULIN LISPRO 2 UNITS: 100 INJECTION, SOLUTION INTRAVENOUS; SUBCUTANEOUS at 11:35

## 2025-02-21 RX ADMIN — ACETAMINOPHEN 1000 MG: 10 INJECTION INTRAVENOUS at 09:53

## 2025-02-21 RX ADMIN — LEVOTHYROXINE SODIUM 25 MCG: 25 TABLET ORAL at 06:16

## 2025-02-21 RX ADMIN — SODIUM CHLORIDE, SODIUM GLUCONATE, SODIUM ACETATE, POTASSIUM CHLORIDE, MAGNESIUM CHLORIDE, SODIUM PHOSPHATE, DIBASIC, AND POTASSIUM PHOSPHATE 75 ML/HR: .53; .5; .37; .037; .03; .012; .00082 INJECTION, SOLUTION INTRAVENOUS at 10:44

## 2025-02-21 RX ADMIN — CARBIDOPA AND LEVODOPA 1 TABLET: 25; 100 TABLET ORAL at 17:58

## 2025-02-21 RX ADMIN — FLUCONAZOLE 400 MG: 2 INJECTION, SOLUTION INTRAVENOUS at 17:57

## 2025-02-21 RX ADMIN — CARBIDOPA AND LEVODOPA 1 TABLET: 25; 100 TABLET ORAL at 21:21

## 2025-02-21 RX ADMIN — PANTOPRAZOLE SODIUM 40 MG: 40 TABLET, DELAYED RELEASE ORAL at 06:16

## 2025-02-21 RX ADMIN — INSULIN GLARGINE 18 UNITS: 100 INJECTION, SOLUTION SUBCUTANEOUS at 21:28

## 2025-02-21 RX ADMIN — AMLODIPINE BESYLATE 5 MG: 5 TABLET ORAL at 17:58

## 2025-02-21 NOTE — PLAN OF CARE
Problem: Prexisting or High Potential for Compromised Skin Integrity  Goal: Skin integrity is maintained or improved  Description: INTERVENTIONS:  - Identify patients at risk for skin breakdown  - Assess and monitor skin integrity  - Assess and monitor nutrition and hydration status  - Monitor labs   - Assess for incontinence   - Turn and reposition patient  - Assist with mobility/ambulation  - Relieve pressure over bony prominences  - Avoid friction and shearing  - Provide appropriate hygiene as needed including keeping skin clean and dry  - Evaluate need for skin moisturizer/barrier cream  - Collaborate with interdisciplinary team   - Patient/family teaching  - Consider wound care consult   Outcome: Progressing     Problem: PAIN - ADULT  Goal: Verbalizes/displays adequate comfort level or baseline comfort level  Description: Interventions:  - Encourage patient to monitor pain and request assistance  - Assess pain using appropriate pain scale  - Administer analgesics based on type and severity of pain and evaluate response  - Implement non-pharmacological measures as appropriate and evaluate response  - Consider cultural and social influences on pain and pain management  - Notify physician/advanced practitioner if interventions unsuccessful or patient reports new pain  Outcome: Progressing     Problem: INFECTION - ADULT  Goal: Absence or prevention of progression during hospitalization  Description: INTERVENTIONS:  - Assess and monitor for signs and symptoms of infection  - Monitor lab/diagnostic results  - Monitor all insertion sites, i.e. indwelling lines, tubes, and drains  - Monitor endotracheal if appropriate and nasal secretions for changes in amount and color  - [place] appropriate cooling/warming therapies per order  - Administer medications as ordered  - Instruct and encourage patient and family to use good hand hygiene technique  - Identify and instruct in appropriate isolation precautions for  identified infection/condition  Outcome: Progressing  Goal: Absence of fever/infection during neutropenic period  Description: INTERVENTIONS:  - Monitor WBC    Outcome: Progressing     Problem: SAFETY ADULT  Goal: Patient will remain free of falls  Description: INTERVENTIONS:  - Educate patient/family on patient safety including physical limitations  - Instruct patient to call for assistance with activity   - Consult OT/PT to assist with strengthening/mobility   - Keep Call bell within reach  - Keep bed low and locked with side rails adjusted as appropriate  - Keep care items and personal belongings within reach  - Initiate and maintain comfort rounds  - Make Fall Risk Sign visible to staff  - Offer Toileting every  Hours, in advance of need  - Initiate/Maintain alarm  - Obtain necessary fall risk management equipment:   - Apply yellow socks and bracelet for high fall risk patients  - Consider moving patient to room near nurses station  Outcome: Progressing  Goal: Maintain or return to baseline ADL function  Description: INTERVENTIONS:  -  Assess patient's ability to carry out ADLs; assess patient's baseline for ADL function and identify physical deficits which impact ability to perform ADLs (bathing, care of mouth/teeth, toileting, grooming, dressing, etc.)  - Assess/evaluate cause of self-care deficits   - Assess range of motion  - Assess patient's mobility; develop plan if impaired  - Assess patient's need for assistive devices and provide as appropriate  - Encourage maximum independence but intervene and supervise when necessary  - Involve family in performance of ADLs  - Assess for home care needs following discharge   - Consider OT consult to assist with ADL evaluation and planning for discharge  - Provide patient education as appropriate  Outcome: Progressing  Goal: Maintains/Returns to pre admission functional level  Description: INTERVENTIONS:  - Perform AM-PAC 6 Click Basic Mobility/ Daily Activity  assessment daily.  - Set and communicate daily mobility goal to care team and patient/family/caregiver.   - Collaborate with rehabilitation services on mobility goals if consulted  - Perform Range of Motion  times a day.  - Reposition patient every  hours.  - Dangle patient  times a day  - Stand patient  times a day  - Ambulate patient  times a day  - Out of bed to chair  times a day   - Out of bed for meals times a day  - Out of bed for toileting  - Record patient progress and toleration of activity level   Outcome: Progressing     Problem: DISCHARGE PLANNING  Goal: Discharge to home or other facility with appropriate resources  Description: INTERVENTIONS:  - Identify barriers to discharge w/patient and caregiver  - Arrange for needed discharge resources and transportation as appropriate  - Identify discharge learning needs (meds, wound care, etc.)  - Arrange for interpretive services to assist at discharge as needed  - Refer to Case Management Department for coordinating discharge planning if the patient needs post-hospital services based on physician/advanced practitioner order or complex needs related to functional status, cognitive ability, or social support system  Outcome: Progressing     Problem: Knowledge Deficit  Goal: Patient/family/caregiver demonstrates understanding of disease process, treatment plan, medications, and discharge instructions  Description: Complete learning assessment and assess knowledge base.  Interventions:  - Provide teaching at level of understanding  - Provide teaching via preferred learning methods  Outcome: Progressing

## 2025-02-21 NOTE — PLAN OF CARE
Problem: Prexisting or High Potential for Compromised Skin Integrity  Goal: Skin integrity is maintained or improved  Description: INTERVENTIONS:  - Identify patients at risk for skin breakdown  - Assess and monitor skin integrity  - Assess and monitor nutrition and hydration status  - Monitor labs   - Assess for incontinence   - Turn and reposition patient  - Assist with mobility/ambulation  - Relieve pressure over bony prominences  - Avoid friction and shearing  - Provide appropriate hygiene as needed including keeping skin clean and dry  - Evaluate need for skin moisturizer/barrier cream  - Collaborate with interdisciplinary team   - Patient/family teaching  - Consider wound care consult   Outcome: Progressing     Problem: PAIN - ADULT  Goal: Verbalizes/displays adequate comfort level or baseline comfort level  Description: Interventions:  - Encourage patient to monitor pain and request assistance  - Assess pain using appropriate pain scale  - Administer analgesics based on type and severity of pain and evaluate response  - Implement non-pharmacological measures as appropriate and evaluate response  - Consider cultural and social influences on pain and pain management  - Notify physician/advanced practitioner if interventions unsuccessful or patient reports new pain  Outcome: Progressing     Problem: INFECTION - ADULT  Goal: Absence or prevention of progression during hospitalization  Description: INTERVENTIONS:  - Assess and monitor for signs and symptoms of infection  - Monitor lab/diagnostic results  - Monitor all insertion sites, i.e. indwelling lines, tubes, and drains  - Monitor endotracheal if appropriate and nasal secretions for changes in amount and color  - [place] appropriate cooling/warming therapies per order  - Administer medications as ordered  - Instruct and encourage patient and family to use good hand hygiene technique  - Identify and instruct in appropriate isolation precautions for  identified infection/condition  Outcome: Progressing  Goal: Absence of fever/infection during neutropenic period  Description: INTERVENTIONS:  - Monitor WBC    Outcome: Progressing     Problem: SAFETY ADULT  Goal: Patient will remain free of falls  Description: INTERVENTIONS:  - Educate patient/family on patient safety including physical limitations  - Instruct patient to call for assistance with activity   - Consult OT/PT to assist with strengthening/mobility   - Keep Call bell within reach  - Keep bed low and locked with side rails adjusted as appropriate  - Keep care items and personal belongings within reach  - Initiate and maintain comfort rounds  - Make Fall Risk Sign visible to staff  - Offer Toileting every 2 Hours, in advance of need  - Initiate/Maintain bed alarm  - Obtain necessary fall risk management equipment: call bell  - Apply yellow socks and bracelet for high fall risk patients  - Consider moving patient to room near nurses station  Outcome: Progressing  Goal: Maintain or return to baseline ADL function  Description: INTERVENTIONS:  -  Assess patient's ability to carry out ADLs; assess patient's baseline for ADL function and identify physical deficits which impact ability to perform ADLs (bathing, care of mouth/teeth, toileting, grooming, dressing, etc.)  - Assess/evaluate cause of self-care deficits   - Assess range of motion  - Assess patient's mobility; develop plan if impaired  - Assess patient's need for assistive devices and provide as appropriate  - Encourage maximum independence but intervene and supervise when necessary  - Involve family in performance of ADLs  - Assess for home care needs following discharge   - Consider OT consult to assist with ADL evaluation and planning for discharge  - Provide patient education as appropriate  Outcome: Progressing  Goal: Maintains/Returns to pre admission functional level  Description: INTERVENTIONS:  - Perform AM-PAC 6 Click Basic Mobility/ Daily  Activity assessment daily.  - Set and communicate daily mobility goal to care team and patient/family/caregiver.   - Collaborate with rehabilitation services on mobility goals if consulted  - Ambulate patient 3 times a day  - Out of bed to chair 3 times a day   - Out of bed for meals 3 times a day  - Out of bed for toileting  - Record patient progress and toleration of activity level   Outcome: Progressing     Problem: DISCHARGE PLANNING  Goal: Discharge to home or other facility with appropriate resources  Description: INTERVENTIONS:  - Identify barriers to discharge w/patient and caregiver  - Arrange for needed discharge resources and transportation as appropriate  - Identify discharge learning needs (meds, wound care, etc.)  - Arrange for interpretive services to assist at discharge as needed  - Refer to Case Management Department for coordinating discharge planning if the patient needs post-hospital services based on physician/advanced practitioner order or complex needs related to functional status, cognitive ability, or social support system  Outcome: Progressing     Problem: Knowledge Deficit  Goal: Patient/family/caregiver demonstrates understanding of disease process, treatment plan, medications, and discharge instructions  Description: Complete learning assessment and assess knowledge base.  Interventions:  - Provide teaching at level of understanding  - Provide teaching via preferred learning methods  Outcome: Progressing

## 2025-02-21 NOTE — ASSESSMENT & PLAN NOTE
Noted at bedside with pus in urine     -Fever       Plan:   Start ceftriaxone   Start IVF @ 75 ml/hr  Follow up with urine cultures  Urology team following

## 2025-02-21 NOTE — OCCUPATIONAL THERAPY NOTE
OCCUPATIONAL THERAPY       02/21/25 3043   OT Last Visit   OT Visit Date 02/21/25   Note Type   Note type Screen   Cancel Reasons Other   Additional Comments Pt is from a LTC facility where he is transferred using a Don lift and assist for all ADLS. Pt is able to feed himself independently. No skilled OT needs or AE recommendations needed at this time.   Licensure   [place] License Number  Yolande Adhikari MS, OTR/L, #[id number]

## 2025-02-21 NOTE — PROGRESS NOTES
"Progress Note - Urology      Patient: Chris Bojorquez   : 1945 Sex: male   MRN: [id number]     CSN: [id number]  Unit/Bed#: 2 [address]     SUBJECTIVE:   Patient seen on morning rounds  Purulent urine Rivera  Spiking temperatures to 101  Urine cultures pending  On cefepime      Objective   Vitals: BP (!) 156/126   Pulse 101   Temp (!) 100.7 °F (38.2 °C)   Resp 17   Ht 5' 10\" (1.778 m)   Wt 72.1 kg (159 lb)   SpO2 100%   BMI 22.81 kg/m²     I/O last 24 hours:  In: 600 [P.O.:350; I.V.:200; IV Piggyback:50]  Out: 50 [Urine:50]      Physical Exam:   General Alert awake   Normocephalic atraumatic PERRLA  Lungs clear bilaterally  Cardiac normal S1 normal S2  Abdomen soft, flank pain  Extremities no edema      Lab Results: CBC:   Lab Results   Component Value Date    WBC 8.10 2025    HGB 9.7 (L) 2025    HCT 32.9 (L) 2025    MCV 96 2025     2025    RBC 3.43 (L) 2025    MCH 28.3 2025    MCHC 29.5 (L) 2025    RDW 13.7 2025    MPV 9.4 2025    NRBC 0 2025     CMP:   Lab Results   Component Value Date     2025    CO2 22 2025    BUN 48 (H) 2025    CREATININE 2.36 (H) 2025    CALCIUM 7.6 (L) 2025    AST 11 (L) 2025    ALT <3 (L) 2025    ALKPHOS 132 (H) 2025    EGFR 25 2025     Urinalysis:   Lab Results   Component Value Date    COLORU Light Brown 2025    CLARITYU Turbid 2025    SPECGRAV 1.020 2025    PHUR 6.5 2025    LEUKOCYTESUR Large (A) 2025    NITRITE Negative 2025    GLUCOSEU Negative 2025    KETONESU Negative 2025    BILIRUBINUR Negative 2025    BLOODU Large (A) 2025     Urine Culture:   Lab Results   Component Value Date    URINECX 20,000-29,000 cfu/ml 2024     PSA: No results found for: \"PSA\"      Assessment/ Plan:  Complicated UTI  Status post right ureteral stent exchange day #1  Possible yeast  Cultures " pending  Continue Rivera  Changed to full admission          Ciro Hughes MD

## 2025-02-21 NOTE — ASSESSMENT & PLAN NOTE
Malnutrition Findings:      Diet modifications                            BMI Findings:           Body mass index is 22.81 kg/m².

## 2025-02-21 NOTE — PROGRESS NOTES
Progress Note - Hospitalist   Name: Chris Bojorquez 79 y.o. male I MRN: [id number]  Unit/Bed#: 2 [facility] [address] A I Date of Admission: 2/20/2025   Date of Service: 2/21/2025 I Hospital Day: 0    Assessment & Plan  Right nephrolithiasis  S/p right ureteroscopy stone extraction per urology  2/20/25       Urology as primary team  Pain management per primary team   Bowel regimen  Monitor I/Os     HTN (hypertension)  On norvasc will hold given hypotension  Monitor vitals  Moderate protein-calorie malnutrition (HCC)  Malnutrition Findings:      Diet modifications                            BMI Findings:           Body mass index is 22.81 kg/m².     Type 2 diabetes mellitus with stage 3 chronic kidney disease and hypertension (HCC)    Lab Results   Component Value Date    HGBA1C 14.6 (H) 08/26/2024   SSI + accu checks   Continue lantus 18 U Qhs   Hypothyroidism  Continue levothyroxine  Parkinson disease (HCC)  On sinnamet   Gastroesophageal reflux disease  Continue protonix  Diastolic dysfunction  Euvolemic   On furosemide 20 mg OD << Hold with low BP   Monitor I/Os  Candidal dermatitis  Continue nystatin   Gross hematuria  Noted at bedside.   S/p Ureteroscopy 2/20/25 per urology     -DVT PPE on hold per primary team   -SCDs only    -check CBC   UTI (urinary tract infection)  Noted at bedside with pus in urine     -Fever       Plan:   Start ceftriaxone   Start IVF @ 75 ml/hr  Follow up with urine cultures  Urology team following     VTE Pharmacologic Prophylaxis:   Moderate Risk (Score 3-4) - Pharmacological DVT Prophylaxis Contraindicated. Sequential Compression Devices Ordered.    Mobility:   Basic Mobility Inpatient Raw Score: 11  JH-HLM Goal: 4: Move to chair/commode  JH-HLM Achieved: 2: Bed activities/Dependent transfer  JH-HLM Goal achieved. Continue to encourage appropriate mobility.    Patient Centered Rounds: I performed bedside rounds with nursing staff today.   Discussions with Specialists or Other Care Team  Provider: urology     Education and Discussions with Family / Patient: Updated  (son) via phone.    Current Length of Stay: 0 day(s)  Current Patient Status: Outpatient Surgery   Certification Statement: The patient will continue to require additional inpatient hospital stay due to UTI  , renal stone   Discharge Plan: Anticipate discharge in 24-48 hrs to home with home services.    Code Status: Prior    Subjective   Patient seen today at bedside. He is upset about blood tests in the hospital. Doesn't have any active complaints.     No chest pain or tightness, SOB or cough, dizziness or light headedness, N/V, Diarrhea of constipation.   Tolerating oral diet.       Objective :  Temp:  [97.1 °F (36.2 °C)-101 °F (38.3 °C)] 100.7 °F (38.2 °C)  HR:  [65-98] 77  BP: ()/(45-89) 90/45  Resp:  [18-22] 18  SpO2:  [93 %-100 %] 98 %  O2 Device: None (Room air)    Body mass index is 22.81 kg/m².     Input and Output Summary (last 24 hours):     Intake/Output Summary (Last 24 hours) at 2/21/2025 1014  Last data filed at 2/20/2025 1230  Gross per 24 hour   Intake 250 ml   Output 50 ml   Net 200 ml       Physical Exam  Vitals and nursing note reviewed.   Constitutional:       General: He is not in acute distress.     Appearance: Normal appearance.   HENT:      Head: Normocephalic and atraumatic.      Mouth/Throat:      Mouth: Mucous membranes are moist.   Eyes:      Conjunctiva/sclera: Conjunctivae normal.      Pupils: Pupils are equal, round, and reactive to light.   Cardiovascular:      Rate and Rhythm: Normal rate and regular rhythm.      Pulses: Normal pulses.           Carotid pulses are 2+ on the right side and 2+ on the left side.       Radial pulses are 2+ on the right side and 2+ on the left side.        Dorsalis pedis pulses are 2+ on the right side and 2+ on the left side.      Heart sounds: Normal heart sounds, S1 normal and S2 normal. No murmur heard.  Pulmonary:      Effort: No tachypnea, bradypnea  or accessory muscle usage.      Breath sounds: Normal breath sounds and air entry. No decreased breath sounds, wheezing, rhonchi or rales.   Abdominal:      General: Abdomen is flat. Bowel sounds are normal. There is no distension.      Palpations: Abdomen is soft.      Tenderness: There is no abdominal tenderness.      Comments: Foleys catheter in place. Draninng pascale colored urine with pus noted / precipitants    Musculoskeletal:      Right lower leg: No edema.      Left lower leg: No edema.   Neurological:      Mental Status: He is alert. Mental status is at baseline.           Lines/Drains:  Lines/Drains/Airways       Active Status       Name Placement date Placement time Site Days    Urethral Catheter Coude 18 Fr. 02/20/25  1201  Coude  less than 1    Ureteral Internal Stent Right ureter 6 Fr. 02/20/25  1206  Right ureter  less than 1                  Urinary Catheter:  Goal for removal:  per urology                  Lab Results: I have reviewed the following results:   Results from last 7 days   Lab Units 02/20/25  1621   WBC Thousand/uL 9.39   HEMOGLOBIN g/dL 11.3*   HEMATOCRIT % 38.2   PLATELETS Thousands/uL 303   BANDS PCT % 16*   LYMPHO PCT % 5*   MONO PCT % 1*   EOS PCT % 0     Results from last 7 days   Lab Units 02/21/25  0655 02/20/25  1621   SODIUM mmol/L 132* 135   POTASSIUM mmol/L 4.7 4.2   CHLORIDE mmol/L 101 107   CO2 mmol/L 22 20*   BUN mg/dL 48* 45*   CREATININE mg/dL 2.36* 1.95*   ANION GAP mmol/L 9 8   CALCIUM mg/dL 7.6* 8.5   ALBUMIN g/dL  --  3.1*   TOTAL BILIRUBIN mg/dL  --  0.58   ALK PHOS U/L  --  132*   ALT U/L  --  <3*   AST U/L  --  11*   GLUCOSE RANDOM mg/dL 152* 197*         Results from last 7 days   Lab Units 02/20/25  2046 02/20/25  0845   POC GLUCOSE mg/dl 218* 220*         Results from last 7 days   Lab Units 02/21/25  0655 02/21/25  0654   LACTIC ACID mmol/L  --  0.8   PROCALCITONIN ng/ml 86.47*  --        Recent Cultures (last 7 days):         Imaging Results Review: No  pertinent imaging studies reviewed.  Other Study Results Review: No additional pertinent studies reviewed.    Last 24 Hours Medication List:     Current Facility-Administered Medications:     acetaminophen (TYLENOL) tablet 650 mg, Q6H PRN    aluminum-magnesium hydroxide-simethicone (MAALOX) oral suspension 30 mL, Q6H PRN    amLODIPine (NORVASC) tablet 5 mg, Daily    carbidopa-levodopa (SINEMET)  mg per tablet 1 tablet, TID    cefTRIAXone (ROCEPHIN) IVPB (premix in dextrose) 1,000 mg 50 mL, Q24H, Last Rate: 1,000 mg (02/21/25 8276)    docusate sodium (COLACE) capsule 100 mg, Daily    fentaNYL (SUBLIMAZE) injection 50 mcg, Q10 Min PRN    fluconazole (DIFLUCAN) IVPB (premix) 400 mg 200 mL, Q24H, Last Rate: 400 mg (02/20/25 8038)    [Held by provider] furosemide (LASIX) tablet 20 mg, Daily    HYDROmorphone HCl (DILAUDID) injection 0.2 mg, Q6H PRN    insulin glargine (LANTUS) subcutaneous injection 18 Units 0.18 mL, HS    insulin lispro (HumALOG/ADMELOG) 100 units/mL subcutaneous injection 1-5 Units, TID AC **AND** Fingerstick Glucose (POCT), TID AC    levothyroxine tablet 25 mcg, Daily    multi-electrolyte (PLASMALYTE-A/ISOLYTE-S PH 7.4) IV solution, Continuous    nystatin (MYCOSTATIN) powder 1 Application, BID    ondansetron (ZOFRAN) injection 4 mg, Q6H PRN    pantoprazole (PROTONIX) EC tablet 40 mg, Daily    Administrative Statements   Today, Patient Was Seen By: Ward Cross MD  I have spent a total time of 30 minutes in caring for this patient on the day of the visit/encounter including Diagnostic results, Prognosis, Risks and benefits of tx options, and Instructions for management.    **Please Note: This note may have been constructed using a voice recognition system.**

## 2025-02-21 NOTE — PHYSICAL THERAPY NOTE
"   PT EVALUATION     02/21/25 1230   PT Last Visit   PT Visit Date 02/21/25   Note Type   Note type Evaluation   Pain Assessment   Pain Assessment Tool 0-10   Pain Score No Pain   Restrictions/Precautions   Weight Bearing Precautions Per Order No   Other Precautions Chair Alarm;Bed Alarm;Multiple lines;Fall Risk  (non ambulatory)   Home Living   Type of Home SNF  ([facility])   Prior Function   Level of [place] Needs assistance with ADLs;Needs assistance with functional mobility;Needs assistance with IADLS   Lives With Facility staff   Receives Help From Personal care attendant   IADLs Family/Friend/Other provides transportation;Family/Friend/Other provides meals;Family/Friend/Other provides medication management   Comments Per pt, he does not walk or transfer to a chair with assistance.  He gets to chair dependently via kathleen lift.   General   Additional Pertinent History Pt admitted with calculus of ureter.    S/P stent 2/20/25.   Family/Caregiver Present No   Cognition   Overall Cognitive Status Impaired   Arousal/Participation Cooperative   Orientation Level Oriented to person;Disoriented to place;Disoriented to time;Oriented to situation   Memory Decreased recall of biographical information;Decreased recall of recent events   Following Commands Follows one step commands with increased time or repetition   Subjective   Subjective \"I have trouble with my prostate\"  Pt reports that he does not walk or transfer except via kathleen lift.   RLE Assessment   RLE Assessment   (hip/knee flexion approx 40 degrees with PROM  : strength grossly 3-/5.)   LLE Assessment   LLE Assessment   (hip/knee flexion approx 40 degrees with PROM;  strength: grossly 3-/5)   Bed Mobility   Rolling R 2  Maximal assistance   Additional items Assist x 1;Increased time required;Verbal cues   Rolling L 2  Maximal assistance   Additional items Assist x 1;Increased time required;Verbal cues   Supine to Sit 1  Dependent   Activity Tolerance "   Medical Staff Made Aware yes: CM: pt is dependent at baseline: no skilled PT needs   Nurse Made Aware yes: Fay RN: pt is dependent at baseline: no skilled PT needs   Assessment   Prognosis Poor   Problem List Decreased strength;Decreased range of motion;Decreased endurance;Impaired balance;Decreased mobility;Decreased coordination;Decreased cognition;Impaired judgement;Decreased safety awareness   Assessment Patient seen for Physical Therapy evaluation. Patient admitted with Right nephrolithiasis.  Comorbidities affecting patient's physical performance include: anemia, anxiety, CHF, CKD, DM, Parkinson disease, DVD.  Personal factors affecting patient at time of initial evaluation include: inability to navigate level surfaces without external assistance, decreased cognition, anxiety, decreased initiation and engagement, inability to perform physical activity, inability to perform ADLS, and inability to perform IADLS . Prior to admission, patient was dependent for mobility, requiring assist for ADLS, requiring assist for IADLS, and a resident of long term care.  Please find objective findings from Physical Therapy assessment regarding body systems outlined above with impairments and limitations including weakness, decreased ROM, impaired balance, decreased endurance, decreased activity tolerance, decreased functional mobility tolerance, decreased safety awareness, fall risk, and decreased cognition.  The Barthel Index was used as a functional outcome tool presenting with a score of Barthel Index Score: 15 today indicating marked limitations of functional mobility and ADLS.  Patient's clinical presentation is currently stable  as seen in patient's presentation of varying levels of cognitive performance. Pt would benefit from continued Physical Therapy treatment to address deficits as defined above and maximize level of functional mobility. As demonstrated by objective findings, the assigned level of complexity for  this evaluation is low.The patient's AM-PAC Basic Mobility Inpatient Short Form Raw Score is 7. A Raw score of less than or equal to 16 suggests the patient may benefit from discharge to post-acute rehabilitation services, however pt is at his baseline of function: bed bound.  No skilled PT needs. Please also refer to the recommendation of the Physical Therapist for safe discharge planning.   Plan   Treatment/Interventions   (d/c from PT services.  No skilled PT needs for this admission)   PT Frequency Other (Comment)  (no PT needs)   Discharge Recommendation   Rehab Resource Intensity Level, PT No post-acute rehabilitation needs   Additional Comments Pt is dependent at baseline   AM-PAC Basic Mobility Inpatient   Turning in Flat Bed Without Bedrails 2   Lying on Back to Sitting on Edge of Flat Bed Without Bedrails 1   Moving Bed to Chair 1   Standing Up From Chair Using Arms 1   Walk in Room 1   Climb 3-5 Stairs With Railing 1   Basic Mobility Inpatient Raw Score 7   Turning Head Towards Sound 3   Follow Simple Instructions 2   Low Function Basic Mobility Raw Score  12   Low Function Basic Mobility Standardized Score  18.33   [facility] Highest Level Of Mobility   -[facility] Goal 2: Bed activities/Dependent transfer   -[facility] Achieved 2: Bed activities/Dependent transfer   Barthel Index   Feeding 5   Bathing 0   Grooming Score 0   Dressing Score 0   Bladder Score 5   Bowels Score 5   Toilet Use Score 0   Transfers (Bed/Chair) Score 0   Mobility (Level Surface) Score 0   Stairs Score 0   Barthel Index Score 15   End of Consult   Patient Position at End of Consult Supine   End of Consult Comments No skilled PT needs for this admission   Licensure   [place] License Number  Tiffanie Larios PT  [id number]

## 2025-02-22 PROBLEM — K59.00 CONSTIPATION: Status: ACTIVE | Noted: 2025-02-22

## 2025-02-22 LAB
ANION GAP SERPL CALCULATED.3IONS-SCNC: 10 MMOL/L (ref 4–13)
BASOPHILS # BLD AUTO: 0.02 THOUSANDS/ΜL (ref 0–0.1)
BASOPHILS NFR BLD AUTO: 0 % (ref 0–1)
BUN SERPL-MCNC: 51 MG/DL (ref 5–25)
CALCIUM SERPL-MCNC: 7.9 MG/DL (ref 8.4–10.2)
CHLORIDE SERPL-SCNC: 103 MMOL/L (ref 96–108)
CO2 SERPL-SCNC: 22 MMOL/L (ref 21–32)
CREAT SERPL-MCNC: 2.54 MG/DL (ref 0.6–1.3)
EOSINOPHIL # BLD AUTO: 0.13 THOUSAND/ΜL (ref 0–0.61)
EOSINOPHIL NFR BLD AUTO: 2 % (ref 0–6)
ERYTHROCYTE [DISTWIDTH] IN BLOOD BY AUTOMATED COUNT: 13.9 % (ref 11.6–15.1)
GFR SERPL CREATININE-BSD FRML MDRD: 23 ML/MIN/1.73SQ M
GLUCOSE SERPL-MCNC: 189 MG/DL (ref 65–140)
GLUCOSE SERPL-MCNC: 191 MG/DL (ref 65–140)
GLUCOSE SERPL-MCNC: 215 MG/DL (ref 65–140)
GLUCOSE SERPL-MCNC: 234 MG/DL (ref 65–140)
GLUCOSE SERPL-MCNC: 245 MG/DL (ref 65–140)
HCT VFR BLD AUTO: 32 % (ref 36.5–49.3)
HGB BLD-MCNC: 9.9 G/DL (ref 12–17)
IMM GRANULOCYTES # BLD AUTO: 0.06 THOUSAND/UL (ref 0–0.2)
IMM GRANULOCYTES NFR BLD AUTO: 1 % (ref 0–2)
LYMPHOCYTES # BLD AUTO: 0.52 THOUSANDS/ΜL (ref 0.6–4.47)
LYMPHOCYTES NFR BLD AUTO: 10 % (ref 14–44)
MCH RBC QN AUTO: 28 PG (ref 26.8–34.3)
MCHC RBC AUTO-ENTMCNC: 30.9 G/DL (ref 31.4–37.4)
MCV RBC AUTO: 90 FL (ref 82–98)
MONOCYTES # BLD AUTO: 0.61 THOUSAND/ΜL (ref 0.17–1.22)
MONOCYTES NFR BLD AUTO: 11 % (ref 4–12)
MRSA NOSE QL CULT: ABNORMAL
MRSA NOSE QL CULT: ABNORMAL
NEUTROPHILS # BLD AUTO: 4.04 THOUSANDS/ΜL (ref 1.85–7.62)
NEUTS SEG NFR BLD AUTO: 76 % (ref 43–75)
NRBC BLD AUTO-RTO: 0 /100 WBCS
PLATELET # BLD AUTO: 211 THOUSANDS/UL (ref 149–390)
PMV BLD AUTO: 9.6 FL (ref 8.9–12.7)
POTASSIUM SERPL-SCNC: 4.4 MMOL/L (ref 3.5–5.3)
RBC # BLD AUTO: 3.54 MILLION/UL (ref 3.88–5.62)
SODIUM SERPL-SCNC: 135 MMOL/L (ref 135–147)
WBC # BLD AUTO: 5.38 THOUSAND/UL (ref 4.31–10.16)

## 2025-02-22 PROCEDURE — 99232 SBSQ HOSP IP/OBS MODERATE 35: CPT

## 2025-02-22 PROCEDURE — 82948 REAGENT STRIP/BLOOD GLUCOSE: CPT

## 2025-02-22 PROCEDURE — 85025 COMPLETE CBC W/AUTO DIFF WBC: CPT

## 2025-02-22 PROCEDURE — 80048 BASIC METABOLIC PNL TOTAL CA: CPT

## 2025-02-22 RX ORDER — POLYETHYLENE GLYCOL 3350 17 G/17G
17 POWDER, FOR SOLUTION ORAL DAILY
Status: DISPENSED | OUTPATIENT
Start: 2025-02-22

## 2025-02-22 RX ADMIN — CARBIDOPA AND LEVODOPA 1 TABLET: 25; 100 TABLET ORAL at 21:39

## 2025-02-22 RX ADMIN — INSULIN LISPRO 1 UNITS: 100 INJECTION, SOLUTION INTRAVENOUS; SUBCUTANEOUS at 18:06

## 2025-02-22 RX ADMIN — INSULIN GLARGINE 18 UNITS: 100 INJECTION, SOLUTION SUBCUTANEOUS at 21:40

## 2025-02-22 RX ADMIN — INSULIN LISPRO 1 UNITS: 100 INJECTION, SOLUTION INTRAVENOUS; SUBCUTANEOUS at 07:51

## 2025-02-22 RX ADMIN — POLYETHYLENE GLYCOL 3350 17 G: 17 POWDER, FOR SOLUTION ORAL at 08:19

## 2025-02-22 RX ADMIN — PANTOPRAZOLE SODIUM 40 MG: 40 TABLET, DELAYED RELEASE ORAL at 05:47

## 2025-02-22 RX ADMIN — CEFTRIAXONE 1000 MG: 1 INJECTION, SOLUTION INTRAVENOUS at 08:00

## 2025-02-22 RX ADMIN — CARBIDOPA AND LEVODOPA 1 TABLET: 25; 100 TABLET ORAL at 08:04

## 2025-02-22 RX ADMIN — CARBIDOPA AND LEVODOPA 1 TABLET: 25; 100 TABLET ORAL at 18:07

## 2025-02-22 RX ADMIN — LEVOTHYROXINE SODIUM 25 MCG: 25 TABLET ORAL at 05:48

## 2025-02-22 RX ADMIN — AMLODIPINE BESYLATE 5 MG: 5 TABLET ORAL at 18:07

## 2025-02-22 RX ADMIN — DOCUSATE SODIUM 100 MG: 100 CAPSULE, LIQUID FILLED ORAL at 08:05

## 2025-02-22 RX ADMIN — FLUCONAZOLE 400 MG: 2 INJECTION, SOLUTION INTRAVENOUS at 15:22

## 2025-02-22 RX ADMIN — NYSTATIN 1 APPLICATION: 100000 POWDER TOPICAL at 08:05

## 2025-02-22 RX ADMIN — INSULIN LISPRO 2 UNITS: 100 INJECTION, SOLUTION INTRAVENOUS; SUBCUTANEOUS at 11:28

## 2025-02-22 RX ADMIN — NYSTATIN 1 APPLICATION: 100000 POWDER TOPICAL at 18:07

## 2025-02-22 RX ADMIN — MUPIROCIN 1 APPLICATION: 20 OINTMENT TOPICAL at 21:39

## 2025-02-22 NOTE — PROGRESS NOTES
"Progress Note - Urology      Patient: Chris Bojorquez   : 1945 Sex: male   MRN: [id number]     CSN: [id number]  Unit/Bed#: 2 [address]     SUBJECTIVE:   Patient seen on rounds  Purulent urine Rivera  Spiking temperatures to 101  Urine cultures Proteus  On cefepime      Objective   Vitals: /74   Pulse 72   Temp 98.9 °F (37.2 °C)   Resp 20   Ht 5' 10\" (1.778 m)   Wt 72.6 kg (160 lb)   SpO2 97%   BMI 22.96 kg/m²     I/O last 24 hours:  In: 1196.3 [P.O.:500; I.V.:546.3; IV Piggyback:150]  Out: 800 [Urine:800]      Physical Exam:   General Alert awake   Normocephalic atraumatic PERRLA  Lungs clear bilaterally  Cardiac normal S1 normal S2  Abdomen soft, flank pain  Extremities no edema      Lab Results: CBC:   Lab Results   Component Value Date    WBC 5.38 2025    HGB 9.9 (L) 2025    HCT 32.0 (L) 2025    MCV 90 2025     2025    RBC 3.54 (L) 2025    MCH 28.0 2025    MCHC 30.9 (L) 2025    RDW 13.9 2025    MPV 9.6 2025    NRBC 0 2025     CMP:   Lab Results   Component Value Date     2025    CO2 22 2025    BUN 51 (H) 2025    CREATININE 2.54 (H) 2025    CALCIUM 7.9 (L) 2025    AST 11 (L) 2025    ALT <3 (L) 2025    ALKPHOS 132 (H) 2025    EGFR 23 2025     Urinalysis:   Lab Results   Component Value Date    COLORU Light Brown 2025    CLARITYU Turbid 2025    SPECGRAV 1.020 2025    PHUR 6.5 2025    LEUKOCYTESUR Large (A) 2025    NITRITE Negative 2025    GLUCOSEU Negative 2025    KETONESU Negative 2025    BILIRUBINUR Negative 2025    BLOODU Large (A) 2025     Urine Culture:   Lab Results   Component Value Date    URINECX >100,000 cfu/ml Proteus species (A) 2025     PSA: No results found for: \"PSA\"      Assessment/ Plan:  Complicated UTI  Status post right ureteral stent exchange day #2  Possible yeast  Cultures " Proteus  Continue Rivera  Changed to full admission yesterday  Continue Miguel Hughes MD

## 2025-02-22 NOTE — PLAN OF CARE
Problem: Prexisting or High Potential for Compromised Skin Integrity  Goal: Skin integrity is maintained or improved  Description: INTERVENTIONS:  - Identify patients at risk for skin breakdown  - Assess and monitor skin integrity  - Assess and monitor nutrition and hydration status  - Monitor labs   - Assess for incontinence   - Turn and reposition patient  - Assist with mobility/ambulation  - Relieve pressure over bony prominences  - Avoid friction and shearing  - Provide appropriate hygiene as needed including keeping skin clean and dry  - Evaluate need for skin moisturizer/barrier cream  - Collaborate with interdisciplinary team   - Patient/family teaching  - Consider wound care consult   Outcome: Progressing     Problem: PAIN - ADULT  Goal: Verbalizes/displays adequate comfort level or baseline comfort level  Description: Interventions:  - Encourage patient to monitor pain and request assistance  - Assess pain using appropriate pain scale  - Administer analgesics based on type and severity of pain and evaluate response  - Implement non-pharmacological measures as appropriate and evaluate response  - Consider cultural and social influences on pain and pain management  - Notify physician/advanced practitioner if interventions unsuccessful or patient reports new pain  Outcome: Progressing     Problem: INFECTION - ADULT  Goal: Absence or prevention of progression during hospitalization  Description: INTERVENTIONS:  - Assess and monitor for signs and symptoms of infection  - Monitor lab/diagnostic results  - Monitor all insertion sites, i.e. indwelling lines, tubes, and drains  - Monitor endotracheal if appropriate and nasal secretions for changes in amount and color  - [place] appropriate cooling/warming therapies per order  - Administer medications as ordered  - Instruct and encourage patient and family to use good hand hygiene technique  - Identify and instruct in appropriate isolation precautions for  identified infection/condition  Outcome: Progressing  Goal: Absence of fever/infection during neutropenic period  Description: INTERVENTIONS:  - Monitor WBC    Outcome: Progressing     Problem: SAFETY ADULT  Goal: Patient will remain free of falls  Description: INTERVENTIONS:  - Educate patient/family on patient safety including physical limitations  - Instruct patient to call for assistance with activity   - Consult OT/PT to assist with strengthening/mobility   - Keep Call bell within reach  - Keep bed low and locked with side rails adjusted as appropriate  - Keep care items and personal belongings within reach  - Initiate and maintain comfort rounds  - Make Fall Risk Sign visible to staff  - Offer Toileting every 2 Hours, in advance of need  - Initiate/Maintain bed alarm  - Obtain necessary fall risk management equipment: walker  - Apply yellow socks and bracelet for high fall risk patients  - Consider moving patient to room near nurses station  Outcome: Progressing  Goal: Maintain or return to baseline ADL function  Description: INTERVENTIONS:  -  Assess patient's ability to carry out ADLs; assess patient's baseline for ADL function and identify physical deficits which impact ability to perform ADLs (bathing, care of mouth/teeth, toileting, grooming, dressing, etc.)  - Assess/evaluate cause of self-care deficits   - Assess range of motion  - Assess patient's mobility; develop plan if impaired  - Assess patient's need for assistive devices and provide as appropriate  - Encourage maximum independence but intervene and supervise when necessary  - Involve family in performance of ADLs  - Assess for home care needs following discharge   - Consider OT consult to assist with ADL evaluation and planning for discharge  - Provide patient education as appropriate  Outcome: Progressing  Goal: Maintains/Returns to pre admission functional level  Description: INTERVENTIONS:  - Perform AM-PAC 6 Click Basic Mobility/ Daily  Activity assessment daily.  - Set and communicate daily mobility goal to care team and patient/family/caregiver.   - Collaborate with rehabilitation services on mobility goals if consulted  - Perform Range of Motion 3 times a day.  - Reposition patient every 2 hours.  - Dangle patient 3 times a day  - Stand patient 3 times a day  - Ambulate patient 3 times a day  - Out of bed to chair 3 times a day   - Out of bed for meals 3 times a day  - Out of bed for toileting  - Record patient progress and toleration of activity level   Outcome: Progressing     Problem: DISCHARGE PLANNING  Goal: Discharge to home or other facility with appropriate resources  Description: INTERVENTIONS:  - Identify barriers to discharge w/patient and caregiver  - Arrange for needed discharge resources and transportation as appropriate  - Identify discharge learning needs (meds, wound care, etc.)  - Arrange for interpretive services to assist at discharge as needed  - Refer to Case Management Department for coordinating discharge planning if the patient needs post-hospital services based on physician/advanced practitioner order or complex needs related to functional status, cognitive ability, or social support system  Outcome: Progressing     Problem: Knowledge Deficit  Goal: Patient/family/caregiver demonstrates understanding of disease process, treatment plan, medications, and discharge instructions  Description: Complete learning assessment and assess knowledge base.  Interventions:  - Provide teaching at level of understanding  - Provide teaching via preferred learning methods  Outcome: Progressing

## 2025-02-22 NOTE — ASSESSMENT & PLAN NOTE
Noted at bedside.   S/p Ureteroscopy 2/20/25 per urology     -DVT PPE on hold per urology  -Hematuria with improvement.  Plan to restart DVT PPE in the next 24 hours  -SCDs only    -check CBC

## 2025-02-22 NOTE — ASSESSMENT & PLAN NOTE
Lab Results   Component Value Date    HGBA1C 11.2 (H) 02/21/2025   SSI + accu checks   Continue lantus 18 U Qhs

## 2025-02-22 NOTE — PROGRESS NOTES
Progress Note - Hospitalist   Name: Chris Bojorquez 79 y.o. male I MRN: [id number]  Unit/Bed#: 2 [facility] 219 A I Date of Admission: 2/20/2025   Date of Service: 2/22/2025 I Hospital Day: 1    Assessment & Plan  Right nephrolithiasis  S/p right ureteroscopy stone extraction per urology  2/20/25       Urology as primary team  Pain management per primary team   Bowel regimen  Monitor I/Os     HTN (hypertension)  On norvasc   Monitor vitals  Moderate protein-calorie malnutrition (HCC)  Malnutrition Findings:      Diet modifications                            BMI Findings:           Body mass index is 22.96 kg/m².     Type 2 diabetes mellitus with stage 3 chronic kidney disease and hypertension ([facility])    Lab Results   Component Value Date    HGBA1C 11.2 (H) 02/21/2025   SSI + accu checks   Continue lantus 18 U Qhs   Hypothyroidism  Continue levothyroxine  Parkinson disease (HCC)  On sinnamet   Gastroesophageal reflux disease  Continue protonix  Diastolic dysfunction  Euvolemic   On furosemide 20 mg OD << Hold with low BP /PABLO  Monitor I/Os  Candidal dermatitis  Continue nystatin   Gross hematuria  Noted at bedside.   S/p Ureteroscopy 2/20/25 per urology     -DVT PPE on hold per urology  -Hematuria with improvement.  Plan to restart DVT PPE in the next 24 hours  -SCDs only    -check CBC   UTI (urinary tract infection)  Noted at bedside with pus in urine     -Fever       Plan:   ceftriaxone  Day2#   IVF @ 75 ml/hr  Follow up with urine cultures  Urology team following   PABLO (acute kidney injury) ([facility])  Baseline creatinine; 1.5-1.8  On admission 1.9.    Trending up to 2.3> 2.5    Lasix on hold since 2/21/2025.  Will continue to hold  Monitor I/os  Rivera's catheter in place  Continue antibiotics for UTI  IV fluids intermittently given history of CHF  Interval follow-up.  If not improved will consider consulting nephrology  Constipation  This is a chronic condition.      -Colace twice daily  -MiraLAX as needed  -Monitor  I/os    VTE Pharmacologic Prophylaxis:   Moderate Risk (Score 3-4) - Pharmacological DVT Prophylaxis Contraindicated. Sequential Compression Devices Ordered.  Consider restarting DVT prophylaxis medication in the next 24 hours pending hematuria improvement    Mobility:   Basic Mobility Inpatient Raw Score: 7  JH-HLM Goal: 2: Bed activities/Dependent transfer  JH-HLM Achieved: 2: Bed activities/Dependent transfer  JH-HLM Goal achieved. Continue to encourage appropriate mobility.    Patient Centered Rounds: I performed bedside rounds with nursing staff today.   Discussions with Specialists or Other Care Team Provider: Urology    Education and Discussions with Family / Patient: Updated  (son) via phone.  Left a voice message    Current Length of Stay: 1 day(s)  Current Patient Status: Inpatient   Certification Statement: The patient will continue to require additional inpatient hospital stay due to UTI, acute kidney injury  Discharge Plan: Anticipate discharge in 24-48 hrs to home.    Code Status: Prior    Subjective   Patient seen today at bedside.  He is in good spirits.  Cooperative.  Answering questions accordingly.  Following commands.  Does not have any active complaints.    No chest pain or tightness, SOB or cough, dizziness or light headedness, N/V, Diarrhea of constipation.     Tolerating oral diet.       Objective :  Temp:  [98.9 °F (37.2 °C)-100.2 °F (37.9 °C)] 98.9 °F (37.2 °C)  HR:  [] 72  BP: (114-156)/() 117/74  Resp:  [17-20] 20  SpO2:  [92 %-100 %] 97 %  O2 Device: None (Room air)    Body mass index is 22.96 kg/m².     Input and Output Summary (last 24 hours):     Intake/Output Summary (Last 24 hours) at 2/22/2025 0941  Last data filed at 2/21/2025 1801  Gross per 24 hour   Intake 946.25 ml   Output 800 ml   Net 146.25 ml       Physical Exam  Vitals reviewed.   Constitutional:       General: He is not in acute distress.     Appearance: Normal appearance.   HENT:      Head:  Normocephalic and atraumatic.      Mouth/Throat:      Mouth: Mucous membranes are moist.   Eyes:      Conjunctiva/sclera: Conjunctivae normal.      Pupils: Pupils are equal, round, and reactive to light.   Cardiovascular:      Rate and Rhythm: Normal rate.      Pulses: Normal pulses.           Carotid pulses are 2+ on the right side and 2+ on the left side.       Radial pulses are 2+ on the right side and 2+ on the left side.        Dorsalis pedis pulses are 2+ on the right side and 2+ on the left side.      Heart sounds: Normal heart sounds, S1 normal and S2 normal. No murmur heard.  Pulmonary:      Effort: No tachypnea, bradypnea or accessory muscle usage.      Breath sounds: Normal breath sounds and air entry. No decreased breath sounds, wheezing, rhonchi or rales.   Abdominal:      General: Abdomen is flat. Bowel sounds are normal. There is no distension.      Palpations: Abdomen is soft.      Tenderness: There is no abdominal tenderness.      Comments: Rivera's catheter noted at bedside.  [place]-colored urine noted.  No clots or pus noted.  Urine is clearing up compared to yesterday   Musculoskeletal:      Right lower leg: No edema.      Left lower leg: No edema.   Skin:     Capillary Refill: Capillary refill takes less than 2 seconds.   Neurological:      General: No focal deficit present.      Mental Status: He is alert. Mental status is at baseline.   Psychiatric:         Mood and Affect: Mood normal.         Behavior: Behavior normal.           Lines/Drains:  Lines/Drains/Airways       Active Status       Name Placement date Placement time Site Days    Urethral Catheter Coude 18 Fr. 02/20/25  1201  Coude  1    Ureteral Internal Stent Right ureter 6 Fr. 02/20/25  1206  Right ureter  1                  Urinary Catheter:  Goal for removal: Remove after 48 hrs of I/O monitoring                 Lab Results: I have reviewed the following results:   Results from last 7 days   Lab Units 02/22/25  0534 02/21/25  1014  02/20/25  1621   WBC Thousand/uL 5.38   < > 9.39   HEMOGLOBIN g/dL 9.9*   < > 11.3*   HEMATOCRIT % 32.0*   < > 38.2   PLATELETS Thousands/uL 211   < > 303   BANDS PCT %  --   --  16*   SEGS PCT % 76*   < >  --    LYMPHO PCT % 10*   < > 5*   MONO PCT % 11   < > 1*   EOS PCT % 2   < > 0    < > = values in this interval not displayed.     Results from last 7 days   Lab Units 02/22/25  0534 02/21/25  0655 02/20/25  1621   SODIUM mmol/L 135   < > 135   POTASSIUM mmol/L 4.4   < > 4.2   CHLORIDE mmol/L 103   < > 107   CO2 mmol/L 22   < > 20*   BUN mg/dL 51*   < > 45*   CREATININE mg/dL 2.54*   < > 1.95*   ANION GAP mmol/L 10   < > 8   CALCIUM mg/dL 7.9*   < > 8.5   ALBUMIN g/dL  --   --  3.1*   TOTAL BILIRUBIN mg/dL  --   --  0.58   ALK PHOS U/L  --   --  132*   ALT U/L  --   --  <3*   AST U/L  --   --  11*   GLUCOSE RANDOM mg/dL 215*   < > 197*    < > = values in this interval not displayed.         Results from last 7 days   Lab Units 02/22/25  0706 02/21/25  2128 02/21/25  1600 02/21/25  1131 02/20/25  2046 02/20/25  0845   POC GLUCOSE mg/dl 189* 305* 182* 217* 218* 220*     Results from last 7 days   Lab Units 02/21/25  1013   HEMOGLOBIN A1C % 11.2*     Results from last 7 days   Lab Units 02/21/25  0655 02/21/25  0654   LACTIC ACID mmol/L  --  0.8   PROCALCITONIN ng/ml 86.47*  --        Recent Cultures (last 7 days):   Results from last 7 days   Lab Units 02/21/25  0655 02/20/25  1207   BLOOD CULTURE  Received in Microbiology Lab. Culture in Progress.  Received in Microbiology Lab. Culture in Progress.  --    URINE CULTURE   --  >100,000 cfu/ml Proteus species*       Imaging Results Review: No pertinent imaging studies reviewed.  Other Study Results Review: EKG was reviewed.     Last 24 Hours Medication List:     Current Facility-Administered Medications:     acetaminophen (TYLENOL) tablet 650 mg, Q6H PRN    aluminum-magnesium hydroxide-simethicone (MAALOX) oral suspension 30 mL, Q6H PRN    amLODIPine (NORVASC)  tablet 5 mg, QPM    carbidopa-levodopa (SINEMET)  mg per tablet 1 tablet, TID    cefTRIAXone (ROCEPHIN) IVPB (premix in dextrose) 1,000 mg 50 mL, Q24H, Last Rate: 1,000 mg (02/22/25 0800)    docusate sodium (COLACE) capsule 100 mg, Daily    fentaNYL (SUBLIMAZE) injection 50 mcg, Q10 Min PRN    fluconazole (DIFLUCAN) IVPB (premix) 400 mg 200 mL, Q24H, Last Rate: 400 mg (02/21/25 1757)    [Held by provider] furosemide (LASIX) tablet 20 mg, Daily    HYDROmorphone HCl (DILAUDID) injection 0.2 mg, Q6H PRN    insulin glargine (LANTUS) subcutaneous injection 18 Units 0.18 mL, HS    insulin lispro (HumALOG/ADMELOG) 100 units/mL subcutaneous injection 1-5 Units, TID AC **AND** Fingerstick Glucose (POCT), TID AC    levothyroxine tablet 25 mcg, Daily    multi-electrolyte (PLASMALYTE-A/ISOLYTE-S PH 7.4) IV solution, Continuous, Last Rate: 75 mL/hr (02/21/25 1044)    nystatin (MYCOSTATIN) powder 1 Application, BID    ondansetron (ZOFRAN) injection 4 mg, Q6H PRN    pantoprazole (PROTONIX) EC tablet 40 mg, Daily    polyethylene glycol (MIRALAX) packet 17 g, Daily    Administrative Statements   Today, Patient Was Seen By: Ward Cross MD  I have spent a total time of 30 minutes in caring for this patient on the day of the visit/encounter including Diagnostic results, Prognosis, Risks and benefits of tx options, and Instructions for management.    **Please Note: This note may have been constructed using a voice recognition system.**

## 2025-02-22 NOTE — PLAN OF CARE
Problem: Prexisting or High Potential for Compromised Skin Integrity  Goal: Skin integrity is maintained or improved  Description: INTERVENTIONS:  - Identify patients at risk for skin breakdown  - Assess and monitor skin integrity  - Assess and monitor nutrition and hydration status  - Monitor labs   - Assess for incontinence   - Turn and reposition patient  - Assist with mobility/ambulation  - Relieve pressure over bony prominences  - Avoid friction and shearing  - Provide appropriate hygiene as needed including keeping skin clean and dry  - Evaluate need for skin moisturizer/barrier cream  - Collaborate with interdisciplinary team   - Patient/family teaching  - Consider wound care consult   Outcome: Progressing     Problem: PAIN - ADULT  Goal: Verbalizes/displays adequate comfort level or baseline comfort level  Description: Interventions:  - Encourage patient to monitor pain and request assistance  - Assess pain using appropriate pain scale  - Administer analgesics based on type and severity of pain and evaluate response  - Implement non-pharmacological measures as appropriate and evaluate response  - Consider cultural and social influences on pain and pain management  - Notify physician/advanced practitioner if interventions unsuccessful or patient reports new pain  Outcome: Progressing     Problem: INFECTION - ADULT  Goal: Absence or prevention of progression during hospitalization  Description: INTERVENTIONS:  - Assess and monitor for signs and symptoms of infection  - Monitor lab/diagnostic results  - Monitor all insertion sites, i.e. indwelling lines, tubes, and drains  - Monitor endotracheal if appropriate and nasal secretions for changes in amount and color  - [place] appropriate cooling/warming therapies per order  - Administer medications as ordered  - Instruct and encourage patient and family to use good hand hygiene technique  - Identify and instruct in appropriate isolation precautions for  identified infection/condition  Outcome: Progressing  Goal: Absence of fever/infection during neutropenic period  Description: INTERVENTIONS:  - Monitor WBC    Outcome: Progressing     Problem: SAFETY ADULT  Goal: Patient will remain free of falls  Description: INTERVENTIONS:  - Educate patient/family on patient safety including physical limitations  - Instruct patient to call for assistance with activity   - Consult OT/PT to assist with strengthening/mobility   - Keep Call bell within reach  - Keep bed low and locked with side rails adjusted as appropriate  - Keep care items and personal belongings within reach  - Initiate and maintain comfort rounds  - Make Fall Risk Sign visible to staff  - Offer Toileting every  Hours, in advance of need  - Initiate/Maintain alarm  - Obtain necessary fall risk management equipment: - Apply yellow socks and bracelet for high fall risk patients  - Consider moving patient to room near nurses station  Outcome: Progressing  Goal: Maintain or return to baseline ADL function  Description: INTERVENTIONS:  -  Assess patient's ability to carry out ADLs; assess patient's baseline for ADL function and identify physical deficits which impact ability to perform ADLs (bathing, care of mouth/teeth, toileting, grooming, dressing, etc.)  - Assess/evaluate cause of self-care deficits   - Assess range of motion  - Assess patient's mobility; develop plan if impaired  - Assess patient's need for assistive devices and provide as appropriate  - Encourage maximum independence but intervene and supervise when necessary  - Involve family in performance of ADLs  - Assess for home care needs following discharge   - Consider OT consult to assist with ADL evaluation and planning for discharge  - Provide patient education as appropriate  Outcome: Progressing  Goal: Maintains/Returns to pre admission functional level  Description: INTERVENTIONS:  - Perform AM-PAC 6 Click Basic Mobility/ Daily Activity  assessment daily.  - Set and communicate daily mobility goal to care team and patient/family/caregiver.   - Collaborate with rehabilitation services on mobility goals if consulted  - Perform Range of Motion  times a day.  - Reposition patient every  hours.  - Dangle patient  times a day  - Stand patient  times a day  - Ambulate patient  times a day  - Out of bed to chair  times a day   - Out of bed for meals  times a day  - Out of bed for toileting  - Record patient progress and toleration of activity level   Outcome: Progressing     Problem: DISCHARGE PLANNING  Goal: Discharge to home or other facility with appropriate resources  Description: INTERVENTIONS:  - Identify barriers to discharge w/patient and caregiver  - Arrange for needed discharge resources and transportation as appropriate  - Identify discharge learning needs (meds, wound care, etc.)  - Arrange for interpretive services to assist at discharge as needed  - Refer to Case Management Department for coordinating discharge planning if the patient needs post-hospital services based on physician/advanced practitioner order or complex needs related to functional status, cognitive ability, or social support system  Outcome: Progressing     Problem: Knowledge Deficit  Goal: Patient/family/caregiver demonstrates understanding of disease process, treatment plan, medications, and discharge instructions  Description: Complete learning assessment and assess knowledge base.  Interventions:  - Provide teaching at level of understanding  - Provide teaching via preferred learning methods  Outcome: Progressing

## 2025-02-22 NOTE — ASSESSMENT & PLAN NOTE
Malnutrition Findings:      Diet modifications                            BMI Findings:           Body mass index is 22.96 kg/m².

## 2025-02-22 NOTE — ASSESSMENT & PLAN NOTE
Noted at bedside with pus in urine     -Fever       Plan:   ceftriaxone  Day2#   IVF @ 75 ml/hr  Follow up with urine cultures  Urology team following

## 2025-02-22 NOTE — ASSESSMENT & PLAN NOTE
Baseline creatinine; 1.5-1.8  On admission 1.9.    Trending up to 2.3> 2.5    Lasix on hold since 2/21/2025.  Will continue to hold  Monitor I/os  Rivera's catheter in place  Continue antibiotics for UTI  IV fluids intermittently given history of CHF  Interval follow-up.  If not improved will consider consulting nephrology

## 2025-02-23 VITALS
RESPIRATION RATE: 19 BRPM | HEIGHT: 70 IN | OXYGEN SATURATION: 95 % | WEIGHT: 160 LBS | SYSTOLIC BLOOD PRESSURE: 126 MMHG | HEART RATE: 72 BPM | TEMPERATURE: 97.7 F | DIASTOLIC BLOOD PRESSURE: 60 MMHG | BODY MASS INDEX: 22.9 KG/M2

## 2025-02-23 PROBLEM — R31.0 GROSS HEMATURIA: Status: RESOLVED | Noted: 2025-02-20 | Resolved: 2025-02-23

## 2025-02-23 PROBLEM — I50.32 CHRONIC DIASTOLIC CONGESTIVE HEART FAILURE (HCC): Status: ACTIVE | Noted: 2024-09-27

## 2025-02-23 LAB
ANION GAP SERPL CALCULATED.3IONS-SCNC: 8 MMOL/L (ref 4–13)
BACTERIA BLD CULT: NORMAL
BACTERIA BLD CULT: NORMAL
BACTERIA UR CULT: ABNORMAL
BASOPHILS # BLD AUTO: 0.01 THOUSANDS/ΜL (ref 0–0.1)
BASOPHILS NFR BLD AUTO: 0 % (ref 0–1)
BUN SERPL-MCNC: 41 MG/DL (ref 5–25)
CALCIUM SERPL-MCNC: 8.1 MG/DL (ref 8.4–10.2)
CHLORIDE SERPL-SCNC: 108 MMOL/L (ref 96–108)
CO2 SERPL-SCNC: 21 MMOL/L (ref 21–32)
CREAT SERPL-MCNC: 1.92 MG/DL (ref 0.6–1.3)
EOSINOPHIL # BLD AUTO: 0.21 THOUSAND/ΜL (ref 0–0.61)
EOSINOPHIL NFR BLD AUTO: 4 % (ref 0–6)
ERYTHROCYTE [DISTWIDTH] IN BLOOD BY AUTOMATED COUNT: 13.8 % (ref 11.6–15.1)
GFR SERPL CREATININE-BSD FRML MDRD: 32 ML/MIN/1.73SQ M
GLUCOSE SERPL-MCNC: 131 MG/DL (ref 65–140)
GLUCOSE SERPL-MCNC: 181 MG/DL (ref 65–140)
GLUCOSE SERPL-MCNC: 220 MG/DL (ref 65–140)
GLUCOSE SERPL-MCNC: 263 MG/DL (ref 65–140)
HCT VFR BLD AUTO: 31.8 % (ref 36.5–49.3)
HGB BLD-MCNC: 9.8 G/DL (ref 12–17)
IMM GRANULOCYTES # BLD AUTO: 0.03 THOUSAND/UL (ref 0–0.2)
IMM GRANULOCYTES NFR BLD AUTO: 1 % (ref 0–2)
LYMPHOCYTES # BLD AUTO: 0.55 THOUSANDS/ΜL (ref 0.6–4.47)
LYMPHOCYTES NFR BLD AUTO: 11 % (ref 14–44)
MCH RBC QN AUTO: 28.1 PG (ref 26.8–34.3)
MCHC RBC AUTO-ENTMCNC: 30.8 G/DL (ref 31.4–37.4)
MCV RBC AUTO: 91 FL (ref 82–98)
MONOCYTES # BLD AUTO: 0.68 THOUSAND/ΜL (ref 0.17–1.22)
MONOCYTES NFR BLD AUTO: 13 % (ref 4–12)
NEUTROPHILS # BLD AUTO: 3.74 THOUSANDS/ΜL (ref 1.85–7.62)
NEUTS SEG NFR BLD AUTO: 71 % (ref 43–75)
NRBC BLD AUTO-RTO: 0 /100 WBCS
PLATELET # BLD AUTO: 207 THOUSANDS/UL (ref 149–390)
PMV BLD AUTO: 9.9 FL (ref 8.9–12.7)
POTASSIUM SERPL-SCNC: 4.2 MMOL/L (ref 3.5–5.3)
RBC # BLD AUTO: 3.49 MILLION/UL (ref 3.88–5.62)
SODIUM SERPL-SCNC: 137 MMOL/L (ref 135–147)
WBC # BLD AUTO: 5.22 THOUSAND/UL (ref 4.31–10.16)

## 2025-02-23 PROCEDURE — 80048 BASIC METABOLIC PNL TOTAL CA: CPT

## 2025-02-23 PROCEDURE — 85025 COMPLETE CBC W/AUTO DIFF WBC: CPT

## 2025-02-23 PROCEDURE — 82948 REAGENT STRIP/BLOOD GLUCOSE: CPT

## 2025-02-23 PROCEDURE — 99232 SBSQ HOSP IP/OBS MODERATE 35: CPT

## 2025-02-23 RX ORDER — HEPARIN SODIUM 5000 [USP'U]/ML
5000 INJECTION, SOLUTION INTRAVENOUS; SUBCUTANEOUS EVERY 8 HOURS SCHEDULED
Status: DISPENSED | OUTPATIENT
Start: 2025-02-23

## 2025-02-23 RX ADMIN — CARBIDOPA AND LEVODOPA 1 TABLET: 25; 100 TABLET ORAL at 21:59

## 2025-02-23 RX ADMIN — CARBIDOPA AND LEVODOPA 1 TABLET: 25; 100 TABLET ORAL at 17:21

## 2025-02-23 RX ADMIN — HEPARIN SODIUM 5000 UNITS: 5000 INJECTION, SOLUTION INTRAVENOUS; SUBCUTANEOUS at 09:32

## 2025-02-23 RX ADMIN — INSULIN LISPRO 2 UNITS: 100 INJECTION, SOLUTION INTRAVENOUS; SUBCUTANEOUS at 17:21

## 2025-02-23 RX ADMIN — NYSTATIN 1 APPLICATION: 100000 POWDER TOPICAL at 21:59

## 2025-02-23 RX ADMIN — FLUCONAZOLE 400 MG: 2 INJECTION, SOLUTION INTRAVENOUS at 14:44

## 2025-02-23 RX ADMIN — DOCUSATE SODIUM 100 MG: 100 CAPSULE, LIQUID FILLED ORAL at 08:12

## 2025-02-23 RX ADMIN — INSULIN GLARGINE 18 UNITS: 100 INJECTION, SOLUTION SUBCUTANEOUS at 21:59

## 2025-02-23 RX ADMIN — MUPIROCIN 1 APPLICATION: 20 OINTMENT TOPICAL at 21:59

## 2025-02-23 RX ADMIN — PANTOPRAZOLE SODIUM 40 MG: 40 TABLET, DELAYED RELEASE ORAL at 05:14

## 2025-02-23 RX ADMIN — HEPARIN SODIUM 5000 UNITS: 5000 INJECTION, SOLUTION INTRAVENOUS; SUBCUTANEOUS at 17:21

## 2025-02-23 RX ADMIN — CEFTRIAXONE 1000 MG: 1 INJECTION, SOLUTION INTRAVENOUS at 08:11

## 2025-02-23 RX ADMIN — POLYETHYLENE GLYCOL 3350 17 G: 17 POWDER, FOR SOLUTION ORAL at 08:13

## 2025-02-23 RX ADMIN — CARBIDOPA AND LEVODOPA 1 TABLET: 25; 100 TABLET ORAL at 08:12

## 2025-02-23 RX ADMIN — LEVOTHYROXINE SODIUM 25 MCG: 25 TABLET ORAL at 05:13

## 2025-02-23 RX ADMIN — INSULIN LISPRO 1 UNITS: 100 INJECTION, SOLUTION INTRAVENOUS; SUBCUTANEOUS at 12:42

## 2025-02-23 RX ADMIN — MUPIROCIN 1 APPLICATION: 20 OINTMENT TOPICAL at 08:14

## 2025-02-23 RX ADMIN — HEPARIN SODIUM 5000 UNITS: 5000 INJECTION, SOLUTION INTRAVENOUS; SUBCUTANEOUS at 21:59

## 2025-02-23 RX ADMIN — NYSTATIN 1 APPLICATION: 100000 POWDER TOPICAL at 08:14

## 2025-02-23 NOTE — ASSESSMENT & PLAN NOTE
Noted at bedside with pus in urine     -Urine culture/sensitivity reviewed.  Susceptible to cephalosporins.    Plan:   ceftriaxone  Day3#   Plan to transition to p.o. antibiotics next 24 hours.  Pending further recommendations from urology team  Follow up with urine cultures

## 2025-02-23 NOTE — PLAN OF CARE
Problem: Prexisting or High Potential for Compromised Skin Integrity  Goal: Skin integrity is maintained or improved  Description: INTERVENTIONS:  - Identify patients at risk for skin breakdown  - Assess and monitor skin integrity  - Assess and monitor nutrition and hydration status  - Monitor labs   - Assess for incontinence   - Turn and reposition patient  - Assist with mobility/ambulation  - Relieve pressure over bony prominences  - Avoid friction and shearing  - Provide appropriate hygiene as needed including keeping skin clean and dry  - Evaluate need for skin moisturizer/barrier cream  - Collaborate with interdisciplinary team   - Patient/family teaching  - Consider wound care consult   Outcome: Progressing     Problem: PAIN - ADULT  Goal: Verbalizes/displays adequate comfort level or baseline comfort level  Description: Interventions:  - Encourage patient to monitor pain and request assistance  - Assess pain using appropriate pain scale  - Administer analgesics based on type and severity of pain and evaluate response  - Implement non-pharmacological measures as appropriate and evaluate response  - Consider cultural and social influences on pain and pain management  - Notify physician/advanced practitioner if interventions unsuccessful or patient reports new pain  Outcome: Progressing     Problem: INFECTION - ADULT  Goal: Absence or prevention of progression during hospitalization  Description: INTERVENTIONS:  - Assess and monitor for signs and symptoms of infection  - Monitor lab/diagnostic results  - Monitor all insertion sites, i.e. indwelling lines, tubes, and drains  - Monitor endotracheal if appropriate and nasal secretions for changes in amount and color  - [place] appropriate cooling/warming therapies per order  - Administer medications as ordered  - Instruct and encourage patient and family to use good hand hygiene technique  - Identify and instruct in appropriate isolation precautions for  identified infection/condition  Outcome: Progressing  Goal: Absence of fever/infection during neutropenic period  Description: INTERVENTIONS:  - Monitor WBC    Outcome: Progressing     Problem: SAFETY ADULT  Goal: Patient will remain free of falls  Description: INTERVENTIONS:  - Educate patient/family on patient safety including physical limitations  - Instruct patient to call for assistance with activity   - Consult OT/PT to assist with strengthening/mobility   - Keep Call bell within reach  - Keep bed low and locked with side rails adjusted as appropriate  - Keep care items and personal belongings within reach  - Initiate and maintain comfort rounds  - Make Fall Risk Sign visible to staff  - Offer Toileting every 2 Hours, in advance of need  - Initiate/Maintain bed alarm  - Obtain necessary fall risk management equipment: walker  - Apply yellow socks and bracelet for high fall risk patients  - Consider moving patient to room near nurses station  Outcome: Progressing  Goal: Maintain or return to baseline ADL function  Description: INTERVENTIONS:  -  Assess patient's ability to carry out ADLs; assess patient's baseline for ADL function and identify physical deficits which impact ability to perform ADLs (bathing, care of mouth/teeth, toileting, grooming, dressing, etc.)  - Assess/evaluate cause of self-care deficits   - Assess range of motion  - Assess patient's mobility; develop plan if impaired  - Assess patient's need for assistive devices and provide as appropriate  - Encourage maximum independence but intervene and supervise when necessary  - Involve family in performance of ADLs  - Assess for home care needs following discharge   - Consider OT consult to assist with ADL evaluation and planning for discharge  - Provide patient education as appropriate  Outcome: Progressing  Goal: Maintains/Returns to pre admission functional level  Description: INTERVENTIONS:  - Perform AM-PAC 6 Click Basic Mobility/ Daily  Activity assessment daily.  - Set and communicate daily mobility goal to care team and patient/family/caregiver.   - Collaborate with rehabilitation services on mobility goals if consulted  - Perform Range of Motion 3 times a day.  - Reposition patient every 2hours.  - Dangle patient 3 times a day  - Stand patient 3 times a day  - Ambulate patient 3 times a day  - Out of bed to chair 3 times a day   - Out of bed for meals 3 times a day  - Out of bed for toileting  - Record patient progress and toleration of activity level   Outcome: Progressing     Problem: DISCHARGE PLANNING  Goal: Discharge to home or other facility with appropriate resources  Description: INTERVENTIONS:  - Identify barriers to discharge w/patient and caregiver  - Arrange for needed discharge resources and transportation as appropriate  - Identify discharge learning needs (meds, wound care, etc.)  - Arrange for interpretive services to assist at discharge as needed  - Refer to Case Management Department for coordinating discharge planning if the patient needs post-hospital services based on physician/advanced practitioner order or complex needs related to functional status, cognitive ability, or social support system  Outcome: Progressing     Problem: Knowledge Deficit  Goal: Patient/family/caregiver demonstrates understanding of disease process, treatment plan, medications, and discharge instructions  Description: Complete learning assessment and assess knowledge base.  Interventions:  - Provide teaching at level of understanding  - Provide teaching via preferred learning methods  Outcome: Progressing

## 2025-02-23 NOTE — PROGRESS NOTES
"Progress Note - Urology      Patient: Chris Bojorquez   : 1945 Sex: male   MRN: [id number]     CSN: [id number]  Unit/Bed#: [address] [place] [address]     SUBJECTIVE:   Patient seen on rounds  Purulent urine Rivera  Spiking temperatures to 101  Urine cultures Proteus  On cefepime  Urine now clear    Objective   Vitals: /70   Pulse 78   Temp 98.7 °F (37.1 °C)   Resp 16   Ht 5' 10\" (1.778 m)   Wt 72.6 kg (160 lb)   SpO2 94%   BMI 22.96 kg/m²     I/O last 24 hours:  In: 1440 [P.O.:940; I.V.:300; IV Piggyback:200]  Out: 2725 [Urine:]      Physical Exam:   General Alert awake   Normocephalic atraumatic PERRLA  Lungs clear bilaterally  Cardiac normal S1 normal S2  Abdomen soft, flank pain  Extremities no edema      Lab Results: CBC:   Lab Results   Component Value Date    WBC 5.22 2025    HGB 9.8 (L) 2025    HCT 31.8 (L) 2025    MCV 91 2025     2025    RBC 3.49 (L) 2025    MCH 28.1 2025    MCHC 30.8 (L) 2025    RDW 13.8 2025    MPV 9.9 2025    NRBC 0 2025     CMP:   Lab Results   Component Value Date     2025    CO2 21 2025    BUN 41 (H) 2025    CREATININE 1.92 (H) 2025    CALCIUM 8.1 (L) 2025    AST 11 (L) 2025    ALT <3 (L) 2025    ALKPHOS 132 (H) 2025    EGFR 32 2025     Urinalysis:   Lab Results   Component Value Date    COLORU Light Brown 2025    CLARITYU Turbid 2025    SPECGRAV 1.020 2025    PHUR 6.5 2025    LEUKOCYTESUR Large (A) 2025    NITRITE Negative 2025    GLUCOSEU Negative 2025    KETONESU Negative 2025    BILIRUBINUR Negative 2025    BLOODU Large (A) 2025     Urine Culture:   Lab Results   Component Value Date    URINECX >100,000 cfu/ml Proteus mirabilis (A) 2025     PSA: No results found for: \"PSA\"      Assessment/ Plan:  Complicated UTI do not believe this is sepsis based on results  Status post " right ureteral stent exchange day # 3  Cultures Proteus  Continue Rivera  Changed to full admission   Can DC Rivera back at nursing home          Ciro Hughes MD

## 2025-02-23 NOTE — ASSESSMENT & PLAN NOTE
Noted at bedside.   S/p Ureteroscopy 2/20/25 per urology     -DVT PPE on hold per urology  -Hematuria with improvement.  Plan to restart DVT PPE in the next 24 hours  -SCDs only    -check CBC   -Hematuria resolved.

## 2025-02-23 NOTE — ASSESSMENT & PLAN NOTE
Chronic diastolic CHF, in the setting of hypertensive heart disease, as evidenced by diastolic disfunction, being treated with monitoring of intake and output, daily weights, fluid restriction and furosemide with holding parameters.

## 2025-02-23 NOTE — PROGRESS NOTES
Progress Note - Hospitalist   Name: Chris Bojorquez 79 y.o. male I MRN: [id number]  Unit/Bed#: [address] [address] I Date of Admission: 2/20/2025   Date of Service: 2/23/2025 I Hospital Day: 2    Assessment & Plan  Right nephrolithiasis  S/p right ureteroscopy stone extraction per urology  2/20/25       Urology as primary team  Pain management per primary team   Bowel regimen  Monitor I/Os     HTN (hypertension)  On norvasc   Monitor vitals  Moderate protein-calorie malnutrition (HCC)  Malnutrition Findings:      Diet modifications                            BMI Findings:           Body mass index is 22.96 kg/m².     Type 2 diabetes mellitus with stage 3 chronic kidney disease and hypertension ([facility])    Lab Results   Component Value Date    HGBA1C 11.2 (H) 02/21/2025   SSI + accu checks   Continue lantus 18 U Qhs   Hypothyroidism  Continue levothyroxine  Parkinson disease (HCC)  On sinnamet   Gastroesophageal reflux disease  Continue protonix  Diastolic dysfunction  Euvolemic   On furosemide 20 mg OD << Hold with low BP /PABLO  Monitor I/Os  Candidal dermatitis  Continue nystatin   Gross hematuria (Resolved: 2/23/2025)  Noted at bedside.   S/p Ureteroscopy 2/20/25 per urology     -DVT PPE on hold per urology  -Hematuria with improvement.  Plan to restart DVT PPE in the next 24 hours  -SCDs only    -check CBC   -Hematuria resolved.  UTI (urinary tract infection)  Noted at bedside with pus in urine     -Urine culture/sensitivity reviewed.  Susceptible to cephalosporins.    Plan:   ceftriaxone  Day3#   Plan to transition to p.o. antibiotics next 24 hours.  Pending further recommendations from urology team  Follow up with urine cultures    PABLO (acute kidney injury) ([facility])  Baseline creatinine; 1.5-1.8  On admission 1.9    2.3> 2.5 > 1.9    Lasix on hold since 2/21/2025.  Will continue to hold  Monitor I/os  Rivera's catheter in place  Continue antibiotics for UTI  PABLO resolving.  Constipation  This is a chronic  condition.      -Colace twice daily  -MiraLAX as needed  -Monitor I/os    VTE Pharmacologic Prophylaxis:   Moderate Risk (Score 3-4) - Pharmacological DVT Prophylaxis Ordered: heparin.    Mobility:   Basic Mobility Inpatient Raw Score: 7  JH-HLM Goal: 2: Bed activities/Dependent transfer  JH-HLM Achieved: 2: Bed activities/Dependent transfer  JH-HLM Goal achieved. Continue to encourage appropriate mobility.    Patient Centered Rounds: I performed bedside rounds with nursing staff today.   Discussions with Specialists or Other Care Team Provider: urology     Education and Discussions with Family / Patient: Updated  (son) via phone.    Current Length of Stay: 2 day(s)  Current Patient Status: Inpatient   Certification Statement: The patient will continue to require additional inpatient hospital stay due to complicated cystitis, hematuria  Discharge Plan: Anticipate discharge in 24-48 hrs to home with home services.    Code Status: Prior    Subjective   Patient seen today at bedside.  Alert and oriented at baseline.  He is feeling well.  No active complaints.  No chest pain or tightness, SOB or cough, dizziness or light headedness, N/V, Diarrhea of constipation.       Objective :  Temp:  [98.5 °F (36.9 °C)-98.8 °F (37.1 °C)] 98.7 °F (37.1 °C)  HR:  [76-83] 78  BP: (104-127)/(70-74) 108/70  Resp:  [15-18] 16  SpO2:  [94 %-97 %] 94 %    Body mass index is 22.96 kg/m².     Input and Output Summary (last 24 hours):     Intake/Output Summary (Last 24 hours) at 2/23/2025 1019  Last data filed at 2/23/2025 0628  Gross per 24 hour   Intake 900 ml   Output 2725 ml   Net -1825 ml       Physical Exam  Vitals and nursing note reviewed.   Constitutional:       General: He is not in acute distress.     Appearance: Normal appearance.   HENT:      Head: Normocephalic and atraumatic.      Mouth/Throat:      Mouth: Mucous membranes are moist.   Eyes:      Conjunctiva/sclera: Conjunctivae normal.      Pupils: Pupils are  equal, round, and reactive to light.   Cardiovascular:      Rate and Rhythm: Normal rate and regular rhythm.      Pulses: Normal pulses.           Carotid pulses are 2+ on the right side and 2+ on the left side.       Radial pulses are 2+ on the right side and 2+ on the left side.        Dorsalis pedis pulses are 2+ on the right side and 2+ on the left side.      Heart sounds: Normal heart sounds, S1 normal and S2 normal. No murmur heard.  Pulmonary:      Effort: No tachypnea, bradypnea or accessory muscle usage.      Breath sounds: Normal breath sounds and air entry. No decreased breath sounds, wheezing, rhonchi or rales.   Abdominal:      General: Abdomen is flat. Bowel sounds are normal. There is no distension.      Palpations: Abdomen is soft.      Tenderness: There is no abdominal tenderness.      Comments: Rivera's catheter noted in place at bedside.  Hematuria cleared up.  Draining yellow urine  with precipitants noted   Musculoskeletal:      Right lower leg: No edema.      Left lower leg: No edema.   Neurological:      Mental Status: He is alert and oriented to person, place, and time. Mental status is at baseline.   Psychiatric:         Mood and Affect: Mood normal.         Behavior: Behavior normal.           Lines/Drains:  Lines/Drains/Airways       Active Status       Name Placement date Placement time Site Days    Urethral Catheter Coude 18 Fr. 02/20/25  1201  Coude  2    Ureteral Internal Stent Right ureter 6 Fr. 02/20/25  1206  Right ureter  2                  Urinary Catheter:  Goal for removal: Remove after 48 hrs of I/O monitoring                 Lab Results: I have reviewed the following results:   Results from last 7 days   Lab Units 02/23/25  0633 02/21/25  1014 02/20/25  1621   WBC Thousand/uL 5.22   < > 9.39   HEMOGLOBIN g/dL 9.8*   < > 11.3*   HEMATOCRIT % 31.8*   < > 38.2   PLATELETS Thousands/uL 207   < > 303   BANDS PCT %  --   --  16*   SEGS PCT % 71   < >  --    LYMPHO PCT % 11*   < > 5*    MONO PCT % 13*   < > 1*   EOS PCT % 4   < > 0    < > = values in this interval not displayed.     Results from last 7 days   Lab Units 02/23/25  0633 02/21/25  0655 02/20/25  1621   SODIUM mmol/L 137   < > 135   POTASSIUM mmol/L 4.2   < > 4.2   CHLORIDE mmol/L 108   < > 107   CO2 mmol/L 21   < > 20*   BUN mg/dL 41*   < > 45*   CREATININE mg/dL 1.92*   < > 1.95*   ANION GAP mmol/L 8   < > 8   CALCIUM mg/dL 8.1*   < > 8.5   ALBUMIN g/dL  --   --  3.1*   TOTAL BILIRUBIN mg/dL  --   --  0.58   ALK PHOS U/L  --   --  132*   ALT U/L  --   --  <3*   AST U/L  --   --  11*   GLUCOSE RANDOM mg/dL 131   < > 197*    < > = values in this interval not displayed.         Results from last 7 days   Lab Units 02/22/25  2112 02/22/25  1550 02/22/25  1048 02/22/25  0706 02/21/25  2128 02/21/25  1600 02/21/25  1131 02/20/25  2046 02/20/25  0845   POC GLUCOSE mg/dl 245* 191* 234* 189* 305* 182* 217* 218* 220*     Results from last 7 days   Lab Units 02/21/25  1013   HEMOGLOBIN A1C % 11.2*     Results from last 7 days   Lab Units 02/21/25  0655 02/21/25  0654   LACTIC ACID mmol/L  --  0.8   PROCALCITONIN ng/ml 86.47*  --        Recent Cultures (last 7 days):   Results from last 7 days   Lab Units 02/21/25  0655 02/20/25  1207   BLOOD CULTURE  No Growth at 48 hrs.  No Growth at 48 hrs.  --    URINE CULTURE   --  >100,000 cfu/ml Proteus mirabilis*       Imaging Results Review: No pertinent imaging studies reviewed.  Other Study Results Review: EKG was reviewed.     Last 24 Hours Medication List:     Current Facility-Administered Medications:     acetaminophen (TYLENOL) tablet 650 mg, Q6H PRN    aluminum-magnesium hydroxide-simethicone (MAALOX) oral suspension 30 mL, Q6H PRN    [Held by provider] amLODIPine (NORVASC) tablet 5 mg, QPM    carbidopa-levodopa (SINEMET)  mg per tablet 1 tablet, TID    cefTRIAXone (ROCEPHIN) IVPB (premix in dextrose) 1,000 mg 50 mL, Q24H, Last Rate: 1,000 mg (02/23/25 0811)    docusate sodium (COLACE)  capsule 100 mg, Daily    fentaNYL (SUBLIMAZE) injection 50 mcg, Q10 Min PRN    fluconazole (DIFLUCAN) IVPB (premix) 400 mg 200 mL, Q24H, Last Rate: Stopped (02/22/25 1722)    [Held by provider] furosemide (LASIX) tablet 20 mg, Daily    heparin (porcine) subcutaneous injection 5,000 Units, Q8H RAHUL    HYDROmorphone HCl (DILAUDID) injection 0.2 mg, Q6H PRN    insulin glargine (LANTUS) subcutaneous injection 18 Units 0.18 mL, HS    insulin lispro (HumALOG/ADMELOG) 100 units/mL subcutaneous injection 1-5 Units, TID AC **AND** Fingerstick Glucose (POCT), TID AC    levothyroxine tablet 25 mcg, Daily    mupirocin (BACTROBAN) 2 % nasal ointment, Q12H RAHUL    nystatin (MYCOSTATIN) powder 1 Application, BID    ondansetron (ZOFRAN) injection 4 mg, Q6H PRN    pantoprazole (PROTONIX) EC tablet 40 mg, Daily    polyethylene glycol (MIRALAX) packet 17 g, Daily    Administrative Statements   Today, Patient Was Seen By: Ward Cross MD  I have spent a total time of 30 minutes in caring for this patient on the day of the visit/encounter including Diagnostic results, Prognosis, Risks and benefits of tx options, and Instructions for management.    **Please Note: This note may have been constructed using a voice recognition system.**

## 2025-02-24 LAB
ANION GAP SERPL CALCULATED.3IONS-SCNC: 7 MMOL/L (ref 4–13)
BASOPHILS # BLD AUTO: 0.02 THOUSANDS/ÂΜL (ref 0–0.1)
BASOPHILS NFR BLD AUTO: 0 % (ref 0–1)
BUN SERPL-MCNC: 34 MG/DL (ref 5–25)
CALCIUM SERPL-MCNC: 7.9 MG/DL (ref 8.4–10.2)
CHLORIDE SERPL-SCNC: 107 MMOL/L (ref 96–108)
CO2 SERPL-SCNC: 23 MMOL/L (ref 21–32)
CREAT SERPL-MCNC: 1.69 MG/DL (ref 0.6–1.3)
EOSINOPHIL # BLD AUTO: 0.22 THOUSAND/ÂΜL (ref 0–0.61)
EOSINOPHIL NFR BLD AUTO: 5 % (ref 0–6)
ERYTHROCYTE [DISTWIDTH] IN BLOOD BY AUTOMATED COUNT: 13.6 % (ref 11.6–15.1)
GFR SERPL CREATININE-BSD FRML MDRD: 38 ML/MIN/1.73SQ M
GLUCOSE SERPL-MCNC: 128 MG/DL (ref 65–140)
GLUCOSE SERPL-MCNC: 149 MG/DL (ref 65–140)
GLUCOSE SERPL-MCNC: 215 MG/DL (ref 65–140)
HCT VFR BLD AUTO: 31.6 % (ref 36.5–49.3)
HGB BLD-MCNC: 9.5 G/DL (ref 12–17)
IMM GRANULOCYTES # BLD AUTO: 0.03 THOUSAND/UL (ref 0–0.2)
IMM GRANULOCYTES NFR BLD AUTO: 1 % (ref 0–2)
LYMPHOCYTES # BLD AUTO: 0.71 THOUSANDS/ÂΜL (ref 0.6–4.47)
LYMPHOCYTES NFR BLD AUTO: 15 % (ref 14–44)
MCH RBC QN AUTO: 27.5 PG (ref 26.8–34.3)
MCHC RBC AUTO-ENTMCNC: 30.1 G/DL (ref 31.4–37.4)
MCV RBC AUTO: 92 FL (ref 82–98)
MONOCYTES # BLD AUTO: 0.6 THOUSAND/ÂΜL (ref 0.17–1.22)
MONOCYTES NFR BLD AUTO: 12 % (ref 4–12)
NEUTROPHILS # BLD AUTO: 3.24 THOUSANDS/ÂΜL (ref 1.85–7.62)
NEUTS SEG NFR BLD AUTO: 67 % (ref 43–75)
NRBC BLD AUTO-RTO: 0 /100 WBCS
PLATELET # BLD AUTO: 195 THOUSANDS/UL (ref 149–390)
PMV BLD AUTO: 9.1 FL (ref 8.9–12.7)
POTASSIUM SERPL-SCNC: 4.3 MMOL/L (ref 3.5–5.3)
RBC # BLD AUTO: 3.45 MILLION/UL (ref 3.88–5.62)
SODIUM SERPL-SCNC: 137 MMOL/L (ref 135–147)
WBC # BLD AUTO: 4.82 THOUSAND/UL (ref 4.31–10.16)

## 2025-02-24 PROCEDURE — 85025 COMPLETE CBC W/AUTO DIFF WBC: CPT

## 2025-02-24 PROCEDURE — 82948 REAGENT STRIP/BLOOD GLUCOSE: CPT

## 2025-02-24 PROCEDURE — 99232 SBSQ HOSP IP/OBS MODERATE 35: CPT

## 2025-02-24 PROCEDURE — 80048 BASIC METABOLIC PNL TOTAL CA: CPT

## 2025-02-24 RX ORDER — AMLODIPINE BESYLATE 2.5 MG/1
2.5 TABLET ORAL EVERY EVENING
Status: DISCONTINUED | OUTPATIENT
Start: 2025-02-24 | End: 2025-02-24

## 2025-02-24 RX ORDER — AMLODIPINE BESYLATE 2.5 MG/1
2.5 TABLET ORAL DAILY
Status: DISCONTINUED | OUTPATIENT
Start: 2025-02-24 | End: 2025-02-25

## 2025-02-24 RX ADMIN — POLYETHYLENE GLYCOL 3350 17 G: 17 POWDER, FOR SOLUTION ORAL at 10:14

## 2025-02-24 RX ADMIN — PANTOPRAZOLE SODIUM 40 MG: 40 TABLET, DELAYED RELEASE ORAL at 07:21

## 2025-02-24 RX ADMIN — DOCUSATE SODIUM 100 MG: 100 CAPSULE, LIQUID FILLED ORAL at 10:14

## 2025-02-24 RX ADMIN — LEVOTHYROXINE SODIUM 25 MCG: 0.03 TABLET ORAL at 07:21

## 2025-02-24 RX ADMIN — INSULIN GLARGINE 18 UNITS: 100 INJECTION, SOLUTION SUBCUTANEOUS at 22:13

## 2025-02-24 RX ADMIN — HEPARIN SODIUM 5000 UNITS: 5000 INJECTION, SOLUTION INTRAVENOUS; SUBCUTANEOUS at 07:20

## 2025-02-24 RX ADMIN — INSULIN LISPRO 2 UNITS: 100 INJECTION, SOLUTION INTRAVENOUS; SUBCUTANEOUS at 18:54

## 2025-02-24 RX ADMIN — CEFTRIAXONE 1000 MG: 1 INJECTION, SOLUTION INTRAVENOUS at 07:21

## 2025-02-24 RX ADMIN — FLUCONAZOLE 400 MG: 2 INJECTION, SOLUTION INTRAVENOUS at 16:45

## 2025-02-24 RX ADMIN — Medication 1 APPLICATION: at 22:13

## 2025-02-24 RX ADMIN — Medication: at 11:08

## 2025-02-24 NOTE — PROGRESS NOTES
Progress Note - Hospitalist   Name: Chris Bojorquez 79 y.o. male I MRN: [id number]  Unit/Bed#: 2 [address] Date of Admission: 2/20/2025   Date of Service: 2/24/2025 I Hospital Day: 3    Assessment & Plan  Right nephrolithiasis  S/p right ureteroscopy stone extraction per urology  2/20/25       Urology as primary team  Pain management per primary team   Bowel regimen  Monitor I/Os     HTN (hypertension)  On norvasc restart 2.5 mg   Monitor vitals  Moderate protein-calorie malnutrition (HCC)  Malnutrition Findings:      Diet modifications                            BMI Findings:           Body mass index is 22.96 kg/m².     Type 2 diabetes mellitus with stage 3 chronic kidney disease and hypertension ([facility])    Lab Results   Component Value Date    HGBA1C 11.2 (H) 02/21/2025   SSI + accu checks   Continue lantus 18 U Qhs   Hypothyroidism  Continue levothyroxine  Parkinson disease (HCC)  On sinnamet   Gastroesophageal reflux disease  Continue protonix  Chronic diastolic congestive heart failure (HCC)  Chronic diastolic CHF, in the setting of hypertensive heart disease, as evidenced by diastolic disfunction, being treated with monitoring of intake and output, daily weights, fluid restriction and furosemide with holding parameters.   Candidal dermatitis  Continue nystatin   UTI (urinary tract infection)  Noted at bedside with pus in urine     -Urine culture/sensitivity reviewed.  Susceptible to cephalosporins.  -Discussed with urology; Rivera's catheter discontinued on 2/24/2025.    Plan:   ceftriaxone  Day4/7#   Plan to transition to p.o. antibiotics next 24 hours.  Pending further recommendations from urology team  voiding trial pending.  Follow up with urine cultures    PABLO (acute kidney injury) ([facility])  Baseline creatinine; 1.5-1.8  On admission 1.9    2.3> 2.5 > 1.9> 1.6    Lasix on hold since 2/21/2025.  Plan to restart in the next 24 hours  Monitor I/os  Continue antibiotics for UTI  PABLO resolving.    Rivera's  catheter removed.  Voiding trial 2/24/2025  Constipation  This is a chronic condition.      -Colace twice daily  -MiraLAX as needed  -Monitor I/os    VTE Pharmacologic Prophylaxis:   Moderate Risk (Score 3-4) - Pharmacological DVT Prophylaxis Ordered: heparin.    Mobility:   Basic Mobility Inpatient Raw Score: 10  -HLM Goal: 4: Move to chair/commode  JH-HLM Achieved: 2: Bed activities/Dependent transfer  JH-HLM Goal achieved. Continue to encourage appropriate mobility.    Patient Centered Rounds: I performed bedside rounds with nursing staff today.   Discussions with Specialists or Other Care Team Provider: urology     Education and Discussions with Family / Patient: Updated  (son) via phone.    Current Length of Stay: 3 day(s)  Current Patient Status: Inpatient   Certification Statement: The patient will continue to require additional inpatient hospital stay due to UTI, Rivera's catheter,  Discharge Plan: Anticipate discharge in 24-48 hrs to group home.    Code Status: Prior    Subjective   Patient seen today at bedside.  She is very upset that is still here in the hospital.  I had a lengthy discussion with him regarding the need for treatment for urine infection and voiding trial for his Rivera's catheter..    He reports no chest pain or tightness or shortness of breath or cough or nausea or vomiting or diarrhea or constipation.      Objective :  Temp:  [97.7 °F (36.5 °C)-98.5 °F (36.9 °C)] 97.7 °F (36.5 °C)  HR:  [65-72] 72  BP: (126-146)/(60-78) 126/60  Resp:  [16-19] 19  SpO2:  [94 %-98 %] 95 %  O2 Device: None (Room air)    Body mass index is 22.96 kg/m².     Input and Output Summary (last 24 hours):     Intake/Output Summary (Last 24 hours) at 2/24/2025 1027  Last data filed at 2/23/2025 2121  Gross per 24 hour   Intake 650 ml   Output 1150 ml   Net -500 ml       Physical Exam  Vitals and nursing note reviewed.   Constitutional:       General: He is not in acute distress.     Appearance: Normal  appearance.   HENT:      Head: Normocephalic and atraumatic.      Mouth/Throat:      Mouth: Mucous membranes are moist.   Eyes:      Conjunctiva/sclera: Conjunctivae normal.      Pupils: Pupils are equal, round, and reactive to light.   Cardiovascular:      Rate and Rhythm: Normal rate and regular rhythm.      Pulses: Normal pulses.           Carotid pulses are 2+ on the right side and 2+ on the left side.       Radial pulses are 2+ on the right side and 2+ on the left side.        Dorsalis pedis pulses are 2+ on the right side and 2+ on the left side.      Heart sounds: Normal heart sounds, S1 normal and S2 normal. No murmur heard.  Pulmonary:      Effort: No tachypnea, bradypnea or accessory muscle usage.      Breath sounds: Normal breath sounds and air entry. No decreased breath sounds, wheezing, rhonchi or rales.   Abdominal:      General: Abdomen is flat. Bowel sounds are normal. There is no distension.      Palpations: Abdomen is soft.      Tenderness: There is no abdominal tenderness.      Comments: Rivera's catheter noted at bedside.  Draining light yellow urine with no clots.   Musculoskeletal:      Right lower leg: No edema.      Left lower leg: No edema.   Skin:     Capillary Refill: Capillary refill takes less than 2 seconds.   Neurological:      General: No focal deficit present.      Mental Status: He is alert. Mental status is at baseline.   Psychiatric:         Mood and Affect: Mood normal.         Behavior: Behavior normal.           Lines/Drains:  Lines/Drains/Airways       Active Status       Name Placement date Placement time Site Days    Urethral Catheter Coude 18 Fr. 02/20/25  1201  Coude  3    Ureteral Internal Stent Right ureter 6 Fr. 02/20/25  1206  Right ureter  3                  Urinary Catheter:  Goal for removal: No longer needed. Will place order to discontinue                 Lab Results: I have reviewed the following results:   Results from last 7 days   Lab Units 02/24/25  2345  02/21/25  1014 02/20/25  1621   WBC Thousand/uL 4.82   < > 9.39   HEMOGLOBIN g/dL 9.5*   < > 11.3*   HEMATOCRIT % 31.6*   < > 38.2   PLATELETS Thousands/uL 195   < > 303   BANDS PCT %  --   --  16*   SEGS PCT % 67   < >  --    LYMPHO PCT % 15   < > 5*   MONO PCT % 12   < > 1*   EOS PCT % 5   < > 0    < > = values in this interval not displayed.     Results from last 7 days   Lab Units 02/24/25  0919 02/21/25  0655 02/20/25  1621   SODIUM mmol/L 137   < > 135   POTASSIUM mmol/L 4.3   < > 4.2   CHLORIDE mmol/L 107   < > 107   CO2 mmol/L 23   < > 20*   BUN mg/dL 34*   < > 45*   CREATININE mg/dL 1.69*   < > 1.95*   ANION GAP mmol/L 7   < > 8   CALCIUM mg/dL 7.9*   < > 8.5   ALBUMIN g/dL  --   --  3.1*   TOTAL BILIRUBIN mg/dL  --   --  0.58   ALK PHOS U/L  --   --  132*   ALT U/L  --   --  <3*   AST U/L  --   --  11*   GLUCOSE RANDOM mg/dL 128   < > 197*    < > = values in this interval not displayed.         Results from last 7 days   Lab Units 02/23/25  2158 02/23/25  1519 02/23/25  1100 02/22/25  2112 02/22/25  1550 02/22/25  1048 02/22/25  0706 02/21/25  2128 02/21/25  1600 02/21/25  1131 02/20/25  2046 02/20/25  0845   POC GLUCOSE mg/dl 220* 263* 181* 245* 191* 234* 189* 305* 182* 217* 218* 220*     Results from last 7 days   Lab Units 02/21/25  1013   HEMOGLOBIN A1C % 11.2*     Results from last 7 days   Lab Units 02/21/25  0655 02/21/25  0654   LACTIC ACID mmol/L  --  0.8   PROCALCITONIN ng/ml 86.47*  --        Recent Cultures (last 7 days):   Results from last 7 days   Lab Units 02/21/25  0655 02/20/25  1207   BLOOD CULTURE  No Growth at 72 hrs.  No Growth at 72 hrs.  --    URINE CULTURE   --  >100,000 cfu/ml Proteus mirabilis*       Imaging Results Review: No pertinent imaging studies reviewed.  Other Study Results Review: No additional pertinent studies reviewed.    Last 24 Hours Medication List:     Current Facility-Administered Medications:     acetaminophen (TYLENOL) tablet 650 mg, Q6H PRN     aluminum-magnesium hydroxide-simethicone (MAALOX) oral suspension 30 mL, Q6H PRN    [Held by provider] amLODIPine (NORVASC) tablet 5 mg, QPM    carbidopa-levodopa (SINEMET)  mg per tablet 1 tablet, TID    cefTRIAXone (ROCEPHIN) IVPB (premix in dextrose) 1,000 mg 50 mL, Q24H, Last Rate: 1,000 mg (02/24/25 0721)    docusate sodium (COLACE) capsule 100 mg, Daily    fentaNYL (SUBLIMAZE) injection 50 mcg, Q10 Min PRN    fluconazole (DIFLUCAN) IVPB (premix) 400 mg 200 mL, Q24H, Last Rate: 400 mg (02/23/25 1444)    [Held by provider] furosemide (LASIX) tablet 20 mg, Daily    heparin (porcine) subcutaneous injection 5,000 Units, Q8H RAHUL    HYDROmorphone HCl (DILAUDID) injection 0.2 mg, Q6H PRN    insulin glargine (LANTUS) subcutaneous injection 18 Units 0.18 mL, HS    insulin lispro (HumALOG/ADMELOG) 100 units/mL subcutaneous injection 1-5 Units, TID AC **AND** Fingerstick Glucose (POCT), TID AC    levothyroxine tablet 25 mcg, Daily    mupirocin (BACTROBAN) 2 % nasal ointment, Q12H RAHUL    nystatin (MYCOSTATIN) powder 1 Application, BID    ondansetron (ZOFRAN) injection 4 mg, Q6H PRN    pantoprazole (PROTONIX) EC tablet 40 mg, Daily    polyethylene glycol (MIRALAX) packet 17 g, Daily    Administrative Statements   Today, Patient Was Seen By: Ward Cross MD  I have spent a total time of 30 minutes in caring for this patient on the day of the visit/encounter including Diagnostic results, Prognosis, Risks and benefits of tx options, and Instructions for management.    **Please Note: This note may have been constructed using a voice recognition system.**

## 2025-02-24 NOTE — PLAN OF CARE
Problem: Prexisting or High Potential for Compromised Skin Integrity  Goal: Skin integrity is maintained or improved  Description: INTERVENTIONS:  - Identify patients at risk for skin breakdown  - Assess and monitor skin integrity  - Assess and monitor nutrition and hydration status  - Monitor labs   - Assess for incontinence   - Turn and reposition patient  - Assist with mobility/ambulation  - Relieve pressure over bony prominences  - Avoid friction and shearing  - Provide appropriate hygiene as needed including keeping skin clean and dry  - Evaluate need for skin moisturizer/barrier cream  - Collaborate with interdisciplinary team   - Patient/family teaching  - Consider wound care consult   Outcome: Progressing     Problem: PAIN - ADULT  Goal: Verbalizes/displays adequate comfort level or baseline comfort level  Description: Interventions:  - Encourage patient to monitor pain and request assistance  - Assess pain using appropriate pain scale  - Administer analgesics based on type and severity of pain and evaluate response  - Implement non-pharmacological measures as appropriate and evaluate response  - Consider cultural and social influences on pain and pain management  - Notify physician/advanced practitioner if interventions unsuccessful or patient reports new pain  Outcome: Progressing     Problem: INFECTION - ADULT  Goal: Absence or prevention of progression during hospitalization  Description: INTERVENTIONS:  - Assess and monitor for signs and symptoms of infection  - Monitor lab/diagnostic results  - Monitor all insertion sites, i.e. indwelling lines, tubes, and drains  - Monitor endotracheal if appropriate and nasal secretions for changes in amount and color  - [place] appropriate cooling/warming therapies per order  - Administer medications as ordered  - Instruct and encourage patient and family to use good hand hygiene technique  - Identify and instruct in appropriate isolation precautions for  identified infection/condition  Outcome: Progressing  Goal: Absence of fever/infection during neutropenic period  Description: INTERVENTIONS:  - Monitor WBC    Outcome: Progressing     Problem: SAFETY ADULT  Goal: Patient will remain free of falls  Description: INTERVENTIONS:  - Educate patient/family on patient safety including physical limitations  - Instruct patient to call for assistance with activity   - Consult OT/PT to assist with strengthening/mobility   - Keep Call bell within reach  - Keep bed low and locked with side rails adjusted as appropriate  - Keep care items and personal belongings within reach  - Initiate and maintain comfort rounds  - Make Fall Risk Sign visible to staff  - Offer Toileting every 2 Hours, in advance of need  - Initiate/Maintain bed alarm  - Obtain necessary fall risk management equipment: yellow nonskid socks and yellow fall bracelet  - Apply yellow socks and bracelet for high fall risk patients  - Consider moving patient to room near nurses station  Outcome: Progressing  Goal: Maintain or return to baseline ADL function  Description: INTERVENTIONS:  -  Assess patient's ability to carry out ADLs; assess patient's baseline for ADL function and identify physical deficits which impact ability to perform ADLs (bathing, care of mouth/teeth, toileting, grooming, dressing, etc.)  - Assess/evaluate cause of self-care deficits   - Assess range of motion  - Assess patient's mobility; develop plan if impaired  - Assess patient's need for assistive devices and provide as appropriate  - Encourage maximum independence but intervene and supervise when necessary  - Involve family in performance of ADLs  - Assess for home care needs following discharge   - Consider OT consult to assist with ADL evaluation and planning for discharge  - Provide patient education as appropriate  Outcome: Progressing  Goal: Maintains/Returns to pre admission functional level  Description: INTERVENTIONS:  - Perform  AM-PAC 6 Click Basic Mobility/ Daily Activity assessment daily.  - Set and communicate daily mobility goal to care team and patient/family/caregiver.   - Collaborate with rehabilitation services on mobility goals if consulted  - Perform Range of Motion daily  - Reposition patient every 2 hours.  - Record patient progress and toleration of activity level   Outcome: Progressing     Problem: DISCHARGE PLANNING  Goal: Discharge to home or other facility with appropriate resources  Description: INTERVENTIONS:  - Identify barriers to discharge w/patient and caregiver  - Arrange for needed discharge resources and transportation as appropriate  - Identify discharge learning needs (meds, wound care, etc.)  - Arrange for interpretive services to assist at discharge as needed  - Refer to Case Management Department for coordinating discharge planning if the patient needs post-hospital services based on physician/advanced practitioner order or complex needs related to functional status, cognitive ability, or social support system  Outcome: Progressing     Problem: Knowledge Deficit  Goal: Patient/family/caregiver demonstrates understanding of disease process, treatment plan, medications, and discharge instructions  Description: Complete learning assessment and assess knowledge base.  Interventions:  - Provide teaching at level of understanding  - Provide teaching via preferred learning methods  Outcome: Progressing     Problem: Nutrition/Hydration-ADULT  Goal: Nutrient/Hydration intake appropriate for improving, restoring or maintaining nutritional needs  Description: Monitor and assess patient's nutrition/hydration status for malnutrition. Collaborate with interdisciplinary team and initiate plan and interventions as ordered.  Monitor patient's weight and dietary intake as ordered or per policy. Utilize nutrition screening tool and intervene as necessary. Determine patient's food preferences and provide high-protein,  high-caloric foods as appropriate.     INTERVENTIONS:  - Monitor oral intake, urinary output, labs, and treatment plans  - Assess nutrition and hydration status and recommend course of action  - Evaluate amount of meals eaten  - Assist patient with eating if necessary   - Allow adequate time for meals  - Recommend/ encourage appropriate diets, oral nutritional supplements, and vitamin/mineral supplements  - Order, calculate, and assess calorie counts as needed  - Recommend, monitor, and adjust tube feedings and TPN/PPN based on assessed needs  - Assess need for intravenous fluids  - Provide specific nutrition/hydration education as appropriate  - Include patient/family/caregiver in decisions related to nutrition  Outcome: Progressing

## 2025-02-24 NOTE — ASSESSMENT & PLAN NOTE
Baseline creatinine; 1.5-1.8  On admission 1.9    2.3> 2.5 > 1.9> 1.6    Lasix on hold since 2/21/2025.  Plan to restart in the next 24 hours  Monitor I/os  Continue antibiotics for UTI  PABLO resolving.    Rivera's catheter removed.  Voiding trial 2/24/2025

## 2025-02-24 NOTE — ASSESSMENT & PLAN NOTE
Noted at bedside with pus in urine     -Urine culture/sensitivity reviewed.  Susceptible to cephalosporins.  -Discussed with urology; Rivera's catheter discontinued on 2/24/2025.    Plan:   ceftriaxone  Day4/7#   Plan to transition to p.o. antibiotics next 24 hours.  Pending further recommendations from urology team  voiding trial pending.  Follow up with urine cultures

## 2025-02-24 NOTE — CASE MANAGEMENT
Case Management Assessment & Discharge Planning Note    Patient name Chris Bojorquez  Location 2 [facility] [address] MRN [id number]  : 1945 Date 2025       Current Admission Date: 2025  Current Admission Diagnosis:Right nephrolithiasis   Patient Active Problem List    Diagnosis Date Noted Date Diagnosed    Constipation 2025     UTI (urinary tract infection) 2025     Candidal dermatitis 2025     PVD (peripheral vascular disease) ([facility])      Spinal stenosis      CKD stage G3a/A3, GFR 45-59 and albumin creatinine ratio >300 mg/g ([facility]) 10/11/2024     Hospital discharge follow-up 10/11/2024     Chronic diastolic congestive heart failure ([facility]) 2024     Rash 2024     Right nephrolithiasis 2024     PABLO (acute kidney injury) ([facility]) 2024     History of recurrent UTIs 2024     Failure to thrive in adult 2024     Volume overload 2024     Iron deficiency anemia 2024     Gastroesophageal reflux disease 2024     HTN (hypertension) 2024     Hyponatremia 2024     Moderate protein-calorie malnutrition (HCC) 2024     Type 2 diabetes mellitus with stage 3 chronic kidney disease and hypertension ([facility]) 2024     Hypothyroidism 2024     Parkinson disease ([facility]) 2024       LOS (days): 3  Geometric Mean LOS (GMLOS) (days): 2.9  Days to GMLOS:-0.1     OBJECTIVE:    Risk of Unplanned Readmission Score: 22.56         Current admission status: Inpatient     Preferred Pharmacy:   PATIENT/FAMILY REPORTS NO PREFERRED PHARMACY  No address on file    Primary Care Provider: No primary care provider on file.    Primary Insurance: MEDICARE  Secondary Insurance: [employer]    ASSESSMENT:  Active Health Care Proxies       Derek Bojorquez [facility] Agent - Son   Primary Phone: [phone] (Mobile)                 Readmission Root Cause  30 Day Readmission: No    Patient Information  Admitted from::  Facility  Mental Status: Alert  During Assessment patient was accompanied by: Not accompanied during assessment  Assessment information provided by:: Patient  Primary Caregiver: Other (Comment) (Facility Staff at [facility])  Caregiver's Name:: Darell Sanchez  Caregiver's Relationship to Patient:: Facility Staff  Caregiver's Telephone Number:: [phone]  Support Systems: Son, Self, Other (Comment) (facility staff)  County of Residence: [place]  What city do you live in?: [place]  Type of Current Residence: Other (Comment)  Living Arrangements: Lives in Facility    Activities of Daily Living Prior to Admission  Functional Status: Assistance  Completes ADLs independently?: No  Level of ADL dependence: Assistance  Ambulates independently?: No  Level of ambulatory dependence: Assistance  Does patient use assisted devices?: Yes  Does the patient have a history of Short-Term Rehab?: Yes ([facility])  Does patient have a history of HHC?: No  Does patient currently have HHC?: No     Patient Information Continued  Income Source: Pension/senior living  Does patient have prescription coverage?: Yes  Does patient receive dialysis treatments?: No     Means of Transportation  Means of Transport to Appts:: Family transport    DISCHARGE DETAILS:    Discharge planning discussed with:: Juan Diego Reed  Freedom of Choice: Yes     Contacts  Patient Contacts: Derek Bojorquez (son)  Contact Method: Other (Comment) (Left voicemail)  Reason/Outcome: Discharge Planning    Requested Home Health Care         Is the patient interested in HHC at discharge?: No    DME Referral Provided  Referral made for DME?: No    Other Referral/Resources/Interventions Provided:  Interventions: SNF, Facility Return  Referral Comments: RN CM called and left voicemail for patient's son Derek, introducing self and role and to discuss discharge plans if the plan is to return to [facility] for SNF. Call back number providedBlaise Knox at [facility] updated.      Treatment Team Recommendation: Facility Return, SNF  Discharge Destination Plan:: Facility Return, SNF ([facility])  Transport at Discharge : Wheelchair van      IMM Given (Date):: 02/23/25 (Initial IMM)

## 2025-02-24 NOTE — PROGRESS NOTES
"Progress Note - Urology      Patient: Chris Bojorquez   : 1945 Sex: male   MRN: [id number]     CSN: [id number]  Unit/Bed#: [address] [place] [address]     SUBJECTIVE:   Patient seen on rounds  Purulent urine Rivera  afebrile  Urine cultures Proteus  On cefepime  Urine now clear    Objective   Vitals: /60 (BP Location: Right arm)   Pulse 72   Temp 97.7 °F (36.5 °C) (Oral)   Resp 19   Ht 5' 10\" (1.778 m)   Wt 72.6 kg (160 lb)   SpO2 95%   BMI 22.96 kg/m²     I/O last 24 hours:  In: 870 [P.O.:870]  Out: 1150 [Urine:1150]      Physical Exam:   General Alert awake   Normocephalic atraumatic PERRLA  Lungs clear bilaterally  Cardiac normal S1 normal S2  Abdomen soft, flank pain  Extremities no edema      Lab Results: CBC:   Lab Results   Component Value Date    WBC 5.22 2025    HGB 9.8 (L) 2025    HCT 31.8 (L) 2025    MCV 91 2025     2025    RBC 3.49 (L) 2025    MCH 28.1 2025    MCHC 30.8 (L) 2025    RDW 13.8 2025    MPV 9.9 2025    NRBC 0 2025     CMP:   Lab Results   Component Value Date     2025    CO2 21 2025    BUN 41 (H) 2025    CREATININE 1.92 (H) 2025    CALCIUM 8.1 (L) 2025    AST 11 (L) 2025    ALT <3 (L) 2025    ALKPHOS 132 (H) 2025    EGFR 32 2025     Urinalysis:   Lab Results   Component Value Date    COLORU Light Brown 2025    CLARITYU Turbid 2025    SPECGRAV 1.020 2025    PHUR 6.5 2025    LEUKOCYTESUR Large (A) 2025    NITRITE Negative 2025    GLUCOSEU Negative 2025    KETONESU Negative 2025    BILIRUBINUR Negative 2025    BLOODU Large (A) 2025     Urine Culture:   Lab Results   Component Value Date    URINECX >100,000 cfu/ml Proteus mirabilis (A) 2025     PSA: No results found for: \"PSA\"      Assessment/ Plan:  Complicated UTI do not believe this is sepsis based on results  Status post right ureteral stent " exchange day # 4  Cultures Proteus  Dc yang  Changed to full admission   Can DC Yang back at nursing home          Ciro Hughes MD

## 2025-02-25 VITALS
HEART RATE: 64 BPM | HEIGHT: 70 IN | SYSTOLIC BLOOD PRESSURE: 148 MMHG | OXYGEN SATURATION: 98 % | WEIGHT: 160.94 LBS | TEMPERATURE: 97.9 F | RESPIRATION RATE: 16 BRPM | BODY MASS INDEX: 23.04 KG/M2 | DIASTOLIC BLOOD PRESSURE: 75 MMHG

## 2025-02-25 LAB
ALBUMIN SERPL BCG-MCNC: 2.8 G/DL (ref 3.5–5)
ALP SERPL-CCNC: 103 U/L (ref 34–104)
ALT SERPL W P-5'-P-CCNC: 6 U/L (ref 7–52)
ANION GAP SERPL CALCULATED.3IONS-SCNC: 6 MMOL/L (ref 4–13)
AST SERPL W P-5'-P-CCNC: 10 U/L (ref 13–39)
BASOPHILS # BLD AUTO: 0.02 THOUSANDS/ÂΜL (ref 0–0.1)
BASOPHILS NFR BLD AUTO: 0 % (ref 0–1)
BILIRUB SERPL-MCNC: 0.31 MG/DL (ref 0.2–1)
BUN SERPL-MCNC: 33 MG/DL (ref 5–25)
CALCIUM ALBUM COR SERPL-MCNC: 9 MG/DL (ref 8.3–10.1)
CALCIUM SERPL-MCNC: 8 MG/DL (ref 8.4–10.2)
CHLORIDE SERPL-SCNC: 110 MMOL/L (ref 96–108)
CO2 SERPL-SCNC: 22 MMOL/L (ref 21–32)
CREAT SERPL-MCNC: 1.79 MG/DL (ref 0.6–1.3)
EOSINOPHIL # BLD AUTO: 0.18 THOUSAND/ÂΜL (ref 0–0.61)
EOSINOPHIL NFR BLD AUTO: 4 % (ref 0–6)
ERYTHROCYTE [DISTWIDTH] IN BLOOD BY AUTOMATED COUNT: 13.5 % (ref 11.6–15.1)
GFR SERPL CREATININE-BSD FRML MDRD: 35 ML/MIN/1.73SQ M
GLUCOSE SERPL-MCNC: 118 MG/DL (ref 65–140)
GLUCOSE SERPL-MCNC: 124 MG/DL (ref 65–140)
GLUCOSE SERPL-MCNC: 214 MG/DL (ref 65–140)
HCT VFR BLD AUTO: 31.3 % (ref 36.5–49.3)
HGB BLD-MCNC: 9.6 G/DL (ref 12–17)
IMM GRANULOCYTES # BLD AUTO: 0.03 THOUSAND/UL (ref 0–0.2)
IMM GRANULOCYTES NFR BLD AUTO: 1 % (ref 0–2)
LYMPHOCYTES # BLD AUTO: 0.77 THOUSANDS/ÂΜL (ref 0.6–4.47)
LYMPHOCYTES NFR BLD AUTO: 17 % (ref 14–44)
MCH RBC QN AUTO: 28.3 PG (ref 26.8–34.3)
MCHC RBC AUTO-ENTMCNC: 30.7 G/DL (ref 31.4–37.4)
MCV RBC AUTO: 92 FL (ref 82–98)
MONOCYTES # BLD AUTO: 0.64 THOUSAND/ÂΜL (ref 0.17–1.22)
MONOCYTES NFR BLD AUTO: 14 % (ref 4–12)
NEUTROPHILS # BLD AUTO: 2.85 THOUSANDS/ÂΜL (ref 1.85–7.62)
NEUTS SEG NFR BLD AUTO: 64 % (ref 43–75)
NRBC BLD AUTO-RTO: 0 /100 WBCS
PLATELET # BLD AUTO: 193 THOUSANDS/UL (ref 149–390)
PMV BLD AUTO: 9.3 FL (ref 8.9–12.7)
POTASSIUM SERPL-SCNC: 4.5 MMOL/L (ref 3.5–5.3)
PROT SERPL-MCNC: 6.9 G/DL (ref 6.4–8.4)
RBC # BLD AUTO: 3.39 MILLION/UL (ref 3.88–5.62)
SODIUM SERPL-SCNC: 138 MMOL/L (ref 135–147)
WBC # BLD AUTO: 4.49 THOUSAND/UL (ref 4.31–10.16)

## 2025-02-25 PROCEDURE — 80053 COMPREHEN METABOLIC PANEL: CPT

## 2025-02-25 PROCEDURE — 82948 REAGENT STRIP/BLOOD GLUCOSE: CPT

## 2025-02-25 PROCEDURE — 85025 COMPLETE CBC W/AUTO DIFF WBC: CPT

## 2025-02-25 PROCEDURE — 99232 SBSQ HOSP IP/OBS MODERATE 35: CPT | Performed by: STUDENT IN AN ORGANIZED HEALTH CARE EDUCATION/TRAINING PROGRAM

## 2025-02-25 RX ORDER — FUROSEMIDE 20 MG/1
20 TABLET ORAL DAILY
Status: DISCONTINUED | OUTPATIENT
Start: 2025-02-25 | End: 2025-02-25 | Stop reason: HOSPADM

## 2025-02-25 RX ORDER — CEFPODOXIME PROXETIL 200 MG/1
200 TABLET, FILM COATED ORAL 2 TIMES DAILY
Qty: 10 TABLET | Refills: 0 | Status: SHIPPED | OUTPATIENT
Start: 2025-02-25 | End: 2025-03-02

## 2025-02-25 RX ORDER — INSULIN GLARGINE 100 [IU]/ML
18 INJECTION, SOLUTION SUBCUTANEOUS
Start: 2025-02-25

## 2025-02-25 RX ORDER — AMLODIPINE BESYLATE 5 MG/1
5 TABLET ORAL DAILY
Status: DISCONTINUED | OUTPATIENT
Start: 2025-02-26 | End: 2025-02-25 | Stop reason: HOSPADM

## 2025-02-25 RX ADMIN — CEFTRIAXONE 1000 MG: 1 INJECTION, SOLUTION INTRAVENOUS at 06:44

## 2025-02-25 RX ADMIN — CARBIDOPA AND LEVODOPA 1 TABLET: 25; 100 TABLET ORAL at 08:40

## 2025-02-25 RX ADMIN — FLUCONAZOLE 400 MG: 2 INJECTION, SOLUTION INTRAVENOUS at 15:31

## 2025-02-25 RX ADMIN — AMLODIPINE BESYLATE 2.5 MG: 2.5 TABLET ORAL at 08:40

## 2025-02-25 RX ADMIN — INSULIN LISPRO 2 UNITS: 100 INJECTION, SOLUTION INTRAVENOUS; SUBCUTANEOUS at 15:52

## 2025-02-25 RX ADMIN — CARBIDOPA AND LEVODOPA 1 TABLET: 25; 100 TABLET ORAL at 15:51

## 2025-02-25 RX ADMIN — FUROSEMIDE 20 MG: 20 TABLET ORAL at 10:44

## 2025-02-25 RX ADMIN — POLYETHYLENE GLYCOL 3350 17 G: 17 POWDER, FOR SOLUTION ORAL at 08:39

## 2025-02-25 NOTE — PROGRESS NOTES
Progress Note - Hospitalist   Name: Chris Bojorquez 79 y.o. male I MRN: [id number]  Unit/Bed#: 2 [address] Date of Admission: 2/20/2025   Date of Service: 2/25/2025 I Hospital Day: 4    Assessment & Plan  Right nephrolithiasis  79 year old male was admitted under the urology service due to right stent stones. Patient is s/p right cystoscopy, ureteroscopy, retrograde pyelogram, lithotripsy holmium laser, and insertion of right ureteral stent 2/20/2025.     RECOMMENDATIONS  Stable from a medical standpoint  PABLO Resolved, restart lasix. Recommend Close PCP follow-up with repeat BMP to ensure stability  Currently on antibiotics for Proteus mirabilis growth on urine culture. Either complete a 7 day course of IV antibiotics through 2/27/2025 or discharge with Cefpodoxime to complete a 10 day course through March 2  HTN (hypertension)  Controlled  Continue Amlodipine  Moderate protein-calorie malnutrition (HCC)  Malnutrition Findings:      Diet modifications                            BMI Findings:           Body mass index is 23.09 kg/m².     Nutrition consulted to assess for malnutrition  Type 2 diabetes mellitus with stage 3 chronic kidney disease and hypertension (HCC)    Lab Results   Component Value Date    HGBA1C 11.2 (H) 02/21/2025     Recent Labs     02/23/25  1100 02/23/25  1519 02/23/25  2158 02/24/25  1153 02/24/25  1651 02/25/25  0731   POCGLU 181* 263* 220* 149* 215* 124     Continue Glargine 18U HS, Begin Lispro 3U TID with meals, sliding scale  Resume home regimen on discharge  Hypothyroidism  Continue levothyroxine  Parkinson disease (HCC)  Continue Sinemet  Gastroesophageal reflux disease  Continue PPI  Chronic diastolic congestive heart failure (HCC)  Chronic diastolic heart failure. Euvolemic.  Restart lasix  Candidal dermatitis  Continue nystatin   UTI (urinary tract infection)  Cultures growing Proteus mirabilis. No evidence of bacteremia. Plan to complete     PABLO (acute kidney injury)  ([facility])  Baseline creatinine; 1.5-1.8  Etiology: Possibly obstructive given stone  Met PABLO criteria, peak creatinine of 2. 5. Resolved, back to baseline  Constipation  Continue bowel regimen    VTE Pharmacologic Prophylaxis:   Moderate Risk (Score 3-4) - Pharmacological DVT Prophylaxis Ordered: heparin.    Mobility:   Basic Mobility Inpatient Raw Score: 10  -[facility] Goal: 4: Move to chair/commode  -[facility] Achieved: 2: Bed activities/Dependent transfer    Discussions with Specialists or Other Care Team Provider: urology    Education and Discussions with Family / Patient: patient    Current Length of Stay: 4 day(s)  Current Patient Status: Inpatient   Certification Statement: The patient will continue to require additional inpatient hospital stay due to iv antibiotics, dispo planning per primary service  Discharge Plan: 24 hours    Code Status: Prior    Subjective   Patient seen and examined. No new issues overnight.     Objective   Vitals:   Temp (24hrs), Av.1 °F (36.7 °C), Min:97.9 °F (36.6 °C), Max:98.4 °F (36.9 °C)    Temp:  [97.9 °F (36.6 °C)-98.4 °F (36.9 °C)] 98 °F (36.7 °C)  HR:  [60-74] 60  Resp:  [16-19] 16  BP: (139-149)/(74-78) 149/78  SpO2:  [95 %-98 %] 98 %  Body mass index is 23.09 kg/m².     Input and Output Summary (last 24 hours):     Intake/Output Summary (Last 24 hours) at 2025 0926  Last data filed at 2025 0714  Gross per 24 hour   Intake 50 ml   Output --   Net 50 ml       Physical Exam  Lines/Drains:  Lines/Drains/Airways       Active Status       Name Placement date Placement time Site Days    Ureteral Internal Stent Right ureter 6 Fr. 25  1206  Right ureter  4    External Urinary Catheter  25  2200  -- less than 1                            Lab Results: I have reviewed the following results:   Results from last 7 days   Lab Units 25  0630 25  0919 25  0633 25  1014 25  1621   WBC Thousand/uL 4.49 4.82 5.22   < > 9.39   HEMOGLOBIN g/dL 9.6*  9.5* 9.8*   < > 11.3*   PLATELETS Thousands/uL 193 195 207   < > 303   MCV fL 92 92 91   < > 93   TOTAL NEUT ABS Thousand/uL  --   --   --   --  8.83*   BANDS PCT %  --   --   --   --  16*    < > = values in this interval not displayed.     Results from last 7 days   Lab Units 02/25/25  0630 02/24/25  0919 02/23/25  0633 02/21/25  0655 02/20/25  1621   SODIUM mmol/L 138 137 137   < > 135   POTASSIUM mmol/L 4.5 4.3 4.2   < > 4.2   CHLORIDE mmol/L 110* 107 108   < > 107   CO2 mmol/L 22 23 21   < > 20*   ANION GAP mmol/L 6 7 8   < > 8   BUN mg/dL 33* 34* 41*   < > 45*   CREATININE mg/dL 1.79* 1.69* 1.92*   < > 1.95*   CALCIUM mg/dL 8.0* 7.9* 8.1*   < > 8.5   ALBUMIN g/dL 2.8*  --   --   --  3.1*   TOTAL BILIRUBIN mg/dL 0.31  --   --   --  0.58   ALK PHOS U/L 103  --   --   --  132*   ALT U/L 6*  --   --   --  <3*   AST U/L 10*  --   --   --  11*   EGFR ml/min/1.73sq m 35 38 32   < > 32   GLUCOSE RANDOM mg/dL 118 128 131   < > 197*    < > = values in this interval not displayed.                      Results from last 7 days   Lab Units 02/21/25  0655 02/21/25  0654   LACTIC ACID mmol/L  --  0.8   PROCALCITONIN ng/ml 86.47*  --      Results from last 7 days   Lab Units 02/25/25  0731 02/24/25  1651 02/24/25  1153 02/23/25  2158 02/23/25  1519 02/23/25  1100 02/22/25  2112 02/22/25  1550 02/22/25  1048 02/22/25  0706 02/21/25  2128 02/21/25  1600   POC GLUCOSE mg/dl 124 215* 149* 220* 263* 181* 245* 191* 234* 189* 305* 182*     Results from last 7 days   Lab Units 02/21/25  1013   HEMOGLOBIN A1C % 11.2*           Recent Cultures (last 7 days):   Results from last 7 days   Lab Units 02/21/25  0655 02/20/25  1207   BLOOD CULTURE  No Growth at 72 hrs.  No Growth at 72 hrs.  --    URINE CULTURE   --  >100,000 cfu/ml Proteus mirabilis*       Imaging:  Reviewed radiology reports from this admission: none during this admission just the pyelogram    Last 24 Hours Medication List:     Current Facility-Administered Medications:      acetaminophen (TYLENOL) tablet 650 mg, Q6H PRN    aluminum-magnesium hydroxide-simethicone (MAALOX) oral suspension 30 mL, Q6H PRN    amLODIPine (NORVASC) tablet 2.5 mg, Daily    carbidopa-levodopa (SINEMET)  mg per tablet 1 tablet, TID    cefTRIAXone (ROCEPHIN) IVPB (premix in dextrose) 1,000 mg 50 mL, Q24H, Last Rate: Stopped (02/25/25 0714)    docusate sodium (COLACE) capsule 100 mg, Daily    fentaNYL (SUBLIMAZE) injection 50 mcg, Q10 Min PRN    fluconazole (DIFLUCAN) IVPB (premix) 400 mg 200 mL, Q24H, Last Rate: 400 mg (02/24/25 1645)    [Held by provider] furosemide (LASIX) tablet 20 mg, Daily    heparin (porcine) subcutaneous injection 5,000 Units, Q8H RAHUL    HYDROmorphone HCl (DILAUDID) injection 0.2 mg, Q6H PRN    insulin glargine (LANTUS) subcutaneous injection 18 Units 0.18 mL, HS    insulin lispro (HumALOG/ADMELOG) 100 units/mL subcutaneous injection 1-5 Units, TID AC **AND** Fingerstick Glucose (POCT), TID AC    levothyroxine tablet 25 mcg, Daily    mupirocin (BACTROBAN) 2 % nasal ointment, Q12H RAHUL    nystatin (MYCOSTATIN) powder 1 Application, BID    ondansetron (ZOFRAN) injection 4 mg, Q6H PRN    pantoprazole (PROTONIX) EC tablet 40 mg, Daily    polyethylene glycol (MIRALAX) packet 17 g, Daily      **Please Note: This note may have been constructed using a voice recognition system.**

## 2025-02-25 NOTE — PROGRESS NOTES
"Progress Note - Urology      Patient: Chris Bojorquez   : 1945 Sex: male   MRN: [id number]     CSN: [id number]  Unit/Bed#: [address] [place] [address]     SUBJECTIVE:   Patient seen on morning rounds  Currently ready for discharge if medical service agrees      Objective   Vitals: /78   Pulse 60   Temp 98 °F (36.7 °C)   Resp 16   Ht 5' 10\" (1.778 m)   Wt 73 kg (160 lb 15 oz)   SpO2 98%   BMI 23.09 kg/m²     I/O last 24 hours:  In: 50 [IV Piggyback:50]  Out: 1300 [Urine:1300]      Physical Exam:   General Alert awake   Normocephalic atraumatic PERRLA  Lungs clear bilaterally  Cardiac normal S1 normal S2  Abdomen soft, flank pain  Extremities no edema      Lab Results: CBC:   Lab Results   Component Value Date    WBC 4.49 2025    HGB 9.6 (L) 2025    HCT 31.3 (L) 2025    MCV 92 2025     2025    RBC 3.39 (L) 2025    MCH 28.3 2025    MCHC 30.7 (L) 2025    RDW 13.5 2025    MPV 9.3 2025    NRBC 0 2025     CMP:   Lab Results   Component Value Date     (H) 2025    CO2 22 2025    BUN 33 (H) 2025    CREATININE 1.79 (H) 2025    CALCIUM 8.0 (L) 2025    AST 10 (L) 2025    ALT 6 (L) 2025    ALKPHOS 103 2025    EGFR 35 2025     Urinalysis:   Lab Results   Component Value Date    COLORU Light Brown 2025    CLARITYU Turbid 2025    SPECGRAV 1.020 2025    PHUR 6.5 2025    LEUKOCYTESUR Large (A) 2025    NITRITE Negative 2025    GLUCOSEU Negative 2025    KETONESU Negative 2025    BILIRUBINUR Negative 2025    BLOODU Large (A) 2025     Urine Culture:   Lab Results   Component Value Date    URINECX >100,000 cfu/ml Proteus mirabilis (A) 2025     PSA: No results found for: \"PSA\"      Assessment/ Plan:  Status post cystoscopy right laser lithotripsy stent exchange day #4  Complicated UTI  Chronic UTIs  To be discharged back to [facility] " Long discussion with son POA in light of chronic UTIs stone burden Proteus do not feel attempts at ureteroscopy are safe and that patient would need to consider right nephrostomy tube antibiotic irrigation before attempting removal of stones at this point will move to 3-month cystoscopy stent exchanges          Ciro Hughes MD

## 2025-02-25 NOTE — ASSESSMENT & PLAN NOTE
Baseline creatinine; 1.5-1.8  Etiology: Possibly obstructive given stone  Met PABLO criteria, peak creatinine of 2. 5. Resolved, back to baseline

## 2025-02-25 NOTE — PLAN OF CARE
Problem: PAIN - ADULT  Goal: Verbalizes/displays adequate comfort level or baseline comfort level  Description: Interventions:  - Encourage patient to monitor pain and request assistance  - Assess pain using appropriate pain scale  - Administer analgesics based on type and severity of pain and evaluate response  - Implement non-pharmacological measures as appropriate and evaluate response  - Consider cultural and social influences on pain and pain management  - Notify physician/advanced practitioner if interventions unsuccessful or patient reports new pain  Outcome: Progressing     Problem: INFECTION - ADULT  Goal: Absence or prevention of progression during hospitalization  Description: INTERVENTIONS:  - Assess and monitor for signs and symptoms of infection  - Monitor lab/diagnostic results  - Monitor all insertion sites, i.e. indwelling lines, tubes, and drains  - Administer medications as ordered  - Instruct and encourage patient and family to use good hand hygiene technique  - Identify and instruct in appropriate isolation precautions for identified infection/condition  Outcome: Progressing  Goal: Absence of fever/infection during neutropenic period  Description: INTERVENTIONS:  - Monitor WBC    Outcome: Progressing      Problem: SAFETY ADULT  Goal: Patient will remain free of falls  Description: INTERVENTIONS:  - Educate patient/family on patient safety including physical limitations  - Instruct patient to call for assistance with activity   - Consult OT/PT to assist with strengthening/mobility   - Keep Call bell within reach  - Keep bed low and locked with side rails adjusted as appropriate  - Keep care items and personal belongings within reach  - Initiate and maintain comfort rounds  - Make Fall Risk Sign visible to staff  - Offer Toileting every 2 Hours, in advance of need  - Initiate/Maintain bed alarm  - Obtain necessary fall risk management equipment  - Apply yellow socks and bracelet for high fall  risk patients  - Consider moving patient to room near nurses station  Outcome: Progressing

## 2025-02-25 NOTE — ASSESSMENT & PLAN NOTE
79 year old male was admitted under the urology service due to right stent stones. Patient is s/p right cystoscopy, ureteroscopy, retrograde pyelogram, lithotripsy holmium laser, and insertion of right ureteral stent 2/20/2025.     RECOMMENDATIONS  Stable from a medical standpoint  PABLO Resolved, restart lasix. Recommend Close PCP follow-up with repeat BMP to ensure stability  Currently on antibiotics for Proteus mirabilis growth on urine culture. Either complete a 7 day course of IV antibiotics through 2/27/2025 or discharge with Cefpodoxime to complete a 10 day course through March 2

## 2025-02-25 NOTE — ASSESSMENT & PLAN NOTE
Lab Results   Component Value Date    HGBA1C 11.2 (H) 02/21/2025     Recent Labs     02/23/25  1100 02/23/25  1519 02/23/25  2158 02/24/25  1153 02/24/25  1651 02/25/25  0731   POCGLU 181* 263* 220* 149* 215* 124     Continue Glargine 18U HS, Begin Lispro 3U TID with meals, sliding scale  Resume home regimen on discharge

## 2025-02-25 NOTE — ASSESSMENT & PLAN NOTE
Malnutrition Findings:      Diet modifications                            BMI Findings:           Body mass index is 23.09 kg/m².     Nutrition consulted to assess for malnutrition

## 2025-02-25 NOTE — DISCHARGE SUMMARY
Discharge Summary - Urology   Name: Chris Bojorquez 79 y.o. male I MRN: [id number]  Unit/Bed#: [address] [place] [address] Date of Admission: 2/20/2025   Date of Service: 2/25/2025 I Hospital Day: 4    This 79-year-old male history of right renal stones chronic right stent patient at [facility] admitted last Thursday, February 20 undergoing cystoscopy right flexible ureteroscopy laser lithotripsy stent exchange found to have purulent urine admitted postop for complicated UTI medical consult called for spiking temperature postop receiving appropriate antibiotics for Proteus UTI for the last 6 days having Rivera catheter removed yesterday voiding trial patient to be discharged back to [facility] Long discussion with son in light of chronic UTIs and stones will be scheduled for cystoscopy right stent exchange for Rivera on May 22

## 2025-02-25 NOTE — CASE MANAGEMENT
Case Management Discharge Planning Note    Patient name Chris Bojorquez  Location [facility] MRN [id number]  : 1945 Date 2025       Current Admission Date: 2025  Current Admission Diagnosis:Right nephrolithiasis   Patient Active Problem List    Diagnosis Date Noted Date Diagnosed    Constipation 2025     UTI (urinary tract infection) 2025     Candidal dermatitis 2025     PVD (peripheral vascular disease) ([facility])      Spinal stenosis      CKD stage G3a/A3, GFR 45-59 and albumin creatinine ratio >300 mg/g ([facility]) 10/11/2024     Hospital discharge follow-up 10/11/2024     Chronic diastolic congestive heart failure ([facility]) 2024     Rash 2024     Right nephrolithiasis 2024     PABLO (acute kidney injury) ([facility]) 2024     History of recurrent UTIs 2024     Failure to thrive in adult 2024     Volume overload 2024     Iron deficiency anemia 2024     Gastroesophageal reflux disease 2024     HTN (hypertension) 2024     Hyponatremia 2024     Moderate protein-calorie malnutrition (HCC) 2024     Type 2 diabetes mellitus with stage 3 chronic kidney disease and hypertension ([facility]) 2024     Hypothyroidism 2024     Parkinson disease ([facility]) 2024       LOS (days): 4  Geometric Mean LOS (GMLOS) (days): 2.9  Days to GMLOS:-1     OBJECTIVE:  Risk of Unplanned Readmission Score: 23.19         Current admission status: Inpatient   Preferred Pharmacy:   PATIENT/FAMILY REPORTS NO PREFERRED PHARMACY  No address on file      Primary Care Provider: No primary care provider on file.    Primary Insurance: MEDICARE  Secondary Insurance: [employer] [place] [employer] Virtual [facility]    DISCHARGE DETAILS:    Other Referral/Resources/Interventions Provided:  Interventions: Transportation  Referral Comments: RN CM made aware patient medically cleared for discharge today. JUSTYNA GERBER made Abilio at [facility] aware and requested [facility] transportation  for 1400 and  via [employer] confirmed for 1800. All related parties made aware and RN CM called and spoke with patient's son to update.     Treatment Team Recommendation: Facility Return, SNF  Discharge Destination Plan:: Facility Return, SNF  Transport at Discharge : Wheelchair van     Number/Name of Dispatcher: [employer]  Transported by (Company and Unit #): Alisa [phone]  ETA of Transport (Date): 02/25/25  ETA of Transport (Time): 1800

## 2025-02-26 LAB
BACTERIA BLD CULT: NORMAL
BACTERIA BLD CULT: NORMAL

## 2025-02-26 NOTE — CONSULTS
Nutrition consult received. Pt does not meet criteria for malnutrition, as evidenced by no marker identified. RD completed assessment (2/23/25) see flowsheet for details.

## 2025-03-10 ENCOUNTER — OFFICE VISIT (OUTPATIENT)
Dept: NEPHROLOGY | Facility: CLINIC | Age: 80
End: 2025-03-10
Payer: MEDICARE

## 2025-03-10 VITALS — SYSTOLIC BLOOD PRESSURE: 135 MMHG | OXYGEN SATURATION: 99 % | DIASTOLIC BLOOD PRESSURE: 75 MMHG | HEART RATE: 74 BPM

## 2025-03-10 DIAGNOSIS — I12.9 TYPE 2 DIABETES MELLITUS WITH STAGE 3 CHRONIC KIDNEY DISEASE AND HYPERTENSION (HCC): Primary | ICD-10-CM

## 2025-03-10 DIAGNOSIS — E83.9 CHRONIC KIDNEY DISEASE-MINERAL BONE DISORDER (CKD-MBD) WITH STAGE 3B CHRONIC KIDNEY DISEASE (HCC): ICD-10-CM

## 2025-03-10 DIAGNOSIS — N20.0 NEPHROLITHIASIS: ICD-10-CM

## 2025-03-10 DIAGNOSIS — N18.30 TYPE 2 DIABETES MELLITUS WITH STAGE 3 CHRONIC KIDNEY DISEASE AND HYPERTENSION (HCC): Primary | ICD-10-CM

## 2025-03-10 DIAGNOSIS — E11.22 TYPE 2 DIABETES MELLITUS WITH STAGE 3 CHRONIC KIDNEY DISEASE AND HYPERTENSION (HCC): Primary | ICD-10-CM

## 2025-03-10 DIAGNOSIS — N18.32 CHRONIC KIDNEY DISEASE-MINERAL BONE DISORDER (CKD-MBD) WITH STAGE 3B CHRONIC KIDNEY DISEASE (HCC): ICD-10-CM

## 2025-03-10 DIAGNOSIS — I50.32 CHRONIC DIASTOLIC CONGESTIVE HEART FAILURE (HCC): ICD-10-CM

## 2025-03-10 DIAGNOSIS — Z87.440 HISTORY OF RECURRENT UTIS: ICD-10-CM

## 2025-03-10 DIAGNOSIS — M89.9 CHRONIC KIDNEY DISEASE-MINERAL BONE DISORDER (CKD-MBD) WITH STAGE 3B CHRONIC KIDNEY DISEASE (HCC): ICD-10-CM

## 2025-03-10 PROBLEM — N17.9 AKI (ACUTE KIDNEY INJURY) (HCC): Status: RESOLVED | Noted: 2024-09-26 | Resolved: 2025-03-10

## 2025-03-10 PROBLEM — N18.31 CKD STAGE G3A/A3, GFR 45-59 AND ALBUMIN CREATININE RATIO >300 MG/G (HCC): Status: RESOLVED | Noted: 2024-10-11 | Resolved: 2025-03-10

## 2025-03-10 PROCEDURE — 99214 OFFICE O/P EST MOD 30 MIN: CPT | Performed by: INTERNAL MEDICINE

## 2025-03-10 RX ORDER — SACCHAROMYCES BOULARDII 250 MG
250 CAPSULE ORAL 2 TIMES DAILY
COMMUNITY

## 2025-03-10 NOTE — ASSESSMENT & PLAN NOTE
Lab Results   Component Value Date    HGBA1C 11.2 (H) 02/21/2025     Chronic kidney disease stage III  -- Presumably secondary to prior episodes of acute kidney injury, age-related nephron loss, recurrent urinary tract infections causing some scarring, diabetic kidney disease, hypertensive nephrosclerosis and some age-related nephron loss.  -- Blood pressure is controlled but his diabetes is poor  -- Baseline creatinine now fluctuating mid to high 1's  -- Patient had a recent urinary infection with nephrolithiasis  -- His last renal imaging including a CT scan from August 2024 showed worsening findings of urinary infection on the right with concerns of emphysematous pyelitis, urothelial thickening throughout the right renal collecting system delayed nephrogram on the right suggestive of obstructive uropathy    Diabetes mellitus type 2  -- Poorly controlled hemoglobin A1c is 11.2 though an improvement from our last visit where the hemoglobin A1c was 14  -- Insulin management as per internal medicine team  -- Yearly podiatry and ophthalmologic visits    Hypertension  -- Blood pressure is controlled and examines euvolemic  -- Amlodipine 5 mg daily    Allergies: Sulfur    Orders:    PTH, intact; Future    Phosphorus; Future    CBC and Platelet; Future    Comprehensive metabolic panel; Future    Vitamin D 25 hydroxy; Future

## 2025-03-10 NOTE — ASSESSMENT & PLAN NOTE
-- Stay well-hydrated  --Avoid SGLT2 inhibitors  --Better diabetic control    Orders:    PTH, intact; Future    Phosphorus; Future    CBC and Platelet; Future    Comprehensive metabolic panel; Future    Vitamin D 25 hydroxy; Future

## 2025-03-10 NOTE — ASSESSMENT & PLAN NOTE
Lab Results   Component Value Date    EGFR 35 02/25/2025    EGFR 38 02/24/2025    EGFR 32 02/23/2025    CREATININE 1.79 (H) 02/25/2025    CREATININE 1.69 (H) 02/24/2025    CREATININE 1.92 (H) 02/23/2025   -- Check PTH phosphorus  -- Calcium normal  -- Check vitamin D 25-hydroxy level    Orders:    PTH, intact; Future    Phosphorus; Future    CBC and Platelet; Future    Comprehensive metabolic panel; Future    Vitamin D 25 hydroxy; Future

## 2025-03-10 NOTE — ASSESSMENT & PLAN NOTE
-- Stay well-hydrated maintain urine volume greater than 2.5 L/day  --Continue follow-up with urology  --Chronic right stent  --Underwent cystoscopy with right flexible ureteroscopy laser lithotripsy stent exchange which found purulent urine and had to be admitted for complicated UTI with urine culture growing out Proteus Rivera catheter was placed and subsequently removed.  --Continue follow-up with urology    Orders:    PTH, intact; Future    Phosphorus; Future    CBC and Platelet; Future    Comprehensive metabolic panel; Future    Vitamin D 25 hydroxy; Future

## 2025-03-10 NOTE — PROGRESS NOTES
Name: Chris Bojorquez      : 1945      MRN: [id number]  Encounter Provider: Tc Gipson MD  Encounter Date: 3/10/2025   Encounter department: [facility]  :  Assessment & Plan  Type 2 diabetes mellitus with stage 3 chronic kidney disease and hypertension (HCC)    Lab Results   Component Value Date    HGBA1C 11.2 (H) 2025     Chronic kidney disease stage III  -- Presumably secondary to prior episodes of acute kidney injury, age-related nephron loss, recurrent urinary tract infections causing some scarring, diabetic kidney disease, hypertensive nephrosclerosis and some age-related nephron loss.  -- Blood pressure is controlled but his diabetes is poor  -- Baseline creatinine now fluctuating mid to high 1's  -- Patient had a recent urinary infection with nephrolithiasis  -- His last renal imaging including a CT scan from 2024 showed worsening findings of urinary infection on the right with concerns of emphysematous pyelitis, urothelial thickening throughout the right renal collecting system delayed nephrogram on the right suggestive of obstructive uropathy    Diabetes mellitus type 2  -- Poorly controlled hemoglobin A1c is 11.2 though an improvement from our last visit where the hemoglobin A1c was 14  -- Insulin management as per internal medicine team  -- Yearly podiatry and ophthalmologic visits    Hypertension  -- Blood pressure is controlled and examines euvolemic  -- Amlodipine 5 mg daily    Allergies: Sulfur    Orders:    PTH, intact; Future    Phosphorus; Future    CBC and Platelet; Future    Comprehensive metabolic panel; Future    Vitamin D 25 hydroxy; Future    Chronic diastolic congestive heart failure (HCC)  Wt Readings from Last 3 Encounters:   25 73 kg (160 lb 15 oz)   24 67.1 kg (148 lb)   10/02/24 66.4 kg (146 lb 6.4 oz)   -- Appears compensated and euvolemic at this time.  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 (HCC)  Lab Results   Component Value Date    EGFR 35 02/25/2025    EGFR 38 02/24/2025    EGFR 32 02/23/2025    CREATININE 1.79 (H) 02/25/2025    CREATININE 1.69 (H) 02/24/2025    CREATININE 1.92 (H) 02/23/2025   -- Check PTH phosphorus  -- Calcium normal  -- Check vitamin D 25-hydroxy level    Orders:    PTH, intact; Future    Phosphorus; Future    CBC and Platelet; Future    Comprehensive metabolic panel; Future    Vitamin D 25 hydroxy; Future    History of recurrent UTIs  -- Stay well-hydrated  --Avoid SGLT2 inhibitors  --Better diabetic control    Orders:    PTH, intact; Future    Phosphorus; Future    CBC and Platelet; Future    Comprehensive metabolic panel; Future    Vitamin D 25 hydroxy; Future    Nephrolithiasis  -- Stay well-hydrated maintain urine volume greater than 2.5 L/day  --Continue follow-up with urology  --Chronic right stent  --Underwent cystoscopy with right flexible ureteroscopy laser lithotripsy stent exchange which found purulent urine and had to be admitted for complicated UTI with urine culture growing out Proteus Rivera catheter was placed and subsequently removed.  --Continue follow-up with urology    Orders:    PTH, intact; Future    Phosphorus; Future    CBC and Platelet; Future    Comprehensive metabolic panel; Future    Vitamin D 25 hydroxy; Future        Patient Instructions   1.)  Low 2 g sodium diet    2.)  Monitor weights at home    3.)  Avoid NSAIDs (ibuprofen, Motrin, Advil, Aleve, naproxen)    4.)  Monitor blood pressure at home, call if blood pressure greater than 150/90 persistently    5.) I will plan to discuss all results including blood work, and/or imaging at our next visit, unless there is an urgent indication, in which case I will call you  earlier. If you have any questions or concerns about your results, please feel free to call our office.          It was a pleasure evaluating your patient in the office today. Thank you for allowing our team to participate in the care of  Chris Bojorquez. Please do not hesitate to contact our team if further issues/questions shall arise in the interim.     History of Present Illness   HPI  Chris Bojorquez is a 79 y.o. male who presents follow-up of chronic kidney disease stage III.  He recently underwent urologic procedure by Dr. Hughes/urology back on February 19 for which he was then admitted for complicated UTI growing out Proteus.  He did have an acute kidney injury with a creatinine peaked at 2.5 mg/dL but started to improve back to what I suspect is now his usual baseline and he was discharged with a creatinine 1.79 mg/dL.    Patient has Parkinson's disease.  He is currently at a facility at this time.  I reviewed the outpatient medication list.    Patient is not a great historian or able to give me good history today.    I reviewed the urology procedure note from February.    I looked at the last KUB and CT scan from 2024.    Reviewed his blood work from February 25 which included CMP and CBC.  Creatinine stable at 1.7 mg/dL.  Sodium was normal.  Potassium was normal.  Acid-base status was normal.  Calcium was normal.    History obtained from: patient  Pertinent Medical History         Review of Systems   Constitutional:  Negative for activity change and fever.   Respiratory:  Negative for cough, chest tightness, shortness of breath and wheezing.    Cardiovascular:  Negative for chest pain and leg swelling.   Gastrointestinal:  Negative for abdominal pain, diarrhea, nausea and vomiting.   Endocrine: Negative for polyuria.   Genitourinary:  Negative for difficulty urinating, dysuria, flank pain, frequency and urgency.   Skin:  Positive for color change and wound. Negative for rash.   Neurological:  Negative  for dizziness, syncope, light-headedness and headaches.     Medical History Reviewed by provider this encounter:  Meds     .  Current Outpatient Medications on File Prior to Visit   Medication Sig Dispense Refill    acetaminophen (TYLENOL) 325 mg tablet Take 2 tablets (650 mg total) by mouth every 6 (six) hours as needed for mild pain, headaches or fever      amLODIPine (NORVASC) 5 mg tablet Take 5 mg by mouth daily      bisacodyl (Dulcolax) 10 mg suppository Insert 10 mg into the rectum as needed for constipation      carbidopa-levodopa (SINEMET)  mg per tablet Take 1 tablet by mouth 3 (three) times a day      Cranberry 400 MG TABS Take 400 mg by mouth daily      docusate sodium (COLACE) 100 mg capsule Take 100 mg by mouth daily      Emollient (EUCERIN ADVANCED REPAIR EX) Apply topically      ferrous sulfate 324 (65 Fe) mg Take 1 tablet (324 mg total) by mouth daily before breakfast      furosemide (LASIX) 20 mg tablet Take 1 tablet (20 mg total) by mouth daily      insulin glargine (LANTUS) 100 units/mL subcutaneous injection Inject 18 Units under the skin daily at bedtime      levothyroxine 25 mcg tablet Take 25 mcg by mouth daily      magnesium hydroxide (MILK OF MAGNESIA) 400 mg/5 mL oral suspension Take by mouth      Multiple Vitamin (multivitamin) tablet Take 1 tablet by mouth daily      nystatin (MYCOSTATIN) powder Apply topically 2 (two) times a day Apply to groin area      pantoprazole (PROTONIX) 40 mg tablet 40 mg daily      rOPINIRole (REQUIP) 1 mg tablet Take 1 mg by mouth if needed (2x per day for contractures)      saccharomyces boulardii (FLORASTOR) 250 mg capsule Take 250 mg by mouth 2 (two) times a day      senna-docusate sodium (SENOKOT S) 8.6-50 mg per tablet 1 (one) time each day at the same time.      SSD 1 % cream        No current facility-administered medications on file prior to visit.         Current Outpatient Medications on File Prior to Visit   Medication Sig Dispense Refill     acetaminophen (TYLENOL) 325 mg tablet Take 2 tablets (650 mg total) by mouth every 6 (six) hours as needed for mild pain, headaches or fever      amLODIPine (NORVASC) 5 mg tablet Take 5 mg by mouth daily      bisacodyl (Dulcolax) 10 mg suppository Insert 10 mg into the rectum as needed for constipation      carbidopa-levodopa (SINEMET)  mg per tablet Take 1 tablet by mouth 3 (three) times a day      Cranberry 400 MG TABS Take 400 mg by mouth daily      docusate sodium (COLACE) 100 mg capsule Take 100 mg by mouth daily      Emollient (EUCERIN ADVANCED REPAIR EX) Apply topically      ferrous sulfate 324 (65 Fe) mg Take 1 tablet (324 mg total) by mouth daily before breakfast      furosemide (LASIX) 20 mg tablet Take 1 tablet (20 mg total) by mouth daily      insulin glargine (LANTUS) 100 units/mL subcutaneous injection Inject 18 Units under the skin daily at bedtime      levothyroxine 25 mcg tablet Take 25 mcg by mouth daily      magnesium hydroxide (MILK OF MAGNESIA) 400 mg/5 mL oral suspension Take by mouth      Multiple Vitamin (multivitamin) tablet Take 1 tablet by mouth daily      nystatin (MYCOSTATIN) powder Apply topically 2 (two) times a day Apply to groin area      pantoprazole (PROTONIX) 40 mg tablet 40 mg daily      rOPINIRole (REQUIP) 1 mg tablet Take 1 mg by mouth if needed (2x per day for contractures)      saccharomyces boulardii (FLORASTOR) 250 mg capsule Take 250 mg by mouth 2 (two) times a day      senna-docusate sodium (SENOKOT S) 8.6-50 mg per tablet 1 (one) time each day at the same time.      SSD 1 % cream        No current facility-administered medications on file prior to visit.     Objective   /75 (BP Location: Left arm, Patient Position: Sitting, Cuff Size: Large)   Pulse 74   SpO2 99%      Physical Exam  Vitals and nursing note reviewed. Exam conducted with a chaperone present.   Constitutional:       General: He is not in acute distress.     Appearance: He is well-developed. He  "is not diaphoretic.   HENT:      Head: Normocephalic and atraumatic.   Eyes:      General: No scleral icterus.     Pupils: Pupils are equal, round, and reactive to light.   Cardiovascular:      Rate and Rhythm: Normal rate and regular rhythm.      Heart sounds: Normal heart sounds. No murmur heard.     No friction rub. No gallop.   Pulmonary:      Effort: Pulmonary effort is normal. No respiratory distress.      Breath sounds: Normal breath sounds. No wheezing or rales.   Chest:      Chest wall: No tenderness.   Abdominal:      General: Bowel sounds are normal. There is no distension.      Palpations: Abdomen is soft.      Tenderness: There is no abdominal tenderness. There is no rebound.   Musculoskeletal:         General: Normal range of motion.      Cervical back: Normal range of motion and neck supple.   Skin:     General: Skin is dry.      Coloration: Skin is pale.      Findings: Erythema present. No rash.   Neurological:      Mental Status: He is alert. Mental status is at baseline.           Laboratory Results:        Invalid input(s): \"ALBUMIN\"    Results for orders placed or performed during the hospital encounter of 02/20/25   Urine culture    Specimen: Urine, Cystoscopic   Result Value Ref Range    Urine Culture >100,000 cfu/ml Proteus mirabilis (A)        Susceptibility    Proteus mirabilis - SACHIN     ZID Performed Yes       Ampicillin ($$) <=8.00 Susceptible ug/ml     Aztreonam ($$$)  <=4 Susceptible ug/ml     Cefazolin ($) 16.00 Susceptible ug/ml     Ciprofloxacin ($)  >2.00 Resistant ug/ml     Gentamicin ($$) <=2 Susceptible ug/ml     Levofloxacin ($) >4.00 Resistant ug/ml     Nitrofurantoin >64 Resistant ug/ml     Tetracycline >8 Resistant ug/ml     Trimethoprim + Sulfamethoxazole ($$$) <=0.5/9.5 Susceptible ug/ml   MRSA culture    Specimen: Nose; Nares   Result Value Ref Range    MRSA Culture Only (A)      Methicillin Resistant Staphylococcus aureus isolated    MRSA Culture Only       This patient " requires contact isolation precautions per [place] law. Contact precautions are not required in [place] for nasal surveillance cultures.   Blood culture    Specimen: Arm, Left; Blood   Result Value Ref Range    Blood Culture No Growth After 5 Days.    Blood culture    Specimen: Arm, Left; Blood   Result Value Ref Range    Blood Culture No Growth After 5 Days.    CBC and differential   Result Value Ref Range    WBC 9.39 4.31 - 10.16 Thousand/uL    RBC 4.10 3.88 - 5.62 Million/uL    Hemoglobin 11.3 (L) 12.0 - 17.0 g/dL    Hematocrit 38.2 36.5 - 49.3 %    MCV 93 82 - 98 fL    MCH 27.6 26.8 - 34.3 pg    MCHC 29.6 (L) 31.4 - 37.4 g/dL    RDW 13.2 11.6 - 15.1 %    MPV 9.1 8.9 - 12.7 fL    Platelets 303 149 - 390 Thousands/uL   Comprehensive metabolic panel   Result Value Ref Range    Sodium 135 135 - 147 mmol/L    Potassium 4.2 3.5 - 5.3 mmol/L    Chloride 107 96 - 108 mmol/L    CO2 20 (L) 21 - 32 mmol/L    ANION GAP 8 4 - 13 mmol/L    BUN 45 (H) 5 - 25 mg/dL    Creatinine 1.95 (H) 0.60 - 1.30 mg/dL    Glucose 197 (H) 65 - 140 mg/dL    Glucose, Fasting 197 (H) 65 - 99 mg/dL    Calcium 8.5 8.4 - 10.2 mg/dL    Corrected Calcium 9.2 8.3 - 10.1 mg/dL    AST 11 (L) 13 - 39 U/L    ALT <3 (L) 7 - 52 U/L    Alkaline Phosphatase 132 (H) 34 - 104 U/L    Total Protein 7.4 6.4 - 8.4 g/dL    Albumin 3.1 (L) 3.5 - 5.0 g/dL    Total Bilirubin 0.58 0.20 - 1.00 mg/dL    eGFR 32 ml/min/1.73sq m   Urinalysis with microscopic   Result Value Ref Range    Color, UA Light Brown     Clarity, UA Turbid     Specific Gravity, UA 1.020 1.005 - 1.030    pH, UA 6.5 4.5, 5.0, 5.5, 6.0, 6.5, 7.0, 7.5, 8.0    Leukocytes, UA Large (A) Negative    Nitrite, UA Negative Negative    Protein,  (3+) (A) Negative mg/dl    Glucose, UA Negative Negative mg/dl    Ketones, UA Negative Negative mg/dl    Urobilinogen, UA <2.0 <2.0 mg/dl mg/dl    Bilirubin, UA Negative Negative    Occult Blood, UA Large (A) Negative    RBC, UA Field obscured, unable to  enumerate (A) None Seen, 2-4 /hpf    WBC, UA Innumerable (A) None Seen, 2-4, 5-60 /hpf    Epithelial Cells Field obscured, unable to enumerate (A) None Seen, Occasional /hpf    Bacteria, UA Field obscured, unable to enumerate (A) None Seen, Occasional /hpf   Lactic acid, plasma (w/reflex if result > 2.0)   Result Value Ref Range    LACTIC ACID 0.8 0.5 - 2.0 mmol/L   Procalcitonin   Result Value Ref Range    Procalcitonin 86.47 (H) <=0.25 ng/ml   CBC and differential   Result Value Ref Range    WBC 8.10 4.31 - 10.16 Thousand/uL    RBC 3.43 (L) 3.88 - 5.62 Million/uL    Hemoglobin 9.7 (L) 12.0 - 17.0 g/dL    Hematocrit 32.9 (L) 36.5 - 49.3 %    MCV 96 82 - 98 fL    MCH 28.3 26.8 - 34.3 pg    MCHC 29.5 (L) 31.4 - 37.4 g/dL    RDW 13.7 11.6 - 15.1 %    MPV 9.4 8.9 - 12.7 fL    Platelets 184 149 - 390 Thousands/uL    nRBC 0 /100 WBCs    Segmented % 84 (H) 43 - 75 %    Immature Grans % 0 0 - 2 %    Lymphocytes % 6 (L) 14 - 44 %    Monocytes % 9 4 - 12 %    Eosinophils Relative 1 0 - 6 %    Basophils Relative 0 0 - 1 %    Absolute Neutrophils 6.76 1.85 - 7.62 Thousands/µL    Absolute Immature Grans 0.03 0.00 - 0.20 Thousand/uL    Absolute Lymphocytes 0.52 (L) 0.60 - 4.47 Thousands/µL    Absolute Monocytes 0.70 0.17 - 1.22 Thousand/µL    Eosinophils Absolute 0.07 0.00 - 0.61 Thousand/µL    Basophils Absolute 0.02 0.00 - 0.10 Thousands/µL   Basic metabolic panel   Result Value Ref Range    Sodium 132 (L) 135 - 147 mmol/L    Potassium 4.7 3.5 - 5.3 mmol/L    Chloride 101 96 - 108 mmol/L    CO2 22 21 - 32 mmol/L    ANION GAP 9 4 - 13 mmol/L    BUN 48 (H) 5 - 25 mg/dL    Creatinine 2.36 (H) 0.60 - 1.30 mg/dL    Glucose 152 (H) 65 - 140 mg/dL    Glucose, Fasting 152 (H) 65 - 99 mg/dL    Calcium 7.6 (L) 8.4 - 10.2 mg/dL    eGFR 25 ml/min/1.73sq m   Hemoglobin A1C   Result Value Ref Range    Hemoglobin A1C 11.2 (H) Normal 4.0-5.6%; PreDiabetic 5.7-6.4%; Diabetic >=6.5%; Glycemic control for adults with diabetes <7.0% %      mg/dl   Vitamin B12   Result Value Ref Range    Vitamin B-12 669 180 - 914 pg/mL   Folate   Result Value Ref Range    Folate >22.3 >5.9 ng/mL   CBC and differential   Result Value Ref Range    WBC 5.38 4.31 - 10.16 Thousand/uL    RBC 3.54 (L) 3.88 - 5.62 Million/uL    Hemoglobin 9.9 (L) 12.0 - 17.0 g/dL    Hematocrit 32.0 (L) 36.5 - 49.3 %    MCV 90 82 - 98 fL    MCH 28.0 26.8 - 34.3 pg    MCHC 30.9 (L) 31.4 - 37.4 g/dL    RDW 13.9 11.6 - 15.1 %    MPV 9.6 8.9 - 12.7 fL    Platelets 211 149 - 390 Thousands/uL    nRBC 0 /100 WBCs    Segmented % 76 (H) 43 - 75 %    Immature Grans % 1 0 - 2 %    Lymphocytes % 10 (L) 14 - 44 %    Monocytes % 11 4 - 12 %    Eosinophils Relative 2 0 - 6 %    Basophils Relative 0 0 - 1 %    Absolute Neutrophils 4.04 1.85 - 7.62 Thousands/µL    Absolute Immature Grans 0.06 0.00 - 0.20 Thousand/uL    Absolute Lymphocytes 0.52 (L) 0.60 - 4.47 Thousands/µL    Absolute Monocytes 0.61 0.17 - 1.22 Thousand/µL    Eosinophils Absolute 0.13 0.00 - 0.61 Thousand/µL    Basophils Absolute 0.02 0.00 - 0.10 Thousands/µL   Basic metabolic panel   Result Value Ref Range    Sodium 135 135 - 147 mmol/L    Potassium 4.4 3.5 - 5.3 mmol/L    Chloride 103 96 - 108 mmol/L    CO2 22 21 - 32 mmol/L    ANION GAP 10 4 - 13 mmol/L    BUN 51 (H) 5 - 25 mg/dL    Creatinine 2.54 (H) 0.60 - 1.30 mg/dL    Glucose 215 (H) 65 - 140 mg/dL    Calcium 7.9 (L) 8.4 - 10.2 mg/dL    eGFR 23 ml/min/1.73sq m   CBC and differential   Result Value Ref Range    WBC 5.22 4.31 - 10.16 Thousand/uL    RBC 3.49 (L) 3.88 - 5.62 Million/uL    Hemoglobin 9.8 (L) 12.0 - 17.0 g/dL    Hematocrit 31.8 (L) 36.5 - 49.3 %    MCV 91 82 - 98 fL    MCH 28.1 26.8 - 34.3 pg    MCHC 30.8 (L) 31.4 - 37.4 g/dL    RDW 13.8 11.6 - 15.1 %    MPV 9.9 8.9 - 12.7 fL    Platelets 207 149 - 390 Thousands/uL    nRBC 0 /100 WBCs    Segmented % 71 43 - 75 %    Immature Grans % 1 0 - 2 %    Lymphocytes % 11 (L) 14 - 44 %    Monocytes % 13 (H) 4 - 12 %    Eosinophils  Relative 4 0 - 6 %    Basophils Relative 0 0 - 1 %    Absolute Neutrophils 3.74 1.85 - 7.62 Thousands/µL    Absolute Immature Grans 0.03 0.00 - 0.20 Thousand/uL    Absolute Lymphocytes 0.55 (L) 0.60 - 4.47 Thousands/µL    Absolute Monocytes 0.68 0.17 - 1.22 Thousand/µL    Eosinophils Absolute 0.21 0.00 - 0.61 Thousand/µL    Basophils Absolute 0.01 0.00 - 0.10 Thousands/µL   Basic metabolic panel   Result Value Ref Range    Sodium 137 135 - 147 mmol/L    Potassium 4.2 3.5 - 5.3 mmol/L    Chloride 108 96 - 108 mmol/L    CO2 21 21 - 32 mmol/L    ANION GAP 8 4 - 13 mmol/L    BUN 41 (H) 5 - 25 mg/dL    Creatinine 1.92 (H) 0.60 - 1.30 mg/dL    Glucose 131 65 - 140 mg/dL    Calcium 8.1 (L) 8.4 - 10.2 mg/dL    eGFR 32 ml/min/1.73sq m   CBC and differential   Result Value Ref Range    WBC 4.82 4.31 - 10.16 Thousand/uL    RBC 3.45 (L) 3.88 - 5.62 Million/uL    Hemoglobin 9.5 (L) 12.0 - 17.0 g/dL    Hematocrit 31.6 (L) 36.5 - 49.3 %    MCV 92 82 - 98 fL    MCH 27.5 26.8 - 34.3 pg    MCHC 30.1 (L) 31.4 - 37.4 g/dL    RDW 13.6 11.6 - 15.1 %    MPV 9.1 8.9 - 12.7 fL    Platelets 195 149 - 390 Thousands/uL    nRBC 0 /100 WBCs    Segmented % 67 43 - 75 %    Immature Grans % 1 0 - 2 %    Lymphocytes % 15 14 - 44 %    Monocytes % 12 4 - 12 %    Eosinophils Relative 5 0 - 6 %    Basophils Relative 0 0 - 1 %    Absolute Neutrophils 3.24 1.85 - 7.62 Thousands/µL    Absolute Immature Grans 0.03 0.00 - 0.20 Thousand/uL    Absolute Lymphocytes 0.71 0.60 - 4.47 Thousands/µL    Absolute Monocytes 0.60 0.17 - 1.22 Thousand/µL    Eosinophils Absolute 0.22 0.00 - 0.61 Thousand/µL    Basophils Absolute 0.02 0.00 - 0.10 Thousands/µL   Basic metabolic panel   Result Value Ref Range    Sodium 137 135 - 147 mmol/L    Potassium 4.3 3.5 - 5.3 mmol/L    Chloride 107 96 - 108 mmol/L    CO2 23 21 - 32 mmol/L    ANION GAP 7 4 - 13 mmol/L    BUN 34 (H) 5 - 25 mg/dL    Creatinine 1.69 (H) 0.60 - 1.30 mg/dL    Glucose 128 65 - 140 mg/dL    Calcium 7.9 (L)  8.4 - 10.2 mg/dL    eGFR 38 ml/min/1.73sq m   CBC and differential   Result Value Ref Range    WBC 4.49 4.31 - 10.16 Thousand/uL    RBC 3.39 (L) 3.88 - 5.62 Million/uL    Hemoglobin 9.6 (L) 12.0 - 17.0 g/dL    Hematocrit 31.3 (L) 36.5 - 49.3 %    MCV 92 82 - 98 fL    MCH 28.3 26.8 - 34.3 pg    MCHC 30.7 (L) 31.4 - 37.4 g/dL    RDW 13.5 11.6 - 15.1 %    MPV 9.3 8.9 - 12.7 fL    Platelets 193 149 - 390 Thousands/uL    nRBC 0 /100 WBCs    Segmented % 64 43 - 75 %    Immature Grans % 1 0 - 2 %    Lymphocytes % 17 14 - 44 %    Monocytes % 14 (H) 4 - 12 %    Eosinophils Relative 4 0 - 6 %    Basophils Relative 0 0 - 1 %    Absolute Neutrophils 2.85 1.85 - 7.62 Thousands/µL    Absolute Immature Grans 0.03 0.00 - 0.20 Thousand/uL    Absolute Lymphocytes 0.77 0.60 - 4.47 Thousands/µL    Absolute Monocytes 0.64 0.17 - 1.22 Thousand/µL    Eosinophils Absolute 0.18 0.00 - 0.61 Thousand/µL    Basophils Absolute 0.02 0.00 - 0.10 Thousands/µL   Comprehensive metabolic panel   Result Value Ref Range    Sodium 138 135 - 147 mmol/L    Potassium 4.5 3.5 - 5.3 mmol/L    Chloride 110 (H) 96 - 108 mmol/L    CO2 22 21 - 32 mmol/L    ANION GAP 6 4 - 13 mmol/L    BUN 33 (H) 5 - 25 mg/dL    Creatinine 1.79 (H) 0.60 - 1.30 mg/dL    Glucose 118 65 - 140 mg/dL    Calcium 8.0 (L) 8.4 - 10.2 mg/dL    Corrected Calcium 9.0 8.3 - 10.1 mg/dL    AST 10 (L) 13 - 39 U/L    ALT 6 (L) 7 - 52 U/L    Alkaline Phosphatase 103 34 - 104 U/L    Total Protein 6.9 6.4 - 8.4 g/dL    Albumin 2.8 (L) 3.5 - 5.0 g/dL    Total Bilirubin 0.31 0.20 - 1.00 mg/dL    eGFR 35 ml/min/1.73sq m   Fingerstick Glucose (POCT)   Result Value Ref Range    POC Glucose 220 (H) 65 - 140 mg/dl   Fingerstick Glucose (POCT)   Result Value Ref Range    POC Glucose 218 (H) 65 - 140 mg/dl   Fingerstick Glucose (POCT)   Result Value Ref Range    POC Glucose 217 (H) 65 - 140 mg/dl   Fingerstick Glucose (POCT)   Result Value Ref Range    POC Glucose 182 (H) 65 - 140 mg/dl   Fingerstick Glucose  (POCT)   Result Value Ref Range    POC Glucose 305 (H) 65 - 140 mg/dl   Fingerstick Glucose (POCT)   Result Value Ref Range    POC Glucose 189 (H) 65 - 140 mg/dl   Fingerstick Glucose (POCT)   Result Value Ref Range    POC Glucose 234 (H) 65 - 140 mg/dl   Fingerstick Glucose (POCT)   Result Value Ref Range    POC Glucose 191 (H) 65 - 140 mg/dl   Fingerstick Glucose (POCT)   Result Value Ref Range    POC Glucose 245 (H) 65 - 140 mg/dl   Fingerstick Glucose (POCT)   Result Value Ref Range    POC Glucose 181 (H) 65 - 140 mg/dl   Fingerstick Glucose (POCT)   Result Value Ref Range    POC Glucose 263 (H) 65 - 140 mg/dl   Fingerstick Glucose (POCT)   Result Value Ref Range    POC Glucose 220 (H) 65 - 140 mg/dl   Fingerstick Glucose (POCT)   Result Value Ref Range    POC Glucose 149 (H) 65 - 140 mg/dl   Fingerstick Glucose (POCT)   Result Value Ref Range    POC Glucose 215 (H) 65 - 140 mg/dl   Fingerstick Glucose (POCT)   Result Value Ref Range    POC Glucose 124 65 - 140 mg/dl   Fingerstick Glucose (POCT)   Result Value Ref Range    POC Glucose 214 (H) 65 - 140 mg/dl   Manual Differential(PHLEBS Do Not Order)   Result Value Ref Range    Segmented % 78 (H) 43 - 75 %    Bands % 16 (H) 0 - 8 %    Lymphocytes % 5 (L) 14 - 44 %    Monocytes % 1 (L) 4 - 12 %    Eosinophils % 0 0 - 6 %    Basophils % 0 0 - 1 %    Absolute Neutrophils 8.83 (H) 1.85 - 7.62 Thousand/uL    Absolute Lymphocytes 0.47 (L) 0.60 - 4.47 Thousand/uL    Absolute Monocytes 0.09 0.00 - 1.22 Thousand/uL    Absolute Eosinophils 0.00 0.00 - 0.40 Thousand/uL    Absolute Basophils 0.00 0.00 - 0.10 Thousand/uL    Total Counted      Toxic Granulation Present     Toxic Vacuolization Present     RBC Morphology Present     Platelet Estimate Adequate Adequate    Anisocytosis Present     Macrocytes Present        Administrative Statements   I have spent a total time of 25 minutes in caring for this patient on the day of the visit/encounter including Counseling /  Coordination of care, Documenting in the medical record, Reviewing/placing orders in the medical record (including tests, medications, and/or procedures), Obtaining or reviewing history  , and Communicating with other healthcare professionals \.

## 2025-03-10 NOTE — ASSESSMENT & PLAN NOTE
Wt Readings from Last 3 Encounters:   02/25/25 73 kg (160 lb 15 oz)   12/31/24 67.1 kg (148 lb)   10/02/24 66.4 kg (146 lb 6.4 oz)   -- Appears compensated and euvolemic at this time.  -- Low 2 g sodium diet and daily weights      Orders:    PTH, intact; Future    Phosphorus; Future    CBC and Platelet; Future    Comprehensive metabolic panel; Future    Vitamin D 25 hydroxy; Future

## 2025-03-23 PROBLEM — N39.0 UTI (URINARY TRACT INFECTION): Status: RESOLVED | Noted: 2025-02-21 | Resolved: 2025-03-23

## 2025-05-15 ENCOUNTER — PRE-ADMISSION TESTING (OUTPATIENT)
Dept: PREADMISSION TESTING | Facility: HOSPITAL | Age: 80
End: 2025-05-15
Payer: MEDICARE

## 2025-05-15 RX ORDER — BACLOFEN 10 MG/1
5 TABLET ORAL 3 TIMES DAILY PRN
COMMUNITY

## 2025-05-15 NOTE — PRE-PROCEDURE INSTRUCTIONS
Pre-Surgery Instructions:   Medication Instructions    acetaminophen (TYLENOL) 325 mg tablet Uses PRN- OK to take day of surgery    amLODIPine (NORVASC) 5 mg tablet Take day of surgery.    baclofen 10 mg tablet Uses PRN- OK to take day of surgery    bisacodyl (Dulcolax) 10 mg suppository Hold day of surgery.    carbidopa-levodopa (SINEMET)  mg per tablet Take day of surgery.    Cranberry 400 MG TABS Hold day of surgery.    docusate sodium (COLACE) 100 mg capsule Hold day of surgery.    Emollient (EUCERIN ADVANCED REPAIR EX) Hold day of surgery.    ferrous sulfate 324 (65 Fe) mg Hold day of surgery.    furosemide (LASIX) 20 mg tablet Hold day of surgery.    insulin glargine (LANTUS) 100 units/mL subcutaneous injection Take night before surgery    levothyroxine 25 mcg tablet Take day of surgery.    magnesium hydroxide (MILK OF MAGNESIA) 400 mg/5 mL oral suspension Hold day of surgery.    Multiple Vitamin (multivitamin) tablet Stop taking 7 days prior to surgery.    nystatin (MYCOSTATIN) powder Hold day of surgery.    pantoprazole (PROTONIX) 40 mg tablet Take day of surgery.    rOPINIRole (REQUIP) 1 mg tablet Uses PRN- OK to take day of surgery    senna-docusate sodium (SENOKOT S) 8.6-50 mg per tablet Hold day of surgery.    SSD 1 % cream Hold day of surgery.   Medication instructions for day of surgery reviewed. Please take all instructed medications with only a sip of water.       You will receive a call one business day prior to surgery with an arrival time and hospital directions. If your surgery is scheduled on a Monday, the hospital will be calling you on the Friday prior to your surgery. If you have not heard from anyone by 8pm, please call the hospital supervisor through the hospital  at [phone]. ([place] [phone] or [place] [phone]).    Do not eat or drink anything after midnight the night before your surgery, including candy, mints, lifesavers, or chewing gum. Do not drink  alcohol 24hrs before your surgery. Try not to smoke at least 24hrs before your surgery.       Follow the pre surgery showering instructions as listed in the “My Surgical Experience Booklet” or otherwise provided by your surgeon's office. Do not use a blade to shave the surgical area 1 week before surgery. It is okay to use a clean electric clippers up to 24 hours before surgery. Do not apply any lotions, creams, including makeup, cologne, deodorant, or perfumes after showering on the day of your surgery. Do not use dry shampoo, hair spray, hair gel, or any type of hair products.     No contact lenses, eye make-up, or artificial eyelashes. Remove nail polish, including gel polish, and any artificial, gel, or acrylic nails if possible. Remove all jewelry including rings and body piercing jewelry.     Wear causal clothing that is easy to take on and off. Consider your type of surgery.    Keep any valuables, jewelry, piercings at home. Please bring any specially ordered equipment (sling, braces) if indicated.    Arrange for a responsible person to drive you to and from the hospital on the day of your surgery. Please confirm the visitor policy for the day of your procedure when you receive your phone call with an arrival time.     Call the surgeon's office with any new illnesses, exposures, or additional questions prior to surgery.    Please reference your “My Surgical Experience Booklet” for additional information to prepare for your upcoming surgery.    FAXED TO FACILITY @ [phone]

## 2025-05-21 ENCOUNTER — ANESTHESIA EVENT (OUTPATIENT)
Dept: PERIOP | Facility: HOSPITAL | Age: 80
End: 2025-05-21
Payer: MEDICARE

## 2025-05-21 PROBLEM — M54.50 LOW BACK PAIN: Status: ACTIVE | Noted: 2025-05-21

## 2025-05-21 NOTE — H&P
"H&P Exam - Urology       Patient: Chris Bojorquez   : 1945 Sex: male   MRN: [id number]     CSN: [id number]      History of Present Illness   HPI:  Chris Bojorquez is a 80 y.o. male who presents with chronic purulent urine struvite stones right stent attempting multiple ureteroscopy's canceled by infections now after discussion with son POA to undergo cystoscopy right stent exchange to be done at 3 to 4-month intervals aware risk of anesthesia infection bleeding additional urologic procedures        Review of Systems:   Constitutional:  Negative for activity change, fever, chills and diaphoresis.   HENT: Negative for hearing loss and trouble swallowing.   Eyes: Negative for itching and visual disturbance.   Respiratory: Negative for chest tightness and shortness of breath.   Cardiovascular: Negative for chest pain, edema.   Gastrointestinal: Negative for abdominal distention, na abdominal pain, constipation, diarrhea, Nausea and vomiting.   Genitourinary: Negative for decreased urine volume, difficulty urinating, dysuria, enuresis, frequency, hematuria and urgency.   Musculoskeletal: Negative for gait problem and myalgias.   Neurological: Negative for dizziness and headaches.   Hematological: Does not bruise/bleed easily.       Historical Information   Past Medical History[1]  Past Surgical History[2]  Social History   Social History     Substance and Sexual Activity   Alcohol Use Not Currently     Social History     Substance and Sexual Activity   Drug Use Not Currently     Tobacco Use History[3]  Family History: Family History[4]    Meds/Allergies   No medications prior to admission.  Allergies[5]    Objective   Vitals: Ht 5' 7\" (1.702 m)   Wt 76.2 kg (168 lb)   BMI 26.31 kg/m²     Physical Exam:  General Alert awake   Normocephalic atraumatic PERRLA  Lungs clear bilaterally  Cardiac normal S1 normal S2  Abdomen soft, flank pain  Extremities no edema    No intake/output data recorded.    Invasive " "Devices       Peripheral Intravenous Line  Duration             Peripheral IV 02/20/25 Dorsal (posterior);Left Hand 90 days              Drain  Duration             Ureteral Internal Stent Right ureter 6 Fr. 90 days    External Urinary Catheter Small 85 days                        Lab Results: CBC:   Lab Results   Component Value Date    WBC 4.49 02/25/2025    HGB 9.6 (L) 02/25/2025    HCT 31.3 (L) 02/25/2025    MCV 92 02/25/2025     02/25/2025    RBC 3.39 (L) 02/25/2025    MCH 28.3 02/25/2025    MCHC 30.7 (L) 02/25/2025    RDW 13.5 02/25/2025    MPV 9.3 02/25/2025    NRBC 0 02/25/2025     CMP:   Lab Results   Component Value Date     (H) 02/25/2025    CO2 22 02/25/2025    BUN 33 (H) 02/25/2025    CREATININE 1.79 (H) 02/25/2025    CALCIUM 8.0 (L) 02/25/2025    AST 10 (L) 02/25/2025    ALT 6 (L) 02/25/2025    ALKPHOS 103 02/25/2025    EGFR 35 02/25/2025     Urinalysis:   Lab Results   Component Value Date    COLORU Light Brown 02/21/2025    CLARITYU Turbid 02/21/2025    SPECGRAV 1.020 02/21/2025    PHUR 6.5 02/21/2025    LEUKOCYTESUR Large (A) 02/21/2025    NITRITE Negative 02/21/2025    GLUCOSEU Negative 02/21/2025    KETONESU Negative 02/21/2025    BILIRUBINUR Negative 02/21/2025    BLOODU Large (A) 02/21/2025     Urine Culture:   Lab Results   Component Value Date    URINECX >100,000 cfu/ml Proteus mirabilis (A) 02/20/2025     PSA: No results found for: \"PSA\"        Assessment/ Plan:  Cystoscopy right stent exchange possible ureteroscopy      Ciro Hughes MD       [1]   Past Medical History:  Diagnosis Date    Acute metabolic encephalopathy 08/26/2024    Anemia     Anxiety     Bacteremia due to Proteus species 08/27/2024    CHF (congestive heart failure) (HCC)     Chronic kidney disease     stage 3    Diabetes (HCC)     Diabetes mellitus (HCC)     Disease of thyroid gland     Dysphagia     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    GERD (gastroesophageal reflux disease)     Gross hematuria 02/20/2025    " Heart failure (HCC)     Hypertension     Kidney stone     Parkinson disease (HCC)     PVD (peripheral vascular disease) (HCC)     Spinal stenosis     Urinary retention    [2]   Past Surgical History:  Procedure Laterality Date    FL RETROGRADE PYELOGRAM  9/26/2024    FL RETROGRADE PYELOGRAM  2/20/2025    MO CYSTO W/INSERT URETERAL STENT Right 2/20/2025    Procedure: INSERTION URETERAL STENT;  Surgeon: Ciro Hughes MD;  Location: [facility];  Service: Urology    MO CYSTO/URETERO W/LITHOTRIPSY &INDWELL STENT INSRT Right 9/26/2024    Procedure: CYSTOSCOPY URETEROSCOPY, INSERTION STENT URETERAL, STONE EXTRACTION, [place] at [facility] [place];  Surgeon: Ciro Hughes MD;  Location: [facility];  Service: Urology    MO CYSTO/URETERO W/LITHOTRIPSY &INDWELL STENT INSRT Right 1/9/2025    Procedure: CYSTOSCOPY,  LITHOTRIPSY HOLMIUM LASER LARGE BLADDER STONE, EXCHANGE ENCRUSTED STENT URETERAL;  Surgeon: Ciro Hughes MD;  Location: [facility];  Service: Urology    MO CYSTO/URETERO W/LITHOTRIPSY &INDWELL STENT INSRT Right 2/20/2025    Procedure: CYSTOSCOPY, URETEROSCOPY, RETROGRADE PYLEOGRAM, LITHOTRISPY HOLMIUM LASER;  Surgeon: Ciro Hughes MD;  Location: [facility];  Service: Urology    MO CYSTOURETHROSCOPY W/URETERAL CATHETERIZATION Right 8/29/2024    Procedure: CYSTOSCOPY WITH INSERTION RIGHT STENT URETERAL removal large 3cm bladder stone;  Surgeon: Ciro Hughes MD;  Location: [facility];  Service: Urology   [3]   Social History  Tobacco Use   Smoking Status Unknown   Smokeless Tobacco Not on file   [4] No family history on file.  [5]   Allergies  Allergen Reactions    Lactose - Food Allergy GI Intolerance    Sulfa Antibiotics Other (See Comments)     Unknown reaction

## 2025-05-21 NOTE — ANESTHESIA PREPROCEDURE EVALUATION
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      Other Findings        Anesthesia Plan  ASA Score- 3     Anesthesia Type- general with ASA Monitors.         Additional Monitors:     Airway Plan: LMA and LMA.           Plan Factors-    Chart reviewed.        Patient is not a current smoker.              Induction- intravenous.    Postoperative Plan- Plan for postoperative opioid use.   Monitoring Plan - Monitoring plan - standard ASA monitoring  Post Operative Pain Plan - plan for postoperative opioid use    Perioperative Resuscitation Plan - Level 1 - Full Code.       Informed Consent- Anesthetic plan and risks discussed with patient.  I personally reviewed this patient with the CRNA. Discussed and agreed on the Anesthesia Plan with the CRNA..      NPO Status:  No vitals data found for the desired time range.

## 2025-05-22 ENCOUNTER — HOSPITAL ENCOUNTER (OUTPATIENT)
Facility: HOSPITAL | Age: 80
Setting detail: OUTPATIENT SURGERY
Discharge: NURSING, REHAB OR LONG-TERM CARE | End: 2025-05-22
Attending: SPECIALIST | Admitting: SPECIALIST
Payer: MEDICARE

## 2025-05-22 ENCOUNTER — ANESTHESIA (OUTPATIENT)
Dept: PERIOP | Facility: HOSPITAL | Age: 80
End: 2025-05-22
Payer: MEDICARE

## 2025-05-22 ENCOUNTER — APPOINTMENT (OUTPATIENT)
Dept: RADIOLOGY | Facility: HOSPITAL | Age: 80
End: 2025-05-22
Payer: MEDICARE

## 2025-05-22 VITALS
HEART RATE: 98 BPM | OXYGEN SATURATION: 98 % | DIASTOLIC BLOOD PRESSURE: 69 MMHG | SYSTOLIC BLOOD PRESSURE: 140 MMHG | WEIGHT: 168 LBS | BODY MASS INDEX: 26.37 KG/M2 | HEIGHT: 67 IN | RESPIRATION RATE: 18 BRPM | TEMPERATURE: 97.6 F

## 2025-05-22 LAB — GLUCOSE SERPL-MCNC: 121 MG/DL (ref 65–140)

## 2025-05-22 PROCEDURE — C2617 STENT, NON-COR, TEM W/O DEL: HCPCS | Performed by: SPECIALIST

## 2025-05-22 PROCEDURE — C1769 GUIDE WIRE: HCPCS | Performed by: SPECIALIST

## 2025-05-22 PROCEDURE — 74420 UROGRAPHY RTRGR +-KUB: CPT

## 2025-05-22 PROCEDURE — 82948 REAGENT STRIP/BLOOD GLUCOSE: CPT

## 2025-05-22 DEVICE — INLAY URETERAL STENT W/O GUIDEWIRE
Type: IMPLANTABLE DEVICE | Site: URETER | Status: FUNCTIONAL
Brand: BARD® INLAY® URETERAL STENT

## 2025-05-22 RX ORDER — EPHEDRINE SULFATE 50 MG/ML
INJECTION INTRAVENOUS AS NEEDED
Status: DISCONTINUED | OUTPATIENT
Start: 2025-05-22 | End: 2025-05-22

## 2025-05-22 RX ORDER — SODIUM CHLORIDE, SODIUM LACTATE, POTASSIUM CHLORIDE, CALCIUM CHLORIDE 600; 310; 30; 20 MG/100ML; MG/100ML; MG/100ML; MG/100ML
75 INJECTION, SOLUTION INTRAVENOUS CONTINUOUS
Status: DISCONTINUED | OUTPATIENT
Start: 2025-05-22 | End: 2025-05-22 | Stop reason: HOSPADM

## 2025-05-22 RX ORDER — DEXAMETHASONE SODIUM PHOSPHATE 10 MG/ML
INJECTION, SOLUTION INTRAMUSCULAR; INTRAVENOUS AS NEEDED
Status: DISCONTINUED | OUTPATIENT
Start: 2025-05-22 | End: 2025-05-22

## 2025-05-22 RX ORDER — ONDANSETRON 2 MG/ML
4 INJECTION INTRAMUSCULAR; INTRAVENOUS ONCE AS NEEDED
Status: DISCONTINUED | OUTPATIENT
Start: 2025-05-22 | End: 2025-05-22 | Stop reason: HOSPADM

## 2025-05-22 RX ORDER — MAGNESIUM HYDROXIDE 1200 MG/15ML
LIQUID ORAL AS NEEDED
Status: DISCONTINUED | OUTPATIENT
Start: 2025-05-22 | End: 2025-05-22 | Stop reason: HOSPADM

## 2025-05-22 RX ORDER — PROPOFOL 10 MG/ML
INJECTION, EMULSION INTRAVENOUS AS NEEDED
Status: DISCONTINUED | OUTPATIENT
Start: 2025-05-22 | End: 2025-05-22

## 2025-05-22 RX ORDER — ALBUTEROL SULFATE 0.83 MG/ML
2.5 SOLUTION RESPIRATORY (INHALATION) ONCE AS NEEDED
Status: DISCONTINUED | OUTPATIENT
Start: 2025-05-22 | End: 2025-05-22 | Stop reason: HOSPADM

## 2025-05-22 RX ORDER — FENTANYL CITRATE/PF 50 MCG/ML
25 SYRINGE (ML) INJECTION
Status: DISCONTINUED | OUTPATIENT
Start: 2025-05-22 | End: 2025-05-22 | Stop reason: HOSPADM

## 2025-05-22 RX ORDER — HYDROMORPHONE HCL IN WATER/PF 6 MG/30 ML
0.2 PATIENT CONTROLLED ANALGESIA SYRINGE INTRAVENOUS
Status: DISCONTINUED | OUTPATIENT
Start: 2025-05-22 | End: 2025-05-22 | Stop reason: HOSPADM

## 2025-05-22 RX ORDER — LIDOCAINE HYDROCHLORIDE 10 MG/ML
INJECTION, SOLUTION EPIDURAL; INFILTRATION; INTRACAUDAL; PERINEURAL AS NEEDED
Status: DISCONTINUED | OUTPATIENT
Start: 2025-05-22 | End: 2025-05-22

## 2025-05-22 RX ORDER — CEFAZOLIN SODIUM 2 G/50ML
2000 SOLUTION INTRAVENOUS ONCE
Status: COMPLETED | OUTPATIENT
Start: 2025-05-22 | End: 2025-05-22

## 2025-05-22 RX ORDER — ONDANSETRON 2 MG/ML
INJECTION INTRAMUSCULAR; INTRAVENOUS AS NEEDED
Status: DISCONTINUED | OUTPATIENT
Start: 2025-05-22 | End: 2025-05-22

## 2025-05-22 RX ADMIN — EPHEDRINE SULFATE 25 MG: 50 INJECTION, SOLUTION INTRAVENOUS at 13:39

## 2025-05-22 RX ADMIN — LIDOCAINE HYDROCHLORIDE 50 MG: 10 INJECTION, SOLUTION EPIDURAL; INFILTRATION; INTRACAUDAL; PERINEURAL at 13:26

## 2025-05-22 RX ADMIN — ONDANSETRON 4 MG: 2 INJECTION INTRAMUSCULAR; INTRAVENOUS at 13:26

## 2025-05-22 RX ADMIN — DEXAMETHASONE SODIUM PHOSPHATE 10 MG: 10 INJECTION, SOLUTION INTRAMUSCULAR; INTRAVENOUS at 13:26

## 2025-05-22 RX ADMIN — IOHEXOL 10 ML: 240 INJECTION, SOLUTION INTRATHECAL; INTRAVASCULAR; INTRAVENOUS; ORAL at 15:43

## 2025-05-22 RX ADMIN — PROPOFOL 130 MG: 10 INJECTION, EMULSION INTRAVENOUS at 13:26

## 2025-05-22 RX ADMIN — SODIUM CHLORIDE, SODIUM LACTATE, POTASSIUM CHLORIDE, AND CALCIUM CHLORIDE: .6; .31; .03; .02 INJECTION, SOLUTION INTRAVENOUS at 13:03

## 2025-05-22 RX ADMIN — CEFAZOLIN SODIUM 2000 MG: 2 SOLUTION INTRAVENOUS at 13:28

## 2025-05-22 NOTE — ANESTHESIA POSTPROCEDURE EVALUATION
Post-Op Assessment Note    CV Status:  Stable  Pain Score: 1    Pain management: adequate       Mental Status:  Awake   Hydration Status:  Stable   PONV Controlled:  None   Airway Patency:  Patent     Post Op Vitals Reviewed: Yes    No anethesia notable event occurred.    Staff: Anesthesiologist           Last Filed PACU Vitals:  Vitals Value Taken Time   Temp 97.6 °F (36.4 °C) 05/22/25 13:54   Pulse 71 05/22/25 14:29   /63 05/22/25 14:29   Resp 11 05/22/25 14:29   SpO2 98 % 05/22/25 14:29       Modified Lacho:     Vitals Value Taken Time   Activity 2 05/22/25 14:29   Respiration 2 05/22/25 14:29   Circulation 2 05/22/25 14:29   Consciousness 1 05/22/25 14:29   Oxygen Saturation 2 05/22/25 14:29     Modified Lacho Score: 9

## 2025-05-22 NOTE — PERIOPERATIVE NURSING NOTE
Received patient from PACU in room 5, bedside report received from PACU RN Maria G, patient is alert and awake, VSS, denies pain, no distress noted, yang draining Pinkish urine Side rails up bilaterally,call bell placed in reach, stretcher placed in the lowest position, Patient is tolerating PO fluids,will continue to monitor patient until d/c criteria met.

## 2025-05-22 NOTE — ANESTHESIA POSTPROCEDURE EVALUATION
Post-Op Assessment Note    CV Status:  Stable  Pain Score: 0    Pain management: adequate       Mental Status:  Sleepy   Hydration Status:  Euvolemic   PONV Controlled:  Controlled   Airway Patency:  Patent     Post Op Vitals Reviewed: Yes    No anethesia notable event occurred.    Staff: Anesthesiologist, CRNA           Last Filed PACU Vitals:  Vitals Value Taken Time   Temp 97.6    Pulse 72    /63    Resp 16    SpO2 99

## 2025-05-22 NOTE — OP NOTE
OPERATIVE REPORT  PATIENT NAME: Chris Bojorquez    :  1945  MRN: [id number]  Pt Location: [facility] OR ROOM 04    SURGERY DATE: 2025    Surgeons and Role:     * Ciro Hughes MD - Primary    Preop Diagnosis:  Calculus of kidney [N20.0]    Post-Op Diagnosis Codes:     * Calculus of kidney [N20.0]    Procedure(s):  Right - CYSTOSCOPY. LITHOPAXY 4CM BLADDER STONE.  STENT EXCHANGE    Specimen(s):  * No specimens in log *    Estimated Blood Loss:   Minimal    Drains:  Urethral Catheter Coude 20 Fr. (Active)   Collection Container Standard drainage bag 25 1354   Securement Method Leg strap 25 1354   Number of days: 0       Ureteral Internal Stent Right ureter 6 Fr. (Active)   Number of days: 91       External Urinary Catheter Small (Active)   Number of days: 86       Anesthesia Type:   General    Operative Indications:  Calculus of kidney [N20.0]  80-year-old male with history of chronic right renal stones/struvite chronic UTIs now to undergo cystoscopy right stent exchange at 3-month intervals aware risk of anesthesia infection bleeding additional Yannes procedures    Operative Findings:  4 cm bladder stone fragmented with graspers removed  24 cm 6 Congolese stent exchanged  20 Congolese coudé catheter left the bed drainage  Purulent urine culture sent Keflex started outpatient Rivera to removed on Tuesday at nursing home       Complications:   None    Procedure and Technique:  Patient identified in holding area consent discussed and signed by patient with daughter listening on phone wished to proceed with stent exchange placed in the OR suite after anesthesia was induced placed in lithotomy position draped and prepped in standard fashion timeout performed anteriorly showed no other maladies post urethra confirmed a 3.0 trilobar obstructing prostatic urethra scope was inserted to the bladder lumen severely purulent urine noted large 4 to 5 cm bladder stone noted 0.038 wire passed up the right orifice into  the right kidney stent removed with alligator over the wire 24 cm 6 Uzbek stent was passed the wire removed the the bladder stone forceps were then placed and the stone was fragmented and crushed removed with the Trin[place]ik scope removed 20 Uzbek coudé cath inserted left the bag drainage time of dictation urine mildly purulent patient to have procedure well awakened taken to recovery room stable condition   I was present for the entire procedure.    Patient Disposition:  PACU          SIGNATURE: Ciro Hughes MD  DATE: May 22, 2025  TIME: 3:21 PM

## 2025-05-22 NOTE — PROGRESS NOTES
"Progress Note - Urology      Patient: Chris Bojorquez   : 1945 Sex: male   MRN: [id number]     CSN: [id number]  Unit/Bed#: OR POOL     SUBJECTIVE:   Patient surgical prep unit no change to history physical to undergo cystoscopy stent exchange only son POA      Objective   Vitals: /69   Pulse 68   Temp 97.7 °F (36.5 °C) (Temporal)   Resp 16   Ht 5' 7\" (1.702 m)   Wt 76.2 kg (168 lb)   SpO2 98%   BMI 26.31 kg/m²     No intake/output data recorded.      Physical Exam:   General Alert awake   Normocephalic atraumatic PERRLA  Lungs clear bilaterally  Cardiac normal S1 normal S2  Abdomen soft, flank pain  Extremities no edema      Lab Results: CBC:   Lab Results   Component Value Date    WBC 4.49 2025    HGB 9.6 (L) 2025    HCT 31.3 (L) 2025    MCV 92 2025     2025    RBC 3.39 (L) 2025    MCH 28.3 2025    MCHC 30.7 (L) 2025    RDW 13.5 2025    MPV 9.3 2025    NRBC 0 2025     CMP:   Lab Results   Component Value Date     (H) 2025    CO2 22 2025    BUN 33 (H) 2025    CREATININE 1.79 (H) 2025    CALCIUM 8.0 (L) 2025    AST 10 (L) 2025    ALT 6 (L) 2025    ALKPHOS 103 2025    EGFR 35 2025     Urinalysis:   Lab Results   Component Value Date    COLORU Light Brown 2025    CLARITYU Turbid 2025    SPECGRAV 1.020 2025    PHUR 6.5 2025    LEUKOCYTESUR Large (A) 2025    NITRITE Negative 2025    GLUCOSEU Negative 2025    KETONESU Negative 2025    BILIRUBINUR Negative 2025    BLOODU Large (A) 2025     Urine Culture:   Lab Results   Component Value Date    URINECX >100,000 cfu/ml Proteus mirabilis (A) 2025     PSA: No results found for: \"PSA\"      Assessment/ Plan:  Cystoscopy right stent exchange possible ureteroscopy          Ciro Hughes MD  "

## 2025-05-22 NOTE — DISCHARGE INSTR - AVS FIRST PAGE
#1 no heavy straining or lifting above 10 pounds for 2 weeks    #2 call office fevers, chills, or worsening blood in the urine.    #3  Patient to have Rivera catheter removed by nursing home staff Tuesday, May 27  Start Keflex 5 mg twice a day  Patient scheduled for next cystoscopy stent exchange August 21      Ciro Hughes M.D. [facility] office  [address] [place] [address].  [place] [address]  [phone]  8:30 AM to 4:30 PM  Monday through Friday    [facility]  [address]  [address]  [place] [address]  [phone]  1:00 to 5:00 PM  Wednesday

## 2025-05-22 NOTE — PERIOPERATIVE NURSING NOTE
Patient d/c to [facility] at this time with transport team Via stretcher. Pt left with all belongings. Iv was D/C intact with dry sterile dressing.

## 2025-05-22 NOTE — NURSING NOTE
Report called to JUSTYNA Lopez at [facility], Advised yang to be removed on 5/27 and next cysto stent exchange 8/21. Also advised of script sent for Keflex.

## 2025-07-10 ENCOUNTER — TELEPHONE (OUTPATIENT)
Dept: NEPHROLOGY | Facility: CLINIC | Age: 80
End: 2025-07-10

## 2025-07-10 NOTE — TELEPHONE ENCOUNTER
Called [facility] and they said they will call us back to schedule. I spoke with Josefa and she wanted to clarify if he still needs to come to Nephrology. No letter needed.

## 2025-08-06 ENCOUNTER — TELEPHONE (OUTPATIENT)
Age: 80
End: 2025-08-06

## (undated) DEVICE — POUCH URO CATCHER II STERILE

## (undated) DEVICE — LUBRICANT JELLY SURGILUBE TUBE 4OZ FLIP TOP

## (undated) DEVICE — FIBER STD QUANTA 550 MICRON

## (undated) DEVICE — SPONGE 4 X 4 XRAY 16 PLY STRL LF RFD

## (undated) DEVICE — TOWEL SET X-RAY

## (undated) DEVICE — SPONGE STICK WITH PVP-I: Brand: KENDALL

## (undated) DEVICE — GUIDEWIRE STRGHT TIP 0.038 IN SOLO PLUS

## (undated) DEVICE — GLOVE SRG LF STRL BGL SKNSNS 8 PF

## (undated) DEVICE — SPECIMEN CONTAINER STERILE PEEL PACK

## (undated) DEVICE — CYSTO TUBING TUR Y IRRIGATION

## (undated) DEVICE — FABRIC REINFORCED, SURGICAL GOWN, XL: Brand: CONVERTORS

## (undated) DEVICE — Device

## (undated) DEVICE — CATH FOLEY COUDE 18FR 5ML 2 WAY TIEMANN LUBRICATH

## (undated) DEVICE — TRAY FOLEY 18FR URIMETER SURESTEP

## (undated) DEVICE — CYSTOSCOPY PACK: Brand: CONVERTORS

## (undated) DEVICE — BAG URINE DRAINAGE 2000ML ANTI RFLX LF

## (undated) DEVICE — SINGLE-USE DIGITAL FLEXIBLE URETEROSCOPE: Brand: APTRA

## (undated) DEVICE — EVACUATOR BLADDER ELLIK DISP STRL

## (undated) DEVICE — SOLUTION BOWL: Brand: KENDALL

## (undated) DEVICE — FIBER STD QUANTA 272 MICRON

## (undated) DEVICE — SHEATH URETERAL ACCESS 10/12FR 45CM PROXIS

## (undated) DEVICE — CATH URET POLYURETHANE 5FR 28IN WHISTLE TIP

## (undated) DEVICE — CATH URETERAL 5FR X 70 CM FLEX TIP POLYUR BARD

## (undated) DEVICE — SEAL BIOPSY PORT ADJUST F/ACCESSORIES UP TO 3FR